# Patient Record
Sex: MALE | Race: WHITE | NOT HISPANIC OR LATINO | Employment: OTHER | URBAN - METROPOLITAN AREA
[De-identification: names, ages, dates, MRNs, and addresses within clinical notes are randomized per-mention and may not be internally consistent; named-entity substitution may affect disease eponyms.]

---

## 2019-01-29 ENCOUNTER — TELEPHONE (OUTPATIENT)
Dept: UROLOGY | Facility: CLINIC | Age: 64
End: 2019-01-29

## 2019-01-29 NOTE — TELEPHONE ENCOUNTER
Spoke w pt and informed him I was calling to inquire about any past urologist he has ever seen or any outside psa labs that may have been performed outside of Dr Hua office pt voiced ok yes and that he has previously seen Dr Pradhan records requested.

## 2019-02-03 ENCOUNTER — TELEPHONE (OUTPATIENT)
Dept: UROLOGY | Facility: CLINIC | Age: 64
End: 2019-02-03

## 2019-02-04 ENCOUNTER — LAB VISIT (OUTPATIENT)
Dept: LAB | Facility: HOSPITAL | Age: 64
End: 2019-02-04
Attending: UROLOGY
Payer: COMMERCIAL

## 2019-02-04 ENCOUNTER — OFFICE VISIT (OUTPATIENT)
Dept: UROLOGY | Facility: CLINIC | Age: 64
End: 2019-02-04
Payer: COMMERCIAL

## 2019-02-04 VITALS
SYSTOLIC BLOOD PRESSURE: 121 MMHG | DIASTOLIC BLOOD PRESSURE: 73 MMHG | RESPIRATION RATE: 18 BRPM | BODY MASS INDEX: 26.05 KG/M2 | WEIGHT: 182 LBS | HEART RATE: 71 BPM | HEIGHT: 70 IN

## 2019-02-04 DIAGNOSIS — N40.1 BPH WITH OBSTRUCTION/LOWER URINARY TRACT SYMPTOMS: Primary | ICD-10-CM

## 2019-02-04 DIAGNOSIS — Z12.5 PROSTATE CANCER SCREENING: ICD-10-CM

## 2019-02-04 DIAGNOSIS — N13.8 BPH WITH OBSTRUCTION/LOWER URINARY TRACT SYMPTOMS: Primary | ICD-10-CM

## 2019-02-04 LAB — COMPLEXED PSA SERPL-MCNC: 2.5 NG/ML

## 2019-02-04 PROCEDURE — 99244 PR OFFICE CONSULTATION,LEVEL IV: ICD-10-PCS | Mod: S$GLB,,, | Performed by: UROLOGY

## 2019-02-04 PROCEDURE — 99999 PR PBB SHADOW E&M-EST. PATIENT-LVL III: ICD-10-PCS | Mod: PBBFAC,,, | Performed by: UROLOGY

## 2019-02-04 PROCEDURE — 99244 OFF/OP CNSLTJ NEW/EST MOD 40: CPT | Mod: S$GLB,,, | Performed by: UROLOGY

## 2019-02-04 PROCEDURE — 99999 PR PBB SHADOW E&M-EST. PATIENT-LVL III: CPT | Mod: PBBFAC,,, | Performed by: UROLOGY

## 2019-02-04 PROCEDURE — 84153 ASSAY OF PSA TOTAL: CPT

## 2019-02-04 PROCEDURE — 36415 COLL VENOUS BLD VENIPUNCTURE: CPT

## 2019-02-04 RX ORDER — TAMSULOSIN HYDROCHLORIDE 0.4 MG/1
0.4 CAPSULE ORAL NIGHTLY
Qty: 30 CAPSULE | Refills: 11 | Status: SHIPPED | OUTPATIENT
Start: 2019-02-04 | End: 2019-08-07

## 2019-02-04 RX ORDER — GARLIC 200 MG
TABLET ORAL
COMMUNITY
End: 2019-08-07

## 2019-02-04 RX ORDER — PANTOPRAZOLE SODIUM 40 MG/1
40 TABLET, DELAYED RELEASE ORAL DAILY
COMMUNITY
End: 2019-08-07 | Stop reason: SDUPTHER

## 2019-02-04 RX ORDER — KETOCONAZOLE 20 MG/G
1 CREAM TOPICAL
COMMUNITY
End: 2019-08-07

## 2019-02-04 RX ORDER — AA/PROT/LYSINE/METHIO/VIT C/B6 50-12.5 MG
1 TABLET ORAL
COMMUNITY
End: 2019-08-07

## 2019-02-04 NOTE — TELEPHONE ENCOUNTER
Patient coming in Monday morning 2/4/19 at 0845 generated from referral sent 1/29/19 from Dr Hua office.     Only records received are 9/4/18 clinic note and one psa from 3/5.    Need:   1. most recent 1/2019 clinc note from visit from which referral was generated  2. ALL psas on record throughout his history with the practice (as he has been a patient since at least 2014)    Please CALL to request asap so can be available during his appt this morning

## 2019-02-04 NOTE — PROGRESS NOTES
[facility] Urology Consult:  Consult from: Dr Kemp  Consult for: BPH    Ashish Dave is a 63 y.o. male referred by Dr Kemp for evaluation of BPH/LUTS    He has been followed by Dr Kemp and noted to have BPH/LUTS since at least 2016.  Has been taking prostate supplement capsule Now prostate health. Has been taking them daily, sometimes BID sometimes TID  (Vitamin D-3, zinc, Selenium Saw Palmetto Phytosterols Quercetin Turmeric Root Pumpkin Seed Oil Lycopene Green Tea Extract Pomegranate flaxseed etc)  PSA: 2.8 on 3/1/18    Previously followed by Dr Ness, and then Dr Pradhan when Dr Ness when he retired  Recalls his PSAs were always 1.1-1.2 and was told he had an enlarged prostate     Usually no hesitancy, but does endorse weakening stream. 3/4 time is poor flow. Occ intermittency. Occ PV dribble  Father  at 87 and was diagnosed with prostate cancer in his 80s and it was removed  Stable on supplements, not worsening but not improving.  Generally averse to Rx meds.  NTF x1. Does drink a sleepy time herbal tea at bedtime  No hematuria, dysuria  Udip negative    AUA SS: 13/2 (3: urgency, weak stream; 2: emptying, straining; 1: frequency, intermittency, sleeping)      Past Medical History:   Diagnosis Date    GERD (gastroesophageal reflux disease)        Past Surgical History:   Procedure Laterality Date    KNEE SURGERY         No family history on file.    Social History     Socioeconomic History    Marital status:      Spouse name: Not on file    Number of children: Not on file    Years of education: Not on file    Highest education level: Not on file   Social Needs    Financial resource strain: Not on file    Food insecurity - worry: Not on file    Food insecurity - inability: Not on file    Transportation needs - medical: Not on file    Transportation needs - non-medical: Not on file   Occupational History    Not on file   Tobacco Use    Smoking status: Never Smoker   Substance and  "Sexual Activity    Alcohol use: Not on file    Drug use: Not on file    Sexual activity: Not on file   Other Topics Concern    Not on file   Social History Narrative    Not on file       Review of patient's allergies indicates:  Allergies no known allergies    Medications Reviewed: see MAR    ROS:    Constitutional: denies fevers, chills, night sweats, fatigue, malaise  Respiratory: negative for cough, shortness of breath, wheezing, dyspnea.  Cardiovascular: negative for chest pain, varicose veins, ankle swelling, palpitations, syncope.  GI: negative for abdominal pain, heartburn, indigestion, nausea, vomiting, constipation, diarrhea, blood in stool.   Urology: as noted above in HPI  Endocrinology: negative for cold intolerance, excessive thirst, not feeling tired/sluggish, no heat intolerance.   Hematology/Lymph: negative for easy bleeding, easy bruising, swollen glands.  Musculoskeletal: negative for back pain, joint pain, joint swelling, neck pain.  Allergy-Immunology: negative for seasonal allergies, negative for unusual infections.   Skin: negative for boils, breast lumps, hives, itching, rash.   Neurology: negative for, dizziness, headache, tingling/numbness, tremors.   Psych: satisfied with life; negative for, anxiety, depression, suicidal thoughts.     PHYSICAL EXAM:    Vitals:    02/04/19 0856   BP: 121/73   Pulse: 71   Resp: 18     Body mass index is 26.11 kg/m². Weight: 82.6 kg (181 lb 15.8 oz) Height: 5' 10" (177.8 cm)       General: Alert, cooperative, no distress, appears stated age  Head: Normocephalic, without obvious abnormality, atraumatic  Neck: no masses, no thyromegaly, no lymphadenopathy  Eyes: PERRL, conjunctiva/corneas clear  Lungs: Respirations unlabored, normal effort, no accessory muscle use  CV: Warm and well perfused extremities  Abdomen: Soft, non-tender, no CVA tenderness, no hepatosplenomegaly, no hernia  Penis: phallus normal, circumcised, well cared for, no plaques or lesions. "   Scrotum: no cysts, no lesions, no rash, no hydrocele.   Epididymes: normal, nontender, symmetrical, no masses or cysts.   Testes: normal, both descended, no masses.   Urethra: palpably normal with orthotopic meatus of normal size    HEIDY: normal sphincter tone, no masses, no hemmorrhoids   PROSTATE: 35g, no nodules, non-tender, symmetrical.   Extremities: Extremities normal, atraumatic, no cyanosis or edema  Skin: Normal color, texture, and turgor, no rashes or lesions  Psych: Appropriate, well oriented, normal affect, normal mood  Neuro: Non-focal      Assessment/Diagnosis:    1. BPH with obstruction/lower urinary tract symptoms  POCT URINE DIPSTICK WITHOUT MICROSCOPE   2. Prostate cancer screening  PSA, Screening       Plans:  He does have an enlarged prostate with moderate LUTS.   Has only ever been managed with OTC supplements without much clinical benefit.  Given longstanding symptoms recommended further intervention  Discussed medical therapy and minimally invasive BPH interventions  He is generally medication averse but we did discuss an alpha blocker trial.  Flomax 0.4mg daily Rxed. Discussed common side effects such as dizziness and retrograde ejaculation  Also discussed limiting fluid intake 2h before bed  Daily flomax for 3-4 months then RTC for uroflow/pvr as office based assessment of obstructive voiding.   If happy on meds with minimal side effects, will continue. Otherwise can discuss further lower tract evaluation with cysto/trus to evaluate for minimally invasive BPH interventions.    As well, will request further psa history from Mitzy Pradhan and Justo. Did discuss routine psa screening as well as psa velocity.   As psa last year was 2.8, which is a normal psa, would be of concern if psa truly was always 1.1-1.2 prior as he relayed.  Will get PSA today and chart check results, and trend with history and if any concern can proceed with further diagnostics which we briefly discussed    All questions  patient had were answered, and he is agreeable to treatment plan.

## 2019-02-04 NOTE — LETTER
February 4, 2019        Phil Kemp MD  [address]  [address]  [facility]  [place] [address]             [place] [facility]  [address] [place] [address] [address]  [place] [address]  Phone: [phone]  Fax: [phone]   Patient: Ashish Dave   MR Number: [id number]   YOB: 1955   Date of Visit: 2/4/2019       Dear Dr. Kemp:    Thank you for referring Ashish Dave to me for evaluation. Attached you will find relevant portions of my assessment and plan of care.    If you have questions, please do not hesitate to call me. I look forward to following Ashish Dave along with you.    Sincerely,      Phil Werner MD            CC  No Recipients    Enclosure

## 2019-02-19 ENCOUNTER — TELEPHONE (OUTPATIENT)
Dept: UROLOGY | Facility: CLINIC | Age: 64
End: 2019-02-19

## 2019-02-19 NOTE — TELEPHONE ENCOUNTER
----- Message from Sang Severino sent at 2/19/2019  9:38 AM CST -----  Type:  Patient Call Back    Who Called: pt   Who Left Message for Patient:nurse  Does the patient know what this is regarding?: psa results  Best Call Back Number:  [phone]  Additional Information:  Please call pt and leave a detailed message if there is no answer.

## 2019-08-07 ENCOUNTER — OFFICE VISIT (OUTPATIENT)
Dept: SURGERY | Facility: CLINIC | Age: 64
End: 2019-08-07
Payer: COMMERCIAL

## 2019-08-07 ENCOUNTER — HOSPITAL ENCOUNTER (EMERGENCY)
Facility: HOSPITAL | Age: 64
Discharge: HOME | End: 2019-08-07
Attending: EMERGENCY MEDICINE
Payer: COMMERCIAL

## 2019-08-07 VITALS
HEART RATE: 63 BPM | RESPIRATION RATE: 39 BRPM | HEIGHT: 70 IN | WEIGHT: 186 LBS | DIASTOLIC BLOOD PRESSURE: 83 MMHG | SYSTOLIC BLOOD PRESSURE: 141 MMHG | BODY MASS INDEX: 26.63 KG/M2 | TEMPERATURE: 98 F | OXYGEN SATURATION: 100 %

## 2019-08-07 VITALS
SYSTOLIC BLOOD PRESSURE: 132 MMHG | HEART RATE: 64 BPM | BODY MASS INDEX: 25.77 KG/M2 | WEIGHT: 180 LBS | DIASTOLIC BLOOD PRESSURE: 85 MMHG | TEMPERATURE: 98 F | HEIGHT: 70 IN

## 2019-08-07 DIAGNOSIS — R07.9 CHEST PAIN: ICD-10-CM

## 2019-08-07 DIAGNOSIS — K81.9 CHOLECYSTITIS: Primary | ICD-10-CM

## 2019-08-07 DIAGNOSIS — K80.10 CHRONIC CALCULOUS CHOLECYSTITIS: Primary | ICD-10-CM

## 2019-08-07 DIAGNOSIS — R10.13 EPIGASTRIC PAIN: ICD-10-CM

## 2019-08-07 LAB
ALBUMIN SERPL BCP-MCNC: 4.6 G/DL (ref 3.5–5.2)
ALP SERPL-CCNC: 48 U/L (ref 55–135)
ALT SERPL W/O P-5'-P-CCNC: 21 U/L (ref 10–44)
ANION GAP SERPL CALC-SCNC: 12 MMOL/L (ref 8–16)
AST SERPL-CCNC: 28 U/L (ref 10–40)
BASOPHILS # BLD AUTO: 0.09 K/UL (ref 0–0.2)
BASOPHILS NFR BLD: 1 % (ref 0–1.9)
BILIRUB SERPL-MCNC: 0.9 MG/DL (ref 0.1–1)
BILIRUB UR QL STRIP: NEGATIVE
BNP SERPL-MCNC: 42 PG/ML (ref 0–99)
BUN SERPL-MCNC: 13 MG/DL (ref 8–23)
CALCIUM SERPL-MCNC: 9.5 MG/DL (ref 8.7–10.5)
CHLORIDE SERPL-SCNC: 102 MMOL/L (ref 95–110)
CLARITY UR: CLEAR
CO2 SERPL-SCNC: 24 MMOL/L (ref 23–29)
COLOR UR: YELLOW
CREAT SERPL-MCNC: 0.7 MG/DL (ref 0.5–1.4)
DIFFERENTIAL METHOD: ABNORMAL
EOSINOPHIL # BLD AUTO: 0.1 K/UL (ref 0–0.5)
EOSINOPHIL NFR BLD: 0.7 % (ref 0–8)
ERYTHROCYTE [DISTWIDTH] IN BLOOD BY AUTOMATED COUNT: 14.5 % (ref 11.5–14.5)
EST. GFR  (AFRICAN AMERICAN): >60 ML/MIN/1.73 M^2
EST. GFR  (NON AFRICAN AMERICAN): >60 ML/MIN/1.73 M^2
GLUCOSE SERPL-MCNC: 132 MG/DL (ref 70–110)
GLUCOSE UR QL STRIP: NEGATIVE
HCT VFR BLD AUTO: 48.4 % (ref 40–54)
HGB BLD-MCNC: 16 G/DL (ref 14–18)
HGB UR QL STRIP: NEGATIVE
IMM GRANULOCYTES # BLD AUTO: 0.03 K/UL (ref 0–0.04)
IMM GRANULOCYTES NFR BLD AUTO: 0.3 % (ref 0–0.5)
INR PPP: 1
KETONES UR QL STRIP: ABNORMAL
LEUKOCYTE ESTERASE UR QL STRIP: NEGATIVE
LIPASE SERPL-CCNC: 29 U/L (ref 4–60)
LYMPHOCYTES # BLD AUTO: 1.2 K/UL (ref 1–4.8)
LYMPHOCYTES NFR BLD: 13 % (ref 18–48)
MAGNESIUM SERPL-MCNC: 1.9 MG/DL (ref 1.6–2.6)
MCH RBC QN AUTO: 29.9 PG (ref 27–31)
MCHC RBC AUTO-ENTMCNC: 33.1 G/DL (ref 32–36)
MCV RBC AUTO: 90 FL (ref 82–98)
MONOCYTES # BLD AUTO: 0.6 K/UL (ref 0.3–1)
MONOCYTES NFR BLD: 7 % (ref 4–15)
NEUTROPHILS # BLD AUTO: 7.2 K/UL (ref 1.8–7.7)
NEUTROPHILS NFR BLD: 78 % (ref 38–73)
NITRITE UR QL STRIP: NEGATIVE
NRBC BLD-RTO: 0 /100 WBC
PH UR STRIP: 6 [PH] (ref 5–8)
PLATELET # BLD AUTO: 197 K/UL (ref 150–350)
PMV BLD AUTO: 9.7 FL (ref 9.2–12.9)
POTASSIUM SERPL-SCNC: 3.3 MMOL/L (ref 3.5–5.1)
PROT SERPL-MCNC: 7.5 G/DL (ref 6–8.4)
PROT UR QL STRIP: NEGATIVE
PROTHROMBIN TIME: 12.5 SEC (ref 11.7–14)
RBC # BLD AUTO: 5.36 M/UL (ref 4.6–6.2)
SODIUM SERPL-SCNC: 138 MMOL/L (ref 136–145)
SP GR UR STRIP: 1 (ref 1–1.03)
TROPONIN I SERPL DL<=0.01 NG/ML-MCNC: <0.03 NG/ML (ref 0.02–0.04)
TROPONIN I SERPL DL<=0.01 NG/ML-MCNC: <0.03 NG/ML (ref 0.02–0.5)
TSH SERPL DL<=0.005 MIU/L-ACNC: 3.63 UIU/ML (ref 0.34–5.6)
URN SPEC COLLECT METH UR: ABNORMAL
UROBILINOGEN UR STRIP-ACNC: NEGATIVE EU/DL
WBC # BLD AUTO: 9.16 K/UL (ref 3.9–12.7)

## 2019-08-07 PROCEDURE — 3008F BODY MASS INDEX DOCD: CPT | Mod: S$GLB,,, | Performed by: SURGERY

## 2019-08-07 PROCEDURE — 99203 PR OFFICE/OUTPT VISIT, NEW, LEVL III, 30-44 MIN: ICD-10-PCS | Mod: S$GLB,,, | Performed by: SURGERY

## 2019-08-07 PROCEDURE — 84484 ASSAY OF TROPONIN QUANT: CPT

## 2019-08-07 PROCEDURE — 83880 ASSAY OF NATRIURETIC PEPTIDE: CPT

## 2019-08-07 PROCEDURE — 85610 PROTHROMBIN TIME: CPT

## 2019-08-07 PROCEDURE — 84484 ASSAY OF TROPONIN QUANT: CPT | Mod: 91

## 2019-08-07 PROCEDURE — 99999 PR PBB SHADOW E&M-EST. PATIENT-LVL III: CPT | Mod: PBBFAC,,, | Performed by: SURGERY

## 2019-08-07 PROCEDURE — 93005 ELECTROCARDIOGRAM TRACING: CPT

## 2019-08-07 PROCEDURE — 99203 OFFICE O/P NEW LOW 30 MIN: CPT | Mod: S$GLB,,, | Performed by: SURGERY

## 2019-08-07 PROCEDURE — 83690 ASSAY OF LIPASE: CPT

## 2019-08-07 PROCEDURE — 85025 COMPLETE CBC W/AUTO DIFF WBC: CPT

## 2019-08-07 PROCEDURE — 81003 URINALYSIS AUTO W/O SCOPE: CPT

## 2019-08-07 PROCEDURE — 99999 PR PBB SHADOW E&M-EST. PATIENT-LVL III: ICD-10-PCS | Mod: PBBFAC,,, | Performed by: SURGERY

## 2019-08-07 PROCEDURE — 36415 COLL VENOUS BLD VENIPUNCTURE: CPT

## 2019-08-07 PROCEDURE — 80053 COMPREHEN METABOLIC PANEL: CPT

## 2019-08-07 PROCEDURE — 99285 EMERGENCY DEPT VISIT HI MDM: CPT

## 2019-08-07 PROCEDURE — 83735 ASSAY OF MAGNESIUM: CPT

## 2019-08-07 PROCEDURE — 84443 ASSAY THYROID STIM HORMONE: CPT

## 2019-08-07 PROCEDURE — 3008F PR BODY MASS INDEX (BMI) DOCUMENTED: ICD-10-PCS | Mod: S$GLB,,, | Performed by: SURGERY

## 2019-08-07 RX ORDER — PANTOPRAZOLE SODIUM 40 MG/1
TABLET, DELAYED RELEASE ORAL
COMMUNITY
End: 2019-11-20 | Stop reason: SDUPTHER

## 2019-08-07 RX ORDER — NAFTIFINE HYDROCHLORIDE 20 MG/G
GEL TOPICAL
COMMUNITY
End: 2019-08-07

## 2019-08-07 RX ORDER — CLINDAMYCIN HYDROCHLORIDE 300 MG/1
CAPSULE ORAL
COMMUNITY
End: 2019-08-07 | Stop reason: ALTCHOICE

## 2019-08-07 RX ORDER — HYDROCORTISONE 25 MG/G
2.5 CREAM TOPICAL
COMMUNITY
Start: 2015-12-14 | End: 2019-08-07

## 2019-08-07 RX ORDER — CLINDAMYCIN HYDROCHLORIDE 300 MG/1
CAPSULE ORAL
Refills: 0 | COMMUNITY
Start: 2019-05-30 | End: 2019-08-07 | Stop reason: SDUPTHER

## 2019-08-07 RX ORDER — NAPROXEN 500 MG/1
500 TABLET ORAL
COMMUNITY
Start: 2016-11-22 | End: 2019-08-07

## 2019-08-07 RX ORDER — TERBINAFINE HYDROCHLORIDE 250 MG/1
250 TABLET ORAL DAILY
Refills: 0 | COMMUNITY
Start: 2019-06-27 | End: 2019-08-07

## 2019-08-07 RX ORDER — LIDOCAINE HYDROCHLORIDE 10 MG/ML
1 INJECTION, SOLUTION EPIDURAL; INFILTRATION; INTRACAUDAL; PERINEURAL ONCE
Status: DISCONTINUED | OUTPATIENT
Start: 2019-08-07 | End: 2019-08-12 | Stop reason: HOSPADM

## 2019-08-07 RX ORDER — NAFTIFINE HYDROCHLORIDE 2 G/100G
GEL TOPICAL
Refills: 5 | COMMUNITY
Start: 2019-06-04 | End: 2019-08-07

## 2019-08-07 RX ORDER — TRIAMCINOLONE ACETONIDE 1 MG/G
CREAM TOPICAL
Refills: 5 | COMMUNITY
Start: 2019-06-27 | End: 2019-08-07

## 2019-08-07 RX ORDER — PANTOPRAZOLE SODIUM 20 MG/1
40 TABLET, DELAYED RELEASE ORAL
COMMUNITY
Start: 2017-09-01 | End: 2019-08-07

## 2019-08-07 RX ORDER — SODIUM CHLORIDE 9 MG/ML
INJECTION, SOLUTION INTRAVENOUS CONTINUOUS
Status: CANCELLED | OUTPATIENT
Start: 2019-08-07

## 2019-08-07 RX ORDER — BENAZEPRIL HYDROCHLORIDE 10 MG/1
10 TABLET ORAL
COMMUNITY
Start: 2015-01-29 | End: 2019-08-07

## 2019-08-07 NOTE — ED PROVIDER NOTES
Encounter Date: 8/7/2019       History     Chief Complaint   Patient presents with    Chest Pain     radiates to back. started 3 hours ago.      63-year-old male presents complaining of chest and epigastric pain started today, patient localizes pain to the substernal region near the bottom of the sternal, patient also reports mild radiation of pain to the right.  Patient rates pain as 7/10 describes it as sharp in nature patient denies alleviating or exacerbating factors patient denies shortness of breath or fever patient does report mild associated nausea.        Review of patient's allergies indicates:  No Known Allergies  Past Medical History:   Diagnosis Date    GERD (gastroesophageal reflux disease)      Past Surgical History:   Procedure Laterality Date    KNEE SURGERY       No family history on file.  Social History     Tobacco Use    Smoking status: Never Smoker   Substance Use Topics    Alcohol use: Not on file    Drug use: Not on file     Review of Systems   Constitutional: Negative for fever.   HENT: Negative for congestion, rhinorrhea, sore throat and trouble swallowing.    Eyes: Negative for visual disturbance.   Respiratory: Negative for cough, chest tightness, shortness of breath and wheezing.    Cardiovascular: Positive for chest pain. Negative for palpitations and leg swelling.   Gastrointestinal: Positive for nausea. Negative for abdominal distention, abdominal pain, constipation, diarrhea and vomiting.   Genitourinary: Negative for difficulty urinating, dysuria, flank pain and frequency.   Musculoskeletal: Negative for arthralgias, back pain, joint swelling and neck pain.   Skin: Negative for color change and rash.   Neurological: Negative for dizziness, syncope, speech difficulty, weakness, numbness and headaches.   All other systems reviewed and are negative.      Physical Exam     Initial Vitals [08/07/19 0213]   BP Pulse Resp Temp SpO2   (!) 179/94 (!) 55 19 97.9 °F (36.6 °C) 99 %       MAP       --         Physical Exam    Nursing note and vitals reviewed.  Constitutional: He appears well-developed and well-nourished. He is not diaphoretic. No distress.   HENT:   Head: Normocephalic and atraumatic.   Right Ear: External ear normal.   Left Ear: External ear normal.   Nose: Nose normal.   Mouth/Throat: Oropharynx is clear and moist. No oropharyngeal exudate.   Eyes: Conjunctivae and EOM are normal. Pupils are equal, round, and reactive to light. Right eye exhibits no discharge. Left eye exhibits no discharge. No scleral icterus.   Neck: Normal range of motion. Neck supple. No thyromegaly present. No tracheal deviation present. No JVD present.   Cardiovascular: Normal rate, regular rhythm, normal heart sounds and intact distal pulses. Exam reveals no gallop and no friction rub.    No murmur heard.  Pulmonary/Chest: Breath sounds normal. No stridor. No respiratory distress. He has no wheezes. He has no rhonchi. He has no rales. He exhibits no tenderness.   Abdominal: Soft. Bowel sounds are normal. He exhibits no distension and no mass. There is tenderness. There is no rebound and no guarding.   Patient has mild tenderness to the substernal region as well as right upper quadrant   Musculoskeletal: Normal range of motion. He exhibits no edema or tenderness.   Lymphadenopathy:     He has no cervical adenopathy.   Neurological: He is alert and oriented to person, place, and time. He has normal strength and normal reflexes. He displays normal reflexes. No cranial nerve deficit or sensory deficit.   Skin: Skin is warm and dry. No rash noted. No erythema.         ED Course   Procedures  Labs Reviewed   LIPASE   MAGNESIUM   PROTIME-INR   TSH   CBC W/ AUTO DIFFERENTIAL   COMPREHENSIVE METABOLIC PANEL   TROPONIN I   B-TYPE NATRIURETIC PEPTIDE   URINALYSIS          Imaging Results    None          Medical Decision Making:   History:   Old Medical Records: I decided to obtain old medical records.  Initial  Assessment:   Emergent evaluation of a 63-year-old male presenting with chest pain and epigastric pain differential diagnosis includes ACS, electrolyte abnormality, endocrine dysfunction, infection        [place] of Care:  Assumed care from Dr. Holley pending right upper quadrant ultrasound and repeat troponin.  Agree with his assessment and plan unless otherwise noted. Reassessed.  Patient lying in bed in no acute distress.  States he has not had any further chest pain. States that the pain that he has had felt exactly like his previous episodes of GERD, it just lasted longer than before.  Endorses nausea but no sweating.  Patient has no family history of CAD.  Remote history of smoking.  The patient has few risk factors for CAD.  And this history is more concerning for a GI cause.  Right upper quadrant ultrasound reportedly shows gallstones with mild wall thickening and pericholecystic edema concerning for early cholecystitis.  Will discuss with surgery.  Obed Hernandez MD  Emergency Medicine  8/7/2019 8:11 AM    Discussed with Dr. Freeman.  States that the patient's gallbladder should come out sooner than later.  States he can either be admitted to have it done today or scheduled for this week.  Patient states that he would rather not have the surgery today.  Dr. Freeman states that he will see him in his office immediately after discharge for evaluation and scheduling.  Patient is amenable to this plan.  Repeat troponin is negative.  Suspect all of his symptoms are likely secondary to cholecystitis.  Patient given a copy of his ultrasound read and instructed to go upstairs to [address] where Dr. Freeman is expecting him.  Patient and wife understand the plan.  Obed Hernandez MD  Emergency Medicine  8/7/2019 9:12 AM                       Clinical Impression:       ICD-10-CM ICD-9-CM   1. Cholecystitis K81.9 575.10   2. Chest pain R07.9 786.50   3. Epigastric pain R10.13 789.06                                Obed  SUELLEN Hernandez MD  08/07/19 0928

## 2019-08-07 NOTE — H&P (VIEW-ONLY)
Subjective:       Patient ID: Ashish Dave is a 63 y.o. male.    Chief Complaint: Other (Referred by [facility] ER to eval gallbladder)      HPI:  Patient is 63-year-old male who was referred to the office from the emergency department.  He presented to the emergency department overnight with severe right upper quadrant and epigastric abdominal pain radiating to his right back.  Pain was associated with nausea.  Pain came on suddenly and remained persistent for about 4 hr.  Pain subsided while in the emergency department without pain medicine.  Ultrasound right upper quadrant showed gallbladder containing gallstones with wall thickening and mild pericholecystic fluid.  LFTs were within normal limits.  White blood cell count was normal at 9.  He states he has had pain in the right upper quadrant in the past lasting only 20-30 minutes at a time.  The mild symptoms go back about 1 year.  He has no history of abdominal surgeries.  He has past medical history of GERD.  Past Medical History:   Diagnosis Date    GERD (gastroesophageal reflux disease)      Past Surgical History:   Procedure Laterality Date    KNEE SURGERY Right     x's3     Review of patient's allergies indicates:  No Known Allergies  Medication List with Changes/Refills   Current Medications    PANTOPRAZOLE (PROTONIX) 40 MG TABLET    pantoprazole 40 mg tablet,delayed release   Discontinued Medications    BENAZEPRIL (LOTENSIN) 10 MG TABLET    Take 10 mg by mouth.    CLINDAMYCIN (CLEOCIN) 300 MG CAPSULE    clindamycin HCl 300 mg capsule   TAKE 1 CAPSULE BY MOUTH THREE TIMES DAILY FOR 10 DAYS    CLINDAMYCIN (CLEOCIN) 300 MG CAPSULE    TAKE 1 CAPSULE BY MOUTH THREE TIMES DAILY FOR 10 DAYS    HYDROCORTISONE (PROCTOSOL HC) 2.5 % RECTAL CREAM    Place 2.5 % rectally.    NAFTIFINE (NAFTIN) 2 % GEL    Naftin 2 % topical gel   APPLY 1 APPLICATION ON THE SKIN TWICE A DAY    NAFTIN 2 % GEL    APPLY 1 APPLICATION ON THE SKIN TWICE A DAY    NAPROXEN (NAPROSYN) 500 MG  TABLET    Take 500 mg by mouth.    PANTOPRAZOLE (PROTONIX) 20 MG TABLET    Take 40 mg by mouth.    PANTOPRAZOLE (PROTONIX) 40 MG TABLET    Take 40 mg by mouth once daily.    ROPINIROLE HCL (ROPINIROLE ORAL)    Take 1 mg by mouth.    TAMSULOSIN (FLOMAX) 0.4 MG CAP    Take 1 capsule (0.4 mg total) by mouth every evening.    TERBINAFINE HCL (LAMISIL) 250 MG TABLET    Take 250 mg by mouth once daily.    TRIAMCINOLONE ACETONIDE 0.1% (KENALOG) 0.1 % CREAM    APPLY ON THE SKIN TWICE DAILY AS NEEDED     Family History   Problem Relation Age of Onset    Stroke Mother     No Known Problems Father      Social History     Socioeconomic History    Marital status:      Spouse name: Not on file    Number of children: Not on file    Years of education: Not on file    Highest education level: Not on file   Occupational History    Not on file   Social Needs    Financial resource strain: Not on file    Food insecurity:     Worry: Not on file     Inability: Not on file    Transportation needs:     Medical: Not on file     Non-medical: Not on file   Tobacco Use    Smoking status: Never Smoker    Smokeless tobacco: Never Used   Substance and Sexual Activity    Alcohol use: Never     Frequency: Never    Drug use: Not on file    Sexual activity: Not on file   Lifestyle    Physical activity:     Days per week: Not on file     Minutes per session: Not on file    Stress: Not on file   Relationships    Social connections:     Talks on phone: Not on file     Gets together: Not on file     Attends Mosque service: Not on file     Active member of club or organization: Not on file     Attends meetings of clubs or organizations: Not on file     Relationship status: Not on file   Other Topics Concern    Not on file   Social History Narrative    Not on file         Review of Systems   Constitutional: Negative for appetite change, chills, fever and unexpected weight change.   HENT: Negative for hearing loss, rhinorrhea,  sore throat and voice change.    Eyes: Negative for photophobia and visual disturbance.   Respiratory: Negative for cough, choking and shortness of breath.    Cardiovascular: Negative for chest pain, palpitations and leg swelling.   Gastrointestinal: Positive for abdominal pain and nausea. Negative for blood in stool, constipation, diarrhea and vomiting.   Endocrine: Negative for cold intolerance, heat intolerance and polyphagia.   Genitourinary: Negative for dysuria.   Musculoskeletal: Positive for back pain. Negative for arthralgias.   Skin: Negative for color change.   Neurological: Negative for dizziness, seizures, syncope and headaches.   Hematological: Negative for adenopathy. Does not bruise/bleed easily.       Objective:      Physical Exam   Constitutional: He is oriented to person, place, and time. He appears well-developed and well-nourished.  Non-toxic appearance. No distress.   HENT:   Head: Normocephalic and atraumatic. Head is without abrasion and without laceration.   Right Ear: External ear normal.   Left Ear: External ear normal.   Nose: Nose normal.   Mouth/Throat: Oropharynx is clear and moist.   Eyes: Pupils are equal, round, and reactive to light. EOM are normal.   Neck: Trachea normal. Neck supple. No tracheal deviation and normal range of motion present.   Cardiovascular: Normal rate and regular rhythm.   Pulmonary/Chest: Effort normal. No accessory muscle usage. No tachypnea. No respiratory distress.   Abdominal: Soft. Normal appearance and bowel sounds are normal. He exhibits no distension and no mass. There is no hepatosplenomegaly. There is no tenderness. There is no rigidity, no rebound, no guarding and negative Briggs's sign.   Lymphadenopathy:        Right: No inguinal adenopathy present.        Left: No inguinal adenopathy present.   Neurological: He is alert and oriented to person, place, and time. Coordination and gait normal.   Skin: Skin is warm and intact. No rash noted.    Psychiatric: He has a normal mood and affect. His speech is normal and behavior is normal.       Assessment/Plan:   Ashish was seen today for other.    Diagnoses and all orders for this visit:    Chronic calculous cholecystitis    He was in the emergency department overnight with severe symptoms.  Symptoms completely resolved after about 4 hr.  He has imaging evidence of chronic cholecystitis or possibly acute or subacute on chronic calculous cholecystitis with wall thickening and mild pericholecystic fluid.  I have recommended cholecystectomy.  Patient would like to discuss this with his family and also with his primary care physician today before proceeding with surgery date.  He will have follow-up with me in the near future.    Patient would like to proceed with surgery first part of next week.  He will be scheduled for lap chapin Monday.      Planned procedure:  Cholecystectomy

## 2019-08-07 NOTE — PROGRESS NOTES
Subjective:       Patient ID: Ashish Dave is a 63 y.o. male.    Chief Complaint: Other (Referred by [facility] ER to eval gallbladder)      HPI:  Patient is 63-year-old male who was referred to the office from the emergency department.  He presented to the emergency department overnight with severe right upper quadrant and epigastric abdominal pain radiating to his right back.  Pain was associated with nausea.  Pain came on suddenly and remained persistent for about 4 hr.  Pain subsided while in the emergency department without pain medicine.  Ultrasound right upper quadrant showed gallbladder containing gallstones with wall thickening and mild pericholecystic fluid.  LFTs were within normal limits.  White blood cell count was normal at 9.  He states he has had pain in the right upper quadrant in the past lasting only 20-30 minutes at a time.  The mild symptoms go back about 1 year.  He has no history of abdominal surgeries.  He has past medical history of GERD.  Past Medical History:   Diagnosis Date    GERD (gastroesophageal reflux disease)      Past Surgical History:   Procedure Laterality Date    KNEE SURGERY Right     x's3     Review of patient's allergies indicates:  No Known Allergies  Medication List with Changes/Refills   Current Medications    PANTOPRAZOLE (PROTONIX) 40 MG TABLET    pantoprazole 40 mg tablet,delayed release   Discontinued Medications    BENAZEPRIL (LOTENSIN) 10 MG TABLET    Take 10 mg by mouth.    CLINDAMYCIN (CLEOCIN) 300 MG CAPSULE    clindamycin HCl 300 mg capsule   TAKE 1 CAPSULE BY MOUTH THREE TIMES DAILY FOR 10 DAYS    CLINDAMYCIN (CLEOCIN) 300 MG CAPSULE    TAKE 1 CAPSULE BY MOUTH THREE TIMES DAILY FOR 10 DAYS    HYDROCORTISONE (PROCTOSOL HC) 2.5 % RECTAL CREAM    Place 2.5 % rectally.    NAFTIFINE (NAFTIN) 2 % GEL    Naftin 2 % topical gel   APPLY 1 APPLICATION ON THE SKIN TWICE A DAY    NAFTIN 2 % GEL    APPLY 1 APPLICATION ON THE SKIN TWICE A DAY    NAPROXEN (NAPROSYN) 500 MG  TABLET    Take 500 mg by mouth.    PANTOPRAZOLE (PROTONIX) 20 MG TABLET    Take 40 mg by mouth.    PANTOPRAZOLE (PROTONIX) 40 MG TABLET    Take 40 mg by mouth once daily.    ROPINIROLE HCL (ROPINIROLE ORAL)    Take 1 mg by mouth.    TAMSULOSIN (FLOMAX) 0.4 MG CAP    Take 1 capsule (0.4 mg total) by mouth every evening.    TERBINAFINE HCL (LAMISIL) 250 MG TABLET    Take 250 mg by mouth once daily.    TRIAMCINOLONE ACETONIDE 0.1% (KENALOG) 0.1 % CREAM    APPLY ON THE SKIN TWICE DAILY AS NEEDED     Family History   Problem Relation Age of Onset    Stroke Mother     No Known Problems Father      Social History     Socioeconomic History    Marital status:      Spouse name: Not on file    Number of children: Not on file    Years of education: Not on file    Highest education level: Not on file   Occupational History    Not on file   Social Needs    Financial resource strain: Not on file    Food insecurity:     Worry: Not on file     Inability: Not on file    Transportation needs:     Medical: Not on file     Non-medical: Not on file   Tobacco Use    Smoking status: Never Smoker    Smokeless tobacco: Never Used   Substance and Sexual Activity    Alcohol use: Never     Frequency: Never    Drug use: Not on file    Sexual activity: Not on file   Lifestyle    Physical activity:     Days per week: Not on file     Minutes per session: Not on file    Stress: Not on file   Relationships    Social connections:     Talks on phone: Not on file     Gets together: Not on file     Attends [employer] service: Not on file     Active member of club or organization: Not on file     Attends meetings of clubs or organizations: Not on file     Relationship status: Not on file   Other Topics Concern    Not on file   Social History Narrative    Not on file         Review of Systems   Constitutional: Negative for appetite change, chills, fever and unexpected weight change.   HENT: Negative for hearing loss, rhinorrhea,  sore throat and voice change.    Eyes: Negative for photophobia and visual disturbance.   Respiratory: Negative for cough, choking and shortness of breath.    Cardiovascular: Negative for chest pain, palpitations and leg swelling.   Gastrointestinal: Positive for abdominal pain and nausea. Negative for blood in stool, constipation, diarrhea and vomiting.   Endocrine: Negative for cold intolerance, heat intolerance and polyphagia.   Genitourinary: Negative for dysuria.   Musculoskeletal: Positive for back pain. Negative for arthralgias.   Skin: Negative for color change.   Neurological: Negative for dizziness, seizures, syncope and headaches.   Hematological: Negative for adenopathy. Does not bruise/bleed easily.       Objective:      Physical Exam   Constitutional: He is oriented to person, place, and time. He appears well-developed and well-nourished.  Non-toxic appearance. No distress.   HENT:   Head: Normocephalic and atraumatic. Head is without abrasion and without laceration.   Right Ear: External ear normal.   Left Ear: External ear normal.   Nose: Nose normal.   Mouth/Throat: Oropharynx is clear and moist.   Eyes: Pupils are equal, round, and reactive to light. EOM are normal.   Neck: Trachea normal. Neck supple. No tracheal deviation and normal range of motion present.   Cardiovascular: Normal rate and regular rhythm.   Pulmonary/Chest: Effort normal. No accessory muscle usage. No tachypnea. No respiratory distress.   Abdominal: Soft. Normal appearance and bowel sounds are normal. He exhibits no distension and no mass. There is no hepatosplenomegaly. There is no tenderness. There is no rigidity, no rebound, no guarding and negative Briggs's sign.   Lymphadenopathy:        Right: No inguinal adenopathy present.        Left: No inguinal adenopathy present.   Neurological: He is alert and oriented to person, place, and time. Coordination and gait normal.   Skin: Skin is warm and intact. No rash noted.    Psychiatric: He has a normal mood and affect. His speech is normal and behavior is normal.       Assessment/Plan:   Ashish was seen today for other.    Diagnoses and all orders for this visit:    Chronic calculous cholecystitis    He was in the emergency department overnight with severe symptoms.  Symptoms completely resolved after about 4 hr.  He has imaging evidence of chronic cholecystitis or possibly acute or subacute on chronic calculous cholecystitis with wall thickening and mild pericholecystic fluid.  I have recommended cholecystectomy.  Patient would like to discuss this with his family and also with his primary care physician today before proceeding with surgery date.  He will have follow-up with me in the near future.    Patient would like to proceed with surgery first part of next week.  He will be scheduled for lap chapin Monday.      Planned procedure:  Cholecystectomy

## 2019-08-08 ENCOUNTER — HOSPITAL ENCOUNTER (OUTPATIENT)
Dept: PREADMISSION TESTING | Facility: HOSPITAL | Age: 64
Discharge: HOME | End: 2019-08-08
Attending: SURGERY
Payer: COMMERCIAL

## 2019-08-08 VITALS
HEART RATE: 56 BPM | SYSTOLIC BLOOD PRESSURE: 142 MMHG | WEIGHT: 180.75 LBS | BODY MASS INDEX: 25.88 KG/M2 | TEMPERATURE: 98 F | HEIGHT: 70 IN | DIASTOLIC BLOOD PRESSURE: 86 MMHG | RESPIRATION RATE: 20 BRPM | OXYGEN SATURATION: 97 %

## 2019-08-08 DIAGNOSIS — Z01.818 PREOP TESTING: Primary | ICD-10-CM

## 2019-08-08 NOTE — PRE-PROCEDURE INSTRUCTIONS
Review of Systems   Gastrointestinal:        To the ER yesterday had pain 10/10 under breastbone and radiated toward right posteriorly  No pain today

## 2019-08-08 NOTE — DISCHARGE INSTRUCTIONS
Cholecystectomy     Clips close off the duct connecting the gallbladder to the bile duct. The gallbladder is then removed.     Youve had painful attacks caused by gallstones. To treat the problem, your healthcare provider wants to remove your gallbladder. This surgery is called cholecystectomy. Removing the gallbladder can relieve pain. It will also prevent future attacks. You can live a healthy life without your gallbladder. You may also be able to go back to eating foods you enjoyed before your gallbladder problems started.  Before your surgery  Be prepared:  · Tell your provider what medicines you take. Include those bought over the counter. Also include herbs or supplements. Be sure to mention if you take prescription blood thinners. This includes warfarin, clopidogrel, and aspirin.  · Have any tests your provider asks for, such as blood tests.  · Dont eat or drink after midnight, the night before your surgery. This includes water, coffee, and mints. However, you may need to take some medicine with sips of water--talk with your healthcare provider.  The day of surgery  When you arrive, you will prepare for surgery:  · An IV line will be put into a vein in your arm or hand. This gives you fluids and medicine.  · An anesthesiologist will talk with you about anesthesia. This is medicine used to prevent pain. You will receive general anesthesia. This puts you into a state like deep sleep through the procedure.  During surgery  There are 2 methods for removing the gallbladder. Your healthcare provider will choose which method is best for you:  · Laparoscopic cholecystectomy. This is most common. During surgery, 2 to 4 small incisions are made. A thin tube with a camera is used. This is called a laparoscope. The scope is put through one of the incisions. It sends images to a video screen. Surgical tools are put through other incisions. The gallbladder is removed using the scope and these tools.  · Open  cholecystectomy. One larger incision is made. The surgeon sees and works through this incision. Open surgery is most often used when scarring or other factors make it a better choice for you.  In some cases, safety requires a change from laparoscopic to open surgery during the procedure.  After surgery  You will be sent to a room to wake up from the anesthesia. You will likely go home the same day. In some cases, an overnight stay is needed. If you had open cholecystectomy, you may need to stay in the hospital for a few days. When you are released to go home, have a family member or friend ready to drive you.  Risks and possible complications of gallbladder surgery  All surgeries have risks. The risks of gallbladder surgery include:  · Bleeding  · Infection  · Injury to the common bile duct or nearby organs  · Blood clots in the legs  · Bile leaks  · Hernia at incision site  · Pnemonia   Date Last Reviewed: 7/1/2016  © 9812-0755 [employer]. [address] [place] [address]. All rights reserved. This information is not intended as a substitute for professional medical care. Always follow your healthcare professional's instructions.      To confirm, Your doctor has instructed you that surgery is scheduled for: August 12    Please report to [facility] on [address] the morning of surgery.     Pre-Op will call the afternoon prior to surgery between 4:00 and 6:00 PM with the final arrival time.      PLEASE NOTE:  The surgery schedule has many variables which may affect the time of your surgery case.  Family members should be available if your surgery time changes.  Plan to be here the day of your procedure between 4-6 hours.    MEDICATIONS:  TAKE ONLY THESE MEDICATIONS WITH A SMALL SIP OF WATER THE MORNING OF YOUR PROCEDURE: NONE      DO NOT TAKE THESE MEDICATIONS 5-7 DAYS PRIOR to your procedure or per your surgeon's request: ASPIRIN, ALEVE, ADVIL, IBUPROFEN, FISH OIL VITAMIN E,  HERBALS  (May take Tylenol)    ONLY if you are prescribed any types of blood thinners such as:  Aspirin, Coumadin, Plavix, Pradaxa, Xarelto, Aggrenox, Effient, Eliquis, Savasya, Brilinta, or any other, ask your surgeon whether you should stop taking them and how long before surgery you should stop.  You may also need to verify with the prescribing physician if it is ok to stop your medication.      INSTRUCTIONS IMPORTANT!!  · Do not eat or drink anything between midnight and the time of your procedure- this includes gum, mints, and candy.  · ONLY if you are diabetic, check your sugar in the morning before your procedure.  · Do not smoke, vape or drink alcoholic beverages 24 hours prior to your procedure.  · Shower the night before AND the morning of your procedure with a Chlorhexidine wash such as Hibiclens or Dial antibacterial soap from the neck down.  Do not get it on your face or in your eyes.  You may use your own shampoo and face wash. This helps your skin to be as bacteria free as possible.    · If you wear contact lenses, dentures, hearing aids or glasses, bring a container to put them in during surgery and give to a family member for safe keeping.  Please leave all jewelry, piercing's and valuables at home.   · DO NOT remove hair from the surgery site.  Do not shave the incision site unless you are given specific instructions to do so.    · ONLY if you have been diagnosed with sleep apnea please bring your C-PAP machine.  · ONLY if you wear home oxygen please bring your portable oxygen tank the day of your procedure.   · ONLY for patients requiring bowel prep, written instructions will be given by your doctor's office.  · ONLY if you have a neuro stimulator, please bring the controller with you the morning of surgery  · ONLY if a type and screen test is needed before surgery, please return:  · If your doctor has scheduled you for an overnight stay, bring a small overnight bag with any personal items you  need.  · Make arrangements in advance for transportation home by a responsible adult.  · You must make arrangements for transportation, TAXI'S, UBER'S OR LYFTS ARE NOT ALLOWED.          If you have any questions about these instructions, call Pre-Op Admit  Nursing at [phone] or the Pre-Op Day Surgery Unit at [phone].

## 2019-08-10 ENCOUNTER — ANESTHESIA EVENT (OUTPATIENT)
Dept: SURGERY | Facility: HOSPITAL | Age: 64
End: 2019-08-10
Payer: COMMERCIAL

## 2019-08-12 ENCOUNTER — HOSPITAL ENCOUNTER (OUTPATIENT)
Facility: HOSPITAL | Age: 64
Discharge: HOME | End: 2019-08-12
Attending: SURGERY | Admitting: SURGERY
Payer: COMMERCIAL

## 2019-08-12 ENCOUNTER — ANESTHESIA (OUTPATIENT)
Dept: SURGERY | Facility: HOSPITAL | Age: 64
End: 2019-08-12
Payer: COMMERCIAL

## 2019-08-12 VITALS
OXYGEN SATURATION: 97 % | SYSTOLIC BLOOD PRESSURE: 141 MMHG | TEMPERATURE: 98 F | DIASTOLIC BLOOD PRESSURE: 81 MMHG | RESPIRATION RATE: 16 BRPM | HEART RATE: 75 BPM

## 2019-08-12 DIAGNOSIS — K80.10 CHRONIC CALCULOUS CHOLECYSTITIS: Primary | ICD-10-CM

## 2019-08-12 DIAGNOSIS — Z01.818 PREOP TESTING: ICD-10-CM

## 2019-08-12 LAB — POTASSIUM SERPL-SCNC: 3.7 MMOL/L (ref 3.5–5.1)

## 2019-08-12 PROCEDURE — 71000039 HC RECOVERY, EACH ADD'L HOUR: Performed by: SURGERY

## 2019-08-12 PROCEDURE — 25000003 PHARM REV CODE 250: Performed by: SURGERY

## 2019-08-12 PROCEDURE — 63600175 PHARM REV CODE 636 W HCPCS: Performed by: NURSE ANESTHETIST, CERTIFIED REGISTERED

## 2019-08-12 PROCEDURE — 27202107 HC XP QUATRO SENSOR: Performed by: STUDENT IN AN ORGANIZED HEALTH CARE EDUCATION/TRAINING PROGRAM

## 2019-08-12 PROCEDURE — 36000709 HC OR TIME LEV III EA ADD 15 MIN: Performed by: SURGERY

## 2019-08-12 PROCEDURE — 27000673 HC TUBING BLOOD Y: Performed by: STUDENT IN AN ORGANIZED HEALTH CARE EDUCATION/TRAINING PROGRAM

## 2019-08-12 PROCEDURE — 37000009 HC ANESTHESIA EA ADD 15 MINS: Performed by: SURGERY

## 2019-08-12 PROCEDURE — 71000033 HC RECOVERY, INTIAL HOUR: Performed by: SURGERY

## 2019-08-12 PROCEDURE — 47562 LAPAROSCOPIC CHOLECYSTECTOMY: CPT | Mod: ,,, | Performed by: SURGERY

## 2019-08-12 PROCEDURE — 71000016 HC POSTOP RECOV ADDL HR: Performed by: SURGERY

## 2019-08-12 PROCEDURE — 27201423 OPTIME MED/SURG SUP & DEVICES STERILE SUPPLY: Performed by: SURGERY

## 2019-08-12 PROCEDURE — 84132 ASSAY OF SERUM POTASSIUM: CPT

## 2019-08-12 PROCEDURE — 36000708 HC OR TIME LEV III 1ST 15 MIN: Performed by: SURGERY

## 2019-08-12 PROCEDURE — 96374 THER/PROPH/DIAG INJ IV PUSH: CPT | Performed by: SURGERY

## 2019-08-12 PROCEDURE — 25000003 PHARM REV CODE 250: Performed by: ANESTHESIOLOGY

## 2019-08-12 PROCEDURE — 25000003 PHARM REV CODE 250: Performed by: NURSE ANESTHETIST, CERTIFIED REGISTERED

## 2019-08-12 PROCEDURE — 63600175 PHARM REV CODE 636 W HCPCS: Performed by: STUDENT IN AN ORGANIZED HEALTH CARE EDUCATION/TRAINING PROGRAM

## 2019-08-12 PROCEDURE — 37000008 HC ANESTHESIA 1ST 15 MINUTES: Performed by: SURGERY

## 2019-08-12 PROCEDURE — 71000015 HC POSTOP RECOV 1ST HR: Performed by: SURGERY

## 2019-08-12 PROCEDURE — 27000671 HC TUBING MICROBORE EXT: Performed by: STUDENT IN AN ORGANIZED HEALTH CARE EDUCATION/TRAINING PROGRAM

## 2019-08-12 PROCEDURE — 47562 PR LAP,CHOLECYSTECTOMY: ICD-10-PCS | Mod: ,,, | Performed by: SURGERY

## 2019-08-12 PROCEDURE — 25000003 PHARM REV CODE 250: Performed by: STUDENT IN AN ORGANIZED HEALTH CARE EDUCATION/TRAINING PROGRAM

## 2019-08-12 PROCEDURE — 27000080 OPTIME MED/SURG SUP & DEVICES GENERAL CLASSIFICATION: Performed by: SURGERY

## 2019-08-12 RX ORDER — ONDANSETRON 2 MG/ML
4 INJECTION INTRAMUSCULAR; INTRAVENOUS DAILY PRN
Status: DISCONTINUED | OUTPATIENT
Start: 2019-08-12 | End: 2019-08-12 | Stop reason: HOSPADM

## 2019-08-12 RX ORDER — PROPOFOL 10 MG/ML
VIAL (ML) INTRAVENOUS
Status: DISCONTINUED | OUTPATIENT
Start: 2019-08-12 | End: 2019-08-12

## 2019-08-12 RX ORDER — ACETAMINOPHEN 325 MG/1
TABLET ORAL
Status: DISCONTINUED | OUTPATIENT
Start: 2019-08-12 | End: 2019-08-12

## 2019-08-12 RX ORDER — LIDOCAINE HCL/PF 100 MG/5ML
SYRINGE (ML) INTRAVENOUS
Status: DISCONTINUED | OUTPATIENT
Start: 2019-08-12 | End: 2019-08-12

## 2019-08-12 RX ORDER — CELECOXIB 100 MG/1
CAPSULE ORAL
Status: DISCONTINUED | OUTPATIENT
Start: 2019-08-12 | End: 2019-08-12

## 2019-08-12 RX ORDER — IBUPROFEN 400 MG/1
400 TABLET ORAL EVERY 4 HOURS PRN
Status: ON HOLD | COMMUNITY
End: 2020-07-21 | Stop reason: HOSPADM

## 2019-08-12 RX ORDER — OXYCODONE HYDROCHLORIDE 5 MG/1
5 TABLET ORAL
Status: DISCONTINUED | OUTPATIENT
Start: 2019-08-12 | End: 2019-08-12 | Stop reason: HOSPADM

## 2019-08-12 RX ORDER — MIDAZOLAM HYDROCHLORIDE 1 MG/ML
INJECTION, SOLUTION INTRAMUSCULAR; INTRAVENOUS
Status: DISCONTINUED | OUTPATIENT
Start: 2019-08-12 | End: 2019-08-12

## 2019-08-12 RX ORDER — HYDROMORPHONE HYDROCHLORIDE 1 MG/ML
0.2 INJECTION, SOLUTION INTRAMUSCULAR; INTRAVENOUS; SUBCUTANEOUS EVERY 5 MIN PRN
Status: DISCONTINUED | OUTPATIENT
Start: 2019-08-12 | End: 2019-08-12 | Stop reason: HOSPADM

## 2019-08-12 RX ORDER — GABAPENTIN 100 MG/1
CAPSULE ORAL
Status: DISCONTINUED | OUTPATIENT
Start: 2019-08-12 | End: 2019-08-12

## 2019-08-12 RX ORDER — EPHEDRINE SULFATE 50 MG/ML
INJECTION, SOLUTION INTRAVENOUS
Status: DISCONTINUED | OUTPATIENT
Start: 2019-08-12 | End: 2019-08-12

## 2019-08-12 RX ORDER — ROCURONIUM BROMIDE 10 MG/ML
INJECTION, SOLUTION INTRAVENOUS
Status: DISCONTINUED | OUTPATIENT
Start: 2019-08-12 | End: 2019-08-12

## 2019-08-12 RX ORDER — CEFOXITIN SODIUM 2 G/50ML
INJECTION, SOLUTION INTRAVENOUS
Status: DISCONTINUED | OUTPATIENT
Start: 2019-08-12 | End: 2019-08-12

## 2019-08-12 RX ORDER — OXYCODONE AND ACETAMINOPHEN 5; 325 MG/1; MG/1
1 TABLET ORAL EVERY 4 HOURS PRN
COMMUNITY
End: 2019-08-28

## 2019-08-12 RX ORDER — ONDANSETRON 2 MG/ML
INJECTION INTRAMUSCULAR; INTRAVENOUS
Status: DISCONTINUED | OUTPATIENT
Start: 2019-08-12 | End: 2019-08-12

## 2019-08-12 RX ORDER — FENTANYL CITRATE 50 UG/ML
INJECTION, SOLUTION INTRAMUSCULAR; INTRAVENOUS
Status: DISCONTINUED | OUTPATIENT
Start: 2019-08-12 | End: 2019-08-12

## 2019-08-12 RX ORDER — SUCCINYLCHOLINE CHLORIDE 20 MG/ML
INJECTION INTRAMUSCULAR; INTRAVENOUS
Status: DISCONTINUED | OUTPATIENT
Start: 2019-08-12 | End: 2019-08-12

## 2019-08-12 RX ORDER — SODIUM CHLORIDE 9 MG/ML
INJECTION, SOLUTION INTRAVENOUS CONTINUOUS
Status: DISCONTINUED | OUTPATIENT
Start: 2019-08-12 | End: 2019-08-12 | Stop reason: HOSPADM

## 2019-08-12 RX ORDER — BUPIVACAINE HYDROCHLORIDE AND EPINEPHRINE 2.5; 5 MG/ML; UG/ML
INJECTION, SOLUTION EPIDURAL; INFILTRATION; INTRACAUDAL; PERINEURAL
Status: DISCONTINUED | OUTPATIENT
Start: 2019-08-12 | End: 2019-08-12 | Stop reason: HOSPADM

## 2019-08-12 RX ORDER — ONDANSETRON 4 MG/1
4 TABLET, ORALLY DISINTEGRATING ORAL ONCE
Status: COMPLETED | OUTPATIENT
Start: 2019-08-12 | End: 2019-08-12

## 2019-08-12 RX ORDER — SODIUM CHLORIDE, SODIUM LACTATE, POTASSIUM CHLORIDE, CALCIUM CHLORIDE 600; 310; 30; 20 MG/100ML; MG/100ML; MG/100ML; MG/100ML
INJECTION, SOLUTION INTRAVENOUS CONTINUOUS PRN
Status: DISCONTINUED | OUTPATIENT
Start: 2019-08-12 | End: 2019-08-12

## 2019-08-12 RX ORDER — ESMOLOL HYDROCHLORIDE 10 MG/ML
INJECTION INTRAVENOUS
Status: DISCONTINUED | OUTPATIENT
Start: 2019-08-12 | End: 2019-08-12

## 2019-08-12 RX ORDER — ONDANSETRON 4 MG/1
4 TABLET, ORALLY DISINTEGRATING ORAL EVERY 6 HOURS PRN
Qty: 20 TABLET | Refills: 0 | Status: SHIPPED | OUTPATIENT
Start: 2019-08-12 | End: 2020-02-12

## 2019-08-12 RX ORDER — DIPHENHYDRAMINE HYDROCHLORIDE 50 MG/ML
25 INJECTION INTRAMUSCULAR; INTRAVENOUS EVERY 6 HOURS PRN
Status: DISCONTINUED | OUTPATIENT
Start: 2019-08-12 | End: 2019-08-12 | Stop reason: HOSPADM

## 2019-08-12 RX ORDER — GLYCOPYRROLATE 0.2 MG/ML
INJECTION INTRAMUSCULAR; INTRAVENOUS
Status: DISCONTINUED | OUTPATIENT
Start: 2019-08-12 | End: 2019-08-12

## 2019-08-12 RX ORDER — NEOSTIGMINE METHYLSULFATE 1 MG/ML
INJECTION, SOLUTION INTRAVENOUS
Status: DISCONTINUED | OUTPATIENT
Start: 2019-08-12 | End: 2019-08-12

## 2019-08-12 RX ADMIN — ESMOLOL HYDROCHLORIDE 20 MG: 10 INJECTION, SOLUTION INTRAVENOUS at 08:08

## 2019-08-12 RX ADMIN — HYDROMORPHONE HYDROCHLORIDE 0.2 MG: 1 INJECTION, SOLUTION INTRAMUSCULAR; INTRAVENOUS; SUBCUTANEOUS at 09:08

## 2019-08-12 RX ADMIN — PROPOFOL 60 MG: 10 INJECTION, EMULSION INTRAVENOUS at 07:08

## 2019-08-12 RX ADMIN — SODIUM CHLORIDE, SODIUM LACTATE, POTASSIUM CHLORIDE, AND CALCIUM CHLORIDE: .6; .31; .03; .02 INJECTION, SOLUTION INTRAVENOUS at 08:08

## 2019-08-12 RX ADMIN — GLYCOPYRROLATE 0.5 MG: 0.2 INJECTION INTRAMUSCULAR; INTRAVENOUS at 08:08

## 2019-08-12 RX ADMIN — OXYCODONE HYDROCHLORIDE 5 MG: 5 TABLET ORAL at 09:08

## 2019-08-12 RX ADMIN — PROPOFOL 140 MG: 10 INJECTION, EMULSION INTRAVENOUS at 07:08

## 2019-08-12 RX ADMIN — SODIUM CHLORIDE, SODIUM LACTATE, POTASSIUM CHLORIDE, AND CALCIUM CHLORIDE: .6; .31; .03; .02 INJECTION, SOLUTION INTRAVENOUS at 07:08

## 2019-08-12 RX ADMIN — NEOSTIGMINE METHYLSULFATE 4 MG: 1 INJECTION INTRAVENOUS at 08:08

## 2019-08-12 RX ADMIN — ROCURONIUM BROMIDE 5 MG: 10 INJECTION, SOLUTION INTRAVENOUS at 07:08

## 2019-08-12 RX ADMIN — EPHEDRINE SULFATE 15 MG: 50 INJECTION, SOLUTION INTRAVENOUS at 07:08

## 2019-08-12 RX ADMIN — GABAPENTIN 300 MG: 100 CAPSULE ORAL at 07:08

## 2019-08-12 RX ADMIN — ACETAMINOPHEN 1000 MG: 325 TABLET ORAL at 07:08

## 2019-08-12 RX ADMIN — FENTANYL CITRATE 100 MCG: 50 INJECTION INTRAMUSCULAR; INTRAVENOUS at 07:08

## 2019-08-12 RX ADMIN — CEFOXITIN SODIUM 2 G: 2 INJECTION, SOLUTION INTRAVENOUS at 07:08

## 2019-08-12 RX ADMIN — SUCCINYLCHOLINE CHLORIDE 140 MG: 20 INJECTION, SOLUTION INTRAMUSCULAR; INTRAVENOUS at 07:08

## 2019-08-12 RX ADMIN — ONDANSETRON 4 MG: 4 TABLET, ORALLY DISINTEGRATING ORAL at 01:08

## 2019-08-12 RX ADMIN — CELECOXIB 200 MG: 100 CAPSULE ORAL at 07:08

## 2019-08-12 RX ADMIN — ONDANSETRON 4 MG: 2 INJECTION INTRAMUSCULAR; INTRAVENOUS at 07:08

## 2019-08-12 RX ADMIN — LIDOCAINE HYDROCHLORIDE 100 MG: 20 INJECTION, SOLUTION INTRAVENOUS at 07:08

## 2019-08-12 RX ADMIN — MIDAZOLAM 2 MG: 1 INJECTION INTRAMUSCULAR; INTRAVENOUS at 07:08

## 2019-08-12 RX ADMIN — ONDANSETRON 4 MG: 2 INJECTION INTRAMUSCULAR; INTRAVENOUS at 11:08

## 2019-08-12 RX ADMIN — ROCURONIUM BROMIDE 20 MG: 10 INJECTION, SOLUTION INTRAVENOUS at 07:08

## 2019-08-12 NOTE — BRIEF OP NOTE
[facility]  Brief Operative Note    SUMMARY     Surgery Date: 8/12/2019     Surgeon(s) and Role:     * Alphonso Freeman III, MD - Primary    Assisting Surgeon: None    Pre-op Diagnosis:  Chronic calculous cholecystitis [K80.10]    Post-op Diagnosis:  Post-Op Diagnosis Codes:     * Chronic calculous cholecystitis [K80.10]    Procedure(s) (LRB):  CHOLECYSTECTOMY, LAPAROSCOPIC (N/A)    Anesthesia: General    Complications none     Instrument counts correct     Description of Procedure: The patient was brought to the operating room and transferred to the operating room table in the supine position.  Patient was given general anesthesia and intubated.  The abdomen was prepped and draped.  A transverse infraumbilical incision was made.  Dissection was carried down through the skin and subcutaneous tissue.  The abdomen was entered using the open Herve technique.  The abdomen was insufflated.  Under direct visualization, three 5mm ports were placed.  One was placed in the subxiphoid position, one in the right anterior axillary line and the other in the mid clavicular line.  The gallbladder body was retracted superiorly and the infundibulum was retracted laterally.  The peritoneum overlying the cystic duct and artery was carefully taken down.  The cystic duct and cystic artery were individually isolated and dissected free 360 degrees.  They were individually clipped proximally and distally.  They were transected using endo mandy.  Electrocautery was used to take the gallbladder off the liver bed. The Endo-Catch bag was used to remove the gallbladder from the abdomen.  We irrigated out the right upper quadrant with irrigation.  We checked again and there was no evidence of any bile leak or bleeding from our clips or from the liver bed.  The patient tolerated the procedure well.  We removed all of our ports.  We repaired the fascia with interrupted Ethibond sutures.  We repaired the skin with 4-0 Monocryl.   After that was done, the patient was extubated and taken to the recovery room in stable condition.  All lap and instrument counts were correct.    Description of the findings of the procedure: multiple small stones     Estimated Blood Loss: 20mL         Specimens:   Specimen (12h ago, onward)    Start     Ordered    08/12/19 0847  Specimen to Pathology - Surgery  Once     Comments:  Pre-op Diagnosis: Chronic calculous cholecystitis [K80.10]Procedure(s):CHOLECYSTECTOMY, LAPAROSCOPIC Number of specimens: 1Name of specimens: Gallbladder     Start Status     08/12/19 0847 Needs to be Collected Order ID: [id number]       08/12/19 0847    08/12/19 0844  Specimen to Pathology - Surgery  Once,   Status:  Canceled     Comments:  Pre-op Diagnosis: Chronic calculous cholecystitis [K80.10]Procedure(s):CHOLECYSTECTOMY, LAPAROSCOPIC Number of specimens: 1Name of specimens: Gallbladder     Start Status     08/12/19 0844 Canceled Order ID: [id number]       08/12/19 0843    08/12/19 0844  Specimen to Pathology - Surgery  Once,   Status:  Canceled     Comments:  Pre-op Diagnosis: Chronic calculous cholecystitis [K80.10]Procedure(s):CHOLECYSTECTOMY, LAPAROSCOPIC Number of specimens: 1Name of specimens: Gallbladder     Start Status     08/12/19 0844 Canceled Order ID: [id number]       08/12/19 0843    08/12/19 0800  Specimen to Pathology - Surgery  Once,   Status:  Canceled     Comments:  Pre-op Diagnosis: Chronic calculous cholecystitis [K80.10]Procedure(s):CHOLECYSTECTOMY, LAPAROSCOPIC Number of specimens: 1Name of specimens: Gallbladder     Start Status     08/12/19 0800 Canceled Order ID: [id number]       08/12/19 0805

## 2019-08-12 NOTE — OP NOTE
Surgery Date: 8/12/2019      Surgeon(s) and Role:     * Alphonso Freeman III, MD - Primary     Assisting Surgeon: None     Pre-op Diagnosis:  Chronic calculous cholecystitis [K80.10]     Post-op Diagnosis:  Post-Op Diagnosis Codes:     * Chronic calculous cholecystitis [K80.10]     Procedure(s) (LRB):  CHOLECYSTECTOMY, LAPAROSCOPIC (N/A)     Anesthesia: General     Complications none      Instrument counts correct      Description of Procedure: The patient was brought to the operating room and transferred to the operating room table in the supine position.  Patient was given general anesthesia and intubated.  The abdomen was prepped and draped.  A transverse infraumbilical incision was made.  Dissection was carried down through the skin and subcutaneous tissue.  The abdomen was entered using the open Herve technique.  The abdomen was insufflated.  Under direct visualization, three 5mm ports were placed.  One was placed in the subxiphoid position, one in the right anterior axillary line and the other in the mid clavicular line.  The gallbladder body was retracted superiorly and the infundibulum was retracted laterally.  The peritoneum overlying the cystic duct and artery was carefully taken down.  The cystic duct and cystic artery were individually isolated and dissected free 360 degrees.  They were individually clipped proximally and distally.  They were transected using endo mandy.  Electrocautery was used to take the gallbladder off the liver bed. The Endo-Catch bag was used to remove the gallbladder from the abdomen.  We irrigated out the right upper quadrant with irrigation.  We checked again and there was no evidence of any bile leak or bleeding from our clips or from the liver bed.  The patient tolerated the procedure well.  We removed all of our ports.  We repaired the fascia with interrupted Ethibond sutures.  We repaired the skin with 4-0 Monocryl.  After that was done, the patient was extubated and  taken to the recovery room in stable condition.  All lap and instrument counts were correct.     Description of the findings of the procedure: multiple small stones      Estimated Blood Loss: 20mL         Specimens:           Specimen (12h ago, onward)     Start     Ordered     08/12/19 0847   Specimen to Pathology - Surgery  Once     Comments:  Pre-op Diagnosis: Chronic calculous cholecystitis [K80.10]Procedure(s):CHOLECYSTECTOMY, LAPAROSCOPIC Number of specimens: 1Name of specimens: Gallbladder

## 2019-08-12 NOTE — ANESTHESIA PREPROCEDURE EVALUATION
08/12/2019  Ashish Dave is a 63 y.o., male.  Patient Active Problem List   Diagnosis    Chronic calculous cholecystitis       Anesthesia Evaluation    I have reviewed the Patient Summary Reports.     I have reviewed the Medications.     Review of Systems  Anesthesia Hx:  No problems with previous Anesthesia Denies Hx of Anesthetic complications  Denies Family Hx of Anesthesia complications.   Denies Personal Hx of Anesthesia complications.   Hematology/Oncology:  Hematology Normal   Oncology Normal     EENT/Dental:EENT/Dental Normal   Cardiovascular:  Cardiovascular Normal     Pulmonary:  Pulmonary Normal    Renal/:  Renal/ Normal     Hepatic/GI:   GERD    Musculoskeletal:  Musculoskeletal Normal    Neurological:  Neurology Normal    Endocrine:  Endocrine Normal    Psych:  Psychiatric Normal           Physical Exam  General:  Well nourished    Airway/Jaw/Neck:  Airway Findings: Mouth Opening: Normal Tongue: Normal  General Airway Assessment: Adult  Mallampati: II  TM Distance: Normal, at least 6 cm  Jaw/Neck Findings:  Neck ROM: Normal ROM      Dental:  Dental Findings:   Chest/Lungs:  Chest/Lungs Findings: Clear to auscultation, Normal Respiratory Rate     Heart/Vascular:  Heart Findings: Rate: Normal  Rhythm: Regular Rhythm  Sounds: Normal        Mental Status:  Mental Status Findings:  Alert and Oriented         Anesthesia Plan  Type of Anesthesia, risks & benefits discussed:  Anesthesia Type:  general  Patient's Preference:   Intra-op Monitoring Plan: standard ASA monitors  Intra-op Monitoring Plan Comments:   Post Op Pain Control Plan: multimodal analgesia  Post Op Pain Control Plan Comments:   Induction:    Beta Blocker:  Patient is not currently on a Beta-Blocker (No further documentation required).       Informed Consent: Patient understands risks and agrees with Anesthesia plan.  Questions  answered. Anesthesia consent signed with patient.  ASA Score: 2     Day of Surgery Review of History & Physical:        Anesthesia Plan Notes: GETA  Gabapentin 300, Acetaminophen 1g, Celecoxib 200mg             Ready For Surgery From Anesthesia Perspective.

## 2019-08-12 NOTE — ANESTHESIA POSTPROCEDURE EVALUATION
Anesthesia Post Evaluation    Patient: Ashish Dave    Procedure(s) Performed: Procedure(s) (LRB):  CHOLECYSTECTOMY, LAPAROSCOPIC (N/A)    Final Anesthesia Type: general  Patient location during evaluation: PACU  Patient participation: Yes- Able to Participate  Level of consciousness: awake and alert  Post-procedure vital signs: reviewed and stable  Pain management: adequate  Airway patency: patent  PONV status at discharge: No PONV  Anesthetic complications: no      Cardiovascular status: hemodynamically stable  Respiratory status: unassisted, spontaneous ventilation and room air  Hydration status: euvolemic  Follow-up not needed.          Vitals Value Taken Time   /79 8/12/2019  9:30 AM   Temp 36.2 °C (97.2 °F) 8/12/2019  9:30 AM   Pulse 68 8/12/2019  9:43 AM   Resp 22 8/12/2019  9:43 AM   SpO2 94 % 8/12/2019  9:43 AM   Vitals shown include unvalidated device data.      No case tracking events are documented in the log.      Pain/Misbah Score: Pain Rating Prior to Med Admin: 4 (8/12/2019  9:25 AM)  Misbah Score: 10 (8/12/2019  9:30 AM)

## 2019-08-12 NOTE — PLAN OF CARE
Arrived to PACU s/p cholecystectomy.  Has 4 lap sites closed with mepore that is D&I.  States pain 3/10.  Answers questions appropriately.

## 2019-08-12 NOTE — INTERVAL H&P NOTE
The patient has been examined and the H&P has been reviewed:    I concur with the findings and no changes have occurred since H&P was written.    Anesthesia/Surgery risks, benefits and alternative options discussed and understood by patient/family.          Active Hospital Problems    Diagnosis  POA    Chronic calculous cholecystitis [K80.10]  Yes      Resolved Hospital Problems   No resolved problems to display.

## 2019-08-12 NOTE — DISCHARGE INSTRUCTIONS
Remove dressings to abdomen in 48 hours.  Ok to shower in 48 hours.  Do not submerge incisions in water. No tub baths.  No strenuous activity.  Low fat diet for next 2 weeks.

## 2019-08-12 NOTE — PLAN OF CARE
Assisted up to bathroom and voided. C/o being nauseated after getting up to bathroom. Dr. Jacobs notified. Orders rec'd. zofran odt given. Dr. poole updated on pt's status. Ok to call zofran prescription to pt's pharmacy.

## 2019-08-12 NOTE — PLAN OF CARE
Transported to [id number] in stable condition.  Pain rated 3/10.  Dressing with scant amount of drainage noted.  Report given to JACQUELYN Peck

## 2019-08-12 NOTE — TRANSFER OF CARE
Anesthesia Transfer of Care Note    Patient: Ashish Dave    Procedure(s) Performed: Procedure(s) (LRB):  CHOLECYSTECTOMY, LAPAROSCOPIC (N/A)    Patient location: PACU    Anesthesia Type: general    Transport from OR: Transported from OR on room air with adequate spontaneous ventilation    Post pain: adequate analgesia    Post assessment: no apparent anesthetic complications    Post vital signs: stable    Level of consciousness: awake    Nausea/Vomiting: no nausea/vomiting    Complications: none    Transfer of care protocol was followed      Last vitals:   Visit Vitals  BP (!) 140/90   Pulse 70   Temp 37.1 °C (98.8 °F) (Oral)   Resp 16   SpO2 97%

## 2019-08-28 ENCOUNTER — OFFICE VISIT (OUTPATIENT)
Dept: SURGERY | Facility: CLINIC | Age: 64
End: 2019-08-28
Payer: COMMERCIAL

## 2019-08-28 VITALS
HEIGHT: 70 IN | DIASTOLIC BLOOD PRESSURE: 82 MMHG | TEMPERATURE: 98 F | SYSTOLIC BLOOD PRESSURE: 130 MMHG | WEIGHT: 180 LBS | HEART RATE: 59 BPM | BODY MASS INDEX: 25.77 KG/M2

## 2019-08-28 DIAGNOSIS — K80.10 CHRONIC CALCULOUS CHOLECYSTITIS: Primary | ICD-10-CM

## 2019-08-28 PROCEDURE — 99024 PR POST-OP FOLLOW-UP VISIT: ICD-10-PCS | Mod: ,,, | Performed by: SURGERY

## 2019-08-28 PROCEDURE — 99999 PR PBB SHADOW E&M-EST. PATIENT-LVL III: ICD-10-PCS | Mod: PBBFAC,,, | Performed by: SURGERY

## 2019-08-28 PROCEDURE — 99999 PR PBB SHADOW E&M-EST. PATIENT-LVL III: CPT | Mod: PBBFAC,,, | Performed by: SURGERY

## 2019-08-28 PROCEDURE — 99024 POSTOP FOLLOW-UP VISIT: CPT | Mod: ,,, | Performed by: SURGERY

## 2019-08-28 NOTE — PROGRESS NOTES
Subjective:       Patient ID: Ashish Dave is a 63 y.o. male.    Chief Complaint: Post-op Evaluation (FU DOS 8/12/19 Lap Tracey )      HPI:  Patient is status post cholecystectomy.  He is feeling well.  No complaints.  Pathology returned chronic calculous cholecystitis.    Review of Systems    Objective:      Physical Exam   Constitutional: He is oriented to person, place, and time. He is cooperative. No distress.   Pulmonary/Chest: Effort normal. No respiratory distress.   Abdominal: Soft. He exhibits no distension. There is no tenderness. There is no rebound and no guarding. No hernia.   Neurological: He is alert and oriented to person, place, and time.   Skin:   Incisions are clean, dry and intact  There is no evidence of infection, hematoma or seroma        Assessment/Plan:   Chronic calculous cholecystitis      Status post cholecystectomy.  Doing well.  Follow up if symptoms worsen or fail to improve.

## 2019-11-20 DIAGNOSIS — K21.9 GASTROESOPHAGEAL REFLUX DISEASE, ESOPHAGITIS PRESENCE NOT SPECIFIED: Primary | ICD-10-CM

## 2019-11-20 RX ORDER — PANTOPRAZOLE SODIUM 40 MG/1
40 TABLET, DELAYED RELEASE ORAL DAILY
Qty: 90 TABLET | Refills: 1 | Status: SHIPPED | OUTPATIENT
Start: 2019-11-20 | End: 2020-02-12 | Stop reason: SDUPTHER

## 2019-11-20 NOTE — TELEPHONE ENCOUNTER
----- Message from Jaycee Gunn sent at 11/20/2019  2:42 PM CST -----  Contact: sola rosenthal wife  Refill on pantoprazole Redd Tarango'is # [phone]

## 2020-01-29 ENCOUNTER — TELEPHONE (OUTPATIENT)
Dept: FAMILY MEDICINE | Facility: CLINIC | Age: 65
End: 2020-01-29

## 2020-01-29 DIAGNOSIS — K80.10 CHRONIC CALCULOUS CHOLECYSTITIS: ICD-10-CM

## 2020-01-29 DIAGNOSIS — Z79.899 ENCOUNTER FOR LONG-TERM (CURRENT) USE OF OTHER MEDICATIONS: ICD-10-CM

## 2020-01-29 DIAGNOSIS — Z00.00 ROUTINE GENERAL MEDICAL EXAMINATION AT A HEALTH CARE FACILITY: Primary | ICD-10-CM

## 2020-01-29 DIAGNOSIS — Z12.5 SPECIAL SCREENING FOR MALIGNANT NEOPLASM OF PROSTATE: ICD-10-CM

## 2020-02-01 LAB
ALBUMIN SERPL-MCNC: 4.7 G/DL (ref 3.6–5.1)
ALBUMIN/GLOB SERPL: 1.7 (CALC) (ref 1–2.5)
ALP SERPL-CCNC: 40 U/L (ref 40–115)
ALT SERPL-CCNC: 21 U/L (ref 9–46)
APPEARANCE UR: CLEAR
AST SERPL-CCNC: 28 U/L (ref 10–35)
BACTERIA #/AREA URNS HPF: NORMAL /HPF
BACTERIA UR CULT: NORMAL
BASOPHILS # BLD AUTO: 103 CELLS/UL (ref 0–200)
BASOPHILS NFR BLD AUTO: 2.1 %
BILIRUB SERPL-MCNC: 1.1 MG/DL (ref 0.2–1.2)
BILIRUB UR QL STRIP: NEGATIVE
BUN SERPL-MCNC: 17 MG/DL (ref 7–25)
BUN/CREAT SERPL: NORMAL (CALC) (ref 6–22)
CALCIUM SERPL-MCNC: 10.1 MG/DL (ref 8.6–10.3)
CHLORIDE SERPL-SCNC: 100 MMOL/L (ref 98–110)
CHOLEST SERPL-MCNC: 161 MG/DL
CHOLEST/HDLC SERPL: 2.1 (CALC)
CO2 SERPL-SCNC: 30 MMOL/L (ref 20–32)
COLOR UR: YELLOW
CREAT SERPL-MCNC: 0.91 MG/DL (ref 0.7–1.25)
EOSINOPHIL # BLD AUTO: 123 CELLS/UL (ref 15–500)
EOSINOPHIL NFR BLD AUTO: 2.5 %
ERYTHROCYTE [DISTWIDTH] IN BLOOD BY AUTOMATED COUNT: 13.2 % (ref 11–15)
GFRSERPLBLD MDRD-ARVRAT: 88 ML/MIN/1.73M2
GLOBULIN SER CALC-MCNC: 2.7 G/DL (CALC) (ref 1.9–3.7)
GLUCOSE SERPL-MCNC: 89 MG/DL (ref 65–99)
GLUCOSE UR QL STRIP: NEGATIVE
HCT VFR BLD AUTO: 50.3 % (ref 38.5–50)
HDLC SERPL-MCNC: 76 MG/DL
HGB BLD-MCNC: 17 G/DL (ref 13.2–17.1)
HGB UR QL STRIP: NEGATIVE
HYALINE CASTS #/AREA URNS LPF: NORMAL /LPF
KETONES UR QL STRIP: NEGATIVE
LDLC SERPL CALC-MCNC: 72 MG/DL (CALC)
LEUKOCYTE ESTERASE UR QL STRIP: NEGATIVE
LYMPHOCYTES # BLD AUTO: 1838 CELLS/UL (ref 850–3900)
LYMPHOCYTES NFR BLD AUTO: 37.5 %
MCH RBC QN AUTO: 30 PG (ref 27–33)
MCHC RBC AUTO-ENTMCNC: 33.8 G/DL (ref 32–36)
MCV RBC AUTO: 88.9 FL (ref 80–100)
MONOCYTES # BLD AUTO: 554 CELLS/UL (ref 200–950)
MONOCYTES NFR BLD AUTO: 11.3 %
NEUTROPHILS # BLD AUTO: 2283 CELLS/UL (ref 1500–7800)
NEUTROPHILS NFR BLD AUTO: 46.6 %
NITRITE UR QL STRIP: NEGATIVE
NONHDLC SERPL-MCNC: 85 MG/DL (CALC)
PH UR STRIP: 6.5 [PH] (ref 5–8)
PLATELET # BLD AUTO: 210 THOUSAND/UL (ref 140–400)
PMV BLD REES-ECKER: 10.2 FL (ref 7.5–12.5)
POTASSIUM SERPL-SCNC: 4.5 MMOL/L (ref 3.5–5.3)
PROT SERPL-MCNC: 7.4 G/DL (ref 6.1–8.1)
PROT UR QL STRIP: NEGATIVE
PSA SERPL-MCNC: 2.4 NG/ML
RBC # BLD AUTO: 5.66 MILLION/UL (ref 4.2–5.8)
RBC #/AREA URNS HPF: NORMAL /HPF
SODIUM SERPL-SCNC: 139 MMOL/L (ref 135–146)
SP GR UR STRIP: 1.02 (ref 1–1.03)
SQUAMOUS #/AREA URNS HPF: NORMAL /HPF
TRIGL SERPL-MCNC: 47 MG/DL
TSH SERPL-ACNC: 2.48 MIU/L (ref 0.4–4.5)
WBC # BLD AUTO: 4.9 THOUSAND/UL (ref 3.8–10.8)
WBC #/AREA URNS HPF: NORMAL /HPF

## 2020-02-12 ENCOUNTER — OFFICE VISIT (OUTPATIENT)
Dept: FAMILY MEDICINE | Facility: CLINIC | Age: 65
End: 2020-02-12
Payer: MEDICARE

## 2020-02-12 VITALS
HEART RATE: 70 BPM | HEIGHT: 70 IN | BODY MASS INDEX: 27.2 KG/M2 | DIASTOLIC BLOOD PRESSURE: 82 MMHG | WEIGHT: 190 LBS | SYSTOLIC BLOOD PRESSURE: 120 MMHG

## 2020-02-12 DIAGNOSIS — N40.0 ENLARGED PROSTATE WITHOUT LOWER URINARY TRACT SYMPTOMS (LUTS): ICD-10-CM

## 2020-02-12 DIAGNOSIS — I10 ESSENTIAL (PRIMARY) HYPERTENSION: ICD-10-CM

## 2020-02-12 DIAGNOSIS — M50.90 CERVICAL DISC DISEASE: ICD-10-CM

## 2020-02-12 DIAGNOSIS — M17.0 PRIMARY OSTEOARTHRITIS OF BOTH KNEES: ICD-10-CM

## 2020-02-12 DIAGNOSIS — K20.90 ESOPHAGITIS: ICD-10-CM

## 2020-02-12 DIAGNOSIS — K21.9 GASTROESOPHAGEAL REFLUX DISEASE, ESOPHAGITIS PRESENCE NOT SPECIFIED: ICD-10-CM

## 2020-02-12 DIAGNOSIS — M17.11 PRIMARY OSTEOARTHRITIS OF RIGHT KNEE: Primary | ICD-10-CM

## 2020-02-12 DIAGNOSIS — Z77.090 ASBESTOS EXPOSURE: ICD-10-CM

## 2020-02-12 PROBLEM — K80.10 CHRONIC CALCULOUS CHOLECYSTITIS: Status: RESOLVED | Noted: 2019-08-12 | Resolved: 2020-02-12

## 2020-02-12 PROCEDURE — 99214 OFFICE O/P EST MOD 30 MIN: CPT | Mod: S$GLB,,, | Performed by: FAMILY MEDICINE

## 2020-02-12 PROCEDURE — 99214 PR OFFICE/OUTPT VISIT, EST, LEVL IV, 30-39 MIN: ICD-10-PCS | Mod: S$GLB,,, | Performed by: FAMILY MEDICINE

## 2020-02-12 RX ORDER — PANTOPRAZOLE SODIUM 40 MG/1
40 TABLET, DELAYED RELEASE ORAL DAILY
Qty: 90 TABLET | Refills: 1 | Status: SHIPPED | OUTPATIENT
Start: 2020-02-12 | End: 2020-08-14 | Stop reason: SDUPTHER

## 2020-02-12 NOTE — PROGRESS NOTES
SUBJECTIVE:    Patient ID: Ashish Dave is a 64 y.o. male.    Chief Complaint: Follow-up (6 month//no bottles tb//refused flu/pna shot//refill)    This 64-year-old male comes in for his six-month checkup.  He has had some increased sinus congestion scratchy throat but nasal discharge has been clear.  No significant cough..  Is now using over-the-counter and antihistamines to help dry this up.  He had a colonoscopy in 2016 and is due for repeat in 2021      Telephone on 01/29/2020   Component Date Value Ref Range Status    WBC 01/31/2020 4.9  3.8 - 10.8 Thousand/uL Final    RBC 01/31/2020 5.66  4.20 - 5.80 Million/uL Final    Hemoglobin 01/31/2020 17.0  13.2 - 17.1 g/dL Final    Hematocrit 01/31/2020 50.3* 38.5 - 50.0 % Final    Mean Corpuscular Volume 01/31/2020 88.9  80.0 - 100.0 fL Final    Mean Corpuscular Hemoglobin 01/31/2020 30.0  27.0 - 33.0 pg Final    Mean Corpuscular Hemoglobin Conc 01/31/2020 33.8  32.0 - 36.0 g/dL Final    RDW 01/31/2020 13.2  11.0 - 15.0 % Final    Platelets 01/31/2020 210  140 - 400 Thousand/uL Final    MPV 01/31/2020 10.2  7.5 - 12.5 fL Final    Neutrophils Absolute 01/31/2020 2,283  1,500 - 7,800 cells/uL Final    Lymph # 01/31/2020 1,838  850 - 3,900 cells/uL Final    Mono # 01/31/2020 554  200 - 950 cells/uL Final    Eos # 01/31/2020 123  15 - 500 cells/uL Final    Baso # 01/31/2020 103  0 - 200 cells/uL Final    Neutrophils Relative 01/31/2020 46.6  % Final    Lymph% 01/31/2020 37.5  % Final    Mono% 01/31/2020 11.3  % Final    Eosinophil% 01/31/2020 2.5  % Final    Basophil% 01/31/2020 2.1  % Final    Glucose 01/31/2020 89  65 - 99 mg/dL Final    BUN, Bld 01/31/2020 17  7 - 25 mg/dL Final    Creatinine 01/31/2020 0.91  0.70 - 1.25 mg/dL Final    eGFR if non African American 01/31/2020 88  > OR = 60 mL/min/1.73m2 Final    eGFR if African American 01/31/2020 102  > OR = 60 mL/min/1.73m2 Final    BUN/Creatinine Ratio 01/31/2020 NOT APPLICABLE  6 - 22  (calc) Final    Sodium 01/31/2020 139  135 - 146 mmol/L Final    Potassium 01/31/2020 4.5  3.5 - 5.3 mmol/L Final    Chloride 01/31/2020 100  98 - 110 mmol/L Final    CO2 01/31/2020 30  20 - 32 mmol/L Final    Calcium 01/31/2020 10.1  8.6 - 10.3 mg/dL Final    Total Protein 01/31/2020 7.4  6.1 - 8.1 g/dL Final    Albumin 01/31/2020 4.7  3.6 - 5.1 g/dL Final    Globulin, Total 01/31/2020 2.7  1.9 - 3.7 g/dL (calc) Final    Albumin/Globulin Ratio 01/31/2020 1.7  1.0 - 2.5 (calc) Final    Total Bilirubin 01/31/2020 1.1  0.2 - 1.2 mg/dL Final    Alkaline Phosphatase 01/31/2020 40  40 - 115 U/L Final    AST 01/31/2020 28  10 - 35 U/L Final    ALT 01/31/2020 21  9 - 46 U/L Final    Cholesterol 01/31/2020 161  <200 mg/dL Final    HDL 01/31/2020 76  >40 mg/dL Final    Triglycerides 01/31/2020 47  <150 mg/dL Final    LDL Cholesterol 01/31/2020 72  mg/dL (calc) Final    Hdl/Cholesterol Ratio 01/31/2020 2.1  <5.0 (calc) Final    Non HDL Chol. (LDL+VLDL) 01/31/2020 85  <130 mg/dL (calc) Final    TSH 01/31/2020 2.48  0.40 - 4.50 mIU/L Final    PROSTATE SPECIFIC ANTIGEN, SCR - Q* 01/31/2020 2.4  < OR = 4.0 ng/mL Final    Color, UA 01/31/2020 YELLOW  YELLOW Final    Appearance, UA 01/31/2020 CLEAR  CLEAR Final    Specific [place], UA 01/31/2020 1.016  1.001 - 1.035 Final    pH, UA 01/31/2020 6.5  5.0 - 8.0 Final    Glucose, UA 01/31/2020 NEGATIVE  NEGATIVE Final    Bilirubin, UA 01/31/2020 NEGATIVE  NEGATIVE Final    Ketones, UA 01/31/2020 NEGATIVE  NEGATIVE Final    Occult Blood UA 01/31/2020 NEGATIVE  NEGATIVE Final    Protein, UA 01/31/2020 NEGATIVE  NEGATIVE Final    Nitrite, UA 01/31/2020 NEGATIVE  NEGATIVE Final    Leukocytes, UA 01/31/2020 NEGATIVE  NEGATIVE Final    WBC Casts, UA 01/31/2020 NONE SEEN  < OR = 5 /HPF Final    RBC Casts, UA 01/31/2020 NONE SEEN  < OR = 2 /HPF Final    Squam Epithel, UA 01/31/2020 NONE SEEN  < OR = 5 /HPF Final    Bacteria, UA 01/31/2020 NONE SEEN  NONE SEEN  /HPF Final    Hyaline Casts, UA 01/31/2020 NONE SEEN  NONE SEEN /LPF Final    Reflexive Urine Culture 01/31/2020 NO CULTURE INDICATED   Final       Past Medical History:   Diagnosis Date    Arthritis     Chronic calculous cholecystitis 8/12/2019    Essential (primary) hypertension 3/8/2016    GERD (gastroesophageal reflux disease)      Past Surgical History:   Procedure Laterality Date    COLONOSCOPY  2016    Dr Saldaña- rtc 5 yr    GALLBLADDER SURGERY  08/2019    KNEE SURGERY Right     x's3    LAPAROSCOPIC CHOLECYSTECTOMY N/A 8/12/2019    Procedure: CHOLECYSTECTOMY, LAPAROSCOPIC;  Surgeon: Alphonso Freeman III, MD;  Location: [facility];  Service: General;  Laterality: N/A;     Family History   Problem Relation Age of Onset    Stroke Mother     No Known Problems Father        Marital Status:   Alcohol History:  reports that he drank alcohol.  Tobacco History:  reports that he has never smoked. He has never used smokeless tobacco.  Drug History:  reports that he does not use drugs.    Review of patient's allergies indicates:  No Known Allergies    Current Outpatient Medications:     ibuprofen (ADVIL,MOTRIN) 400 MG tablet, Take 400 mg by mouth every 4 (four) hours as needed for Other., Disp: , Rfl:     pantoprazole (PROTONIX) 40 MG tablet, Take 1 tablet (40 mg total) by mouth once daily., Disp: 90 tablet, Rfl: 1    Review of Systems   Constitutional: Positive for activity change. Negative for appetite change, chills, fatigue, fever and unexpected weight change.   HENT: Positive for congestion and sore throat. Negative for ear pain and trouble swallowing.    Eyes: Negative for pain, discharge and visual disturbance.   Respiratory: Negative for apnea, cough, shortness of breath (with exertion) and wheezing.    Cardiovascular: Negative for chest pain and leg swelling.   Gastrointestinal: Negative for abdominal pain, blood in stool, constipation (on metamucil powder), diarrhea, nausea and vomiting.     "    On pantoprazole daily   Endocrine: Negative for cold intolerance, heat intolerance and polydipsia.   Genitourinary: Negative for dysuria, hematuria, testicular pain and urgency.        Noctuia 1 x  nite   Musculoskeletal: Positive for arthralgias (rt knee cant bend well, walking is ok, rides a  bike,occas uses  ibuprofen 400 mg) and neck stiffness. Negative for back pain, gait problem, joint swelling and myalgias.        Can't sleep on his shoulders at night   Neurological: Negative for dizziness, seizures and numbness (cervical arthritis causes  neck paresthesias).   Psychiatric/Behavioral: Negative for agitation, behavioral problems and hallucinations. The patient is not nervous/anxious.           Objective:      Vitals:    02/12/20 1056   BP: 120/82   Pulse: 70   Weight: 86.2 kg (190 lb)   Height: 5' 10" (1.778 m)     Body mass index is 27.26 kg/m².  Physical Exam   Constitutional: He is oriented to person, place, and time. He appears well-developed and well-nourished.   Well-nourished middle-aged male in no apparent distress.   HENT:   Head: Normocephalic and atraumatic.   Right Ear: External ear normal.   Left Ear: External ear normal.   Nose: Nose normal.   Mouth/Throat: Oropharynx is clear and moist.   Eyes: Pupils are equal, round, and reactive to light. EOM are normal.   Neck: Normal range of motion. Neck supple. Carotid bruit is not present. No thyromegaly present.   Cardiovascular: Normal rate, regular rhythm, normal heart sounds and intact distal pulses.   No murmur heard.  Pulmonary/Chest: Effort normal and breath sounds normal. He has no wheezes. He has no rales.   Abdominal: Soft. Bowel sounds are normal. He exhibits no distension. There is no hepatosplenomegaly. There is no tenderness.   Musculoskeletal: Normal range of motion. He exhibits no tenderness or deformity.        Lumbar back: Normal. He exhibits no pain and no spasm.   Bends 90 degrees at  waist shoulders full range of motion with some " soreness.  Knees are quite crepitant right greater than the left.  He is unable to flex the right knee past 90°.  No peripheral edema noted.  He walks with a mild limp   Lymphadenopathy:     He has no cervical adenopathy.   Neurological: He is alert and oriented to person, place, and time. No cranial nerve deficit. Coordination normal.   Skin: Skin is warm and dry. No rash noted.   Psychiatric: He has a normal mood and affect. His behavior is normal. Judgment and thought content normal.   Nursing note and vitals reviewed.        Assessment:       1. Primary osteoarthritis of right knee    2. Gastroesophageal reflux disease, esophagitis presence not specified    3. Cervical disc disease    4. Essential (primary) hypertension    5. Enlarged prostate without lower urinary tract symptoms (luts)    6. Esophagitis    7. Primary osteoarthritis of both knees    8. Asbestos exposure         Plan:       Primary osteoarthritis of right knee  Patient manages his right knee arthritis well with ibuprofen p.r.n..  He is not ready to go  pursue knee injections or knee replacements at this time.  Gastroesophageal reflux disease, esophagitis presence not specified  -     pantoprazole (PROTONIX) 40 MG tablet; Take 1 tablet (40 mg total) by mouth once daily.  Dispense: 90 tablet; Refill: 1  Cervical disc disease  Stable at this time  Essential (primary) hypertension  Blood pressure normal at this time  Enlarged prostate without lower urinary tract symptoms (luts)  Minimal symptoms of nocturia  Esophagitis  Pantoprazole refill  Primary osteoarthritis of both knees  Continue conservative care at this time  Asbestos exposure  He had a chest x-ray last year that was clear    Follow up in about 6 months (around 8/12/2020) for Osteoarthritis.

## 2020-03-03 ENCOUNTER — TELEPHONE (OUTPATIENT)
Dept: FAMILY MEDICINE | Facility: CLINIC | Age: 65
End: 2020-03-03

## 2020-03-03 NOTE — TELEPHONE ENCOUNTER
----- Message from Nara Raymundo MA sent at 2/27/2020 11:27 AM CST -----      ----- Message -----  From: Noni Feliz  Sent: 2/27/2020  11:08 AM CST  To: Phil Kemp Staff    Med PA with forms

## 2020-06-24 ENCOUNTER — TELEPHONE (OUTPATIENT)
Dept: FAMILY MEDICINE | Facility: CLINIC | Age: 65
End: 2020-06-24

## 2020-06-24 ENCOUNTER — OFFICE VISIT (OUTPATIENT)
Dept: FAMILY MEDICINE | Facility: CLINIC | Age: 65
End: 2020-06-24
Payer: MEDICARE

## 2020-06-24 VITALS
WEIGHT: 192 LBS | SYSTOLIC BLOOD PRESSURE: 124 MMHG | DIASTOLIC BLOOD PRESSURE: 82 MMHG | TEMPERATURE: 98 F | BODY MASS INDEX: 27.49 KG/M2 | HEIGHT: 70 IN | HEART RATE: 74 BPM

## 2020-06-24 DIAGNOSIS — Q75.9 SKULL ANOMALY: Primary | ICD-10-CM

## 2020-06-24 PROCEDURE — 99213 OFFICE O/P EST LOW 20 MIN: CPT | Mod: S$GLB,,, | Performed by: NURSE PRACTITIONER

## 2020-06-24 PROCEDURE — 99213 PR OFFICE/OUTPT VISIT, EST, LEVL III, 20-29 MIN: ICD-10-PCS | Mod: S$GLB,,, | Performed by: NURSE PRACTITIONER

## 2020-06-24 NOTE — TELEPHONE ENCOUNTER
----- Message from Fidel Muro sent at 6/24/2020  9:38 AM CDT -----  Regarding: needing appt  Pt is needing an appt for a spot on top of his head the size of a quarter just noticed this a.m.   Pt [phone]

## 2020-06-24 NOTE — PROGRESS NOTES
Patient ID: Ashish Dave is a 64 y.o. male.    Chief Complaint: Spot (on top of pts head on right side//no bottles tb )    65 y/o male presents for sick visit- c/oing of scaling and Indentation to top of skull. Pt reports he's had occasional scaling/flaky skin to the top of his head however this AM when he was feeling area he felt a small indentation to the same area. Denies any recent or past trauma to head. No pruritus or tenderness to site. He sees Dr. Bergeron regularly for skin checks/removal of sun spots.           Past Medical History:   Diagnosis Date    Arthritis     Chronic calculous cholecystitis 8/12/2019    Essential (primary) hypertension 3/8/2016    GERD (gastroesophageal reflux disease)      Past Surgical History:   Procedure Laterality Date    COLONOSCOPY  2016    Dr Saldaña- rtc 5 yr    GALLBLADDER SURGERY  08/2019    KNEE SURGERY Right     x's3    LAPAROSCOPIC CHOLECYSTECTOMY N/A 8/12/2019    Procedure: CHOLECYSTECTOMY, LAPAROSCOPIC;  Surgeon: Alphonso Freeman III, MD;  Location: [facility];  Service: General;  Laterality: N/A;         Tobacco History:  reports that he has never smoked. He has never used smokeless tobacco.      Review of patient's allergies indicates:  No Known Allergies    Current Outpatient Medications:     ibuprofen (ADVIL,MOTRIN) 400 MG tablet, Take 400 mg by mouth every 4 (four) hours as needed for Other., Disp: , Rfl:     pantoprazole (PROTONIX) 40 MG tablet, Take 1 tablet (40 mg total) by mouth once daily., Disp: 90 tablet, Rfl: 1    Review of Systems   Constitutional: Negative for chills and fever.   Respiratory: Negative for cough, shortness of breath and wheezing.    Cardiovascular: Negative for chest pain and leg swelling.   Gastrointestinal: Negative for abdominal pain, blood in stool, constipation and diarrhea.   Genitourinary: Negative for dysuria and hematuria.   Skin: Positive for rash (intermittent scaling/flaky skin to scalp).        Scaling and  "indentation to top of head   Neurological: Negative for dizziness and headaches.          Objective:      Vitals:    06/24/20 1034   BP: 124/82   Pulse: 74   Temp: 98.3 °F (36.8 °C)   Weight: 87.1 kg (192 lb)   Height: 5' 10" (1.778 m)     Physical Exam  Vitals signs and nursing note reviewed.   Constitutional:       Appearance: He is well-developed.   HENT:      Head: Normocephalic and atraumatic.        Right Ear: External ear normal.      Left Ear: External ear normal.      Mouth/Throat:      Pharynx: No posterior oropharyngeal erythema.   Neck:      Musculoskeletal: Neck supple.      Vascular: No carotid bruit.   Cardiovascular:      Rate and Rhythm: Normal rate and regular rhythm.      Heart sounds: No murmur. No friction rub. No gallop.    Pulmonary:      Effort: Pulmonary effort is normal. No respiratory distress.      Breath sounds: Normal breath sounds. No wheezing or rales.   Abdominal:      General: There is no distension.      Palpations: Abdomen is soft.      Tenderness: There is no abdominal tenderness.   Lymphadenopathy:      Cervical: No cervical adenopathy.   Skin:     General: Skin is warm and dry.      Findings: No rash.          Neurological:      Mental Status: He is alert and oriented to person, place, and time.      Gait: Gait normal.           Assessment:       1. Skull anomaly           Plan:       Skull anomaly  Comments:  pt does have small area of indentation though normal appearing however pt concerned this is new- advised can send for scalp xray however no red flags to indicate need for intracranial imaging- advised to see derm for skin issues  Orders:  -     X-Ray Skull Complete Min 4 Views; Future; Expected date: 06/24/2020      Follow up if symptoms worsen or fail to improve, for as scheduled.        6/24/2020 Shanae Pringle NP      "

## 2020-07-09 ENCOUNTER — TELEPHONE (OUTPATIENT)
Dept: FAMILY MEDICINE | Facility: CLINIC | Age: 65
End: 2020-07-09

## 2020-07-09 DIAGNOSIS — R50.9 FEVER, UNSPECIFIED FEVER CAUSE: Primary | ICD-10-CM

## 2020-07-09 NOTE — TELEPHONE ENCOUNTER
"Spoke with pt, state his main concern is his scalp is very tender to touch (in your last note this was mentioned). Dry cough. No SOB. 99.2 temp Has been taking Tylenol. Just doesn't feel "all together"  "

## 2020-07-09 NOTE — TELEPHONE ENCOUNTER
Spoke to pt. Advised Shanae recommends to go get tested for COVID and that the order is in for [facility] but the patient isn't wanting to go out. He is just going to self quarantine for now, rest, and take tylenol. He will call if symptoms worsen.

## 2020-07-09 NOTE — TELEPHONE ENCOUNTER
----- Message from Idalia Blum sent at 7/9/2020  9:40 AM CDT -----  Pt left VM  saying he isnt feeling well, and has a low grade fever    [phone]

## 2020-07-09 NOTE — TELEPHONE ENCOUNTER
Please let him know I recommend he go be tested for COVID19 at [facility]- I have entered the order. Recommend fluids, rest and tylenol for now

## 2020-07-12 ENCOUNTER — HOSPITAL ENCOUNTER (EMERGENCY)
Facility: HOSPITAL | Age: 65
Discharge: HOME | End: 2020-07-12
Attending: EMERGENCY MEDICINE
Payer: MEDICARE

## 2020-07-12 VITALS
HEART RATE: 83 BPM | SYSTOLIC BLOOD PRESSURE: 160 MMHG | WEIGHT: 190 LBS | TEMPERATURE: 99 F | HEIGHT: 70 IN | BODY MASS INDEX: 27.2 KG/M2 | OXYGEN SATURATION: 96 % | RESPIRATION RATE: 16 BRPM | DIASTOLIC BLOOD PRESSURE: 81 MMHG

## 2020-07-12 DIAGNOSIS — Z20.822 SUSPECTED COVID-19 VIRUS INFECTION: Primary | ICD-10-CM

## 2020-07-12 DIAGNOSIS — U07.1 COVID-19 VIRUS DETECTED: ICD-10-CM

## 2020-07-12 DIAGNOSIS — R50.9 FEVER, UNSPECIFIED FEVER CAUSE: ICD-10-CM

## 2020-07-12 LAB — SARS-COV-2 RNA RESP QL NAA+PROBE: DETECTED

## 2020-07-12 PROCEDURE — U0003 INFECTIOUS AGENT DETECTION BY NUCLEIC ACID (DNA OR RNA); SEVERE ACUTE RESPIRATORY SYNDROME CORONAVIRUS 2 (SARS-COV-2) (CORONAVIRUS DISEASE [COVID-19]), AMPLIFIED PROBE TECHNIQUE, MAKING USE OF HIGH THROUGHPUT TECHNOLOGIES AS DESCRIBED BY CMS-2020-01-R: HCPCS

## 2020-07-12 PROCEDURE — 99282 EMERGENCY DEPT VISIT SF MDM: CPT

## 2020-07-13 ENCOUNTER — TELEPHONE (OUTPATIENT)
Dept: FAMILY MEDICINE | Facility: CLINIC | Age: 65
End: 2020-07-13

## 2020-07-13 ENCOUNTER — NURSE TRIAGE (OUTPATIENT)
Dept: ADMINISTRATIVE | Facility: CLINIC | Age: 65
End: 2020-07-13

## 2020-07-13 ENCOUNTER — TELEPHONE (OUTPATIENT)
Dept: EMERGENCY MEDICINE | Facility: HOSPITAL | Age: 65
End: 2020-07-13

## 2020-07-13 NOTE — TELEPHONE ENCOUNTER
Not familiar with this patient but typically given he is positive for COVID-19 there is not much we can do aside from supportive measures at home.  If he feels like he can not breathe we have refer them for further evaluation in the ER

## 2020-07-13 NOTE — TELEPHONE ENCOUNTER
Your test was POSITIVE for COVID-19 (coronavirus).       Prevention steps for patients with confirmed COVID-19       Stay home and stay away from family members and friends. The CDC says, you can leave home after these three things have happened: 1) You have had no fever for at least 72 hours (that is three full days of no fever without the use of medicine that reduces fevers) 2) AND other symptoms have improved (for example, when your cough or shortness of breath have improved) 3) AND at least 10 days have passed since your first positive test.   Separate yourself from other people and animals in your home.   Call ahead before visiting your doctor.   Wear a facemask.   Cover your coughs and sneezes.   Wash your hands often with soap and water; hand  can be used, too.   Avoid sharing personal household items.   Wipe down surfaces used daily.   Monitor your symptoms. Seek prompt medical attention if your illness is worsening (e.g., difficulty breathing).    Before seeking care, call your healthcare provider.   If you have a medical emergency and need to call 911, notify the dispatch personnel that you have, or are being evaluated for COVID-19. If possible, put on a facemask before emergency medical services arrive.        Recommended precautions for household members, intimate partners, and caregivers in a home setting of a patient with symptomatic laboratory-confirmed COVID-19 or a patient under investigation.  Household members, intimate partners, and caregivers in the home setting awaiting tests results have close contact with a person with symptomatic, laboratory-confirmed COVID-19 or a person under investigation. Close contacts should monitor their health; they should call their provider right away if they develop symptoms suggestive of COVID-19 (e.g., fever, cough, shortness of breath).    Close contacts should also follow these recommendations:   Make sure that you understand and can help  the patient follow their provider's instructions for medication(s) and care. You should help the patient with basic needs in the home and provide support for getting groceries, prescriptions, and other personal needs.   Monitor the patient's symptoms. If the patient is getting sicker, call his or her healthcare provider and tell them that the patient has laboratory-confirmed COVID-19. If the patient has a medical emergency and you need to call 911, notify the dispatch personnel that the patient has, or is being evaluated for COVID-19.   Household members should stay in another room or be  from the patient. Household members should use a separate bedroom and bathroom, if available.   Prohibit visitors.   Household members should care for any pets in the home.   Make sure that shared spaces in the home have good air flow, such as by an air conditioner or an opened window, weather permitting.   Perform hand hygiene frequently. Wash your hands often with soap and water for at least 20 seconds or use an alcohol-based hand  (that contains > 60% alcohol) covering all surfaces of your hands and rubbing them together until they feel dry. Soap and water should be used preferentially.   Avoid touching your eyes, nose, and mouth.   The patient should wear a facemask. If the patient is not able to wear a facemask (for example, because it causes trouble breathing), caregivers should wear a mask when they are in the same room as the patient.   Wear a disposable facemask and gloves when you touch or have contact with the patient's blood, stool, or body fluids, such as saliva, sputum, nasal mucus, vomit, urine.  o Throw out disposable facemasks and gloves after using them. Do not reuse.  o When removing personal protective equipment, first remove and dispose of gloves. Then, immediately clean your hands with soap and water or alcohol-based hand . Next, remove and dispose of facemask, and  immediately clean your hands again with soap and water or alcohol-based hand .   You should not share dishes, drinking glasses, cups, eating utensils, towels, bedding, or other items with the patient. After the patient uses these items, you should wash them thoroughly (see below Wash laundry thoroughly).   Clean all high-touch surfaces, such as counters, tabletops, doorknobs, bathroom fixtures, toilets, phones, keyboards, tablets, and bedside tables, every day. Also, clean any surfaces that may have blood, stool, or body fluids on them.   Use a household cleaning spray or wipe, according to the label instructions. Labels contain instructions for safe and effective use of the cleaning product including precautions you should take when applying the product, such as wearing gloves and making sure you have good ventilation during use of the product.   Wash laundry thoroughly.  o Immediately remove and wash clothes or bedding that have blood, stool, or body fluids on them.  o Wear disposable gloves while handling soiled items and keep soiled items away from your body. Clean your hands (with soap and water or an alcohol-based hand ) immediately after removing your gloves.  o Read and follow directions on labels of laundry or clothing items and detergent. In general, using a normal laundry detergent according to washing machine instructions and dry thoroughly using the warmest temperatures recommended on the clothing label.   Place all used disposable gloves, facemasks, and other contaminated items in a lined container before disposing of them with other household waste. Clean your hands (with soap and water or an alcohol-based hand ) immediately after handling these items. Soap and water should be used preferentially if hands are visibly dirty.   Discuss any additional questions with your state or local health department or healthcare provider. Check available hours when contacting  your local health department.    For more information see CDC link below.      https://www.cdc.gov/coronavirus/2019-ncov/hcp/guidance-prevent-spread.html#precautions        Sources:  [facility], [place] [facility] and [facility]     Sincerely,     Suzanne Guerrero RN

## 2020-07-13 NOTE — ED PROVIDER NOTES
Encounter Date: 7/12/2020       History     Chief Complaint   Patient presents with    COVID-19 Concerns     EXPOSURE     HPI     Seen and evaluated.  Presents with chief complaint of requesting screening for COVID-19.  He has nonspecific complaints of viral syndrome, denies any associated shortness of breath or chest pain.  Symptoms have been ongoing for several days and he has been quarantined in with his significant other.  He denies any additional complaints.Symptom mild to moderate.       Review of patient's allergies indicates:  No Known Allergies  Past Medical History:   Diagnosis Date    Arthritis     Chronic calculous cholecystitis 8/12/2019    Essential (primary) hypertension 3/8/2016    GERD (gastroesophageal reflux disease)      Past Surgical History:   Procedure Laterality Date    COLONOSCOPY  2016    Dr Saldaña- rtc 5 yr    GALLBLADDER SURGERY  08/2019    KNEE SURGERY Right     x's3    LAPAROSCOPIC CHOLECYSTECTOMY N/A 8/12/2019    Procedure: CHOLECYSTECTOMY, LAPAROSCOPIC;  Surgeon: Alphonso Freeman III, MD;  Location: [facility];  Service: General;  Laterality: N/A;     Family History   Problem Relation Age of Onset    Stroke Mother     No Known Problems Father      Social History     Tobacco Use    Smoking status: Never Smoker    Smokeless tobacco: Never Used   Substance Use Topics    Alcohol use: Not Currently     Frequency: Never    Drug use: Never     Review of Systems   Constitutional: Negative for fever.   HENT: Positive for sore throat.    Respiratory: Negative for shortness of breath.    Cardiovascular: Negative for chest pain.   Skin: Negative for rash.   Neurological: Negative for weakness.   All other systems reviewed and are negative.      Physical Exam     Initial Vitals [07/12/20 1201]   BP Pulse Resp Temp SpO2   (!) 160/81 83 16 99 °F (37.2 °C) 96 %      MAP       --         Physical Exam    Nursing note and vitals reviewed.  Constitutional: He appears well-developed and  well-nourished. No distress.   HENT:   Head: Normocephalic.   Neck: Neck supple.   Cardiovascular: Normal rate.   Pulmonary/Chest: No respiratory distress.   Skin: Skin is warm and dry.         ED Course   Procedures  Labs Reviewed   SARS-COV-2 (COVID-19) QUALITATIVE PCR - Abnormal; Notable for the following components:       Result Value    SARS-CoV2 (COVID-19) Qualitative PCR Detected (*)     All other components within normal limits          Imaging Results    None          Medical Decision Making:   Initial Assessment:   Presented requesting covid screening. Appears stable. Will discharge with quarantine instructionspendinng results.                                 Clinical Impression:       ICD-10-CM ICD-9-CM   1. Suspected Covid-19 Virus Infection  R68.89    2. Fever, unspecified fever cause  R50.9 780.60             ED Disposition Condition    Discharge Stable        ED Prescriptions     None        Follow-up Information     Follow up With Specialties Details Why Contact Info Additional Information    Phil Kemp MD Family Medicine Go to  As needed [address]  [address]  [place] [address]  [phone]       [facility] Emergency Medicine Go to  As needed [address] [id number]  [phone] 1st floor                                     Eddi Marinelli Jr., MD  07/13/20 7300

## 2020-07-13 NOTE — TELEPHONE ENCOUNTER
----- Message from Janell Magallon sent at 7/13/2020  8:18 AM CDT -----  Vm- pt went and got tested yesterday for covid. He is felling terrible and would like to be checked out.

## 2020-07-13 NOTE — TELEPHONE ENCOUNTER
Spoke with pt, states he has not been able to sleep at night. Not having any other issues, no breathing problems. Agrees to go to the ER if any SOB or breathing issues occur.

## 2020-07-13 NOTE — TELEPHONE ENCOUNTER
"Spoke with patient in reference to cough and fever. Sob ,   Denies all symptoms.  States " I have been testing myself by holding my breathe and see how long I can hold it."  I told him that that is not an indication of lung function.  He needed to sit down and take deep breathes.  He has anxiety over covid.  States he takes" tylenol around the clock.  Told him to take tylenol at fever of 101. As directed on the bottle.    Gave him [phone] [facility] RN for anything.  Patient making full sentence is in NAD.  Patient verbalized understanding.       Reason for Disposition   [1] COVID-19 diagnosed by positive lab test AND [2] mild symptoms (e.g., cough, fever, others) AND [3] no complications or SOB    Additional Information   Negative: SEVERE difficulty breathing (e.g., struggling for each breath, speaks in single words)   Negative: Difficult to awaken or acting confused (e.g., disoriented, slurred speech)   Negative: Bluish (or gray) lips or face now   Negative: Shock suspected (e.g., cold/pale/clammy skin, too weak to stand, low BP, rapid pulse)   Negative: Sounds like a life-threatening emergency to the triager   Negative: [1] COVID-19 exposure AND [2] NO symptoms   Negative: COVID-19 and Breastfeeding, questions about   Negative: [1] Adult with possible COVID-19 symptoms AND [2] triager concerned about severity of symptoms or other causes   Negative: SEVERE or constant chest pain or pressure (Exception: mild central chest pain, present only when coughing)   Negative: MODERATE difficulty breathing (e.g., speaks in phrases, SOB even at rest, pulse 100-120)   Negative: MILD difficulty breathing (e.g., minimal/no SOB at rest, SOB with walking, pulse <100)   Negative: Chest pain   Negative: Patient sounds very sick or weak to the triager   Negative: Fever > 103 F (39.4 C)   Negative: [1] Fever > 101 F (38.3 C) AND [2] age > 60   Negative: [1] Fever > 100.0 F (37.8 C) AND [2] bedridden (e.g., nursing home " patient, CVA, chronic illness, recovering from surgery)   Negative: HIGH RISK patient (e.g., age > 64 years, diabetes, heart or lung disease, weak immune system)   Negative: [1] COVID-19 infection suspected by caller or triager AND [2] mild symptoms (cough, fever, or others) AND [3] no complications or SOB   Negative: Fever present > 3 days (72 hours)   Negative: [1] Fever returns after gone for over 24 hours AND [2] symptoms worse or not improved   Negative: [1] Continuous (nonstop) coughing interferes with work or school AND [2] no improvement using cough treatment per protocol   Negative: Cough present > 3 weeks    Protocols used: CORONAVIRUS (COVID-19) DIAGNOSED OR [id number]

## 2020-07-13 NOTE — TELEPHONE ENCOUNTER
Pt is positive for covid. He needs to treat his sx. If he is having SOB or trouble breathing pt should present back to the ER.

## 2020-07-13 NOTE — TELEPHONE ENCOUNTER
----- Message from Madison Garg sent at 7/13/2020 11:11 AM CDT -----  Vm- patient is calling and wants to be seen he said he is feeling shana bad and wants to get his lungs checked @ 10:59

## 2020-07-15 ENCOUNTER — NURSE TRIAGE (OUTPATIENT)
Dept: ADMINISTRATIVE | Facility: CLINIC | Age: 65
End: 2020-07-15

## 2020-07-15 NOTE — TELEPHONE ENCOUNTER
"Patient had a weak spell but is doing "much  Better now". Per patient.  Advised as per protocol.  Reason for Disposition   COVID-19, questions about    Additional Information   Negative: Severe difficulty breathing (e.g., struggling for each breath, speaks in single words)   Negative: Difficult to awaken or acting confused (e.g., disoriented, slurred speech)   Negative: Bluish (or gray) lips or face now   Negative: Shock suspected (e.g., cold/pale/clammy skin, too weak to stand, low BP, rapid pulse)   Negative: Sounds like a life-threatening emergency to the triager   Negative: [1] COVID-19 suspected (e.g., cough, fever, shortness of breath) AND [2] mild symptoms AND [3] public health department recommends testing   Negative: [1] COVID-19 exposure AND [2] no symptoms   Negative: COVID-19 and Breastfeeding, questions about   Negative: SEVERE or constant chest pain (Exception: mild central chest pain, present only when coughing)   Negative: MODERATE difficulty breathing (e.g., speaks in phrases, SOB even at rest, pulse 100-120)   Negative: Patient sounds very sick or weak to the triager   Negative: MILD difficulty breathing (e.g., minimal/no SOB at rest, SOB with walking, pulse <100)   Negative: Chest pain   Negative: Fever > 103 F (39.4 C)   Negative: [1] Fever > 101 F (38.3 C) AND [2] age > 60   Negative: [1] Fever > 100.0 F (37.8 C) AND [2] bedridden (e.g., nursing home patient, CVA, chronic illness, recovering from surgery)   Negative: HIGH RISK patient (e.g., age > 64 years, diabetes, heart or lung disease, weak immune system)   Negative: Fever present > 3 days (72 hours)   Negative: [1] Fever returns after gone for over 24 hours AND [2] symptoms worse or not improved   Negative: [1] Continuous (nonstop) coughing interferes with work or school AND [2] no improvement using cough treatment per protocol   Negative: Cough present > 3 weeks   Negative: [1] COVID-19 infection diagnosed or " suspected AND [2] mild symptoms (fever, cough) AND [3] no trouble breathing or other complications   Negative: COVID-19 Home Isolation, questions about   Negative: COVID-19 Prevention and Healthy Living, questions about   Negative: COVID-19 Testing, questions about    Protocols used: CORONAVIRUS (COVID-19) - DIAGNOSED OR [id number]

## 2020-07-16 ENCOUNTER — NURSE TRIAGE (OUTPATIENT)
Dept: ADMINISTRATIVE | Facility: CLINIC | Age: 65
End: 2020-07-16

## 2020-07-16 RX ORDER — DOXYCYCLINE 100 MG/1
100 CAPSULE ORAL 2 TIMES DAILY
Qty: 14 CAPSULE | Refills: 0 | Status: SHIPPED | OUTPATIENT
Start: 2020-07-16 | End: 2020-07-17 | Stop reason: SDUPTHER

## 2020-07-16 NOTE — TELEPHONE ENCOUNTER
Received message from symptom monitor that pt requesting call back. Pt reports dealing with consistent cough and  F temp for last 12 days. States she hasn't taken tylenol because  Temp hasn't went over 100. Recommend cough medication, tylenol, fluids and humidifier. Pt also reports that he is starting to fell a little depressed since he dosent seem to be getting better or worse. Pt requests that I send message to PCP to contact him regarding mood and temp. Message sent to Dr Kemp to contact pt. OOC number provided to call back with worsening symptoms or concerns    Reason for Disposition   COVID-19 Testing, questions about    Additional Information   Negative: Severe difficulty breathing (e.g., struggling for each breath, speaks in single words)   Negative: Difficult to awaken or acting confused (e.g., disoriented, slurred speech)   Negative: Bluish (or gray) lips or face now   Negative: Shock suspected (e.g., cold/pale/clammy skin, too weak to stand, low BP, rapid pulse)   Negative: Sounds like a life-threatening emergency to the triager   Negative: [1] COVID-19 suspected (e.g., cough, fever, shortness of breath) AND [2] mild symptoms AND [3] public health department recommends testing   Negative: [1] COVID-19 exposure AND [2] no symptoms   Negative: COVID-19 and Breastfeeding, questions about    Protocols used: CORONAVIRUS (COVID-19) - DIAGNOSED OR [id number]

## 2020-07-16 NOTE — TELEPHONE ENCOUNTER
"Per Dr. Kemp "try adding Doxycyline 100mg 1 po BID x 7 days." Spoke with pt, he wants the rx to [facility] [place].   "

## 2020-07-17 ENCOUNTER — NURSE TRIAGE (OUTPATIENT)
Dept: ADMINISTRATIVE | Facility: CLINIC | Age: 65
End: 2020-07-17

## 2020-07-17 RX ORDER — DOXYCYCLINE 100 MG/1
100 CAPSULE ORAL 2 TIMES DAILY
Qty: 14 CAPSULE | Refills: 0 | Status: ON HOLD | OUTPATIENT
Start: 2020-07-17 | End: 2020-07-21 | Stop reason: HOSPADM

## 2020-07-17 NOTE — TELEPHONE ENCOUNTER
Pt contacted through Covid Symptom tracking for an escalation of symptoms. States he called in this morning but also messaged PCP. States he has already spoken with someone from PCP and the have addressed his concerns. No triage required. Advised pt to call back with any questions, concerns, or worsening condition. Verbalized understanding.

## 2020-07-17 NOTE — TELEPHONE ENCOUNTER
----- Message from Janell Magallon sent at 7/17/2020 11:06 AM CDT -----  - pt was supposed to get rx sent to [facility] and they did not have it yesterday evening.   [phone]

## 2020-07-18 ENCOUNTER — NURSE TRIAGE (OUTPATIENT)
Dept: ADMINISTRATIVE | Facility: CLINIC | Age: 65
End: 2020-07-18

## 2020-07-18 NOTE — TELEPHONE ENCOUNTER
Pt verified identity by spelling first and last name and verifying . Pt states that SaO2 is 95% after reassessment; Pulse 96 bpm; T 99.4 F. Pt denies fever symptoms. Pt c/o intermittent cough with scant production of clear phlegm. Pt denies SOB at rest or with activity and states that he is able to make 20 or more laps around his house without any distress. Pt negative for stridor, wheezing, chest tightness or pressure and denies acute distress. Pt states that he just doesn't seem to have the same lung capacity that he did before COVID-19 and that it seems a harder to get air in. Care advice provided at this time per protocol disposition and pt advised that symptoms are mild at this time and can continue to be properly managed and monitored from home; pt voiced verbal understanding and agrees with advice. Pt states that his wife tested positive for COVID-19 and doesn't believe that she was enrolled in the Symptom Tracker program and would like her to be. Spoke with pt's wife and she was successfully enrolled into the Symptom Tracker program. Instructed pt to consider self as infectious and to follow social distancing, vigorous handwashing, cover cough and sneezes, wipe down cell phone several times daily with an antibacterial wipe, and quarantine techniques; pt voiced verbal understanding and agrees with information. Instructed pt to call OOC back for further assistance if needed or if symptoms worsened and call 911 for all emergencies; pt voiced verbal understanding and agrees with goals.      Reason for Disposition   [1] COVID-19 diagnosed by positive lab test AND [2] mild symptoms (e.g., cough, fever, others) AND [3] no complications or SOB    Protocols used: CORONAVIRUS (COVID-19) DIAGNOSED OR [id number]

## 2020-07-20 ENCOUNTER — HOSPITAL ENCOUNTER (INPATIENT)
Facility: HOSPITAL | Age: 65
LOS: 1 days | Discharge: HOME | DRG: 177 | End: 2020-07-21
Attending: EMERGENCY MEDICINE | Admitting: INTERNAL MEDICINE
Payer: MEDICARE

## 2020-07-20 DIAGNOSIS — J12.82 PNEUMONIA DUE TO COVID-19 VIRUS: Primary | ICD-10-CM

## 2020-07-20 DIAGNOSIS — U07.1 PNEUMONIA DUE TO COVID-19 VIRUS: Primary | ICD-10-CM

## 2020-07-20 DIAGNOSIS — Z20.822 SUSPECTED COVID-19 VIRUS INFECTION: ICD-10-CM

## 2020-07-20 DIAGNOSIS — R06.02 SOB (SHORTNESS OF BREATH): ICD-10-CM

## 2020-07-20 PROBLEM — R93.89 ABNORMAL CHEST X-RAY: Status: ACTIVE | Noted: 2020-07-20

## 2020-07-20 LAB
ABO + RH BLD: NORMAL
ALBUMIN SERPL BCP-MCNC: 3.2 G/DL (ref 3.5–5.2)
ALP SERPL-CCNC: 56 U/L (ref 55–135)
ALT SERPL W/O P-5'-P-CCNC: 55 U/L (ref 10–44)
ANION GAP SERPL CALC-SCNC: 14 MMOL/L (ref 8–16)
AST SERPL-CCNC: 51 U/L (ref 10–40)
BASOPHILS # BLD AUTO: 0.02 K/UL (ref 0–0.2)
BASOPHILS NFR BLD: 0.6 % (ref 0–1.9)
BILIRUB SERPL-MCNC: 1.1 MG/DL (ref 0.1–1)
BLD GP AB SCN CELLS X3 SERPL QL: NORMAL
BNP SERPL-MCNC: 53 PG/ML (ref 0–99)
BUN SERPL-MCNC: 8 MG/DL (ref 8–23)
CALCIUM SERPL-MCNC: 8.5 MG/DL (ref 8.7–10.5)
CHLORIDE SERPL-SCNC: 99 MMOL/L (ref 95–110)
CK SERPL-CCNC: 158 U/L (ref 20–200)
CO2 SERPL-SCNC: 23 MMOL/L (ref 23–29)
CREAT SERPL-MCNC: 0.7 MG/DL (ref 0.5–1.4)
CRP SERPL-MCNC: 10.78 MG/DL
DIFFERENTIAL METHOD: ABNORMAL
EOSINOPHIL # BLD AUTO: 0.1 K/UL (ref 0–0.5)
EOSINOPHIL NFR BLD: 2 % (ref 0–8)
ERYTHROCYTE [DISTWIDTH] IN BLOOD BY AUTOMATED COUNT: 14.3 % (ref 11.5–14.5)
EST. GFR  (AFRICAN AMERICAN): >60 ML/MIN/1.73 M^2
EST. GFR  (NON AFRICAN AMERICAN): >60 ML/MIN/1.73 M^2
FERRITIN SERPL-MCNC: 634 NG/ML (ref 20–300)
GLUCOSE SERPL-MCNC: 101 MG/DL (ref 70–110)
HCT VFR BLD AUTO: 56.9 % (ref 40–54)
HGB BLD-MCNC: 19.3 G/DL (ref 14–18)
IMM GRANULOCYTES # BLD AUTO: 0.02 K/UL (ref 0–0.04)
IMM GRANULOCYTES NFR BLD AUTO: 0.6 % (ref 0–0.5)
LACTATE SERPL-SCNC: 1 MMOL/L (ref 0.5–1.9)
LDH SERPL L TO P-CCNC: 230 U/L (ref 110–260)
LYMPHOCYTES # BLD AUTO: 0.5 K/UL (ref 1–4.8)
LYMPHOCYTES NFR BLD: 15.2 % (ref 18–48)
MCH RBC QN AUTO: 29.1 PG (ref 27–31)
MCHC RBC AUTO-ENTMCNC: 33.9 G/DL (ref 32–36)
MCV RBC AUTO: 86 FL (ref 82–98)
MONOCYTES # BLD AUTO: 0.5 K/UL (ref 0.3–1)
MONOCYTES NFR BLD: 15.7 % (ref 4–15)
NEUTROPHILS # BLD AUTO: 2.3 K/UL (ref 1.8–7.7)
NEUTROPHILS NFR BLD: 65.9 % (ref 38–73)
NRBC BLD-RTO: 0 /100 WBC
PLATELET # BLD AUTO: 330 K/UL (ref 150–350)
PMV BLD AUTO: 8.7 FL (ref 9.2–12.9)
POTASSIUM SERPL-SCNC: 4 MMOL/L (ref 3.5–5.1)
PROCALCITONIN SERPL IA-MCNC: 0.08 NG/ML (ref 0–0.5)
PROT SERPL-MCNC: 7.2 G/DL (ref 6–8.4)
RBC # BLD AUTO: 6.63 M/UL (ref 4.6–6.2)
SARS-COV-2 RDRP RESP QL NAA+PROBE: NEGATIVE
SODIUM SERPL-SCNC: 136 MMOL/L (ref 136–145)
TROPONIN I SERPL DL<=0.01 NG/ML-MCNC: <0.03 NG/ML
WBC # BLD AUTO: 3.43 K/UL (ref 3.9–12.7)

## 2020-07-20 PROCEDURE — 84484 ASSAY OF TROPONIN QUANT: CPT

## 2020-07-20 PROCEDURE — 86901 BLOOD TYPING SEROLOGIC RH(D): CPT

## 2020-07-20 PROCEDURE — 82550 ASSAY OF CK (CPK): CPT

## 2020-07-20 PROCEDURE — 25000003 PHARM REV CODE 250: Performed by: INTERNAL MEDICINE

## 2020-07-20 PROCEDURE — 12000002 HC ACUTE/MED SURGE SEMI-PRIVATE ROOM

## 2020-07-20 PROCEDURE — 25000242 PHARM REV CODE 250 ALT 637 W/ HCPCS: Performed by: EMERGENCY MEDICINE

## 2020-07-20 PROCEDURE — U0002 COVID-19 LAB TEST NON-CDC: HCPCS

## 2020-07-20 PROCEDURE — 83605 ASSAY OF LACTIC ACID: CPT

## 2020-07-20 PROCEDURE — 99285 EMERGENCY DEPT VISIT HI MDM: CPT | Mod: 25

## 2020-07-20 PROCEDURE — 84145 PROCALCITONIN (PCT): CPT

## 2020-07-20 PROCEDURE — 80053 COMPREHEN METABOLIC PANEL: CPT

## 2020-07-20 PROCEDURE — 82728 ASSAY OF FERRITIN: CPT

## 2020-07-20 PROCEDURE — 93005 ELECTROCARDIOGRAM TRACING: CPT | Performed by: SPECIALIST

## 2020-07-20 PROCEDURE — 99223 1ST HOSP IP/OBS HIGH 75: CPT | Mod: ,,, | Performed by: INTERNAL MEDICINE

## 2020-07-20 PROCEDURE — 87040 BLOOD CULTURE FOR BACTERIA: CPT

## 2020-07-20 PROCEDURE — 83615 LACTATE (LD) (LDH) ENZYME: CPT

## 2020-07-20 PROCEDURE — 83880 ASSAY OF NATRIURETIC PEPTIDE: CPT

## 2020-07-20 PROCEDURE — 86140 C-REACTIVE PROTEIN: CPT

## 2020-07-20 PROCEDURE — 85025 COMPLETE CBC W/AUTO DIFF WBC: CPT

## 2020-07-20 PROCEDURE — 63600175 PHARM REV CODE 636 W HCPCS: Performed by: INTERNAL MEDICINE

## 2020-07-20 PROCEDURE — 99223 PR INITIAL HOSPITAL CARE,LEVL III: ICD-10-PCS | Mod: ,,, | Performed by: INTERNAL MEDICINE

## 2020-07-20 RX ORDER — GUAIFENESIN/DEXTROMETHORPHAN 100-10MG/5
5 SYRUP ORAL
Status: DISCONTINUED | OUTPATIENT
Start: 2020-07-20 | End: 2020-07-21 | Stop reason: HOSPADM

## 2020-07-20 RX ORDER — GUAIFENESIN/DEXTROMETHORPHAN 100-10MG/5
5 SYRUP ORAL
COMMUNITY
End: 2020-08-14

## 2020-07-20 RX ORDER — ENOXAPARIN SODIUM 100 MG/ML
40 INJECTION SUBCUTANEOUS
Status: DISCONTINUED | OUTPATIENT
Start: 2020-07-20 | End: 2020-07-21 | Stop reason: HOSPADM

## 2020-07-20 RX ORDER — ACETAMINOPHEN 500 MG
500 TABLET ORAL EVERY 6 HOURS PRN
Status: DISCONTINUED | OUTPATIENT
Start: 2020-07-20 | End: 2020-07-21 | Stop reason: HOSPADM

## 2020-07-20 RX ORDER — DEXAMETHASONE 2 MG/1
6 TABLET ORAL
Status: COMPLETED | OUTPATIENT
Start: 2020-07-20 | End: 2020-07-20

## 2020-07-20 RX ORDER — CHOLECALCIFEROL (VITAMIN D3) 25 MCG
4000 TABLET ORAL DAILY
COMMUNITY

## 2020-07-20 RX ORDER — POTASSIUM CHLORIDE 20 MEQ/15ML
40 SOLUTION ORAL
Status: DISCONTINUED | OUTPATIENT
Start: 2020-07-20 | End: 2020-07-21 | Stop reason: HOSPADM

## 2020-07-20 RX ORDER — PANTOPRAZOLE SODIUM 40 MG/1
40 TABLET, DELAYED RELEASE ORAL DAILY
Status: DISCONTINUED | OUTPATIENT
Start: 2020-07-20 | End: 2020-07-21 | Stop reason: HOSPADM

## 2020-07-20 RX ORDER — LANOLIN ALCOHOL/MO/W.PET/CERES
800 CREAM (GRAM) TOPICAL
Status: DISCONTINUED | OUTPATIENT
Start: 2020-07-20 | End: 2020-07-21 | Stop reason: HOSPADM

## 2020-07-20 RX ORDER — ONDANSETRON 2 MG/ML
4 INJECTION INTRAMUSCULAR; INTRAVENOUS EVERY 6 HOURS PRN
Status: DISCONTINUED | OUTPATIENT
Start: 2020-07-20 | End: 2020-07-21 | Stop reason: HOSPADM

## 2020-07-20 RX ORDER — ACETAMINOPHEN 500 MG
10000 TABLET ORAL 3 TIMES DAILY
Status: COMPLETED | OUTPATIENT
Start: 2020-07-20 | End: 2020-07-21

## 2020-07-20 RX ORDER — ASCORBIC ACID 500 MG
1000 TABLET ORAL 3 TIMES DAILY
Status: DISCONTINUED | OUTPATIENT
Start: 2020-07-20 | End: 2020-07-21 | Stop reason: HOSPADM

## 2020-07-20 RX ORDER — CHOLECALCIFEROL (VITAMIN D3) 50 MCG
12000 TABLET ORAL 3 TIMES DAILY
Status: DISCONTINUED | OUTPATIENT
Start: 2020-07-20 | End: 2020-07-20

## 2020-07-20 RX ORDER — ACETAMINOPHEN 500 MG
500 TABLET ORAL EVERY 6 HOURS PRN
COMMUNITY
End: 2021-02-18

## 2020-07-20 RX ORDER — CHOLECALCIFEROL (VITAMIN D3) 25 MCG
2000 TABLET ORAL 3 TIMES DAILY
Status: COMPLETED | OUTPATIENT
Start: 2020-07-20 | End: 2020-07-21

## 2020-07-20 RX ADMIN — CHOLECALCIFEROL TAB 125 MCG (5000 UNIT) 10000 UNITS: 125 TAB at 01:07

## 2020-07-20 RX ADMIN — OXYCODONE HYDROCHLORIDE AND ACETAMINOPHEN 1000 MG: 500 TABLET ORAL at 09:07

## 2020-07-20 RX ADMIN — Medication 2000 UNITS: at 09:07

## 2020-07-20 RX ADMIN — DEXAMETHASONE 6 MG: 4 TABLET ORAL at 09:07

## 2020-07-20 RX ADMIN — CHOLECALCIFEROL TAB 125 MCG (5000 UNIT) 10000 UNITS: 125 TAB at 09:07

## 2020-07-20 RX ADMIN — Medication 2000 UNITS: at 01:07

## 2020-07-20 RX ADMIN — OXYCODONE HYDROCHLORIDE AND ACETAMINOPHEN 1000 MG: 500 TABLET ORAL at 01:07

## 2020-07-20 RX ADMIN — GUAIFENESIN AND DEXTROMETHORPHAN 5 ML: 100; 10 SYRUP ORAL at 12:07

## 2020-07-20 RX ADMIN — ENOXAPARIN SODIUM 40 MG: 100 INJECTION SUBCUTANEOUS at 09:07

## 2020-07-20 RX ADMIN — DEXAMETHASONE 6 MG: 2 TABLET ORAL at 08:07

## 2020-07-20 RX ADMIN — GUAIFENESIN AND DEXTROMETHORPHAN 5 ML: 100; 10 SYRUP ORAL at 11:07

## 2020-07-20 RX ADMIN — PANTOPRAZOLE SODIUM 40 MG: 40 TABLET, DELAYED RELEASE ORAL at 12:07

## 2020-07-20 NOTE — CONSULTS
"Consult Note  Infectious Disease    Reason for Consult:  COVID.    HPI: Ashish Dave is a   64 y.o. male with no significant past medical history other than HTN, GERD, arthritis, tested positive for COVID about 8 days ago, on 07/12/2020.    Initially he was okay, then he developed some episodes of fever generalized weakness.  Three days ago he felt shortness of breath.  He describes it as " I did not have enough lung capacity"     Today morning that the above feeling was even more pronounced.  He got anxious and decided to come  Pulse ox was 95% on room air.  He was also hypertensive  Chest x-ray showed bilateral patchy infiltrates.  He was admitted for possible COVID convalescent plasma and are RDV    T-max 98.6 at this time.  He looks, double although he seems to be an anxious person.  He was on room air.  Pulse ox was 96-97%.  At 1 point it dropped to 94 but I feel that was an error reading.      Review of patient's allergies indicates:  No Known Allergies  Past Medical History:   Diagnosis Date    Arthritis     Chronic calculous cholecystitis 8/12/2019    COVID-19     Essential (primary) hypertension 3/8/2016    GERD (gastroesophageal reflux disease)      Past Surgical History:   Procedure Laterality Date    COLONOSCOPY  2016    Dr Saldaña- rtc 5 yr    GALLBLADDER SURGERY  08/2019    KNEE SURGERY Right     x's3    LAPAROSCOPIC CHOLECYSTECTOMY N/A 8/12/2019    Procedure: CHOLECYSTECTOMY, LAPAROSCOPIC;  Surgeon: Alphonso Freeman III, MD;  Location: [facility];  Service: General;  Laterality: N/A;     Social History     Socioeconomic History    Marital status:      Spouse name: Not on file    Number of children: Not on file    Years of education: Not on file    Highest education level: Not on file   Occupational History    Not on file   Social Needs    Financial resource strain: Not on file    Food insecurity     Worry: Not on file     Inability: Not on file    Transportation needs     " Medical: Not on file     Non-medical: Not on file   Tobacco Use    Smoking status: Never Smoker    Smokeless tobacco: Never Used   Substance and Sexual Activity    Alcohol use: Not Currently     Frequency: Never    Drug use: Never    Sexual activity: Not on file   Lifestyle    Physical activity     Days per week: Not on file     Minutes per session: Not on file    Stress: Not on file   Relationships    Social connections     Talks on phone: Not on file     Gets together: Not on file     Attends Mandaen service: Not on file     Active member of club or organization: Not on file     Attends meetings of clubs or organizations: Not on file     Relationship status: Not on file   Other Topics Concern    Not on file   Social History Narrative    Not on file     Family History   Problem Relation Age of Onset    Stroke Mother     No Known Problems Father        Pertinent medications noted:     Review of Systems:   Twelve point review of systems negative other than the above.    EXAM & DIAGNOSTICS REVIEWED:   Vitals:     Temp:  [98.3 °F (36.8 °C)-98.6 °F (37 °C)]   Temp: 98.3 °F (36.8 °C) (07/20/20 1059)  Pulse: 79 (07/20/20 1059)  Resp: 18 (07/20/20 1059)  BP: (!) 160/87 (07/20/20 1059)  SpO2: 98 % (07/20/20 1059)  No intake or output data in the 24 hours ending 07/20/20 1132    General:  In NAD. Alert and attentive, cooperative, comfortable  Eyes:  Anicteric, PERRL, EOMI  ENT:  No ulcers, exudates, thrush, nares patent, dentition is  Neck:  supple, no masses or adenopathy appreciated  Lungs: Faint crackles if any. no consolidation, rales, wheezes, rub  Heart:  RRR, no gallop/murmur/rub noted  Abd:  Soft, NT, ND, normal BS, no masses or organomegaly appreciated.  :  Voids/Coello, urine clear, no flank tenderness  Musc:  Joints without effusion, swelling, erythema, synovitis, muscle wasting.   Skin:  No rashes. No palmar or plantar lesions. No subungual petechiae  Wound:   Neuro:Alert, attentive, speech fluent,  face symmetric, moves all extremities, no focal weakness. Ambulatory  Psych:  Calm, cooperative  Lymphatic:     No cervical, supraclavicular, axillary, or inguinal nodes  Extrem: No edema, erythema, phlebitis, cellulitis, warm and well perfused  VAD:       Isolation:      Lines/Tubes/Drains:    General Labs reviewed:  Recent Labs   Lab 07/20/20  0746   WBC 3.43*   HGB 19.3*   HCT 56.9*          Recent Labs   Lab 07/20/20  0746      K 4.0   CL 99   CO2 23   BUN 8   CREATININE 0.7   CALCIUM 8.5*   PROT 7.2   BILITOT 1.1*   ALKPHOS 56   ALT 55*   AST 51*     Micro:  Microbiology Results (last 7 days)     Procedure Component Value Units Date/Time    Blood culture x two cultures. Draw prior to antibiotics. [[id number]] Collected: 07/20/20 0755    Order Status: Sent Specimen: Blood from Peripheral, Forearm, Right Updated: 07/20/20 1023    Blood culture x two cultures. Draw prior to antibiotics. [[id number]] Collected: 07/20/20 0746    Order Status: Sent Specimen: Blood from Peripheral, Hand, Left Updated: 07/20/20 0838        Imaging Reviewed:   CXR reviewed   CT  Cardiology:    IMPRESSION & PLAN   COVID 19 illness.    COVID-19 Pneumonia.  Not hypoxemic as per documentation.  Anxious  Transaminitis, probably due to COVID.  Follow.    Recommendations:    I discussed COVID convalescent plasma and are RDV with patient.  Given that his oxygenation is markedly improved and he feels  improved in general, will just observe for now.  If he gets, progressively short of breath and if he becomes hypoxemic, will consider the above treatment.    Prone positioning is beneficial for people who were severely hypoxemic, I feel that even other patient with COVID could benefitting from it.  I discussed with patient.  He will try to go prone, although he has some osteoarthritis of the lower back which may not allow it.    I also advised that he gets a pulse oximeter to monitor pulse ox at at home    Agree with vitamin-C,  vitamin-D, zinc.  Discussed with patient.    Steroids as per pulmonologist.

## 2020-07-20 NOTE — ED NOTES
States was covid positive one week ago was feeling better started feeling like he cant catch his breath states he feels like his lungs aren't working at full capacity

## 2020-07-20 NOTE — ED NOTES
Patient up to walk around side of bed for repeat pulse ox after per Dr Lazo, patient walked around room, denies shortness of breath

## 2020-07-20 NOTE — ED PROVIDER NOTES
Encounter Date: 7/20/2020       History     Chief Complaint   Patient presents with    POS COVID     MY LUNG CAPACITY IS HALF ITS NORMAL, ONSET THIS AM, CANT TAKE FULL BREATH     Patient presents complaining of difficulty breathing.  Patient states he feels like he cannot take a deep breath.  Patient with recent diagnosis of COVID-19.  Patient having fever for the past 1 week although lately fevers have improved.  Morning when he awoke he felt like he did not have his of lung capacity.  Patient has history of hypertension.  He otherwise has medical problems.        Review of patient's allergies indicates:  No Known Allergies  Past Medical History:   Diagnosis Date    Arthritis     Chronic calculous cholecystitis 8/12/2019    COVID-19     Essential (primary) hypertension 3/8/2016    GERD (gastroesophageal reflux disease)      Past Surgical History:   Procedure Laterality Date    COLONOSCOPY  2016    Dr Saldaña- rtc 5 yr    GALLBLADDER SURGERY  08/2019    KNEE SURGERY Right     x's3    LAPAROSCOPIC CHOLECYSTECTOMY N/A 8/12/2019    Procedure: CHOLECYSTECTOMY, LAPAROSCOPIC;  Surgeon: Alphonso Freeman III, MD;  Location: [facility];  Service: General;  Laterality: N/A;     Family History   Problem Relation Age of Onset    Stroke Mother     No Known Problems Father      Social History     Tobacco Use    Smoking status: Never Smoker    Smokeless tobacco: Never Used   Substance Use Topics    Alcohol use: Not Currently     Frequency: Never    Drug use: Never     Review of Systems   Constitutional: Positive for fever.   Respiratory: Positive for cough and shortness of breath.    All other systems reviewed and are negative.      Physical Exam     Initial Vitals [07/20/20 0659]   BP Pulse Resp Temp SpO2   (!) 148/90 88 16 98.6 °F (37 °C) 95 %      MAP       --         Physical Exam    Nursing note and vitals reviewed.  Constitutional: He appears well-developed and well-nourished. He is not diaphoretic. No  distress.   HENT:   Head: Normocephalic and atraumatic.   Mouth/Throat: Oropharynx is clear and moist.   Eyes: EOM are normal.   Neck: Normal range of motion. Neck supple.   Cardiovascular: Normal rate, regular rhythm, normal heart sounds and intact distal pulses.   Pulmonary/Chest: No respiratory distress.   Bilateral rhonchi   Musculoskeletal: Normal range of motion.   Neurological: He is alert and oriented to person, place, and time. He has normal strength.   Skin: Skin is warm and dry.   Psychiatric: He has a normal mood and affect. His behavior is normal. Judgment and thought content normal.         ED Course   Procedures  Labs Reviewed   CBC W/ AUTO DIFFERENTIAL - Abnormal; Notable for the following components:       Result Value    WBC 3.43 (*)     RBC 6.63 (*)     Hemoglobin 19.3 (*)     Hematocrit 56.9 (*)     MPV 8.7 (*)     Immature Granulocytes 0.6 (*)     Lymph # 0.5 (*)     Lymph% 15.2 (*)     Mono% 15.7 (*)     All other components within normal limits   CULTURE, BLOOD   CULTURE, BLOOD   LACTIC ACID, PLASMA   SARS-COV-2 RNA AMPLIFICATION, QUAL   COMPREHENSIVE METABOLIC PANEL   C-REACTIVE PROTEIN   FERRITIN   LACTATE DEHYDROGENASE   CK   TROPONIN I   PROCALCITONIN   B-TYPE NATRIURETIC PEPTIDE   TYPE & SCREEN     EKG Readings: (Independently Interpreted)   EKG is normal sinus rhythm, no ST changes, regular rate     ECG Results          EKG 12-lead (In process)  Result time 07/20/20 08:31:17    In process by Interface, Lab In [facility] (07/20/20 08:31:17)                 Narrative:    Test Reason : R68.89,    Vent. Rate : 073 BPM     Atrial Rate : 073 BPM     P-R Int : 168 ms          QRS Dur : 080 ms      QT Int : 410 ms       P-R-T Axes : 053 -04 018 degrees     QTc Int : 451 ms    Normal sinus rhythm  Normal ECG  When compared with ECG of 07-AUG-2019 06:34,  No significant change was found    Referred By: [email or web address]   SELF           Confirmed By:                             Imaging Results          X-Ray  Chest AP Portable (Final result)  Result time 07/20/20 07:35:51    Final result by Luís Mireles MD (07/20/20 07:35:51)                 Narrative:    REASON: Pt complains of not getting full breaths, pt recently tested  pos for covid    FINDINGS:    Portable chest radiograph with comparison chest x-ray August 7, 2019.  The cardiomediastinal silhouette is within normal limits in size.The  pulmonary vascular structures are within normal limits. Bilateral  peripheral patchy lung opacities noted. No acute osseous abnormality.    IMPRESSION:    Bilateral peripheral patchy lung opacities noted consistent with  infection.    Electronically Signed by Luís Mireels on 7/20/2020 7:44 AM                               Medical Decision Making:   ED Management:  Patient with bilateral patchy infiltrates on chest x-ray consistent with sequale from COVID-19.  Patient candidate for steroids and, some plasma.  Discussed this with Infectious Disease doctor show bear as well as pulmonology Dr. Ray who both agree with assessment and plan.  Patient remains clinically stable.                                 Clinical Impression:       ICD-10-CM ICD-9-CM   1. Pneumonia due to COVID-19 virus  U07.1     J12.89    2. SOB (shortness of breath)  R06.02 786.05   3. Suspected Covid-19 Virus Infection  R68.89              ED Disposition Condition    Admit                           Gab Lazo MD  07/20/20 0910       Gab Lazo MD  07/20/20 0912

## 2020-07-20 NOTE — HPI
Mr. Ashish Dave is a 64-year-old gentleman with past medical history of GERD who was diagnosed with COVID-19 couple weeks ago.  Since that time he is initial fevers have resolved.  However, this morning he woke short of breath and decided come to the emergency department.  The emergency department he is found to have some radiographic evidence consistent with a sickle a of COVID-19 pneumonia.  Additionally, he was found have some mildly elevated inflammatory markers.  Pulmonology was consulted by the emergency department.  I examined this morning he was walking around his room on room air and in no acute distress.  Reported feeling like he could not catch his breath but otherwise had no new complaints.

## 2020-07-20 NOTE — ASSESSMENT & PLAN NOTE
· No indication for either steroids nor convalescent plasma from my perspective.  · Stable to discharge home.

## 2020-07-20 NOTE — CONSULTS
[facility]  Pulmonology   Consult Note    Inpatient consult to Pulmonology  Consult performed by: Kane Ray MD  Consult ordered by: Zoltan Hammer MD  Reason for consult: COVID-19 Pneumonia:  Assessment/Recommendations: -  no object of evidence of an ongoing COVID-19 infection.  -  I see no compelling indication for steroids nor for convalescent plasma.  -  seems stable to transfer to the outpatient setting.         Subjective     Chief Complaint   Patient presents with    POS COVID     MY LUNG CAPACITY IS HALF ITS NORMAL, ONSET THIS AM, CANT TAKE FULL BREATH      History of Present Illness:  Mr. Ashish Dave is a 64-year-old gentleman with past medical history of GERD who was diagnosed with COVID-19 couple weeks ago.  Since that time he is initial fevers have resolved.  However, this morning he woke short of breath and decided come to the emergency department.  The emergency department he is found to have some radiographic evidence consistent with a sickle a of COVID-19 pneumonia.  Additionally, he was found have some mildly elevated inflammatory markers.  Pulmonology was consulted by the emergency department.  I examined this morning he was walking around his room on room air and in no acute distress.  Reported feeling like he could not catch his breath but otherwise had no new complaints.     Past Medical History:   Diagnosis Date    Arthritis     Chronic calculous cholecystitis 8/12/2019    COVID-19     Essential (primary) hypertension 3/8/2016    GERD (gastroesophageal reflux disease)      Past Surgical History:   Procedure Laterality Date    COLONOSCOPY  2016    Dr Saldaña- rtc 5 yr    GALLBLADDER SURGERY  08/2019    KNEE SURGERY Right     x's3    LAPAROSCOPIC CHOLECYSTECTOMY N/A 8/12/2019    Procedure: CHOLECYSTECTOMY, LAPAROSCOPIC;  Surgeon: Alphonso Freeman III, MD;  Location: [facility];  Service: General;  Laterality: N/A;     Family History   Problem Relation Age of Onset     Stroke Mother     No Known Problems Father       Social History     Tobacco Use    Smoking status: Never Smoker    Smokeless tobacco: Never Used   Substance and Sexual Activity    Alcohol use: Not Currently     Frequency: Never    Drug use: Never    Sexual activity: Not on file      Allergies:  Patient has no known allergies.     Facility-Administered Medications as of 7/20/2020   Medication Route Frequency    acetaminophen tablet 500 mg Oral Q6H PRN    ascorbic acid (vitamin C) tablet 1,000 mg Oral TID    cholecalciferol (vitamin D3) 125 mcg (5,000 unit) tablet 10,000 Units Oral TID    And    vitamin D 1000 units tablet 2,000 Units Oral TID    [COMPLETED] dexAMETHasone tablet 6 mg Oral ED 1 Time    dexAMETHasone tablet 6 mg Oral Q12H    dextromethorphan-guaifenesin  mg/5 ml liquid 5 mL Oral Q12H    enoxaparin injection 40 mg Subcutaneous Q24H    magnesium oxide tablet 800 mg Oral PRN    magnesium oxide tablet 800 mg Oral PRN    ondansetron injection 4 mg Intravenous Q6H PRN    pantoprazole EC tablet 40 mg Oral Daily    potassium chloride 10% oral solution 40 mEq Oral PRN    potassium chloride 10% oral solution 40 mEq Oral PRN     Outpatient Medications as of 7/20/2020   Medication    doxycycline (MONODOX) 100 MG capsule    pantoprazole (PROTONIX) 40 MG tablet    ibuprofen (ADVIL,MOTRIN) 400 MG tablet      Review of Systems   Constitutional: Positive for fever, fatigue and weakness.   HENT: Positive for sore throat. Negative for postnasal drip and sinus pressure.    Eyes: Positive for redness. Negative for itching.   Respiratory: Positive for cough, shortness of breath and dyspnea on extertion. Negative for hemoptysis, sputum production and wheezing.    Cardiovascular: Negative for chest pain, palpitations and leg swelling.   Genitourinary: Negative for difficulty urinating and hematuria.   Endocrine: Negative for polydipsia, polyphagia and polyuria.    Musculoskeletal: Positive for  "arthralgias and myalgias. Negative for back pain.   Skin: Negative for rash.   Gastrointestinal: Positive for nausea. Negative for vomiting and abdominal pain.   Neurological: Positive for weakness and headaches. Negative for syncope.   Hematological: Negative for adenopathy. Does not bruise/bleed easily and no excessive bruising.   Psychiatric/Behavioral: Negative for confusion and sleep disturbance. The patient is not nervous/anxious.    All other systems reviewed and are negative.       I have personally reviewed the following: active problem list, medication list, allergies, family history, social history, health maintenance, notes from last encounter, lab results, endoscopy procedure notes, imaging and nursing/provider documentation .    Objective     VS: Temp:  [98.3 °F (36.8 °C)-98.8 °F (37.1 °C)]   Pulse:  [71-88]   Resp:  [13-20]   BP: (126-165)/(70-94)   SpO2:  [93 %-99 %]    Estimated body mass index is 26.83 kg/m² as calculated from the following:    Height as of this encounter: 5' 10" (1.778 m).    Weight as of this encounter: 84.8 kg (187 lb).   Ideal body weight: 73 kg (160 lb 15 oz)  Adjusted ideal body weight: 77.7 kg (171 lb 5.8 oz)   I/O: No intake or output data in the 24 hours ending 07/20/20 1246     Vent: SpO2 98% on room air   PE Physical Exam   Constitutional: He is oriented to person, place, and time. He appears well-developed and well-nourished.  Non-toxic appearance. No distress.   HENT:   Head: Normocephalic and atraumatic.   Mouth/Throat: Uvula is midline, oropharynx is clear and moist and mucous membranes are normal. Mallampati Score: II.   Neck: Trachea normal and normal range of motion. Neck supple. No thyromegaly present.   Cardiovascular: Normal rate, regular rhythm, normal heart sounds and intact distal pulses.   Pulmonary/Chest: Normal expansion, symmetric chest wall expansion and effort normal. He has no wheezes. He has no rhonchi. He has no rales.   Abdominal: Soft. Bowel " sounds are normal. He exhibits no distension. There is no abdominal tenderness.   Musculoskeletal: Normal range of motion.         General: No tenderness, deformity or edema.   Lymphadenopathy: No supraclavicular adenopathy is present.     He has no cervical adenopathy.     He has no axillary adenopathy.   Neurological: He is alert and oriented to person, place, and time. He has normal strength. No cranial nerve deficit or sensory deficit. GCS eye subscore is 4. GCS verbal subscore is 5. GCS motor subscore is 6.   Skin: Skin is warm, dry and intact. No rash noted.   Psychiatric: He has a normal mood and affect.   Nursing note and vitals reviewed.       Recent Diagnostic Studies:  Labs I have personally reviewed and interpreted all recent labs / diagnostic studies, and noted the following relevant results:  All pertinent labs within the past 24 hours have been reviewed.  Recent Labs   Lab 07/20/20  0746   WBC 3.43*   RBC 6.63*   HGB 19.3*   HCT 56.9*      MCV 86   MCH 29.1   MCHC 33.9      K 4.0   CL 99   CO2 23   BUN 8   CREATININE 0.7   ALT 55*   AST 51*   ALKPHOS 56   BILITOT 1.1*   PROT 7.2   ALBUMIN 3.2*      TROPONINI <0.030     CRP:  10.7  BNP:  53  CPK:  158  LDH:  230  Troponin:  Less than 0.03  Lactate:  1.0  Procalcitonin:  0.08  COVID-19 RNA amplification:  Negative   Imaging I have personally reviewed and interpreted all available images and reviewed the associated Radiology reports.  My personal impression of the relevant studies is as follows:  I have reviewed and interpreted all pertinent imaging results/findings within the past 24 hours.  CXR:  Bilateral peripheral patchy lung opacities noted consistent with sequelae of recent COVID-19 pneumonia.    Micro I have personally reviewed and interpreted the available culture data.  Relevant results are as follows.  Blood Culture No results found for: LABBLOO, Sputum Culture No results found for: GSRESP, RESPIRATORYC and Urine Culture   No results found for: LABURIN     Previous Diagnostic Studies:  I have personally reviewed and interpreted the following labs/studies/images from previous encounters:  COVID-19 PCR (07/12/2020):  Positive    Assessment       Active Hospital Problems    Diagnosis    Abnormal chest x-ray     My Impression:  Resolved COVID-19 infection with residual radiographic abnormalities consistent with a sacral a of COVID-19 pneumonia.  No compelling evidence of active infection or hypoxic respiratory failure.    Plan     Pulmonary  · No indication for either steroids nor convalescent plasma from my perspective.  · Stable to discharge home.     Thank you for your consult. I will sign off. Please contact us if you have any additional questions.    Kane Ray MD  Pulmonary / Critical Care Medicine  [facility]

## 2020-07-20 NOTE — PLAN OF CARE
07/20/20 1133   Discharge Assessment   Assessment Type Discharge Planning Assessment   Confirmed/corrected address and phone number on facesheet? Yes   Assessment information obtained from? Patient   Communicated expected length of stay with patient/caregiver no   Prior to hospitilization cognitive status: Alert/Oriented   Prior to hospitalization functional status: Independent   Current cognitive status: Alert/Oriented   Current Functional Status: Independent   Facility Arrived From: home   Lives With spouse   Able to Return to Prior Arrangements yes   Is patient able to care for self after discharge? Yes   Patient's perception of discharge disposition home or selfcare   Readmission Within the Last 30 Days no previous admission in last 30 days   Patient currently being followed by outpatient case management? No   Patient currently receives any other outside agency services? No   Equipment Currently Used at Home none   Do you have any problems affording any of your prescribed medications? No   Is the patient taking medications as prescribed? yes   Does the patient have transportation home? Yes   Transportation Anticipated family or friend will provide   Does the patient receive services at the [facility]? No   Discharge Plan A Home   Discharge Plan B Home Health   DME Needed Upon Discharge  none   Patient/Family in Agreement with Plan yes   Introduced self to patient asked if ok to take a few min of their time for short interview for D/C planning and ok with patient

## 2020-07-21 VITALS
DIASTOLIC BLOOD PRESSURE: 91 MMHG | WEIGHT: 187 LBS | RESPIRATION RATE: 16 BRPM | SYSTOLIC BLOOD PRESSURE: 142 MMHG | HEART RATE: 92 BPM | BODY MASS INDEX: 26.77 KG/M2 | OXYGEN SATURATION: 95 % | HEIGHT: 70 IN | TEMPERATURE: 98 F

## 2020-07-21 LAB
ALBUMIN SERPL BCP-MCNC: 3.3 G/DL (ref 3.5–5.2)
ALP SERPL-CCNC: 60 U/L (ref 55–135)
ALT SERPL W/O P-5'-P-CCNC: 73 U/L (ref 10–44)
ANION GAP SERPL CALC-SCNC: 12 MMOL/L (ref 8–16)
AST SERPL-CCNC: 53 U/L (ref 10–40)
BASOPHILS # BLD AUTO: 0.01 K/UL (ref 0–0.2)
BASOPHILS NFR BLD: 0.1 % (ref 0–1.9)
BILIRUB SERPL-MCNC: 0.9 MG/DL (ref 0.1–1)
BUN SERPL-MCNC: 12 MG/DL (ref 8–23)
CALCIUM SERPL-MCNC: 9.1 MG/DL (ref 8.7–10.5)
CHLORIDE SERPL-SCNC: 97 MMOL/L (ref 95–110)
CO2 SERPL-SCNC: 24 MMOL/L (ref 23–29)
CREAT SERPL-MCNC: 0.8 MG/DL (ref 0.5–1.4)
D DIMER PPP IA.FEU-MCNC: 0.91 UG/ML FEU
DIFFERENTIAL METHOD: ABNORMAL
EOSINOPHIL # BLD AUTO: 0 K/UL (ref 0–0.5)
EOSINOPHIL NFR BLD: 0 % (ref 0–8)
ERYTHROCYTE [DISTWIDTH] IN BLOOD BY AUTOMATED COUNT: 13.6 % (ref 11.5–14.5)
EST. GFR  (AFRICAN AMERICAN): >60 ML/MIN/1.73 M^2
EST. GFR  (NON AFRICAN AMERICAN): >60 ML/MIN/1.73 M^2
GLUCOSE SERPL-MCNC: 143 MG/DL (ref 70–110)
HCT VFR BLD AUTO: 45.8 % (ref 40–54)
HGB BLD-MCNC: 15.4 G/DL (ref 14–18)
IMM GRANULOCYTES # BLD AUTO: 0.08 K/UL (ref 0–0.04)
IMM GRANULOCYTES NFR BLD AUTO: 0.8 % (ref 0–0.5)
LYMPHOCYTES # BLD AUTO: 1 K/UL (ref 1–4.8)
LYMPHOCYTES NFR BLD: 10.2 % (ref 18–48)
MAGNESIUM SERPL-MCNC: 2.3 MG/DL (ref 1.6–2.6)
MCH RBC QN AUTO: 28.9 PG (ref 27–31)
MCHC RBC AUTO-ENTMCNC: 33.6 G/DL (ref 32–36)
MCV RBC AUTO: 86 FL (ref 82–98)
MONOCYTES # BLD AUTO: 0.4 K/UL (ref 0.3–1)
MONOCYTES NFR BLD: 4 % (ref 4–15)
NEUTROPHILS # BLD AUTO: 8.4 K/UL (ref 1.8–7.7)
NEUTROPHILS NFR BLD: 84.9 % (ref 38–73)
NRBC BLD-RTO: 0 /100 WBC
PLATELET # BLD AUTO: 667 K/UL (ref 150–350)
PLATELET BLD QL SMEAR: ABNORMAL
PMV BLD AUTO: 8.5 FL (ref 9.2–12.9)
POTASSIUM SERPL-SCNC: 4.5 MMOL/L (ref 3.5–5.1)
PROT SERPL-MCNC: 7.6 G/DL (ref 6–8.4)
RBC # BLD AUTO: 5.33 M/UL (ref 4.6–6.2)
SODIUM SERPL-SCNC: 133 MMOL/L (ref 136–145)
WBC # BLD AUTO: 9.92 K/UL (ref 3.9–12.7)

## 2020-07-21 PROCEDURE — 25000003 PHARM REV CODE 250: Performed by: INTERNAL MEDICINE

## 2020-07-21 PROCEDURE — 80053 COMPREHEN METABOLIC PANEL: CPT

## 2020-07-21 PROCEDURE — 85025 COMPLETE CBC W/AUTO DIFF WBC: CPT

## 2020-07-21 PROCEDURE — 36415 COLL VENOUS BLD VENIPUNCTURE: CPT

## 2020-07-21 PROCEDURE — 85379 FIBRIN DEGRADATION QUANT: CPT

## 2020-07-21 PROCEDURE — 83735 ASSAY OF MAGNESIUM: CPT

## 2020-07-21 PROCEDURE — 63600175 PHARM REV CODE 636 W HCPCS: Performed by: INTERNAL MEDICINE

## 2020-07-21 RX ADMIN — CHOLECALCIFEROL TAB 125 MCG (5000 UNIT) 10000 UNITS: 125 TAB at 09:07

## 2020-07-21 RX ADMIN — Medication 2000 UNITS: at 09:07

## 2020-07-21 RX ADMIN — OXYCODONE HYDROCHLORIDE AND ACETAMINOPHEN 1000 MG: 500 TABLET ORAL at 09:07

## 2020-07-21 RX ADMIN — OXYCODONE HYDROCHLORIDE AND ACETAMINOPHEN 1000 MG: 500 TABLET ORAL at 05:07

## 2020-07-21 RX ADMIN — Medication 2000 UNITS: at 05:07

## 2020-07-21 RX ADMIN — CHOLECALCIFEROL TAB 125 MCG (5000 UNIT) 10000 UNITS: 125 TAB at 05:07

## 2020-07-21 RX ADMIN — DEXAMETHASONE 6 MG: 4 TABLET ORAL at 09:07

## 2020-07-21 RX ADMIN — GUAIFENESIN AND DEXTROMETHORPHAN 5 ML: 100; 10 SYRUP ORAL at 11:07

## 2020-07-21 NOTE — PLAN OF CARE
Problem: Adult Inpatient Plan of Care  Goal: Absence of Hospital-Acquired Illness or Injury  Outcome: Ongoing, Progressing  Goal: Optimal Comfort and Wellbeing  Outcome: Ongoing, Progressing  Goal: Readiness for Transition of Care  Outcome: Ongoing, Progressing

## 2020-07-21 NOTE — ASSESSMENT & PLAN NOTE
Getting admitted with COVID19 Pneumonia , but not hypoxic  PCR done 1 week ago was positive and Today Pt had RAA COVID 19 test which is negative  CXR showed opacities  ID and Pulmonology team on board  If not meeting criteria for COVID treatment pt can go Home

## 2020-07-21 NOTE — SUBJECTIVE & OBJECTIVE
Past Medical History:   Diagnosis Date    Arthritis     Chronic calculous cholecystitis 8/12/2019    COVID-19     Essential (primary) hypertension 3/8/2016    GERD (gastroesophageal reflux disease)        Past Surgical History:   Procedure Laterality Date    COLONOSCOPY  2016    Dr Saldaña- rtc 5 yr    GALLBLADDER SURGERY  08/2019    KNEE SURGERY Right     x's3    LAPAROSCOPIC CHOLECYSTECTOMY N/A 8/12/2019    Procedure: CHOLECYSTECTOMY, LAPAROSCOPIC;  Surgeon: Alphonso Freeman III, MD;  Location: [facility];  Service: General;  Laterality: N/A;       Review of patient's allergies indicates:  No Known Allergies    No current facility-administered medications on file prior to encounter.      Current Outpatient Medications on File Prior to Encounter   Medication Sig    acetaminophen (TYLENOL) 500 MG tablet Take 500 mg by mouth every 6 (six) hours as needed for Pain.    ASCORBIC ACID, VITAMIN C, ORAL Take 4,000 mg by mouth 2 (two) times a day.    dextromethorphan-guaifenesin  mg/5 ml (TUSSIN DM COUGH)  mg/5 mL liquid Take 5 mLs by mouth every 12 (twelve) hours.    doxycycline (MONODOX) 100 MG capsule Take 1 capsule (100 mg total) by mouth 2 (two) times daily.    pantoprazole (PROTONIX) 40 MG tablet Take 1 tablet (40 mg total) by mouth once daily.    vitamin D (VITAMIN D3) 1000 units Tab Take 4,000 Units by mouth once daily.    ibuprofen (ADVIL,MOTRIN) 400 MG tablet Take 400 mg by mouth every 4 (four) hours as needed for Other.     Family History     Problem Relation (Age of Onset)    No Known Problems Father    Stroke Mother        Tobacco Use    Smoking status: Never Smoker    Smokeless tobacco: Never Used   Substance and Sexual Activity    Alcohol use: Not Currently     Frequency: Never    Drug use: Never    Sexual activity: Not on file     Review of Systems   Constitutional: Negative for activity change and appetite change.   HENT: Negative for congestion and dental problem.    Eyes:  Negative for discharge and itching.   Respiratory: Positive for shortness of breath.    Cardiovascular: Negative for chest pain.   Gastrointestinal: Negative for abdominal distention and abdominal pain.   Endocrine: Negative for cold intolerance.   Genitourinary: Negative for difficulty urinating and dysuria.   Musculoskeletal: Negative for arthralgias and back pain.   Skin: Negative for color change.   Neurological: Negative for dizziness and facial asymmetry.   Hematological: Negative for adenopathy.   Psychiatric/Behavioral: Negative for agitation and behavioral problems.     Objective:     Vital Signs (Most Recent):  Temp: 98.3 °F (36.8 °C) (07/20/20 1700)  Pulse: 70 (07/20/20 1700)  Resp: 17 (07/20/20 1700)  BP: (!) 150/80 (07/20/20 1700)  SpO2: 96 % (07/20/20 1700) Vital Signs (24h Range):  Temp:  [98.3 °F (36.8 °C)-98.8 °F (37.1 °C)] 98.3 °F (36.8 °C)  Pulse:  [70-88] 70  Resp:  [13-20] 17  SpO2:  [93 %-99 %] 96 %  BP: (126-165)/(70-94) 150/80     Weight: 84.8 kg (187 lb)  Body mass index is 26.83 kg/m².    Physical Exam  Vitals signs and nursing note reviewed.   Constitutional:       Appearance: He is well-developed.   HENT:      Head: Atraumatic.      Right Ear: External ear normal.      Left Ear: External ear normal.      Nose: Nose normal.      Mouth/Throat:      Mouth: Mucous membranes are moist.   Eyes:      Conjunctiva/sclera: Conjunctivae normal.   Neck:      Musculoskeletal: Full passive range of motion without pain and normal range of motion.   Cardiovascular:      Rate and Rhythm: Normal rate.   Pulmonary:      Effort: Pulmonary effort is normal.   Abdominal:      General: There is no distension.   Musculoskeletal: Normal range of motion.   Skin:     Coloration: Skin is not jaundiced.   Neurological:      Mental Status: He is alert and oriented to person, place, and time.   Psychiatric:         Behavior: Behavior normal.             Significant Labs:   CBC:   Recent Labs   Lab 07/20/20  0746   WBC  3.43*   HGB 19.3*   HCT 56.9*        CMP:   Recent Labs   Lab 07/20/20  0746      K 4.0   CL 99   CO2 23      BUN 8   CREATININE 0.7   CALCIUM 8.5*   PROT 7.2   ALBUMIN 3.2*   BILITOT 1.1*   ALKPHOS 56   AST 51*   ALT 55*   ANIONGAP 14   EGFRNONAA >60.0       Significant Imaging: I have reviewed all pertinent imaging results/findings within the past 24 hours.

## 2020-07-21 NOTE — H&P
[facility] Medicine  History & Physical    Patient Name: Ashish Dave  MRN: [id number]  Admission Date: 7/20/2020  Attending Physician: Zoltan Hammer MD   Primary Care Provider: Phil Kemp MD         Patient information was obtained from patient and ER records.     Subjective:     Principal Problem:Pneumonia due to COVID-19 virus    Chief Complaint:   Chief Complaint   Patient presents with    POS COVID     MY LUNG CAPACITY IS HALF ITS NORMAL, ONSET THIS AM, CANT TAKE FULL BREATH        HPI: 64 year old patient getting admitted with COVID19 pneumonia  Patient started having fever/malaise and weakness few days ago  COVID19 PCR was positive at that point  He went home and started having shortness of breath few days ago  He became anxious and came to ER and got admitted   He is  not hypoxic  His RAA COVID 19 done today was negative  CXR showed opacities    Past Medical History:   Diagnosis Date    Arthritis     Chronic calculous cholecystitis 8/12/2019    COVID-19     Essential (primary) hypertension 3/8/2016    GERD (gastroesophageal reflux disease)        Past Surgical History:   Procedure Laterality Date    COLONOSCOPY  2016    Dr Saldaña- rtc 5 yr    GALLBLADDER SURGERY  08/2019    KNEE SURGERY Right     x's3    LAPAROSCOPIC CHOLECYSTECTOMY N/A 8/12/2019    Procedure: CHOLECYSTECTOMY, LAPAROSCOPIC;  Surgeon: Alphonso Freeman III, MD;  Location: [facility];  Service: General;  Laterality: N/A;       Review of patient's allergies indicates:  No Known Allergies    No current facility-administered medications on file prior to encounter.      Current Outpatient Medications on File Prior to Encounter   Medication Sig    acetaminophen (TYLENOL) 500 MG tablet Take 500 mg by mouth every 6 (six) hours as needed for Pain.    ASCORBIC ACID, VITAMIN C, ORAL Take 4,000 mg by mouth 2 (two) times a day.    dextromethorphan-guaifenesin  mg/5 ml (TUSSIN DM COUGH)  mg/5 mL liquid  Take 5 mLs by mouth every 12 (twelve) hours.    doxycycline (MONODOX) 100 MG capsule Take 1 capsule (100 mg total) by mouth 2 (two) times daily.    pantoprazole (PROTONIX) 40 MG tablet Take 1 tablet (40 mg total) by mouth once daily.    vitamin D (VITAMIN D3) 1000 units Tab Take 4,000 Units by mouth once daily.    ibuprofen (ADVIL,MOTRIN) 400 MG tablet Take 400 mg by mouth every 4 (four) hours as needed for Other.     Family History     Problem Relation (Age of Onset)    No Known Problems Father    Stroke Mother        Tobacco Use    Smoking status: Never Smoker    Smokeless tobacco: Never Used   Substance and Sexual Activity    Alcohol use: Not Currently     Frequency: Never    Drug use: Never    Sexual activity: Not on file     Review of Systems   Constitutional: Negative for activity change and appetite change.   HENT: Negative for congestion and dental problem.    Eyes: Negative for discharge and itching.   Respiratory: Positive for shortness of breath.    Cardiovascular: Negative for chest pain.   Gastrointestinal: Negative for abdominal distention and abdominal pain.   Endocrine: Negative for cold intolerance.   Genitourinary: Negative for difficulty urinating and dysuria.   Musculoskeletal: Negative for arthralgias and back pain.   Skin: Negative for color change.   Neurological: Negative for dizziness and facial asymmetry.   Hematological: Negative for adenopathy.   Psychiatric/Behavioral: Negative for agitation and behavioral problems.     Objective:     Vital Signs (Most Recent):  Temp: 98.3 °F (36.8 °C) (07/20/20 1700)  Pulse: 70 (07/20/20 1700)  Resp: 17 (07/20/20 1700)  BP: (!) 150/80 (07/20/20 1700)  SpO2: 96 % (07/20/20 1700) Vital Signs (24h Range):  Temp:  [98.3 °F (36.8 °C)-98.8 °F (37.1 °C)] 98.3 °F (36.8 °C)  Pulse:  [70-88] 70  Resp:  [13-20] 17  SpO2:  [93 %-99 %] 96 %  BP: (126-165)/(70-94) 150/80     Weight: 84.8 kg (187 lb)  Body mass index is 26.83 kg/m².    Physical Exam  Vitals  signs and nursing note reviewed.   Constitutional:       Appearance: He is well-developed.   HENT:      Head: Atraumatic.      Right Ear: External ear normal.      Left Ear: External ear normal.      Nose: Nose normal.      Mouth/Throat:      Mouth: Mucous membranes are moist.   Eyes:      Conjunctiva/sclera: Conjunctivae normal.   Neck:      Musculoskeletal: Full passive range of motion without pain and normal range of motion.   Cardiovascular:      Rate and Rhythm: Normal rate.   Pulmonary:      Effort: Pulmonary effort is normal.   Abdominal:      General: There is no distension.   Musculoskeletal: Normal range of motion.   Skin:     Coloration: Skin is not jaundiced.   Neurological:      Mental Status: He is alert and oriented to person, place, and time.   Psychiatric:         Behavior: Behavior normal.             Significant Labs:   CBC:   Recent Labs   Lab 07/20/20  0746   WBC 3.43*   HGB 19.3*   HCT 56.9*        CMP:   Recent Labs   Lab 07/20/20  0746      K 4.0   CL 99   CO2 23      BUN 8   CREATININE 0.7   CALCIUM 8.5*   PROT 7.2   ALBUMIN 3.2*   BILITOT 1.1*   ALKPHOS 56   AST 51*   ALT 55*   ANIONGAP 14   EGFRNONAA >60.0       Significant Imaging: I have reviewed all pertinent imaging results/findings within the past 24 hours.    Assessment/Plan:     * Pneumonia due to COVID-19 virus  Getting admitted with COVID19 Pneumonia , but not hypoxic  PCR done 1 week ago was positive and Today Pt had RAA COVID 19 test which is negative  CXR showed opacities  ID and Pulmonology team on board  If not meeting criteria for COVID treatment pt can go Home        VTE Risk Mitigation (From admission, onward)         Ordered     enoxaparin injection 40 mg  Every 24 hours (non-standard times)      07/20/20 0942     IP VTE HIGH RISK PATIENT  Once      07/20/20 0942     Place sequential compression device  Until discontinued      07/20/20 0942                   Zoltan Hammer MD  Department of Hospital  Medicine   Formerly [facility]

## 2020-07-21 NOTE — DISCHARGE SUMMARY
[facility] Medicine  Discharge Summary      Patient Name: Ashish Dave  MRN: [id number]  Admission Date: 7/20/2020  Hospital Length of Stay: 1 days  Discharge Date and Time:  07/21/2020 4:50 PM  Attending Physician: Brandon Garsia MD   Discharging Provider: Brandon Garsia MD  Primary Care Provider: Phil Kemp MD      HPI:   64 year old patient getting admitted with COVID19 pneumonia  Patient started having fever/malaise and weakness few days ago  COVID19 PCR was positive at that point  He went home and started having shortness of breath few days ago  He became anxious and came to ER and got admitted   He is  not hypoxic  His RAA COVID 19 done today was negative  CXR showed opacities    * No surgery found *      Hospital Course:   7/21/2020  Case discussed with Dr Ray/pulmonology. The patient is currently covid 19 negative but still has the pulmonary effects of covid 19 which can resolve slowly. Pt is comfortable on room air. His wife has covid and is under self quarantine. Prt will receive the post covid care sheet. Per Dr Ray pt may be discharged and see him in 6 months. Pt,s O 2 saturation was above 94% at all times with or without exertion . ROS mild BARRETT, PE occasional bibasilar crackles, no wheezing.    Consults:   Consults (From admission, onward)        Status Ordering Provider     Inpatient consult to Hospitalist  Once     Provider:  Zoltan Hammer MD    Acknowledged ZOLTAN HAMMER     Inpatient consult to Infectious Diseases  Once     Provider:  Autumn Castañeda MD    Acknowledged ZOLTAN HAMMER     Inpatient consult to Pulmonology  Once     Provider:  Kane Ray MD    Completed ZOLTAN HAMMER          No new Assessment & Plan notes have been filed under this hospital service since the last note was generated.  Service: Hospital Medicine    Final Active Diagnoses:    Diagnosis Date Noted POA    PRINCIPAL PROBLEM:  Pneumonia due to COVID-19 virus [U07.1, J12.89] 07/20/2020 Yes       Problems Resolved During this Admission:    Diagnosis Date Noted Date Resolved POA    Pneumonia due to COVID-19 virus [U07.1, J12.89] 07/20/2020 07/20/2020 Unknown       Discharged Condition: good    Disposition: Home or Self Care    Follow Up:  Follow-up Information     Phil Kemp MD In 2 weeks.    Specialty: Family Medicine  Contact information:  [address]  [address]  [facility]  [place] [address]  [phone]             Kane Ray MD In 6 months.    Specialties: Hospitalist, Pulmonary Disease  Contact information:  [address]  [address]  [place] [address]  [phone]                 Patient Instructions:      Diet Cardiac     Notify your health care provider if you experience any of the following:  temperature >100.4     Notify your health care provider if you experience any of the following:  persistent nausea and vomiting or diarrhea     Notify your health care provider if you experience any of the following:  severe uncontrolled pain     Notify your health care provider if you experience any of the following:  redness, tenderness, or signs of infection (pain, swelling, redness, odor or green/yellow discharge around incision site)     Notify your health care provider if you experience any of the following:  difficulty breathing or increased cough     Notify your health care provider if you experience any of the following:  severe persistent headache     Notify your health care provider if you experience any of the following:  worsening rash     Notify your health care provider if you experience any of the following:  persistent dizziness, light-headedness, or visual disturbances     Notify your health care provider if you experience any of the following:  increased confusion or weakness     Activity as tolerated       Significant Diagnostic Studies: Labs:   BMP:   Recent Labs   Lab 07/20/20  0746 07/21/20  0604    143*    133*   K 4.0 4.5   CL 99 97   CO2 23  24   BUN 8 12   CREATININE 0.7 0.8   CALCIUM 8.5* 9.1   MG  --  2.3   , CMP   Recent Labs   Lab 07/20/20  0746 07/21/20  0604    133*   K 4.0 4.5   CL 99 97   CO2 23 24    143*   BUN 8 12   CREATININE 0.7 0.8   CALCIUM 8.5* 9.1   PROT 7.2 7.6   ALBUMIN 3.2* 3.3*   BILITOT 1.1* 0.9   ALKPHOS 56 60   AST 51* 53*   ALT 55* 73*   ANIONGAP 14 12   ESTGFRAFRICA >60.0 >60.0   EGFRNONAA >60.0 >60.0   , CBC   Recent Labs   Lab 07/20/20  0746 07/21/20  0604   WBC 3.43* 9.92   HGB 19.3* 15.4   HCT 56.9* 45.8    667*   , INR   Lab Results   Component Value Date    INR 1.0 08/07/2019   , Troponin   Recent Labs   Lab 07/20/20  0746   TROPONINI <0.030    and All labs within the past 24 hours have been reviewed    Pending Diagnostic Studies:     None         Medications:  Reconciled Home Medications:      Medication List      CONTINUE taking these medications    acetaminophen 500 MG tablet  Commonly known as: TYLENOL  Take 500 mg by mouth every 6 (six) hours as needed for Pain.     ASCORBIC ACID (VITAMIN C) ORAL  Take 4,000 mg by mouth 2 (two) times a day.     pantoprazole 40 MG tablet  Commonly known as: PROTONIX  Take 1 tablet (40 mg total) by mouth once daily.     TUSSIN DM COUGH  mg/5 mL liquid  Generic drug: dextromethorphan-guaifenesin  mg/5 ml  Take 5 mLs by mouth every 12 (twelve) hours.     vitamin D 1000 units Tab  Commonly known as: VITAMIN D3  Take 4,000 Units by mouth once daily.        STOP taking these medications    doxycycline 100 MG capsule  Commonly known as: MONODOX     ibuprofen 400 MG tablet  Commonly known as: ADVIL,MOTRIN            Indwelling Lines/Drains at time of discharge:   Lines/Drains/Airways     None                 Time spent on the discharge of patient: 19 minutes  Patient was seen and examined on the date of discharge and determined to be suitable for discharge.         Brandon Garsia MD  Department of Hospital Medicine  [facility]

## 2020-07-21 NOTE — HOSPITAL COURSE
7/21/2020  Case discussed with Dr Ray/pulmonology. The patient is currently covid 19 negative but still has the pulmonary effects of covid 19 which can resolve slowly. Pt is comfortable on room air. His wife has covid and is under self quarantine. Prt will receive the post covid care sheet. Per Dr Ray pt may be discharged and see him in 6 months.

## 2020-07-21 NOTE — HPI
64 year old patient getting admitted with COVID19 pneumonia  Patient started having fever/malaise and weakness few days ago  COVID19 PCR was positive at that point  He went home and started having shortness of breath few days ago  He became anxious and came to ER and got admitted   He is  not hypoxic  His RAA COVID 19 done today was negative  CXR showed opacities

## 2020-07-25 ENCOUNTER — NURSE TRIAGE (OUTPATIENT)
Dept: ADMINISTRATIVE | Facility: CLINIC | Age: 65
End: 2020-07-25

## 2020-07-25 ENCOUNTER — PATIENT MESSAGE (OUTPATIENT)
Dept: FAMILY MEDICINE | Facility: CLINIC | Age: 65
End: 2020-07-25

## 2020-07-25 LAB
BACTERIA BLD CULT: NORMAL
BACTERIA BLD CULT: NORMAL

## 2020-07-25 NOTE — TELEPHONE ENCOUNTER
Pt contacted through Covid Symptom tracking for an escalation of symptoms. Pt states he is mostly calling to ask questions. Pt states he thinks he is pretty much over Covid symptoms However,  states this morning had a brief episode of dizziness and labored breathing/ SOB while standing up cooking. States he rested and symptoms quickly resolved. States he has since walked for a 15 minute increment with no SOB/BARRETT. Denies CP and fever. States he was recently admitted with pneumonia secondary to covid-19. States since he has been home he has been monitoring sPo2- which has been 95-97%. States he did not check pulse ox with breathing episode earlier today. Advised pt to try to check pulse ox if another episode occurs. Also advised pt to call back or seek care if he has another episode of SOB, BARRETT, CP. Verbalized understanding.     [facility].    Encouraged pt to continue monitoring vital signs at home and call if they worsen in any way. Advised pt to call or seek care  if sat<95%, HR >100, or fever. Verbalized understanding.         Reason for Disposition   [1] COVID-19 diagnosed by positive lab test AND [2] mild symptoms (e.g., cough, fever, others) AND [3] no complications or SOB    Additional Information   Negative: SEVERE difficulty breathing (e.g., struggling for each breath, speaks in single words)   Negative: Difficult to awaken or acting confused (e.g., disoriented, slurred speech)   Negative: Bluish (or gray) lips or face now   Negative: Shock suspected (e.g., cold/pale/clammy skin, too weak to stand, low BP, rapid pulse)   Negative: Sounds like a life-threatening emergency to the triager   Negative: SEVERE or constant chest pain or pressure (Exception: mild central chest pain, present only when coughing)   Negative: MODERATE difficulty breathing (e.g., speaks in phrases, SOB even at rest, pulse 100-120)     Brief episode earlly this morning while standing- resolved immediately upon rest. Has since  walked for 15 min with no SOB/BARRETT. Advised pt to call back if he has another episode of SOB. Sat currently 96-97% .   Negative: Patient sounds very sick or weak to the triager   Negative: MILD difficulty breathing (e.g., minimal/no SOB at rest, SOB with walking, pulse <100)   Negative: Chest pain or pressure   Negative: Fever > 103 F (39.4 C)   Negative: [1] Fever > 101 F (38.3 C) AND [2] age > 60   Negative: [1] Fever > 100.0 F (37.8 C) AND [2] bedridden (e.g., nursing home patient, CVA, chronic illness, recovering from surgery)   Negative: HIGH RISK patient (e.g., age > 64 years, diabetes, heart or lung disease, weak immune system)   Negative: Fever present > 3 days (72 hours)   Negative: [1] Fever returns after gone for over 24 hours AND [2] symptoms worse or not improved   Negative: [1] Continuous (nonstop) coughing interferes with work or school AND [2] no improvement using cough treatment per protocol   Negative: [1] COVID-19 infection suspected by caller or triager AND [2] mild symptoms (cough, fever, or others) AND [3] no complications or SOB   Negative: Cough present > 3 weeks    Protocols used: CORONAVIRUS (COVID-19) DIAGNOSED OR [id number]

## 2020-07-26 ENCOUNTER — NURSE TRIAGE (OUTPATIENT)
Dept: ADMINISTRATIVE | Facility: CLINIC | Age: 65
End: 2020-07-26

## 2020-07-26 NOTE — TELEPHONE ENCOUNTER
Pt also voiced concern for not having ABX or steroids ordered on dc from hospital. Message routed to PCP/staff. Cough protocol also followed for c/o cough.    Reason for Disposition   [1] COVID-19 diagnosed by positive lab test AND [2] mild symptoms (e.g., cough, fever, others) AND [3] no complications or SOB    Additional Information   Negative: SEVERE difficulty breathing (e.g., struggling for each breath, speaks in single words)   Negative: Difficult to awaken or acting confused (e.g., disoriented, slurred speech)   Negative: Bluish (or gray) lips or face now   Negative: Shock suspected (e.g., cold/pale/clammy skin, too weak to stand, low BP, rapid pulse)   Negative: Sounds like a life-threatening emergency to the triager   Negative: [1] COVID-19 exposure AND [2] NO symptoms   Negative: COVID-19 and Breastfeeding, questions about   Negative: [1] Adult with possible COVID-19 symptoms AND [2] triager concerned about severity of symptoms or other causes   Negative: SEVERE or constant chest pain or pressure (Exception: mild central chest pain, present only when coughing)   Negative: MODERATE difficulty breathing (e.g., speaks in phrases, SOB even at rest, pulse 100-120)   Negative: Patient sounds very sick or weak to the triager   Negative: MILD difficulty breathing (e.g., minimal/no SOB at rest, SOB with walking, pulse <100)   Negative: Chest pain or pressure   Negative: Fever > 103 F (39.4 C)   Negative: [1] Fever > 101 F (38.3 C) AND [2] age > 60   Negative: [1] Fever > 100.0 F (37.8 C) AND [2] bedridden (e.g., nursing home patient, CVA, chronic illness, recovering from surgery)   Negative: HIGH RISK patient (e.g., age > 64 years, diabetes, heart or lung disease, weak immune system)   Negative: Fever present > 3 days (72 hours)   Negative: [1] Fever returns after gone for over 24 hours AND [2] symptoms worse or not improved   Negative: [1] Continuous (nonstop) coughing interferes with work or  school AND [2] no improvement using cough treatment per protocol   Negative: [1] COVID-19 infection suspected by caller or triager AND [2] mild symptoms (cough, fever, or others) AND [3] no complications or SOB   Negative: Cough present > 3 weeks    Protocols used: CORONAVIRUS (COVID-19) DIAGNOSED OR [id number]

## 2020-07-27 NOTE — TELEPHONE ENCOUNTER
Kartik Hernandez   
Let him know we can put in an order for CXR but many patients with COVID are reporting it can take some time for lung symptoms to resolve. As long as he's not having worsening SOB or cough or recurrent fever there really is no treatment indicated.  
yes

## 2020-07-28 ENCOUNTER — HOSPITAL ENCOUNTER (OUTPATIENT)
Dept: RADIOLOGY | Facility: HOSPITAL | Age: 65
Discharge: HOME | End: 2020-07-28
Attending: FAMILY MEDICINE
Payer: MEDICARE

## 2020-07-28 ENCOUNTER — TELEPHONE (OUTPATIENT)
Dept: FAMILY MEDICINE | Facility: CLINIC | Age: 65
End: 2020-07-28

## 2020-07-28 DIAGNOSIS — R06.02 SOB (SHORTNESS OF BREATH): Primary | ICD-10-CM

## 2020-07-28 DIAGNOSIS — R06.02 SOB (SHORTNESS OF BREATH): ICD-10-CM

## 2020-07-28 PROCEDURE — 71046 X-RAY EXAM CHEST 2 VIEWS: CPT | Mod: TC,PO

## 2020-07-28 NOTE — TELEPHONE ENCOUNTER
----- Message from Fidel Muro sent at 7/28/2020  8:33 AM CDT -----  Regarding: needing appt today  Pt is stating he is needing to come in . He feels his lung compactly  is low. He had covid but has recovered. Pt o2 level is 92 his bp was 117/73 which is low for him   Pt [phone]

## 2020-07-30 ENCOUNTER — TELEPHONE (OUTPATIENT)
Dept: FAMILY MEDICINE | Facility: CLINIC | Age: 65
End: 2020-07-30

## 2020-07-30 DIAGNOSIS — J18.9 PNEUMONIA OF BOTH LUNGS DUE TO INFECTIOUS ORGANISM, UNSPECIFIED PART OF LUNG: Primary | ICD-10-CM

## 2020-07-30 RX ORDER — METHYLPREDNISOLONE 4 MG/1
TABLET ORAL
Qty: 1 PACKAGE | Refills: 0 | Status: SHIPPED | OUTPATIENT
Start: 2020-07-30 | End: 2020-08-14

## 2020-07-30 RX ORDER — DOXYCYCLINE 100 MG/1
100 CAPSULE ORAL 2 TIMES DAILY
Qty: 20 CAPSULE | Refills: 0 | Status: SHIPPED | OUTPATIENT
Start: 2020-07-30 | End: 2020-08-14

## 2020-07-30 NOTE — TELEPHONE ENCOUNTER
----- Message from Janell Magallon sent at 7/30/2020 11:56 AM CDT -----  - pt would like to mention something else to dr. Kemp  [phone]

## 2020-07-30 NOTE — TELEPHONE ENCOUNTER
Spoke with pt, states she wanted to let Dr. Kemp know in the morning when he gets up his BP is lower than normal. It is 98/66 with a pulse of 63 which is extremely low for him. He just took it and it is 121/72. His oxygen level is also low when he first gets up. This morning it was between 92-95. During the day it is always 98. I let him know the parameters for BP, pulse, and oxygen and when to be concerned and that his readings per Dr. Kemp are wnl.

## 2020-07-30 NOTE — TELEPHONE ENCOUNTER
Patient has chest x-ray 07/28/2020 that shows patchy ground-glass opacities compatible with COVID pneumonia slightly worsened in the right perihilar region compared to prior.  Prescribing doxycycline and Medrol.  Recheck chest x-ray next week

## 2020-07-30 NOTE — TELEPHONE ENCOUNTER
----- Message from Janell Magallon sent at 7/30/2020 10:16 AM CDT -----  Vm- pt had xray done Monday and would like to know results  [phone]

## 2020-07-30 NOTE — TELEPHONE ENCOUNTER
----- Message from Janell Magallon sent at 7/30/2020  3:44 PM CDT -----  Vm- pt just picked up steroid pack from [facility] and he is not sure how to take it or start today.   [phone]

## 2020-07-31 ENCOUNTER — TELEPHONE (OUTPATIENT)
Dept: FAMILY MEDICINE | Facility: CLINIC | Age: 65
End: 2020-07-31

## 2020-07-31 NOTE — TELEPHONE ENCOUNTER
----- Message from Janell Magallon sent at 7/31/2020  9:24 AM CDT -----  - pt has a few questions about his pneumonia  [phone]

## 2020-07-31 NOTE — TELEPHONE ENCOUNTER
Spoke with pt, states next week he is going to be watching his grandchildren and he has heard that pna is contagious so he wanted to get Dr. Hua stance on that. Also states he wants to know if he should wait to do the repeat CXR until after he finishes all of his medication. Printed.

## 2020-07-31 NOTE — TELEPHONE ENCOUNTER
Spoke with pt and let him know per Dr. Kemp to have the CXR done on Tuesday. 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

## 2020-08-03 ENCOUNTER — TELEPHONE (OUTPATIENT)
Dept: FAMILY MEDICINE | Facility: CLINIC | Age: 65
End: 2020-08-03

## 2020-08-03 NOTE — TELEPHONE ENCOUNTER
----- Message from Phil Kemp MD sent at 8/2/2020 10:07 AM CDT -----  Patient was called with his chest x-ray result.  Slightly worsening right perihilar infiltrate.  Refill doxycycline and recheck chest x-ray in 1 to

## 2020-08-04 ENCOUNTER — HOSPITAL ENCOUNTER (OUTPATIENT)
Dept: RADIOLOGY | Facility: HOSPITAL | Age: 65
Discharge: HOME | End: 2020-08-04
Attending: FAMILY MEDICINE
Payer: MEDICARE

## 2020-08-04 DIAGNOSIS — J18.9 PNEUMONIA OF BOTH LUNGS DUE TO INFECTIOUS ORGANISM, UNSPECIFIED PART OF LUNG: ICD-10-CM

## 2020-08-04 PROCEDURE — 71046 X-RAY EXAM CHEST 2 VIEWS: CPT | Mod: TC,PO

## 2020-08-05 ENCOUNTER — TELEPHONE (OUTPATIENT)
Dept: FAMILY MEDICINE | Facility: CLINIC | Age: 65
End: 2020-08-05

## 2020-08-05 DIAGNOSIS — U07.1 PNEUMONIA DUE TO COVID-19 VIRUS: Primary | ICD-10-CM

## 2020-08-05 DIAGNOSIS — J12.82 PNEUMONIA DUE TO COVID-19 VIRUS: Primary | ICD-10-CM

## 2020-08-05 NOTE — TELEPHONE ENCOUNTER
Spoke to patient with result verbatim per Dr Kemp. Patient states that he is improving, but at a very slow pace. He still has 4 days of abx left. Wants to be sure he is to continue this. Also wants to know if he is still contagious. CXR order pended. Remind me to be sure he goes.

## 2020-08-05 NOTE — TELEPHONE ENCOUNTER
----- Message from Phil Kemp MD sent at 8/4/2020  9:18 PM CDT -----  Call patient.  Chest x-ray shows slight improvement in the pneumonia he has in both lungs.  If his symptoms are improving no additional medications are needed.  Repeat chest x-ray in 1 week to document clearing of the infiltrate

## 2020-08-06 ENCOUNTER — PATIENT MESSAGE (OUTPATIENT)
Dept: FAMILY MEDICINE | Facility: CLINIC | Age: 65
End: 2020-08-06

## 2020-08-07 ENCOUNTER — TELEPHONE (OUTPATIENT)
Dept: FAMILY MEDICINE | Facility: CLINIC | Age: 65
End: 2020-08-07

## 2020-08-07 NOTE — TELEPHONE ENCOUNTER
----- Message from Fidel Muro sent at 8/7/2020 10:46 AM CDT -----  Regarding: needing call back  Pt has a mild cause of pneumonia and his son is saying his dad is still the sample even with medication and he wants to know why he is not on an inhaler   Pt son gignx-[phone]

## 2020-08-07 NOTE — TELEPHONE ENCOUNTER
Son is not on the consent form - called the . They just thought maybe an inhaler could help cure the pna. Pt is not SOB or wheezing. Aware that we would give an inhaler to help the sx but pt is not having any and is okay to continue as is per noreen

## 2020-08-10 ENCOUNTER — TELEPHONE (OUTPATIENT)
Dept: FAMILY MEDICINE | Facility: CLINIC | Age: 65
End: 2020-08-10

## 2020-08-10 DIAGNOSIS — U07.1 PNEUMONIA DUE TO COVID-19 VIRUS: Primary | ICD-10-CM

## 2020-08-10 DIAGNOSIS — J12.82 PNEUMONIA DUE TO COVID-19 VIRUS: Primary | ICD-10-CM

## 2020-08-10 NOTE — TELEPHONE ENCOUNTER
----- Message from Jaycee Gunn sent at 8/10/2020  8:51 AM CDT -----   8:47    He wants to be referred to Dr. Ray or Dr. Cunningham  A pulmonologist  pts # [phone]

## 2020-08-11 ENCOUNTER — TELEPHONE (OUTPATIENT)
Dept: FAMILY MEDICINE | Facility: CLINIC | Age: 65
End: 2020-08-11

## 2020-08-11 ENCOUNTER — OFFICE VISIT (OUTPATIENT)
Dept: PULMONOLOGY | Facility: CLINIC | Age: 65
End: 2020-08-11
Payer: MEDICARE

## 2020-08-11 DIAGNOSIS — Z77.090 ASBESTOS EXPOSURE: ICD-10-CM

## 2020-08-11 DIAGNOSIS — U07.1 PNEUMONIA DUE TO COVID-19 VIRUS: ICD-10-CM

## 2020-08-11 DIAGNOSIS — R06.02 SHORTNESS OF BREATH: Primary | ICD-10-CM

## 2020-08-11 DIAGNOSIS — J12.82 PNEUMONIA DUE TO COVID-19 VIRUS: ICD-10-CM

## 2020-08-11 PROCEDURE — 99213 OFFICE O/P EST LOW 20 MIN: CPT | Mod: S$GLB,,, | Performed by: INTERNAL MEDICINE

## 2020-08-11 PROCEDURE — 99213 PR OFFICE/OUTPT VISIT, EST, LEVL III, 20-29 MIN: ICD-10-PCS | Mod: S$GLB,,, | Performed by: INTERNAL MEDICINE

## 2020-08-11 NOTE — PROGRESS NOTES
[facility]  Pulmonology  Hospital Follow-Up Visit    Subjective:     Reason for Visit:    Ashish Dave is a 64 y.o. male here for follow up after a recent hospitalization for COVID..    Chief Complaint   Patient presents with    Pneumonia     HOSPITAL FU     Shortness of Breath      8/11/2020:  Has had some residual but gradually improving exertional dyspnea since leaving the hospital.  Had a CXR that prompted a course of ABx and steroids which provided no significant improvement.  Denies fevers, dizziness, pleurisy or sputum production.  Feeling well today.  Has good days and bad days but overall, his trajectory is improving.     Review of Systems   Constitutional: Negative for fever, fatigue and weakness.   HENT: Negative for postnasal drip, sinus pressure and sore throat.    Eyes: Negative for redness and itching.   Respiratory: Positive for shortness of breath and dyspnea on extertion. Negative for cough, hemoptysis, sputum production, wheezing and pleurisy.    Cardiovascular: Negative for chest pain, palpitations and leg swelling.   Genitourinary: Negative for difficulty urinating and hematuria.   Endocrine: Negative for polydipsia, polyphagia and polyuria.    Musculoskeletal: Positive for arthralgias and myalgias. Negative for back pain.   Skin: Negative for rash.   Gastrointestinal: Positive for nausea. Negative for vomiting and abdominal pain.   Neurological: Positive for weakness and headaches. Negative for syncope.   Hematological: Negative for adenopathy. Does not bruise/bleed easily and no excessive bruising.   Psychiatric/Behavioral: Negative for confusion and sleep disturbance. The patient is not nervous/anxious.    All other systems reviewed and are negative.     Outpatient Medications as of 8/11/2020   Medication    acetaminophen (TYLENOL) 500 MG tablet    ASCORBIC ACID, VITAMIN C, ORAL    dextromethorphan-guaifenesin  mg/5 ml (TUSSIN DM COUGH)  mg/5 mL liquid     doxycycline (MONODOX) 100 MG capsule    methylPREDNISolone (MEDROL DOSEPACK) 4 mg tablet    pantoprazole (PROTONIX) 40 MG tablet    vitamin D (VITAMIN D3) 1000 units Tab     No current facility-administered medications on file as of 8/11/2020.       Previous Reports Reviewed:   ER records, historical medical records, lab reports, nursing home notes, office notes, operative reports, radiology reports, referral letter/letters and x-ray reports   The following portions of the patient's history were reviewed and updated as appropriate: allergies, current medications, past family history, past medical history, past social history, past surgical history and problem list.      Objective:     BP (!) (P) 146/78   Pulse (P) 88   Temp (P) 98.2 °F (36.8 °C)   Wt (P) 82.1 kg (181 lb)   SpO2 (P) 97%   BMI (P) 25.97 kg/m²   Body mass index is 25.97 kg/m² (pended).     Physical Exam   Constitutional: He is oriented to person, place, and time. He appears well-developed and well-nourished.  Non-toxic appearance. No distress.   HENT:   Head: Normocephalic and atraumatic.   Mouth/Throat: Uvula is midline, oropharynx is clear and moist and mucous membranes are normal. Mallampati Score: II.   Neck: Trachea normal and normal range of motion. Neck supple. No thyromegaly present.   Cardiovascular: Normal rate, regular rhythm, normal heart sounds and intact distal pulses.   Pulmonary/Chest: Normal expansion, symmetric chest wall expansion and effort normal. He has no wheezes. He has no rhonchi. He has no rales.   Abdominal: Soft. Bowel sounds are normal. He exhibits no distension. There is no abdominal tenderness.   Musculoskeletal: Normal range of motion.         General: No tenderness, deformity or edema.   Lymphadenopathy: No supraclavicular adenopathy is present.     He has no cervical adenopathy.     He has no axillary adenopathy.   Neurological: He is alert and oriented to person, place, and time. He has normal strength. No  cranial nerve deficit or sensory deficit. GCS eye subscore is 4. GCS verbal subscore is 5. GCS motor subscore is 6.   Skin: Skin is warm, dry and intact. No rash noted.   Psychiatric: He has a normal mood and affect.   Nursing note and vitals reviewed.     Personal Review of Relevant Diagnostic Studies:  I have personally reviewed and interpreted the following labs/studies/images.     CXR:  o Multiple CXRs obtained since discharge demonstrate improving infiltrates.  Most recent CXR is normal despite the radiologist's interpretation.      Assessment:     1. Shortness of breath    2. Pneumonia due to COVID-19 virus    3. Asbestos exposure      Impression:  Overall, he is recovering as expected form COVID-19.  His radiograph is steadily improving and I see no indication for further work up or intervention.  Radiographic abnormalities are not unexpected even up to 6 weeks after clinical improvement.      Plan:     · Reassurance.  · Obtain PFTs for baseline.     I informed the patient of my working diagnosis, it's etiology, risk factors, expected symptoms, diagnostic work up, treatment options and prognosis., I personally reviewed the results of relevant imaging/labs/studies with the patient, and discussed their clinical significance., I spent >10 minutes counseling the patient about their condition., Plan discussed with the patient, who is in agreement., Opportunity provided for the the patient to voice any additional questions or concerns., All questions were answered to the patient's satisfaction., Educational material provided and Patient given date for next visit.    Follow up in about 2 months (around 10/11/2020).    Orders Placed This Visit:  Orders Placed This Encounter   Procedures    Complete PFT with bronchodilator     Standing Status:   Future     Standing Expiration Date:   8/11/2021    Stress test, pulmonary     Standing Status:   Future     Standing Expiration Date:   8/11/2021     Scheduling  Instructions:      Six-Minute Walk     Order Specific Question:   Reason for study     Answer:   Functional status       Kane Ray MD  Pulmonary / Critical Care Medicine  [facility]

## 2020-08-11 NOTE — PATIENT INSTRUCTIONS

## 2020-08-11 NOTE — TELEPHONE ENCOUNTER
----- Message from [facility],  sent at 8/3/2020 10:54 AM CDT -----  Regarding: Repeat CXR due  Phil Kemp MD sent at 8/2/2020 10:07 AM CDT -----  Patient was called with his chest x-ray result.  Slightly worsening right perihilar infiltrate.  Refill doxycycline and recheck chest x-ray in 1 to

## 2020-08-11 NOTE — LETTER
August 11, 2020      Phil Kemp MD  [address]  [address]  [place] [address]           [facility] - Pulmonology  [address]  [address]  [place] [address]  Phone: [phone]          Patient: Ashish Dave   MR Number: [id number]   YOB: 1955   Date of Visit: 8/11/2020       Dear Dr. Phil Kemp:    Thank you for referring Ashish Dave to me for evaluation. Attached you will find relevant portions of my assessment and plan of care.    If you have questions, please do not hesitate to call me. I look forward to following Ashish Dave along with you.    Sincerely,    Kane Ray MD    Enclosure  CC:  No Recipients    If you would like to receive this communication electronically, please contact [email or web address] or [phone] to request more information on [employer] access.    For providers and/or their staff who would like to refer a patient to [facility], please contact us through our one-stop-shop provider referral line, [facility], at [phone].    If you feel you have received this communication in error or would no longer like to receive these types of communications, please e-mail [email or web address]

## 2020-08-12 ENCOUNTER — HOSPITAL ENCOUNTER (OUTPATIENT)
Dept: RADIOLOGY | Facility: HOSPITAL | Age: 65
Discharge: HOME | End: 2020-08-12
Attending: FAMILY MEDICINE
Payer: MEDICARE

## 2020-08-12 DIAGNOSIS — U07.1 PNEUMONIA DUE TO COVID-19 VIRUS: ICD-10-CM

## 2020-08-12 DIAGNOSIS — J12.82 PNEUMONIA DUE TO COVID-19 VIRUS: ICD-10-CM

## 2020-08-12 PROCEDURE — 71046 X-RAY EXAM CHEST 2 VIEWS: CPT | Mod: TC,PO

## 2020-08-13 ENCOUNTER — TELEPHONE (OUTPATIENT)
Dept: FAMILY MEDICINE | Facility: CLINIC | Age: 65
End: 2020-08-13

## 2020-08-14 ENCOUNTER — OFFICE VISIT (OUTPATIENT)
Dept: FAMILY MEDICINE | Facility: CLINIC | Age: 65
End: 2020-08-14
Payer: MEDICARE

## 2020-08-14 VITALS
HEART RATE: 76 BPM | WEIGHT: 181 LBS | SYSTOLIC BLOOD PRESSURE: 132 MMHG | TEMPERATURE: 99 F | DIASTOLIC BLOOD PRESSURE: 80 MMHG | HEIGHT: 70 IN | BODY MASS INDEX: 25.91 KG/M2

## 2020-08-14 DIAGNOSIS — Z79.899 LONG-TERM USE OF HIGH-RISK MEDICATION: ICD-10-CM

## 2020-08-14 DIAGNOSIS — M17.0 PRIMARY OSTEOARTHRITIS OF BOTH KNEES: ICD-10-CM

## 2020-08-14 DIAGNOSIS — Z12.5 SPECIAL SCREENING FOR MALIGNANT NEOPLASM OF PROSTATE: ICD-10-CM

## 2020-08-14 DIAGNOSIS — M17.11 PRIMARY OSTEOARTHRITIS OF RIGHT KNEE: ICD-10-CM

## 2020-08-14 DIAGNOSIS — N40.0 ENLARGED PROSTATE WITHOUT LOWER URINARY TRACT SYMPTOMS (LUTS): ICD-10-CM

## 2020-08-14 DIAGNOSIS — U07.1 PNEUMONIA DUE TO COVID-19 VIRUS: Primary | ICD-10-CM

## 2020-08-14 DIAGNOSIS — K21.9 GASTROESOPHAGEAL REFLUX DISEASE, ESOPHAGITIS PRESENCE NOT SPECIFIED: ICD-10-CM

## 2020-08-14 DIAGNOSIS — G47.00 INSOMNIA, UNSPECIFIED TYPE: ICD-10-CM

## 2020-08-14 DIAGNOSIS — M50.90 CERVICAL DISC DISEASE: ICD-10-CM

## 2020-08-14 DIAGNOSIS — K20.90 ESOPHAGITIS: ICD-10-CM

## 2020-08-14 DIAGNOSIS — J12.82 PNEUMONIA DUE TO COVID-19 VIRUS: Primary | ICD-10-CM

## 2020-08-14 PROCEDURE — 99214 PR OFFICE/OUTPT VISIT, EST, LEVL IV, 30-39 MIN: ICD-10-PCS | Mod: S$GLB,,, | Performed by: FAMILY MEDICINE

## 2020-08-14 PROCEDURE — 99214 OFFICE O/P EST MOD 30 MIN: CPT | Mod: S$GLB,,, | Performed by: FAMILY MEDICINE

## 2020-08-14 RX ORDER — PANTOPRAZOLE SODIUM 40 MG/1
40 TABLET, DELAYED RELEASE ORAL DAILY
Qty: 90 TABLET | Refills: 1 | Status: SHIPPED | OUTPATIENT
Start: 2020-08-14 | End: 2021-02-18 | Stop reason: SDUPTHER

## 2020-08-14 NOTE — PROGRESS NOTES
Subjective:       Patient ID: Ashish Dave is a 64 y.o. male.    Chief Complaint: Follow-up (from coronavirus and pna ac // not taking any medications ac)    This 64-year-old male had a positive COVID-19 test 17 days ago.  It was associated with fever 100.5 and her to take a deep breath.  He had a cough but was not productive.  Due to dyspnea and chest pain is hospitalized 1 night at [facility] but then released as he improved.  He did not require IV antivirals.  He has lost 10 lb throughout this illness.  He fine he feels like he is breathing better.  His chest x-ray is finally clear this week.    He has chronic arthritis in the right knee but can still walk 1-2 miles.  He does not wish a knee replacement at this time.  He does not take much NSAIDs or pain medicine for this.    Review of Systems   Constitutional: Negative for appetite change, chills, fatigue, fever and unexpected weight change.   HENT: Negative for congestion, ear pain and trouble swallowing.         Lost  Taste and  Sense  Of smell   Eyes: Negative for pain, discharge and visual disturbance.   Respiratory: Negative for apnea, cough, shortness of breath and wheezing.         Covid 19 pneumonia , took doxycycline 100 mg  Bid  X  14  Days, had pleuritic pains,   Cardiovascular: Negative for chest pain and leg swelling.   Gastrointestinal: Negative for abdominal pain, blood in stool, constipation, diarrhea, nausea and vomiting.   Endocrine: Negative for cold intolerance, heat intolerance and polydipsia.   Genitourinary: Negative for dysuria, hematuria, testicular pain and urgency.   Musculoskeletal: Positive for arthralgias (rt knee chronic pains ). Negative for gait problem, joint swelling and myalgias.   Neurological: Negative for dizziness, seizures and numbness.   Psychiatric/Behavioral: Negative for agitation, behavioral problems and hallucinations. The patient is not nervous/anxious.        Objective:      Physical Exam   Neck:  Normal range of motion. Neck supple.   Cardiovascular: Normal rate, regular rhythm and normal pulses.   Pulmonary/Chest: Effort normal and breath sounds normal.   Abdominal: Normal appearance and bowel sounds are normal.   Musculoskeletal:      Comments: Rt knee crepitant but has good range of motion, spine has full flexibility at 80-90 degrees.   Neurological: He is alert.   Psychiatric: Mood normal.       Assessment:       1. Pneumonia due to COVID-19 virus    2. Cervical disc disease    3. Enlarged prostate without lower urinary tract symptoms (luts)    4. Primary osteoarthritis of right knee    5. Primary osteoarthritis of both knees    6. Insomnia, unspecified type    7. Gastroesophageal reflux disease, esophagitis presence not specified    8. Esophagitis    9. Special screening for malignant neoplasm of prostate    10. Long-term use of high-risk medication        Plan:       1.  Pneumonia due to COVID-19 virus-chest x-ray is now clear, pulse oxygen are greater than 90%.  He has been approved now for walking or light exercise  2.  Cervical disc disease-intermittent neck pains      3.  BPH-not on medication for this  4.  OA of right knee-he is managing this conservatively and does not 1 knee replacement yet

## 2020-08-15 ENCOUNTER — NURSE TRIAGE (OUTPATIENT)
Dept: ADMINISTRATIVE | Facility: CLINIC | Age: 65
End: 2020-08-15

## 2020-08-15 NOTE — TELEPHONE ENCOUNTER
"States: shortness of breath while riding bicycle, feels ok now.    Additional Information   Negative: [1] Breathing stopped AND [2] hasn't returned   Negative: Choking on something   Negative: Severe difficulty breathing (e.g., struggling for each breath, speaks in single words)   Negative: Bluish lips, tongue, or face now   Negative: Difficult to awaken or acting confused  (e.g., disoriented, slurred speech)   Negative: Passed out (i.e., lost consciousness, collapsed and was not responding)   Negative: Wheezing started suddenly after medicine, an allergic food or bee sting   Negative: Stridor   Negative: Slow, shallow and weak breathing   Negative: Sounds like a life-threatening emergency to the triager   Negative: Chest pain   Negative: [1] Wheezing (high pitched whistling sound) AND [2] previous asthma attacks or use of asthma medicines   Negative: [1] Difficulty breathing AND [2] only present when coughing   Negative: [1] Difficulty breathing AND [2] only from stuffy or runny nose   Negative: [1] MODERATE difficulty breathing (e.g., speaks in phrases, SOB even at rest, pulse 100-120) AND [2] NEW-onset or WORSE than normal   Negative: Wheezing can be heard across the room   Negative: Drooling or spitting out saliva (because can't swallow)   Negative: History of prior "blood clot" in leg or lungs (i.e., deep vein thrombosis, pulmonary embolism)   Negative: History of inherited increased risk of blood clots (e.g., Factor 5 Leiden, Anti-thrombin 3, Protein C or Protein S deficiency, Prothrombin mutation)   Negative: Recent illness requiring prolonged bedrest (i.e., immobilization)   Negative: Hip or leg fracture in past 2 months (e.g., had cast on leg or ankle)   Negative: Major surgery in the past month   Negative: Recent long-distance travel with prolonged time in car, bus, plane, or train (i.e., within past 2 weeks; 6 or  more hours duration)   Negative: Extra heart beats OR irregular " "heart beating   (i.e., "palpitations")   Negative: Fever > 103 F (39.4 C)   Negative: [1] Fever > 101 F (38.3 C) AND [2] age > 60   Negative: [1] Fever > 101 F (38.3 C) AND [2] bedridden (e.g., nursing home patient, CVA, chronic illness, recovering from surgery)   Negative: [1] Fever > 100.5 F (38.1 C) AND [2] diabetes mellitus or weak immune system (e.g., HIV positive, cancer chemo, splenectomy, organ transplant, chronic steroids)   Negative: [1] Periods where breathing stops and then resumes normally AND [2] bedridden (e.g., nursing home patient, CVA)   Negative: Pregnant or postpartum (< 1 month since delivery)   Negative: Patient sounds very sick or weak to the triager   Negative: [1] MILD difficulty breathing (e.g., minimal/no SOB at rest, SOB with walking, pulse <100) AND [2] NEW-onset or WORSE than normal   Negative: [1] Longstanding difficulty breathing (e.g., CHF, COPD, emphysema) AND [2] WORSE than normal   Negative: [1] Longstanding difficulty breathing AND [2] not responding to usual therapy   Negative: [1] Coughing continuously (nonstop) AND [2] keeps from working or sleeping   Negative: [1] MODERATE longstanding difficulty breathing (e.g., speaks in phrases, SOB even at rest, pulse 100-120) AND [2] SAME as normal   Negative: [1] MILD longstanding difficulty breathing AND [2]  SAME as normal    Protocols used: ST BREATHING DIFFICULTY-A-AH      "

## 2020-08-16 ENCOUNTER — TELEPHONE (OUTPATIENT)
Dept: FAMILY MEDICINE | Facility: CLINIC | Age: 65
End: 2020-08-16

## 2020-08-16 NOTE — TELEPHONE ENCOUNTER
Phone call to patient-he had an episode of heart racing and dyspnea while riding a bicycle.  He felt short of breath while walking to the mailbox.  History of heart arrhythmia in the past diagnosed with a Holter monitor.  He would like to do a Holter monitor this week.  I instructed him to take aspirin 81 mg daily with food also.

## 2020-08-17 DIAGNOSIS — R00.2 HEART PALPITATIONS: Primary | ICD-10-CM

## 2020-08-21 ENCOUNTER — CLINICAL SUPPORT (OUTPATIENT)
Dept: CARDIOLOGY | Facility: HOSPITAL | Age: 65
End: 2020-08-21
Attending: FAMILY MEDICINE
Payer: MEDICARE

## 2020-08-21 DIAGNOSIS — R00.2 HEART PALPITATIONS: ICD-10-CM

## 2020-08-21 PROCEDURE — 93226 XTRNL ECG REC<48 HR SCAN A/R: CPT

## 2020-08-25 LAB
OHS CV EVENT MONITOR DAY: 0
OHS CV HOLTER LENGTH DECIMAL HOURS: 24
OHS CV HOLTER LENGTH HOURS: 24
OHS CV HOLTER LENGTH MINUTES: 0

## 2020-08-27 ENCOUNTER — TELEPHONE (OUTPATIENT)
Dept: FAMILY MEDICINE | Facility: CLINIC | Age: 65
End: 2020-08-27

## 2020-08-27 NOTE — TELEPHONE ENCOUNTER
----- Message from Jaycee Gunn sent at 8/27/2020  3:48 PM CDT -----  VM 3:01   Results on his heart monitor. Pt's # [phone]

## 2020-08-31 ENCOUNTER — TELEPHONE (OUTPATIENT)
Dept: FAMILY MEDICINE | Facility: CLINIC | Age: 65
End: 2020-08-31

## 2020-08-31 NOTE — TELEPHONE ENCOUNTER
----- Message from hPil Kemp MD sent at 8/30/2020 11:24 AM CDT -----  Call patient.  Holter monitor showed no serious heart conditions.  No abnormal rhythms showed up on that 24 hour strip

## 2020-09-02 ENCOUNTER — HOSPITAL ENCOUNTER (OUTPATIENT)
Dept: PULMONOLOGY | Facility: HOSPITAL | Age: 65
Discharge: HOME | End: 2020-09-02
Attending: INTERNAL MEDICINE
Payer: MEDICARE

## 2020-09-02 VITALS — BODY MASS INDEX: 25.91 KG/M2 | HEIGHT: 70 IN | WEIGHT: 181 LBS

## 2020-09-02 DIAGNOSIS — R06.02 SHORTNESS OF BREATH: ICD-10-CM

## 2020-09-02 PROCEDURE — 94727 GAS DIL/WSHOT DETER LNG VOL: CPT

## 2020-09-02 PROCEDURE — 94618 PULMONARY STRESS TESTING: CPT

## 2020-09-02 PROCEDURE — 94729 DIFFUSING CAPACITY: CPT

## 2020-09-02 PROCEDURE — 94010 BREATHING CAPACITY TEST: CPT

## 2020-09-02 NOTE — CARE UPDATE
"   09/02/20 0927   6MW Test   Ordering Provider Dr. Kane Ray   Diagnosis Completed Treatment;Shortness of Breath   Height 5' 10" (1.778 m)   Weight 82.1 kg (181 lb)   BMI (Calculated) 26   Predicted Distance 390.04   Patient Race    6MWT Status completed without stopping   Patient Reported No complaints   Was O2 used? No   6MW Distance walked (feet) 1600 feet   Distance walked (meters) 487.68 meters   Did patient stop? No   Type of assistive device(s) used? no assistive devices   Is extra documentation required for this patient? No   Pre-Exercise   Oxygen Saturation 93 %   Supplemental Oxygen Room Air   Heart Rate 82 bpm   Arik Dyspnea Rating  very, very light (just noticeable)   Post Exercise   Oxygen Saturation 96 %   Supplemental Oxygen Room Air   Heart Rate 105 bpm   Arik Dyspnea Rating  light   Recovery   Oxygen Saturation 96 %   Supplemental Oxygen Room Air   Heart Rate 80 bpm   Arik Dyspnea Rating  light   Interpretation   Is procedure ready for interpretation? Yes   Predicted Distance Meters (Calculated) 566.15 meters   Oxygen Qualification   Oxygen Qualification? No     "

## 2020-11-16 ENCOUNTER — PATIENT MESSAGE (OUTPATIENT)
Dept: FAMILY MEDICINE | Facility: CLINIC | Age: 65
End: 2020-11-16

## 2020-11-16 DIAGNOSIS — T30.0 BURN: Primary | ICD-10-CM

## 2020-11-17 RX ORDER — SILVER SULFADIAZINE 10 G/1000G
CREAM TOPICAL DAILY
Qty: 25 TUBE | Refills: 0 | Status: SHIPPED | OUTPATIENT
Start: 2020-11-17 | End: 2021-02-18

## 2020-11-20 ENCOUNTER — PATIENT MESSAGE (OUTPATIENT)
Dept: FAMILY MEDICINE | Facility: CLINIC | Age: 65
End: 2020-11-20

## 2021-02-04 ENCOUNTER — TELEPHONE (OUTPATIENT)
Dept: FAMILY MEDICINE | Facility: CLINIC | Age: 66
End: 2021-02-04

## 2021-02-18 ENCOUNTER — OFFICE VISIT (OUTPATIENT)
Dept: FAMILY MEDICINE | Facility: CLINIC | Age: 66
End: 2021-02-18
Payer: MEDICARE

## 2021-02-18 VITALS
HEIGHT: 70 IN | SYSTOLIC BLOOD PRESSURE: 132 MMHG | DIASTOLIC BLOOD PRESSURE: 76 MMHG | BODY MASS INDEX: 28.2 KG/M2 | WEIGHT: 197 LBS | HEART RATE: 60 BPM

## 2021-02-18 DIAGNOSIS — U07.1 PNEUMONIA DUE TO COVID-19 VIRUS: ICD-10-CM

## 2021-02-18 DIAGNOSIS — M12.811 ROTATOR CUFF ARTHROPATHY OF BOTH SHOULDERS: ICD-10-CM

## 2021-02-18 DIAGNOSIS — N40.0 ENLARGED PROSTATE WITHOUT LOWER URINARY TRACT SYMPTOMS (LUTS): ICD-10-CM

## 2021-02-18 DIAGNOSIS — M12.812 ROTATOR CUFF ARTHROPATHY OF BOTH SHOULDERS: ICD-10-CM

## 2021-02-18 DIAGNOSIS — J12.82 PNEUMONIA DUE TO COVID-19 VIRUS: ICD-10-CM

## 2021-02-18 DIAGNOSIS — K21.9 GASTROESOPHAGEAL REFLUX DISEASE WITHOUT ESOPHAGITIS: ICD-10-CM

## 2021-02-18 DIAGNOSIS — M17.0 PRIMARY OSTEOARTHRITIS OF BOTH KNEES: Primary | ICD-10-CM

## 2021-02-18 PROCEDURE — 99214 PR OFFICE/OUTPT VISIT, EST, LEVL IV, 30-39 MIN: ICD-10-PCS | Mod: CR,S$GLB,, | Performed by: FAMILY MEDICINE

## 2021-02-18 PROCEDURE — 99214 OFFICE O/P EST MOD 30 MIN: CPT | Mod: CR,S$GLB,, | Performed by: FAMILY MEDICINE

## 2021-02-18 RX ORDER — PANTOPRAZOLE SODIUM 40 MG/1
40 TABLET, DELAYED RELEASE ORAL DAILY
Qty: 90 TABLET | Refills: 1 | Status: SHIPPED | OUTPATIENT
Start: 2021-02-18 | End: 2021-08-19

## 2021-02-19 LAB
ALBUMIN SERPL-MCNC: 4.5 G/DL (ref 3.6–5.1)
ALBUMIN/GLOB SERPL: 1.7 (CALC) (ref 1–2.5)
ALP SERPL-CCNC: 44 U/L (ref 35–144)
ALT SERPL-CCNC: 29 U/L (ref 9–46)
APPEARANCE UR: CLEAR
AST SERPL-CCNC: 32 U/L (ref 10–35)
BACTERIA #/AREA URNS HPF: NORMAL /HPF
BACTERIA UR CULT: NORMAL
BASOPHILS # BLD AUTO: 93 CELLS/UL (ref 0–200)
BASOPHILS NFR BLD AUTO: 1.5 %
BILIRUB SERPL-MCNC: 0.8 MG/DL (ref 0.2–1.2)
BILIRUB UR QL STRIP: NEGATIVE
BUN SERPL-MCNC: 12 MG/DL (ref 7–25)
BUN/CREAT SERPL: NORMAL (CALC) (ref 6–22)
CALCIUM SERPL-MCNC: 9.5 MG/DL (ref 8.6–10.3)
CHLORIDE SERPL-SCNC: 103 MMOL/L (ref 98–110)
CHOLEST SERPL-MCNC: 158 MG/DL
CHOLEST/HDLC SERPL: 2.5 (CALC)
CO2 SERPL-SCNC: 28 MMOL/L (ref 20–32)
COLOR UR: NORMAL
CREAT SERPL-MCNC: 0.8 MG/DL (ref 0.7–1.25)
EOSINOPHIL # BLD AUTO: 180 CELLS/UL (ref 15–500)
EOSINOPHIL NFR BLD AUTO: 2.9 %
ERYTHROCYTE [DISTWIDTH] IN BLOOD BY AUTOMATED COUNT: 13.5 % (ref 11–15)
GFRSERPLBLD MDRD-ARVRAT: 93 ML/MIN/1.73M2
GLOBULIN SER CALC-MCNC: 2.7 G/DL (CALC) (ref 1.9–3.7)
GLUCOSE SERPL-MCNC: 92 MG/DL (ref 65–99)
GLUCOSE UR QL STRIP: NEGATIVE
HCT VFR BLD AUTO: 49.5 % (ref 38.5–50)
HDLC SERPL-MCNC: 63 MG/DL
HGB BLD-MCNC: 16.8 G/DL (ref 13.2–17.1)
HGB UR QL STRIP: NEGATIVE
HYALINE CASTS #/AREA URNS LPF: NORMAL /LPF
KETONES UR QL STRIP: NEGATIVE
LDLC SERPL CALC-MCNC: 79 MG/DL (CALC)
LEUKOCYTE ESTERASE UR QL STRIP: NEGATIVE
LYMPHOCYTES # BLD AUTO: 2195 CELLS/UL (ref 850–3900)
LYMPHOCYTES NFR BLD AUTO: 35.4 %
MCH RBC QN AUTO: 30.3 PG (ref 27–33)
MCHC RBC AUTO-ENTMCNC: 33.9 G/DL (ref 32–36)
MCV RBC AUTO: 89.2 FL (ref 80–100)
MONOCYTES # BLD AUTO: 577 CELLS/UL (ref 200–950)
MONOCYTES NFR BLD AUTO: 9.3 %
NEUTROPHILS # BLD AUTO: 3156 CELLS/UL (ref 1500–7800)
NEUTROPHILS NFR BLD AUTO: 50.9 %
NITRITE UR QL STRIP: NEGATIVE
NONHDLC SERPL-MCNC: 95 MG/DL (CALC)
PH UR STRIP: 5.5 [PH] (ref 5–8)
PLATELET # BLD AUTO: 239 THOUSAND/UL (ref 140–400)
PMV BLD REES-ECKER: 10.5 FL (ref 7.5–12.5)
POTASSIUM SERPL-SCNC: 4.1 MMOL/L (ref 3.5–5.3)
PROT SERPL-MCNC: 7.2 G/DL (ref 6.1–8.1)
PROT UR QL STRIP: NEGATIVE
PSA SERPL-MCNC: 3.1 NG/ML
RBC # BLD AUTO: 5.55 MILLION/UL (ref 4.2–5.8)
RBC #/AREA URNS HPF: NORMAL /HPF
SODIUM SERPL-SCNC: 138 MMOL/L (ref 135–146)
SP GR UR STRIP: 1.02 (ref 1–1.03)
SQUAMOUS #/AREA URNS HPF: NORMAL /HPF
TRIGL SERPL-MCNC: 80 MG/DL
TSH SERPL-ACNC: 2.54 MIU/L (ref 0.4–4.5)
WBC # BLD AUTO: 6.2 THOUSAND/UL (ref 3.8–10.8)
WBC #/AREA URNS HPF: NORMAL /HPF

## 2021-02-22 ENCOUNTER — TELEPHONE (OUTPATIENT)
Dept: FAMILY MEDICINE | Facility: CLINIC | Age: 66
End: 2021-02-22

## 2021-03-03 ENCOUNTER — TELEPHONE (OUTPATIENT)
Dept: FAMILY MEDICINE | Facility: CLINIC | Age: 66
End: 2021-03-03

## 2021-04-29 ENCOUNTER — PATIENT MESSAGE (OUTPATIENT)
Dept: RESEARCH | Facility: HOSPITAL | Age: 66
End: 2021-04-29

## 2021-06-14 ENCOUNTER — PATIENT MESSAGE (OUTPATIENT)
Dept: FAMILY MEDICINE | Facility: CLINIC | Age: 66
End: 2021-06-14

## 2021-06-15 ENCOUNTER — PATIENT MESSAGE (OUTPATIENT)
Dept: FAMILY MEDICINE | Facility: CLINIC | Age: 66
End: 2021-06-15

## 2021-07-05 NOTE — DISCHARGE SUMMARY
Discharge Summary  General Surgery      Admit Date: 8/12/2019    Discharge Date :8/12/2019    Attending Physician: Alphonso Freeman III     Discharge Physician: Alphonso Freeman III    Discharged Condition: good    Discharge Diagnosis: Chronic calculous cholecystitis [K80.10]    Treatments/Procedures: Procedure(s) (LRB):  CHOLECYSTECTOMY, LAPAROSCOPIC (N/A)    Hospital Course:  Patient admitted through day surgery for scheduled cholecystectomy.  Procedure performed without complication.  He had an uneventful hospital stay.  He was discharged home in stable condition    Significant Diagnostic Studies: none    Disposition: Home or Self Care    Diet: Regular    Follow up: Office 10-14 days    Activity: No heavy lifting till seen in office.    Patient Instructions:   Current Discharge Medication List      CONTINUE these medications which have NOT CHANGED    Details   ibuprofen (ADVIL,MOTRIN) 400 MG tablet Take 400 mg by mouth every 4 (four) hours as needed for Other.      pantoprazole (PROTONIX) 40 MG tablet pantoprazole 40 mg tablet,delayed release      ranitidine (ZANTAC) 75 MG tablet Take 75 mg by mouth nightly.             Discharge Procedure Orders   Diet Adult Regular     Lifting restrictions   Order Comments: No lifting over twenty pounds for six weeks     Notify your health care provider if you experience any of the following:  temperature >100.4     Notify your health care provider if you experience any of the following:  persistent nausea and vomiting or diarrhea     Notify your health care provider if you experience any of the following:  redness, tenderness, or signs of infection (pain, swelling, redness, odor or green/yellow discharge around incision site)     Notify your health care provider if you experience any of the following:  increased confusion or weakness     Remove dressing in 48 hours     Shower on day dressing removed (No bath)     
Ambulatory

## 2021-07-08 ENCOUNTER — PATIENT MESSAGE (OUTPATIENT)
Dept: FAMILY MEDICINE | Facility: CLINIC | Age: 66
End: 2021-07-08

## 2021-07-08 RX ORDER — DOXYCYCLINE 100 MG/1
100 CAPSULE ORAL 2 TIMES DAILY
Qty: 20 CAPSULE | Refills: 0 | Status: SHIPPED | OUTPATIENT
Start: 2021-07-08 | End: 2021-08-19

## 2021-07-22 ENCOUNTER — OFFICE VISIT (OUTPATIENT)
Dept: FAMILY MEDICINE | Facility: CLINIC | Age: 66
End: 2021-07-22
Payer: MEDICARE

## 2021-07-22 VITALS
SYSTOLIC BLOOD PRESSURE: 140 MMHG | HEIGHT: 70 IN | WEIGHT: 198 LBS | HEART RATE: 84 BPM | BODY MASS INDEX: 28.35 KG/M2 | DIASTOLIC BLOOD PRESSURE: 88 MMHG

## 2021-07-22 DIAGNOSIS — J01.40 SUBACUTE PANSINUSITIS: ICD-10-CM

## 2021-07-22 DIAGNOSIS — I10 BENIGN ESSENTIAL HTN: ICD-10-CM

## 2021-07-22 DIAGNOSIS — R07.89 CHEST TIGHTNESS: Primary | ICD-10-CM

## 2021-07-22 DIAGNOSIS — U07.1 COVID-19 VIRUS INFECTION: ICD-10-CM

## 2021-07-22 LAB
EKG 12-LEAD: NORMAL
PR INTERVAL: NORMAL
PRT AXES: NORMAL
QRS DURATION: NORMAL
QT/QTC: NORMAL
VENTRICULAR RATE: NORMAL

## 2021-07-22 PROCEDURE — 93000 ELECTROCARDIOGRAM COMPLETE: CPT | Mod: S$GLB,,, | Performed by: FAMILY MEDICINE

## 2021-07-22 PROCEDURE — 99214 PR OFFICE/OUTPT VISIT, EST, LEVL IV, 30-39 MIN: ICD-10-PCS | Mod: 25,CR,S$GLB, | Performed by: FAMILY MEDICINE

## 2021-07-22 PROCEDURE — 99214 OFFICE O/P EST MOD 30 MIN: CPT | Mod: 25,CR,S$GLB, | Performed by: FAMILY MEDICINE

## 2021-07-22 PROCEDURE — 93000 POCT EKG 12-LEAD: ICD-10-PCS | Mod: S$GLB,,, | Performed by: FAMILY MEDICINE

## 2021-07-22 RX ORDER — LISINOPRIL 10 MG/1
10 TABLET ORAL DAILY
Qty: 30 TABLET | Refills: 2 | Status: SHIPPED | OUTPATIENT
Start: 2021-07-22 | End: 2021-08-19 | Stop reason: SDUPTHER

## 2021-07-22 RX ORDER — AMOXICILLIN AND CLAVULANATE POTASSIUM 875; 125 MG/1; MG/1
1 TABLET, FILM COATED ORAL 2 TIMES DAILY
Qty: 14 TABLET | Refills: 0 | Status: SHIPPED | OUTPATIENT
Start: 2021-07-22 | End: 2021-08-19

## 2021-07-24 LAB — SARS-COV-2 IGG SERPL IA-ACNC: 4.19 INDEX

## 2021-08-09 ENCOUNTER — CLINICAL SUPPORT (OUTPATIENT)
Dept: FAMILY MEDICINE | Facility: CLINIC | Age: 66
End: 2021-08-09
Payer: MEDICARE

## 2021-08-09 VITALS — DIASTOLIC BLOOD PRESSURE: 78 MMHG | SYSTOLIC BLOOD PRESSURE: 122 MMHG

## 2021-08-09 DIAGNOSIS — R42 VERTIGO: Primary | ICD-10-CM

## 2021-08-09 RX ORDER — MECLIZINE HYDROCHLORIDE 25 MG/1
25 TABLET ORAL 2 TIMES DAILY PRN
Qty: 20 TABLET | Refills: 0 | Status: SHIPPED | OUTPATIENT
Start: 2021-08-09 | End: 2023-03-18

## 2021-08-19 ENCOUNTER — OFFICE VISIT (OUTPATIENT)
Dept: FAMILY MEDICINE | Facility: CLINIC | Age: 66
End: 2021-08-19
Payer: MEDICARE

## 2021-08-19 VITALS
DIASTOLIC BLOOD PRESSURE: 82 MMHG | SYSTOLIC BLOOD PRESSURE: 132 MMHG | HEIGHT: 70 IN | WEIGHT: 195 LBS | BODY MASS INDEX: 27.92 KG/M2 | HEART RATE: 76 BPM

## 2021-08-19 DIAGNOSIS — Z12.5 SPECIAL SCREENING FOR MALIGNANT NEOPLASM OF PROSTATE: ICD-10-CM

## 2021-08-19 DIAGNOSIS — I10 BENIGN ESSENTIAL HTN: Primary | ICD-10-CM

## 2021-08-19 DIAGNOSIS — M17.0 PRIMARY OSTEOARTHRITIS OF BOTH KNEES: ICD-10-CM

## 2021-08-19 DIAGNOSIS — M12.812 ROTATOR CUFF ARTHROPATHY OF BOTH SHOULDERS: ICD-10-CM

## 2021-08-19 DIAGNOSIS — F51.01 PRIMARY INSOMNIA: ICD-10-CM

## 2021-08-19 DIAGNOSIS — M50.90 CERVICAL DISC DISEASE: ICD-10-CM

## 2021-08-19 DIAGNOSIS — M12.811 ROTATOR CUFF ARTHROPATHY OF BOTH SHOULDERS: ICD-10-CM

## 2021-08-19 DIAGNOSIS — N40.0 ENLARGED PROSTATE WITHOUT LOWER URINARY TRACT SYMPTOMS (LUTS): ICD-10-CM

## 2021-08-19 PROCEDURE — 99214 PR OFFICE/OUTPT VISIT, EST, LEVL IV, 30-39 MIN: ICD-10-PCS | Mod: S$GLB,,, | Performed by: FAMILY MEDICINE

## 2021-08-19 PROCEDURE — 99214 OFFICE O/P EST MOD 30 MIN: CPT | Mod: S$GLB,,, | Performed by: FAMILY MEDICINE

## 2021-08-19 RX ORDER — LISINOPRIL 10 MG/1
10 TABLET ORAL DAILY
Qty: 90 TABLET | Refills: 1 | Status: SHIPPED | OUTPATIENT
Start: 2021-08-19 | End: 2021-09-30

## 2021-08-19 RX ORDER — PANTOPRAZOLE SODIUM 40 MG/1
40 TABLET, DELAYED RELEASE ORAL DAILY
COMMUNITY
End: 2021-08-19 | Stop reason: SDUPTHER

## 2021-08-19 RX ORDER — PANTOPRAZOLE SODIUM 40 MG/1
40 TABLET, DELAYED RELEASE ORAL DAILY
Qty: 90 TABLET | Refills: 1 | Status: SHIPPED | OUTPATIENT
Start: 2021-08-19 | End: 2022-03-02 | Stop reason: SDUPTHER

## 2021-09-30 ENCOUNTER — PATIENT MESSAGE (OUTPATIENT)
Dept: FAMILY MEDICINE | Facility: CLINIC | Age: 66
End: 2021-09-30

## 2021-09-30 RX ORDER — LISINOPRIL 10 MG/1
10 TABLET ORAL 2 TIMES DAILY
COMMUNITY
End: 2022-03-08 | Stop reason: SDUPTHER

## 2021-12-17 NOTE — TELEPHONE ENCOUNTER
"Spoke with pt, states he has been dealing with him and his wife that have COVID. He has been having symptoms for the last 12 days and his wife has had symptoms for the last 9-10 days.  His symptoms were getting a little better but he is still having to take Tylenol for his fever. His temp is usually 100 before he takes Tylenol. He states he is getting gradually better but it is not going aware but he is starting to feel like when he takes a breath he feels something in his lungs, states he just "feels like something is there". He has been taking [facility] brand Mucinex. Every once in a while he will cough up small bits of phlem but not a lot. He has not had a CXR. He is not having any SOB.   " 11

## 2021-12-21 ENCOUNTER — OFFICE VISIT (OUTPATIENT)
Dept: FAMILY MEDICINE | Facility: CLINIC | Age: 66
End: 2021-12-21
Payer: MEDICARE

## 2021-12-21 ENCOUNTER — PATIENT MESSAGE (OUTPATIENT)
Dept: FAMILY MEDICINE | Facility: CLINIC | Age: 66
End: 2021-12-21
Payer: MEDICARE

## 2021-12-21 ENCOUNTER — PATIENT MESSAGE (OUTPATIENT)
Dept: FAMILY MEDICINE | Facility: CLINIC | Age: 66
End: 2021-12-21

## 2021-12-21 VITALS
HEIGHT: 70 IN | SYSTOLIC BLOOD PRESSURE: 126 MMHG | OXYGEN SATURATION: 95 % | DIASTOLIC BLOOD PRESSURE: 82 MMHG | WEIGHT: 197.38 LBS | BODY MASS INDEX: 28.26 KG/M2 | HEART RATE: 92 BPM

## 2021-12-21 DIAGNOSIS — U07.1 COVID-19 VIRUS DETECTED: ICD-10-CM

## 2021-12-21 DIAGNOSIS — R05.9 COUGH: ICD-10-CM

## 2021-12-21 DIAGNOSIS — U07.1 COVID-19 VIRUS INFECTION: Primary | ICD-10-CM

## 2021-12-21 LAB
CTP QC/QA: YES
CTP QC/QA: YES
POC MOLECULAR INFLUENZA A AGN: NEGATIVE
POC MOLECULAR INFLUENZA B AGN: NEGATIVE
SARS-COV-2 RDRP RESP QL NAA+PROBE: POSITIVE

## 2021-12-21 PROCEDURE — 99213 PR OFFICE/OUTPT VISIT, EST, LEVL III, 20-29 MIN: ICD-10-PCS | Mod: CR,S$GLB,, | Performed by: NURSE PRACTITIONER

## 2021-12-21 PROCEDURE — 87502 INFLUENZA DNA AMP PROBE: CPT | Mod: QW,,, | Performed by: NURSE PRACTITIONER

## 2021-12-21 PROCEDURE — U0002: ICD-10-PCS | Mod: QW,CR,S$GLB, | Performed by: NURSE PRACTITIONER

## 2021-12-21 PROCEDURE — 87502 POCT INFLUENZA A/B MOLECULAR: ICD-10-PCS | Mod: QW,,, | Performed by: NURSE PRACTITIONER

## 2021-12-21 PROCEDURE — U0002 COVID-19 LAB TEST NON-CDC: HCPCS | Mod: QW,CR,S$GLB, | Performed by: NURSE PRACTITIONER

## 2021-12-21 PROCEDURE — 99213 OFFICE O/P EST LOW 20 MIN: CPT | Mod: CR,S$GLB,, | Performed by: NURSE PRACTITIONER

## 2021-12-21 RX ORDER — ASPIRIN 325 MG
325 TABLET, DELAYED RELEASE (ENTERIC COATED) ORAL DAILY
Refills: 0 | COMMUNITY
Start: 2021-12-21 | End: 2023-03-18

## 2021-12-22 ENCOUNTER — TELEPHONE (OUTPATIENT)
Dept: FAMILY MEDICINE | Facility: CLINIC | Age: 66
End: 2021-12-22
Payer: MEDICARE

## 2021-12-23 ENCOUNTER — INFUSION (OUTPATIENT)
Dept: INFECTIOUS DISEASES | Facility: HOSPITAL | Age: 66
End: 2021-12-23
Attending: NURSE PRACTITIONER
Payer: MEDICARE

## 2021-12-23 VITALS
TEMPERATURE: 98 F | OXYGEN SATURATION: 97 % | RESPIRATION RATE: 18 BRPM | DIASTOLIC BLOOD PRESSURE: 64 MMHG | SYSTOLIC BLOOD PRESSURE: 120 MMHG | HEART RATE: 65 BPM

## 2021-12-23 DIAGNOSIS — U07.1 COVID-19 VIRUS INFECTION: Primary | ICD-10-CM

## 2021-12-23 PROCEDURE — 25000003 PHARM REV CODE 250: Performed by: NURSE PRACTITIONER

## 2021-12-23 PROCEDURE — M0243 CASIRIVI AND IMDEVI INFUSION: HCPCS | Performed by: NURSE PRACTITIONER

## 2021-12-23 PROCEDURE — 63600175 PHARM REV CODE 636 W HCPCS: Performed by: NURSE PRACTITIONER

## 2021-12-23 RX ORDER — ALBUTEROL SULFATE 90 UG/1
2 AEROSOL, METERED RESPIRATORY (INHALATION)
Status: DISCONTINUED | OUTPATIENT
Start: 2021-12-23 | End: 2023-12-22

## 2021-12-23 RX ORDER — DIPHENHYDRAMINE HYDROCHLORIDE 50 MG/ML
25 INJECTION INTRAMUSCULAR; INTRAVENOUS ONCE AS NEEDED
Status: DISCONTINUED | OUTPATIENT
Start: 2021-12-23 | End: 2023-12-07

## 2021-12-23 RX ORDER — SODIUM CHLORIDE 0.9 % (FLUSH) 0.9 %
10 SYRINGE (ML) INJECTION
Status: DISCONTINUED | OUTPATIENT
Start: 2021-12-23 | End: 2023-12-07

## 2021-12-23 RX ORDER — ACETAMINOPHEN 325 MG/1
650 TABLET ORAL ONCE AS NEEDED
Status: DISCONTINUED | OUTPATIENT
Start: 2021-12-23 | End: 2023-12-07

## 2021-12-23 RX ORDER — EPINEPHRINE 0.3 MG/.3ML
0.3 INJECTION SUBCUTANEOUS
Status: DISCONTINUED | OUTPATIENT
Start: 2021-12-23 | End: 2023-12-07

## 2021-12-23 RX ORDER — ONDANSETRON 4 MG/1
4 TABLET, ORALLY DISINTEGRATING ORAL ONCE AS NEEDED
Status: DISCONTINUED | OUTPATIENT
Start: 2021-12-23 | End: 2023-12-22

## 2021-12-23 RX ADMIN — SODIUM CHLORIDE: 0.9 INJECTION, SOLUTION INTRAVENOUS at 12:12

## 2021-12-23 RX ADMIN — CASIRIVIMAB AND IMDEVIMAB 600 MG: 600; 600 INJECTION, SOLUTION, CONCENTRATE INTRAVENOUS at 12:12

## 2021-12-29 ENCOUNTER — PATIENT MESSAGE (OUTPATIENT)
Dept: FAMILY MEDICINE | Facility: CLINIC | Age: 66
End: 2021-12-29
Payer: MEDICARE

## 2022-02-23 ENCOUNTER — TELEPHONE (OUTPATIENT)
Dept: FAMILY MEDICINE | Facility: CLINIC | Age: 67
End: 2022-02-23
Payer: MEDICARE

## 2022-02-26 LAB
ALBUMIN SERPL-MCNC: 4.5 G/DL (ref 3.6–5.1)
ALBUMIN/CREAT UR: 3 MCG/MG CREAT
ALBUMIN/GLOB SERPL: 1.7 (CALC) (ref 1–2.5)
ALP SERPL-CCNC: 48 U/L (ref 35–144)
ALT SERPL-CCNC: 26 U/L (ref 9–46)
AST SERPL-CCNC: 25 U/L (ref 10–35)
BASOPHILS # BLD AUTO: 58 CELLS/UL (ref 0–200)
BASOPHILS NFR BLD AUTO: 1.1 %
BILIRUB SERPL-MCNC: 0.7 MG/DL (ref 0.2–1.2)
BUN SERPL-MCNC: 12 MG/DL (ref 7–25)
BUN/CREAT SERPL: NORMAL (CALC) (ref 6–22)
CALCIUM SERPL-MCNC: 9.7 MG/DL (ref 8.6–10.3)
CHLORIDE SERPL-SCNC: 103 MMOL/L (ref 98–110)
CHOLEST SERPL-MCNC: 143 MG/DL
CHOLEST/HDLC SERPL: 2.4 (CALC)
CO2 SERPL-SCNC: 31 MMOL/L (ref 20–32)
CREAT SERPL-MCNC: 0.91 MG/DL (ref 0.7–1.25)
CREAT UR-MCNC: 68 MG/DL (ref 20–320)
EOSINOPHIL # BLD AUTO: 122 CELLS/UL (ref 15–500)
EOSINOPHIL NFR BLD AUTO: 2.3 %
ERYTHROCYTE [DISTWIDTH] IN BLOOD BY AUTOMATED COUNT: 13.4 % (ref 11–15)
GLOBULIN SER CALC-MCNC: 2.6 G/DL (CALC) (ref 1.9–3.7)
GLUCOSE SERPL-MCNC: 93 MG/DL (ref 65–99)
HCT VFR BLD AUTO: 50.5 % (ref 38.5–50)
HDLC SERPL-MCNC: 59 MG/DL
HGB BLD-MCNC: 16.9 G/DL (ref 13.2–17.1)
LDLC SERPL CALC-MCNC: 71 MG/DL (CALC)
LYMPHOCYTES # BLD AUTO: 1622 CELLS/UL (ref 850–3900)
LYMPHOCYTES NFR BLD AUTO: 30.6 %
MCH RBC QN AUTO: 29.5 PG (ref 27–33)
MCHC RBC AUTO-ENTMCNC: 33.5 G/DL (ref 32–36)
MCV RBC AUTO: 88.3 FL (ref 80–100)
MICROALBUMIN UR-MCNC: 0.2 MG/DL
MONOCYTES # BLD AUTO: 716 CELLS/UL (ref 200–950)
MONOCYTES NFR BLD AUTO: 13.5 %
NEUTROPHILS # BLD AUTO: 2783 CELLS/UL (ref 1500–7800)
NEUTROPHILS NFR BLD AUTO: 52.5 %
NONHDLC SERPL-MCNC: 84 MG/DL (CALC)
PLATELET # BLD AUTO: 238 THOUSAND/UL (ref 140–400)
PMV BLD REES-ECKER: 9.9 FL (ref 7.5–12.5)
POTASSIUM SERPL-SCNC: 4.6 MMOL/L (ref 3.5–5.3)
PROT SERPL-MCNC: 7.1 G/DL (ref 6.1–8.1)
PSA SERPL-MCNC: 3.22 NG/ML
RBC # BLD AUTO: 5.72 MILLION/UL (ref 4.2–5.8)
SODIUM SERPL-SCNC: 140 MMOL/L (ref 135–146)
TRIGL SERPL-MCNC: 54 MG/DL
TSH SERPL-ACNC: 4.02 MIU/L (ref 0.4–4.5)
WBC # BLD AUTO: 5.3 THOUSAND/UL (ref 3.8–10.8)

## 2022-03-02 ENCOUNTER — PATIENT MESSAGE (OUTPATIENT)
Dept: FAMILY MEDICINE | Facility: CLINIC | Age: 67
End: 2022-03-02
Payer: MEDICARE

## 2022-03-02 RX ORDER — PANTOPRAZOLE SODIUM 40 MG/1
40 TABLET, DELAYED RELEASE ORAL DAILY
Qty: 90 TABLET | Refills: 1 | Status: SHIPPED | OUTPATIENT
Start: 2022-03-02 | End: 2023-03-18

## 2022-03-08 ENCOUNTER — TELEPHONE (OUTPATIENT)
Dept: FAMILY MEDICINE | Facility: CLINIC | Age: 67
End: 2022-03-08
Payer: MEDICARE

## 2022-03-08 RX ORDER — LISINOPRIL 10 MG/1
10 TABLET ORAL 2 TIMES DAILY
Qty: 60 TABLET | Refills: 2 | Status: SHIPPED | OUTPATIENT
Start: 2022-03-08 | End: 2022-04-07

## 2022-03-08 NOTE — TELEPHONE ENCOUNTER
----- Message from Deandra Wilkins sent at 3/8/2022  9:57 AM CST -----  [facility] on [address], calling for refill on Lisinopril

## 2022-03-10 ENCOUNTER — OFFICE VISIT (OUTPATIENT)
Dept: FAMILY MEDICINE | Facility: CLINIC | Age: 67
End: 2022-03-10
Payer: MEDICARE

## 2022-03-10 VITALS
WEIGHT: 193 LBS | BODY MASS INDEX: 27.63 KG/M2 | HEIGHT: 70 IN | HEART RATE: 66 BPM | DIASTOLIC BLOOD PRESSURE: 88 MMHG | SYSTOLIC BLOOD PRESSURE: 120 MMHG

## 2022-03-10 DIAGNOSIS — M12.812 ROTATOR CUFF ARTHROPATHY OF BOTH SHOULDERS: ICD-10-CM

## 2022-03-10 DIAGNOSIS — K20.90 ESOPHAGITIS: ICD-10-CM

## 2022-03-10 DIAGNOSIS — M50.90 CERVICAL DISC DISEASE: ICD-10-CM

## 2022-03-10 DIAGNOSIS — N40.0 ENLARGED PROSTATE WITHOUT LOWER URINARY TRACT SYMPTOMS (LUTS): ICD-10-CM

## 2022-03-10 DIAGNOSIS — M12.811 ROTATOR CUFF ARTHROPATHY OF BOTH SHOULDERS: ICD-10-CM

## 2022-03-10 DIAGNOSIS — R13.12 OROPHARYNGEAL DYSPHAGIA: Primary | ICD-10-CM

## 2022-03-10 DIAGNOSIS — M17.0 PRIMARY OSTEOARTHRITIS OF BOTH KNEES: ICD-10-CM

## 2022-03-10 PROCEDURE — 99214 OFFICE O/P EST MOD 30 MIN: CPT | Mod: S$GLB,,, | Performed by: FAMILY MEDICINE

## 2022-03-10 PROCEDURE — 99214 PR OFFICE/OUTPT VISIT, EST, LEVL IV, 30-39 MIN: ICD-10-PCS | Mod: S$GLB,,, | Performed by: FAMILY MEDICINE
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
## 2022-03-11 ENCOUNTER — TELEPHONE (OUTPATIENT)
Dept: FAMILY MEDICINE | Facility: CLINIC | Age: 67
End: 2022-03-11
Payer: MEDICARE

## 2022-03-24 ENCOUNTER — PES CALL (OUTPATIENT)
Dept: ADMINISTRATIVE | Facility: CLINIC | Age: 67
End: 2022-03-24
Payer: MEDICARE

## 2022-04-26 ENCOUNTER — PATIENT MESSAGE (OUTPATIENT)
Dept: FAMILY MEDICINE | Facility: CLINIC | Age: 67
End: 2022-04-26

## 2022-04-26 ENCOUNTER — TELEPHONE (OUTPATIENT)
Dept: GASTROENTEROLOGY | Facility: CLINIC | Age: 67
End: 2022-04-26
Payer: MEDICARE

## 2022-04-26 NOTE — TELEPHONE ENCOUNTER
----- Message from Emmie Hernandez sent at 4/26/2022 12:35 PM CDT -----  Contact: pt   [phone]  Type:  Sooner Appointment Request    Caller is requesting a sooner appointment.  Caller declined first available appointment listed below.  Caller will not accept being placed on the waitlist and is requesting a message be sent to doctor.    Name of Caller:  Pt  When is the first available appointment?  NA  Symptoms:  GERD  Best Call Back Number:  [phone]    Additional Information:  Pls cll back and advise

## 2022-04-28 ENCOUNTER — OFFICE VISIT (OUTPATIENT)
Dept: GASTROENTEROLOGY | Facility: CLINIC | Age: 67
End: 2022-04-28
Payer: MEDICARE

## 2022-04-28 VITALS
HEART RATE: 60 BPM | WEIGHT: 188.94 LBS | SYSTOLIC BLOOD PRESSURE: 152 MMHG | HEIGHT: 70 IN | BODY MASS INDEX: 27.05 KG/M2 | DIASTOLIC BLOOD PRESSURE: 75 MMHG

## 2022-04-28 DIAGNOSIS — R12 HEARTBURN: ICD-10-CM

## 2022-04-28 DIAGNOSIS — K21.9 GASTROESOPHAGEAL REFLUX DISEASE, UNSPECIFIED WHETHER ESOPHAGITIS PRESENT: ICD-10-CM

## 2022-04-28 DIAGNOSIS — Z12.11 SCREENING FOR COLON CANCER: ICD-10-CM

## 2022-04-28 DIAGNOSIS — R09.A2 GLOBUS SENSATION: ICD-10-CM

## 2022-04-28 DIAGNOSIS — Z01.818 PREOP TESTING: ICD-10-CM

## 2022-04-28 DIAGNOSIS — R13.10 DYSPHAGIA, UNSPECIFIED TYPE: Primary | ICD-10-CM

## 2022-04-28 DIAGNOSIS — R49.0 HOARSE VOICE QUALITY: ICD-10-CM

## 2022-04-28 PROCEDURE — 99999 PR PBB SHADOW E&M-EST. PATIENT-LVL IV: CPT | Mod: PBBFAC,,,

## 2022-04-28 PROCEDURE — 99203 OFFICE O/P NEW LOW 30 MIN: CPT | Mod: S$PBB,,,

## 2022-04-28 PROCEDURE — 99999 PR PBB SHADOW E&M-EST. PATIENT-LVL IV: ICD-10-PCS | Mod: PBBFAC,,,

## 2022-04-28 PROCEDURE — 99214 OFFICE O/P EST MOD 30 MIN: CPT | Mod: PBBFAC,PN

## 2022-04-28 PROCEDURE — 99203 PR OFFICE/OUTPT VISIT, NEW, LEVL III, 30-44 MIN: ICD-10-PCS | Mod: S$PBB,,,

## 2022-04-28 RX ORDER — ALUMINUM HYDROXIDE, MAGNESIUM HYDROXIDE, AND SIMETHICONE 2400; 240; 2400 MG/30ML; MG/30ML; MG/30ML
SUSPENSION ORAL EVERY 6 HOURS PRN
COMMUNITY
End: 2023-03-18

## 2022-04-28 RX ORDER — LISINOPRIL 10 MG/1
10 TABLET ORAL DAILY
COMMUNITY
End: 2022-09-15 | Stop reason: SDUPTHER

## 2022-04-28 NOTE — PROGRESS NOTES
Subjective:       Patient ID: Ashish Dave is a 66 y.o. male Body mass index is 27.11 kg/m².    Chief Complaint: Gastroesophageal Reflux (dysphagia)    This patient is new to me.  Referring Provider: Dr. Phil Kemp for oropharyngeal dysphagia.     Gastroesophageal Reflux  He complains of dysphagia (occurs with pills and dry foods like bread; feels like a lump in throat), globus sensation, heartburn and a hoarse voice (denies currently). He reports no abdominal pain, no belching, no chest pain, no choking, no coughing, no early satiety, no nausea, no sore throat or no water brash. The current episode started 1 to 4 weeks ago (worsened 2-3 weeks ago). The problem occurs frequently. The problem has been gradually improving. The heartburn duration is several minutes (resolve after taking mylanta). The heartburn is located in the substernum and back. The heartburn does not wake him from sleep. The heartburn does not limit his activity. The heartburn changes with position. The symptoms are aggravated by certain foods, lying down and caffeine (worsens after eating red gravy, meatloaf, onion & garlic; drinks 1 cup of coffee daily). Associated symptoms include weight loss (reports only doing smoothies during episodes of GERD). Pertinent negatives include no anemia, fatigue, melena or muscle weakness. Risk factors include NSAIDs (reports using ibuprofen OTC PRN). He has tried a PPI and an antacid (currently using Protonix 40 mg once daily) for the symptoms. The treatment provided moderate relief. Past procedures include an EGD (history of EGD with Dr. Iglesias years ago with dilation). Past procedures do not include an abdominal ultrasound, esophageal manometry, esophageal pH monitoring, H. pylori antibody titer or a UGI. Past invasive treatments do not include gastroplasty, gastroplication or reflux surgery.     Review of Systems   Constitutional: Positive for weight loss (reports only doing smoothies during episodes  of GERD). Negative for activity change, appetite change, chills, diaphoresis, fatigue, fever and unexpected weight change.   HENT: Positive for hoarse voice (denies currently). Negative for sore throat and trouble swallowing.    Respiratory: Negative for cough, choking and shortness of breath.    Cardiovascular: Negative for chest pain.   Gastrointestinal: Positive for dysphagia (occurs with pills and dry foods like bread; feels like a lump in throat) and heartburn. Negative for abdominal distention, abdominal pain, anal bleeding, blood in stool, constipation, diarrhea, melena, nausea, rectal pain and vomiting.   Musculoskeletal: Negative for muscle weakness.       No LMP for male patient.  Past Medical History:   Diagnosis Date    Arthritis     Chronic calculous cholecystitis 8/12/2019    COVID-19     Essential (primary) hypertension 3/8/2016    GERD (gastroesophageal reflux disease)      Past Surgical History:   Procedure Laterality Date    CHOLECYSTECTOMY      COLONOSCOPY  2016    Dr Saldaña- rtc 5 yr    GALLBLADDER SURGERY  08/2019    KNEE SURGERY Right     x's3    LAPAROSCOPIC CHOLECYSTECTOMY N/A 8/12/2019    Procedure: CHOLECYSTECTOMY, LAPAROSCOPIC;  Surgeon: Alphonso Freeman III, MD;  Location[facility];  Service: General;  Laterality: N/A;    UPPER GASTROINTESTINAL ENDOSCOPY       Family History   Problem Relation Age of Onset    Stroke Mother     No Known Problems Father     Ulcerative colitis Son     Colon cancer Neg Hx     Colon polyps Neg Hx     Crohn's disease Neg Hx     Esophageal cancer Neg Hx     Stomach cancer Neg Hx      Social History     Tobacco Use    Smoking status: Never Smoker    Smokeless tobacco: Never Used   Substance Use Topics    Alcohol use: Not Currently    Drug use: Never     Wt Readings from Last 10 Encounters:   04/28/22 85.7 kg (188 lb 15 oz)   03/10/22 87.5 kg (193 lb)   12/21/21 89.5 kg (197 lb 6.4 oz)   08/19/21 88.5 kg (195 lb)   07/22/21 89.8 kg (198  lb)   02/18/21 89.4 kg (197 lb)   09/02/20 82.1 kg (181 lb)   08/14/20 82.1 kg (181 lb)   08/11/20 (P) 82.1 kg (181 lb)   07/20/20 84.8 kg (187 lb)     Lab Results   Component Value Date    WBC 5.3 02/25/2022    HGB 16.9 02/25/2022    HCT 50.5 (H) 02/25/2022    MCV 88.3 02/25/2022     02/25/2022     CMP  Sodium   Date Value Ref Range Status   02/25/2022 140 135 - 146 mmol/L Final     Potassium   Date Value Ref Range Status   02/25/2022 4.6 3.5 - 5.3 mmol/L Final     Chloride   Date Value Ref Range Status   02/25/2022 103 98 - 110 mmol/L Final     CO2   Date Value Ref Range Status   02/25/2022 31 20 - 32 mmol/L Final     Glucose   Date Value Ref Range Status   02/25/2022 93 65 - 99 mg/dL Final     Comment:                   Fasting reference interval          BUN   Date Value Ref Range Status   02/25/2022 12 7 - 25 mg/dL Final     Creatinine   Date Value Ref Range Status   02/25/2022 0.91 0.70 - 1.25 mg/dL Final     Comment:     For patients >49 years of age, the reference limit  for Creatinine is approximately 13% higher for people  identified as -American.          Calcium   Date Value Ref Range Status   02/25/2022 9.7 8.6 - 10.3 mg/dL Final     Total Protein   Date Value Ref Range Status   02/25/2022 7.1 6.1 - 8.1 g/dL Final     Albumin   Date Value Ref Range Status   02/25/2022 4.5 3.6 - 5.1 g/dL Final     Total Bilirubin   Date Value Ref Range Status   02/25/2022 0.7 0.2 - 1.2 mg/dL Final     Alkaline Phosphatase   Date Value Ref Range Status   07/21/2020 60 55 - 135 U/L Final     AST   Date Value Ref Range Status   02/25/2022 25 10 - 35 U/L Final     ALT   Date Value Ref Range Status   02/25/2022 26 9 - 46 U/L Final     Anion Gap   Date Value Ref Range Status   07/21/2020 12 8 - 16 mmol/L Final     eGFR if    Date Value Ref Range Status   02/25/2022 101 > OR = 60 mL/min/1.73m2 Final     eGFR if non    Date Value Ref Range Status   02/25/2022 87 > OR = 60  mL/min/1.73m2 Final     Lab Results   Component Value Date    LIPASE 29 08/07/2019     Lab Results   Component Value Date    TSH 2.48 01/31/2020       Reviewed prior medical records including radiology report of chest x-ray 08/12/2020, abdominal US 08/07/2019 & endoscopy history (see surgical history).    Objective:      Physical Exam  Vitals and nursing note reviewed.   Constitutional:       General: He is not in acute distress.     Appearance: Normal appearance. He is not ill-appearing.   HENT:      Mouth/Throat:      Comments: Unable to assess due to COVID-19 concerns.  Eyes:      Extraocular Movements: Extraocular movements intact.      Pupils: Pupils are equal, round, and reactive to light.   Cardiovascular:      Rate and Rhythm: Normal rate and regular rhythm.      Heart sounds: Normal heart sounds.   Pulmonary:      Effort: Pulmonary effort is normal. No respiratory distress.      Breath sounds: Normal breath sounds.   Abdominal:      General: Abdomen is protuberant. Bowel sounds are normal. There is no distension or abdominal bruit. There are no signs of injury.      Palpations: Abdomen is soft. There is no shifting dullness, fluid wave, hepatomegaly, splenomegaly or mass.      Tenderness: There is no abdominal tenderness. There is no guarding or rebound. Negative signs include Briggs's sign, Rovsing's sign and McBurney's sign.      Hernia: No hernia is present.   Skin:     General: Skin is warm and dry.      Coloration: Skin is not jaundiced or pale.   Neurological:      Mental Status: He is alert and oriented to person, place, and time.   Psychiatric:         Attention and Perception: Attention normal.         Mood and Affect: Mood normal.         Speech: Speech normal.         Behavior: Behavior normal.         Assessment:       1. Dysphagia, unspecified type    2. Gastroesophageal reflux disease, unspecified whether esophagitis present    3. Heartburn    4. Globus sensation    5. Hoarse voice quality     6. Screening for colon cancer    7. Preop testing        Plan:       Dysphagia, unspecified type  - schedule EGD, discussed procedure with patient and possible esophageal dilation may be performed during procedure if indicated, patient verbalized understanding  - educated patient to eat smaller more frequent meals and to eat slowly and advised to eat a soft diet.  - possible UGI/esophagram/esophageal manometry if symptoms persist  -     Case Request Endoscopy: EGD (ESOPHAGOGASTRODUODENOSCOPY)    Gastroesophageal reflux disease, unspecified whether esophagitis present & Heartburn  - schedule EGD, discussed procedure with patient, including risks and benefits, patient verbalized understanding  -discussed about the different types of medications used to treat reflux and how to use them, antacids can be used PRN for breakthrough heartburn symptoms by reducing stomach acid that is already produced, H2 blockers work by limiting the amount acid production, & PPI's work to block acid production and are taken daily, patient verbalized understanding.  -Educated patient on lifestyle modifications to help control/reduce reflux/abdominal pain including: avoid large meals, avoid eating within 2-3 hours of bedtime (avoid late night eating & lying down soon after eating), elevate head of bed if nocturnal symptoms are present, smoking cessation (if current smoker), & weight loss (if overweight).   -Educated to avoid known foods which trigger reflux symptoms & to minimize/avoid high-fat foods, chocolate, caffeine, citrus, alcohol, & tomato products.  -Advised to avoid/limit use of NSAID's, since they can cause GI upset, bleeding, and/or ulcers. If needed, take with food.   -CONTINUE: Protonix 40 mg once daily 30-60 minutes before first meal of the day on empty stomach  -     Case Request Endoscopy: EGD (ESOPHAGOGASTRODUODENOSCOPY)    Globus sensation & Hoarse voice quality  - schedule EGD, discussed procedure with patient,  including risks and benefits, patient verbalized understanding  -     Case Request Endoscopy: EGD (ESOPHAGOGASTRODUODENOSCOPY)    Screening for colon cancer  -Follow-up for surveillance colonoscopy 06/2026    Preop testing  -     COVID-19 Routine Screening; Future; Expected date: 04/28/2022    Follow up in about 4 weeks (around 5/26/2022), or if symptoms worsen or fail to improve.      If no improvement in symptoms or symptoms worsen, call/follow-up at clinic or go to ER.        30 minutes of total time spent on the encounter, which includes face to face time and non-face to face time preparing to see the patient (eg, review of tests), Obtaining and/or reviewing separately obtained history, Documenting clinical information in the electronic or other health record, Independently interpreting results (not separately reported) and communicating results to the patient/family/caregiver, or Care coordination (not separately reported).

## 2022-05-09 ENCOUNTER — PATIENT MESSAGE (OUTPATIENT)
Dept: FAMILY MEDICINE | Facility: CLINIC | Age: 67
End: 2022-05-09

## 2022-06-03 ENCOUNTER — LAB VISIT (OUTPATIENT)
Dept: PRIMARY CARE CLINIC | Facility: CLINIC | Age: 67
End: 2022-06-03
Payer: MEDICARE

## 2022-06-03 DIAGNOSIS — Z01.818 PREOP TESTING: ICD-10-CM

## 2022-06-03 PROCEDURE — U0005 INFEC AGEN DETEC AMPLI PROBE: HCPCS

## 2022-06-03 PROCEDURE — U0003 INFECTIOUS AGENT DETECTION BY NUCLEIC ACID (DNA OR RNA); SEVERE ACUTE RESPIRATORY SYNDROME CORONAVIRUS 2 (SARS-COV-2) (CORONAVIRUS DISEASE [COVID-19]), AMPLIFIED PROBE TECHNIQUE, MAKING USE OF HIGH THROUGHPUT TECHNOLOGIES AS DESCRIBED BY CMS-2020-01-R: HCPCS

## 2022-06-04 LAB — SARS-COV-2 RNA RESP QL NAA+PROBE: NOT DETECTED

## 2022-06-06 ENCOUNTER — HOSPITAL ENCOUNTER (OUTPATIENT)
Facility: HOSPITAL | Age: 67
Discharge: HOME | End: 2022-06-06
Attending: INTERNAL MEDICINE | Admitting: INTERNAL MEDICINE
Payer: MEDICARE

## 2022-06-06 ENCOUNTER — ANESTHESIA (OUTPATIENT)
Dept: ENDOSCOPY | Facility: HOSPITAL | Age: 67
End: 2022-06-06
Payer: MEDICARE

## 2022-06-06 ENCOUNTER — ANESTHESIA EVENT (OUTPATIENT)
Dept: ENDOSCOPY | Facility: HOSPITAL | Age: 67
End: 2022-06-06
Payer: MEDICARE

## 2022-06-06 VITALS
BODY MASS INDEX: 26.34 KG/M2 | WEIGHT: 184 LBS | TEMPERATURE: 98 F | OXYGEN SATURATION: 98 % | RESPIRATION RATE: 17 BRPM | SYSTOLIC BLOOD PRESSURE: 102 MMHG | DIASTOLIC BLOOD PRESSURE: 65 MMHG | HEART RATE: 53 BPM | HEIGHT: 70 IN

## 2022-06-06 DIAGNOSIS — R13.10 DYSPHAGIA: ICD-10-CM

## 2022-06-06 DIAGNOSIS — K31.7 GASTRIC POLYPS: ICD-10-CM

## 2022-06-06 DIAGNOSIS — K22.2 SCHATZKI'S RING: Primary | ICD-10-CM

## 2022-06-06 PROCEDURE — 43251 PR EGD, FLEX, W/REMOVAL, TUMOR/POLYP/LESION(S), SNARE: ICD-10-PCS | Mod: ,,, | Performed by: INTERNAL MEDICINE

## 2022-06-06 PROCEDURE — 43251 EGD REMOVE LESION SNARE: CPT | Mod: ,,, | Performed by: INTERNAL MEDICINE

## 2022-06-06 PROCEDURE — 63600175 PHARM REV CODE 636 W HCPCS: Performed by: NURSE ANESTHETIST, CERTIFIED REGISTERED

## 2022-06-06 PROCEDURE — 88305 TISSUE EXAM BY PATHOLOGIST: CPT | Mod: 26,,, | Performed by: PATHOLOGY

## 2022-06-06 PROCEDURE — 25000003 PHARM REV CODE 250: Performed by: NURSE ANESTHETIST, CERTIFIED REGISTERED

## 2022-06-06 PROCEDURE — 37000009 HC ANESTHESIA EA ADD 15 MINS: Performed by: INTERNAL MEDICINE

## 2022-06-06 PROCEDURE — 43248 PR EGD, FLEX, W/DILATION OVER GUIDEWIRE: ICD-10-PCS | Mod: ,,, | Performed by: INTERNAL MEDICINE

## 2022-06-06 PROCEDURE — 43239 EGD BIOPSY SINGLE/MULTIPLE: CPT | Mod: 59,,, | Performed by: INTERNAL MEDICINE

## 2022-06-06 PROCEDURE — 43248 EGD GUIDE WIRE INSERTION: CPT | Mod: ,,, | Performed by: INTERNAL MEDICINE

## 2022-06-06 PROCEDURE — 88305 TISSUE EXAM BY PATHOLOGIST: ICD-10-PCS | Mod: 26,,, | Performed by: PATHOLOGY

## 2022-06-06 PROCEDURE — 37000008 HC ANESTHESIA 1ST 15 MINUTES: Performed by: INTERNAL MEDICINE

## 2022-06-06 PROCEDURE — 27201089 HC SNARE, DISP (ANY): Performed by: INTERNAL MEDICINE

## 2022-06-06 PROCEDURE — D9220A PRA ANESTHESIA: Mod: ANES,,, | Performed by: ANESTHESIOLOGY

## 2022-06-06 PROCEDURE — 25000003 PHARM REV CODE 250: Performed by: INTERNAL MEDICINE

## 2022-06-06 PROCEDURE — C1769 GUIDE WIRE: HCPCS | Performed by: INTERNAL MEDICINE

## 2022-06-06 PROCEDURE — 43251 EGD REMOVE LESION SNARE: CPT | Performed by: INTERNAL MEDICINE

## 2022-06-06 PROCEDURE — D9220A PRA ANESTHESIA: Mod: CRNA,,, | Performed by: NURSE ANESTHETIST, CERTIFIED REGISTERED

## 2022-06-06 PROCEDURE — D9220A PRA ANESTHESIA: ICD-10-PCS | Mod: CRNA,,, | Performed by: NURSE ANESTHETIST, CERTIFIED REGISTERED

## 2022-06-06 PROCEDURE — 27201012 HC FORCEPS, HOT/COLD, DISP: Performed by: INTERNAL MEDICINE

## 2022-06-06 PROCEDURE — 88305 TISSUE EXAM BY PATHOLOGIST: CPT | Mod: 59 | Performed by: PATHOLOGY

## 2022-06-06 PROCEDURE — 43248 EGD GUIDE WIRE INSERTION: CPT | Performed by: INTERNAL MEDICINE

## 2022-06-06 PROCEDURE — D9220A PRA ANESTHESIA: ICD-10-PCS | Mod: ANES,,, | Performed by: ANESTHESIOLOGY

## 2022-06-06 PROCEDURE — 43239 PR EGD, FLEX, W/BIOPSY, SGL/MULTI: ICD-10-PCS | Mod: 59,,, | Performed by: INTERNAL MEDICINE

## 2022-06-06 PROCEDURE — 43239 EGD BIOPSY SINGLE/MULTIPLE: CPT | Mod: 59 | Performed by: INTERNAL MEDICINE

## 2022-06-06 RX ORDER — PROPOFOL 10 MG/ML
VIAL (ML) INTRAVENOUS
Status: DISCONTINUED | OUTPATIENT
Start: 2022-06-06 | End: 2022-06-06

## 2022-06-06 RX ORDER — SODIUM CHLORIDE 9 MG/ML
INJECTION, SOLUTION INTRAVENOUS CONTINUOUS
Status: DISCONTINUED | OUTPATIENT
Start: 2022-06-06 | End: 2022-06-06 | Stop reason: HOSPADM

## 2022-06-06 RX ORDER — LIDOCAINE HYDROCHLORIDE 20 MG/ML
INJECTION INTRAVENOUS
Status: DISCONTINUED | OUTPATIENT
Start: 2022-06-06 | End: 2022-06-06

## 2022-06-06 RX ADMIN — PROPOFOL 50 MG: 10 INJECTION, EMULSION INTRAVENOUS at 11:06

## 2022-06-06 RX ADMIN — PROPOFOL 50 MG: 10 INJECTION, EMULSION INTRAVENOUS at 10:06

## 2022-06-06 RX ADMIN — SODIUM CHLORIDE: 0.9 INJECTION, SOLUTION INTRAVENOUS at 09:06

## 2022-06-06 RX ADMIN — LIDOCAINE HYDROCHLORIDE 100 MG: 20 INJECTION, SOLUTION INTRAVENOUS at 10:06

## 2022-06-06 NOTE — TRANSFER OF CARE
"Anesthesia Transfer of Care Note    Patient: Ashish Dave    Procedure(s) Performed: Procedure(s) (LRB):  EGD (ESOPHAGOGASTRODUODENOSCOPY) (N/A)    Patient location: GI    Anesthesia Type: general    Transport from OR: Transported from OR on room air with adequate spontaneous ventilation    Post pain: adequate analgesia    Post assessment: no apparent anesthetic complications    Post vital signs: stable    Level of consciousness: sedated    Nausea/Vomiting: no nausea/vomiting    Complications: none    Transfer of care protocol was followed      Last vitals:   Visit Vitals  /81   Pulse (!) 59   Temp 36.8 °C (98.2 °F) (Skin)   Resp 17   Ht 5' 10" (1.778 m)   Wt 83.5 kg (184 lb)   SpO2 99%   BMI 26.40 kg/m²     "

## 2022-06-06 NOTE — ANESTHESIA PREPROCEDURE EVALUATION
06/06/2022  Ashish Dave is a 66 y.o., male.  Patient Active Problem List   Diagnosis    Asbestos exposure    Cervical disc disease    Enlarged prostate without lower urinary tract symptoms (luts)    Esophagitis    Insomnia, unspecified    Osteoarthrosis    Primary osteoarthritis of right knee    Primary osteoarthritis of both knees    Pneumonia due to COVID-19 virus    Rotator cuff arthropathy of both shoulders       Pre-op Assessment    I have reviewed the Patient Summary Reports.    I have reviewed the Nursing Notes. I have reviewed the NPO Status.   I have reviewed the Medications.     Review of Systems  Anesthesia Hx:  No problems with previous Anesthesia Denies Hx of Anesthetic complications Asbestos exposure Denies Family Hx of Anesthesia complications.   Denies Personal Hx of Anesthesia complications.   Social:  Non-Smoker    Hematology/Oncology:  Hematology Normal   Oncology Normal     EENT/Dental:EENT/Dental Normal   Cardiovascular:   Hypertension    Pulmonary:  Pulmonary Normal Asbestos exposure   Renal/:  Renal/ Normal     Hepatic/GI:   GERD    Musculoskeletal:   Arthritis   Spine Disorders: cervical    Neurological:  Neurology Normal    Endocrine:  Endocrine Normal    Psych:  Psychiatric Normal           Physical Exam  General:  Well nourished, Cooperative, Alert and Oriented      Airway/Jaw/Neck:  Airway Findings: Mouth Opening: Normal   Tongue: Normal   General Airway Assessment: Adult Oropharynx Findings: Normal Mallampati: II  Improves to II with phonation.  TM Distance: Normal, at least 6 cm   Jaw/Neck Findings:  Neck ROM: Normal ROM   Neck Findings: Normal     Dental:  Dental Findings: Intact     Chest/Lungs:  Chest/Lungs Findings: Clear to auscultation, Normal Respiratory Rate      Heart/Vascular:  Heart Findings: Rate: Normal  Rhythm: Regular Rhythm  Sounds: Normal  Heart  murmur: negative    Abdomen:  Abdomen Findings: Normal    Musculoskeletal:  Musculoskeletal Findings: Normal   Skin:  Skin Findings: Normal    Mental Status:  Mental Status Findings:  Alert and Oriented         Anesthesia Plan  Type of Anesthesia, risks & benefits discussed:  Anesthesia Type:  Gen Natural Airway    Patient's Preference:   Plan Factors:          Intra-op Monitoring Plan: standard ASA monitors  Intra-op Monitoring Plan Comments:   Post Op Pain Control Plan: multimodal analgesia  Post Op Pain Control Plan Comments:     Induction:   IV  Beta Blocker:  Patient is not currently on a Beta-Blocker (No further documentation required).       Informed Consent: Informed consent signed with the Patient and all parties understand the risks and agree with anesthesia plan.  All questions answered.  Anesthesia consent signed with patient.  ASA Score: 2     Day of Surgery Review of History & Physical:        Anesthesia Plan Notes: GETA  Gabapentin 300, Acetaminophen 1g, Celecoxib 200mg             Ready For Surgery From Anesthesia Perspective.           Physical Exam  General: Well nourished, Cooperative, Alert and Oriented    Airway:  Mallampati: II / II  Mouth Opening: Normal  TM Distance: Normal, at least 6 cm  Tongue: Normal  Neck ROM: Normal ROM    Dental:  Intact    Chest/Lungs:  Clear to auscultation, Normal Respiratory Rate    Heart:  Rate: Normal  Rhythm: Regular Rhythm  Sounds: Normal          Anesthesia Plan  Type of Anesthesia, risks & benefits discussed:    Anesthesia Type: Gen Natural Airway  Intra-op Monitoring Plan: standard ASA monitors  Post Op Pain Control Plan: multimodal analgesia  Induction:  IV  Informed Consent: Informed consent signed with the Patient and all parties understand the risks and agree with anesthesia plan.  All questions answered.   ASA Score: 2  Anesthesia Plan Notes: GETA  Gabapentin 300, Acetaminophen 1g, Celecoxib 200mg         Ready For Surgery From Anesthesia Perspective.        .

## 2022-06-06 NOTE — ANESTHESIA POSTPROCEDURE EVALUATION
Anesthesia Post Evaluation    Patient: Ashish Dave    Procedure(s) Performed: Procedure(s) (LRB):  EGD (ESOPHAGOGASTRODUODENOSCOPY) (N/A)    Final Anesthesia Type: general      Patient location during evaluation: PACU  Patient participation: Yes- Able to Participate  Level of consciousness: awake and alert and oriented  Post-procedure vital signs: reviewed and stable  Pain management: adequate  Airway patency: patent    PONV status at discharge: No PONV  Anesthetic complications: no      Cardiovascular status: blood pressure returned to baseline  Respiratory status: unassisted, spontaneous ventilation and room air  Hydration status: euvolemic  Follow-up not needed.          Vitals Value Taken Time   BP 88/51 06/06/22 1110   Temp 36.4 °C (97.5 °F) 06/06/22 1110   Pulse 57 06/06/22 1110   Resp 16 06/06/22 1110   SpO2 94 % 06/06/22 1110         No case tracking events are documented in the log.      Pain/Misbah Score: Modified Misbah Score: 19 (6/6/2022 11:10 AM)

## 2022-06-06 NOTE — H&P
CC: Dysphagia    66 year old male with above. States that symptoms are stable, no alleviating/exacerbating factors. No bleeding or weight loss.     ROS:  No headache, no fever/chills, no chest pain/SOB, no nausea/vomiting/diarrhea/constipation/GI bleeding/abdominal pain, no dysuria/hematuria.    VSSAF   Exam:   Alert and oriented x 3; no apparent distress   PERRLA, sclera anicteric  CV: Regular rate/rhythm, normal PMI   Lungs: Clear bilaterally with no wheeze/rales   Abdomen: Soft, NT/ND, normal bowel sounds   Ext: No cyanosis, clubbing     Impression:   As above    Plan:   Proceed with endoscopy. Further recs to follow.

## 2022-06-06 NOTE — PLAN OF CARE
Discharge instructions given to pt/wife, verbalized understanding.  Tolerating PO fluids.  IV removed.  No c/o pain or nausea.  Up in wheelchair, waiting for MD to speak to pt.

## 2022-06-08 LAB
FINAL PATHOLOGIC DIAGNOSIS: NORMAL
GROSS: NORMAL
Lab: NORMAL

## 2022-06-08 NOTE — PROGRESS NOTES
Please notify patient that biopsies reviewed and showed no bacteria.  Stomach polyps were benign fundic gland polyps.  Continue current meds and follow up as previously planned.

## 2022-06-09 ENCOUNTER — PATIENT MESSAGE (OUTPATIENT)
Dept: GASTROENTEROLOGY | Facility: CLINIC | Age: 67
End: 2022-06-09
Payer: MEDICARE

## 2022-06-13 ENCOUNTER — TELEPHONE (OUTPATIENT)
Dept: GASTROENTEROLOGY | Facility: CLINIC | Age: 67
End: 2022-06-13
Payer: MEDICARE

## 2022-06-13 NOTE — TELEPHONE ENCOUNTER
----- Message from Amber Wu sent at 6/13/2022  1:50 PM CDT -----  Contact: wife Maureen  Type:  Sooner Appointment Request    Name of Caller: pt's gutierrez Ríos     When is the first available appointment?  7/20    Symptoms:  Post op appt     Best Call Back Number:  [phone] (home)      Additional Information:  Patient is asking for a call back would like to be seen sooner than 7/20

## 2022-07-18 ENCOUNTER — PATIENT MESSAGE (OUTPATIENT)
Dept: FAMILY MEDICINE | Facility: CLINIC | Age: 67
End: 2022-07-18

## 2022-07-20 ENCOUNTER — OFFICE VISIT (OUTPATIENT)
Dept: GASTROENTEROLOGY | Facility: CLINIC | Age: 67
End: 2022-07-20
Payer: MEDICARE

## 2022-07-20 VITALS
BODY MASS INDEX: 26.79 KG/M2 | HEART RATE: 68 BPM | SYSTOLIC BLOOD PRESSURE: 138 MMHG | DIASTOLIC BLOOD PRESSURE: 78 MMHG | WEIGHT: 186.75 LBS

## 2022-07-20 DIAGNOSIS — K21.9 GASTROESOPHAGEAL REFLUX DISEASE, UNSPECIFIED WHETHER ESOPHAGITIS PRESENT: Primary | ICD-10-CM

## 2022-07-20 DIAGNOSIS — Z86.010 HISTORY OF COLON POLYPS: ICD-10-CM

## 2022-07-20 DIAGNOSIS — K31.7 GASTRIC POLYPS: ICD-10-CM

## 2022-07-20 PROCEDURE — 99214 OFFICE O/P EST MOD 30 MIN: CPT | Mod: S$PBB,,, | Performed by: INTERNAL MEDICINE

## 2022-07-20 PROCEDURE — 99999 PR PBB SHADOW E&M-EST. PATIENT-LVL III: CPT | Mod: PBBFAC,,, | Performed by: INTERNAL MEDICINE

## 2022-07-20 PROCEDURE — 99999 PR PBB SHADOW E&M-EST. PATIENT-LVL III: ICD-10-PCS | Mod: PBBFAC,,, | Performed by: INTERNAL MEDICINE

## 2022-07-20 PROCEDURE — 99214 PR OFFICE/OUTPT VISIT, EST, LEVL IV, 30-39 MIN: ICD-10-PCS | Mod: S$PBB,,, | Performed by: INTERNAL MEDICINE

## 2022-07-20 PROCEDURE — 99213 OFFICE O/P EST LOW 20 MIN: CPT | Mod: PBBFAC,PN | Performed by: INTERNAL MEDICINE

## 2022-07-20 NOTE — PROGRESS NOTES
Subjective:       Patient ID: Ashihs Dave is a 66 y.o. male.    This is an established patient.      Chief Complaint: Gastritis    Patient seen for follow up from recent EGD with mild gastritis noted which was H.pylori negative.  He had multiple polyps removed which were benign fundic gland polyps.  He also had Schatzki ring dilated.  He feels well.  Symptoms have resolved.  He has been on PPI and is asking about weaning.  Last colonoscopy was a year ago and he had a polyp.  No complaints currently.    Review of Systems   Constitutional: Negative for chills, fatigue and fever.   HENT: Negative for trouble swallowing.    Respiratory: Negative for cough, shortness of breath and wheezing.    Cardiovascular: Negative for chest pain and palpitations.   Gastrointestinal: Negative for abdominal pain, constipation, diarrhea, nausea and vomiting.   Musculoskeletal: Negative for arthralgias and myalgias.   Integumentary:  Negative for color change and rash.   Neurological: Negative for dizziness, weakness and numbness.   Psychiatric/Behavioral: Negative for confusion. The patient is not nervous/anxious.    All other systems reviewed and are negative.        Objective:       Vitals:    07/20/22 1357   BP: 138/78   BP Location: Left arm   Patient Position: Sitting   Pulse: 68   Weight: 84.7 kg (186 lb 11.7 oz)       Physical Exam  Constitutional:       Appearance: He is well-developed.   HENT:      Head: Normocephalic and atraumatic.   Eyes:      General: No scleral icterus.     Pupils: Pupils are equal, round, and reactive to light.   Neck:      Thyroid: No thyromegaly.   Cardiovascular:      Rate and Rhythm: Normal rate and regular rhythm.      Heart sounds: No murmur heard.  Pulmonary:      Effort: Pulmonary effort is normal.      Breath sounds: Normal breath sounds. No wheezing.   Abdominal:      General: Bowel sounds are normal. There is no distension.      Palpations: Abdomen is soft.      Tenderness: There is no  abdominal tenderness.   Musculoskeletal:      Cervical back: Normal range of motion and neck supple.   Lymphadenopathy:      Cervical: No cervical adenopathy.   Skin:     General: Skin is warm and dry.      Findings: No erythema or rash.   Neurological:      Mental Status: He is alert and oriented to person, place, and time.   Psychiatric:         Behavior: Behavior normal.           Lab Results   Component Value Date    WBC 5.3 02/25/2022    HGB 16.9 02/25/2022    HCT 50.5 (H) 02/25/2022    MCV 88.3 02/25/2022     02/25/2022         CMP  Sodium   Date Value Ref Range Status   02/25/2022 140 135 - 146 mmol/L Final     Potassium   Date Value Ref Range Status   02/25/2022 4.6 3.5 - 5.3 mmol/L Final     Chloride   Date Value Ref Range Status   02/25/2022 103 98 - 110 mmol/L Final     CO2   Date Value Ref Range Status   02/25/2022 31 20 - 32 mmol/L Final     Glucose   Date Value Ref Range Status   02/25/2022 93 65 - 99 mg/dL Final     Comment:                   Fasting reference interval          BUN   Date Value Ref Range Status   02/25/2022 12 7 - 25 mg/dL Final     Creatinine   Date Value Ref Range Status   02/25/2022 0.91 0.70 - 1.25 mg/dL Final     Comment:     For patients >49 years of age, the reference limit  for Creatinine is approximately 13% higher for people  identified as -American.          Calcium   Date Value Ref Range Status   02/25/2022 9.7 8.6 - 10.3 mg/dL Final     Total Protein   Date Value Ref Range Status   02/25/2022 7.1 6.1 - 8.1 g/dL Final     Albumin   Date Value Ref Range Status   02/25/2022 4.5 3.6 - 5.1 g/dL Final     Total Bilirubin   Date Value Ref Range Status   02/25/2022 0.7 0.2 - 1.2 mg/dL Final     Alkaline Phosphatase   Date Value Ref Range Status   07/21/2020 60 55 - 135 U/L Final     AST   Date Value Ref Range Status   02/25/2022 25 10 - 35 U/L Final     ALT   Date Value Ref Range Status   02/25/2022 26 9 - 46 U/L Final     Anion Gap   Date Value Ref Range Status    07/21/2020 12 8 - 16 mmol/L Final     eGFR if    Date Value Ref Range Status   02/25/2022 101 > OR = 60 mL/min/1.73m2 Final     eGFR if non    Date Value Ref Range Status   02/25/2022 87 > OR = 60 mL/min/1.73m2 Final       Assessment:       Problem List Items Addressed This Visit    None     Visit Diagnoses     Gastroesophageal reflux disease, unspecified whether esophagitis present    -  Primary    Gastric polyps        History of colon polyps              Plan:       1.  Instructions given for weaning PPI  2.  Antireflux precautions including avoidance of late night eating and lying down soon after eating.     3.  Colonoscopy for surveillance due in 2026  4.  Follow up PRN in interim.

## 2022-09-15 ENCOUNTER — OFFICE VISIT (OUTPATIENT)
Dept: FAMILY MEDICINE | Facility: CLINIC | Age: 67
End: 2022-09-15
Payer: MEDICARE

## 2022-09-15 VITALS
HEART RATE: 82 BPM | DIASTOLIC BLOOD PRESSURE: 84 MMHG | BODY MASS INDEX: 27.35 KG/M2 | SYSTOLIC BLOOD PRESSURE: 128 MMHG | HEIGHT: 70 IN | WEIGHT: 191 LBS

## 2022-09-15 DIAGNOSIS — I10 BENIGN ESSENTIAL HTN: ICD-10-CM

## 2022-09-15 DIAGNOSIS — M12.811 ROTATOR CUFF ARTHROPATHY OF BOTH SHOULDERS: Primary | ICD-10-CM

## 2022-09-15 DIAGNOSIS — M50.90 CERVICAL DISC DISEASE: ICD-10-CM

## 2022-09-15 DIAGNOSIS — Z12.5 SPECIAL SCREENING FOR MALIGNANT NEOPLASM OF PROSTATE: ICD-10-CM

## 2022-09-15 DIAGNOSIS — K31.7 MULTIPLE GASTRIC POLYPS: ICD-10-CM

## 2022-09-15 DIAGNOSIS — M17.0 PRIMARY OSTEOARTHRITIS OF BOTH KNEES: ICD-10-CM

## 2022-09-15 DIAGNOSIS — N40.0 ENLARGED PROSTATE WITHOUT LOWER URINARY TRACT SYMPTOMS (LUTS): ICD-10-CM

## 2022-09-15 DIAGNOSIS — M12.812 ROTATOR CUFF ARTHROPATHY OF BOTH SHOULDERS: Primary | ICD-10-CM

## 2022-09-15 PROCEDURE — 99214 PR OFFICE/OUTPT VISIT, EST, LEVL IV, 30-39 MIN: ICD-10-PCS | Mod: S$GLB,,, | Performed by: FAMILY MEDICINE

## 2022-09-15 PROCEDURE — 99214 OFFICE O/P EST MOD 30 MIN: CPT | Mod: S$GLB,,, | Performed by: FAMILY MEDICINE

## 2022-09-15 RX ORDER — LISINOPRIL 10 MG/1
10 TABLET ORAL DAILY
Qty: 90 TABLET | Refills: 2 | Status: SHIPPED | OUTPATIENT
Start: 2022-09-15 | End: 2023-03-16 | Stop reason: SDUPTHER

## 2022-09-15 RX ORDER — PANTOPRAZOLE SODIUM 40 MG/1
40 TABLET, DELAYED RELEASE ORAL DAILY
Qty: 90 TABLET | Refills: 1 | Status: CANCELLED | OUTPATIENT
Start: 2022-09-15

## 2022-09-15 NOTE — PROGRESS NOTES
"  SUBJECTIVE:    Patient ID: Ashish Dave is a 66 y.o. male.    Chief Complaint: Shoulder Pain (Right side // no bottles //need refills // abc )    66-year-old male complains of bilateral shoulder pains.  Pop and ache that hurts to sleep on the midnight.  Ibuprofen 600 mg used p.r.n..  He can still mow the lawn and do active duties.    He sees orthopedics Dr. Thornton-knees are arthritic but seems to be doing okay.  He can bike ride but is unable to walk greater than 1 block.    2021-colonoscopy with Dr. deleon-RTC 5 years, EGD done with Dr. Syed revealed approximately 30 gastric polyps all of which were removed.  Currently on pantoprazole liver aide day or every other day.    Admits to having COVID infection 2-3 times.    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      Admission on 06/06/2022, Discharged on 06/06/2022   Component Date Value Ref Range Status    Final Pathologic Diagnosis 06/06/2022    Final                    Value:1. Gastric antrum and body, biopsy:      -  Gastric antral and oxyntic mucosa with findings of reactive  gastropathy      -  Routine H&E stain is negative for Helicobacter pylori  Comment:  The specimen shows foveolar hyperplasia with associated lamina  propria edema and vascular ectasia, consistent with reactive gastropathy.  This pattern of injury is often seen in the setting of bile reflux, alcohol  use, stress ulceration and NSAID/aspirin use. There is no evidence of atrophy  or intestinal metaplasia.  The specimen is negative for dysplasia or  malignancy.  2. Gastric polyps, biopsy:      -  Fragments of benign fundic gland polyp      Gross 06/06/2022    Final                    Value:Number of containers:  2  Part 1  Container Label: Clinic Number/AP Number:  [id number], and "antrum/body"  Received in formalin are 4 soft tan tissue fragments ranging from 2-3 mm in  greatest dimension.  Entirely submitted in RKJ--1-A.  Part " "2  Container Label: Clinic Number/AP Number:  [id number], and "gastric polyps"  Received in formalin in polyp trap 1 are multiple soft tan polypoid tissue  fragments ranging from 2-6 mm in greatest dimension.  Entirely submitted in  3492-2 as follows:  2A-2C:  Each cassette with 3 bisected polypoid fragments, 1 inked black 1  inked blue 1 inked green  2D:  The rest of the fragments.  SOBIA Pascual      Disclaimer 06/06/2022    Final                    Value:Unless the case is a 'gross only' or additional testing only, the final  diagnosis for each specimen is based on a microscopic examination of  appropriate tissue sections.     Lab Visit on 06/03/2022   Component Date Value Ref Range Status    SARS-CoV2 (COVID-19) Qualitative P* 06/03/2022 Not Detected  Not Detected Final       Past Medical History:   Diagnosis Date    Arthritis     Chronic calculous cholecystitis 8/12/2019    COVID-19     Essential (primary) hypertension 3/8/2016    GERD (gastroesophageal reflux disease)      Social History     Socioeconomic History    Marital status:    Tobacco Use    Smoking status: Never    Smokeless tobacco: Never   Substance and Sexual Activity    Alcohol use: Not Currently    Drug use: Never     Social Determinants of Health     Financial Resource Strain: Low Risk     Difficulty of Paying Living Expenses: Not hard at all   Food Insecurity: No Food Insecurity    Worried About Running Out of Food in the Last Year: Never true    Ran Out of Food in the Last Year: Never true   Transportation Needs: No Transportation Needs    Lack of Transportation (Medical): No    Lack of Transportation (Non-Medical): No   Physical Activity: Insufficiently Active    Days of Exercise per Week: 3 days    Minutes of Exercise per Session: 20 min   Stress: No Stress Concern Present    Feeling of Stress : Only a little   Social Connections: Unknown    Frequency of Communication with Friends and Family: More than three times a week    Frequency " of Social Gatherings with Friends and Family: Twice a week    Active Member of Clubs or Organizations: Yes    Attends Club or Organization Meetings: More than 4 times per year    Marital Status:    Housing Stability: Low Risk     Unable to Pay for Housing in the Last Year: No    Number of Places Lived in the Last Year: 1    Unstable Housing in the Last Year: No     Past Surgical History:   Procedure Laterality Date    CHOLECYSTECTOMY      COLONOSCOPY  2016    Dr Saldaña- rtc 5 yr    ESOPHAGOGASTRODUODENOSCOPY N/A 6/6/2022    Procedure: EGD (ESOPHAGOGASTRODUODENOSCOPY);  Surgeon: Pj Syed MD;  Location: [facility];  Service: Endoscopy;  Laterality: N/A;    GALLBLADDER SURGERY  08/2019    KNEE SURGERY Right     x's3    LAPAROSCOPIC CHOLECYSTECTOMY N/A 8/12/2019    Procedure: CHOLECYSTECTOMY, LAPAROSCOPIC;  Surgeon: Alphonso Freeman III, MD;  Location: [facility] OR;  Service: General;  Laterality: N/A;    UPPER GASTROINTESTINAL ENDOSCOPY       Family History   Problem Relation Age of Onset    Stroke Mother     No Known Problems Father     Ulcerative colitis Son     Colon cancer Neg Hx     Colon polyps Neg Hx     Crohn's disease Neg Hx     Esophageal cancer Neg Hx     Stomach cancer Neg Hx        Review of patient's allergies indicates:  No Known Allergies    Current Outpatient Medications:     ASCORBIC ACID, VITAMIN C, ORAL, Take 4,000 mg by mouth 2 (two) times a day., Disp: , Rfl:     pantoprazole (PROTONIX) 40 MG tablet, Take 1 tablet (40 mg total) by mouth once daily., Disp: 90 tablet, Rfl: 1    vitamin D (VITAMIN D3) 1000 units Tab, Take 4,000 Units by mouth once daily., Disp: , Rfl:     aluminum & magnesium hydroxide-simethicone (MYLANTA MAX STRENGTH) 400-400-40 mg/5 mL suspension, Take by mouth every 6 (six) hours as needed., Disp: , Rfl:     aspirin (ECOTRIN) 325 MG EC tablet, Take 1 tablet (325 mg total) by mouth once daily. for 14 days, Disp: , Rfl: 0    lisinopriL 10 MG tablet, Take 1 tablet (10  mg total) by mouth once daily., Disp: 90 tablet, Rfl: 2    meclizine (ANTIVERT) 25 mg tablet, Take 1 tablet (25 mg total) by mouth 2 (two) times daily as needed (vertigo). (Patient not taking: No sig reported), Disp: 20 tablet, Rfl: 0    Current Facility-Administered Medications:     acetaminophen tablet 650 mg, 650 mg, Oral, Once PRN, Shanae Pringle, NP    albuterol inhaler 2 puff, 2 puff, Inhalation, Q20 Min PRN, Shanae Pringle, NP    diphenhydrAMINE injection 25 mg, 25 mg, Intravenous, Once PRN, Shanae Pringle, NP    EPINEPHrine (EPIPEN) 0.3 mg/0.3 mL pen injection 0.3 mg, 0.3 mg, Intramuscular, PRN, Shanae Pringle, NP    methylPREDNISolone sodium succinate injection 40 mg, 40 mg, Intravenous, Once PRN, Shanae Pringle NP    ondansetron disintegrating tablet 4 mg, 4 mg, Oral, Once PRN, Shanae Pringle, NP    sodium chloride 0.9% 500 mL flush bag, , Intravenous, PRN, Shanae Pringle, NP, Stopped at 12/23/21 1415    sodium chloride 0.9% flush 10 mL, 10 mL, Intravenous, PRN, Shanae Pringle, NP    Review of Systems   Constitutional:  Negative for appetite change, chills, fatigue, fever and unexpected weight change.   HENT:  Negative for congestion, ear pain and trouble swallowing.    Eyes:  Negative for pain, discharge and visual disturbance.   Respiratory:  Positive for shortness of breath (occas). Negative for apnea, cough and wheezing.    Cardiovascular:  Negative for chest pain and leg swelling.   Gastrointestinal:  Negative for abdominal pain, blood in stool, constipation, diarrhea, nausea and vomiting.   Endocrine: Negative for cold intolerance, heat intolerance and polydipsia.   Genitourinary:  Negative for dysuria, hematuria, testicular pain and urgency.   Musculoskeletal:  Negative for gait problem, joint swelling and myalgias.   Neurological:  Negative for dizziness, seizures and numbness.   Psychiatric/Behavioral:  Negative for agitation, behavioral problems and hallucinations. The  "patient is not nervous/anxious.         Objective:      Vitals:    09/15/22 0845   BP: 128/84   Pulse: 82   Weight: 86.6 kg (191 lb)   Height: 5' 10" (1.778 m)     Physical Exam  Vitals and nursing note reviewed.   Constitutional:       General: He is not in acute distress.     Appearance: Normal appearance. He is well-developed. He is not toxic-appearing.   HENT:      Head: Normocephalic and atraumatic.      Right Ear: Tympanic membrane and external ear normal.      Left Ear: Tympanic membrane and external ear normal.      Nose: Nose normal.      Mouth/Throat:      Pharynx: Oropharynx is clear.   Eyes:      Pupils: Pupils are equal, round, and reactive to light.   Neck:      Thyroid: No thyromegaly.      Vascular: No carotid bruit.   Cardiovascular:      Rate and Rhythm: Normal rate and regular rhythm.      Heart sounds: Normal heart sounds. No murmur heard.  Pulmonary:      Effort: Pulmonary effort is normal.      Breath sounds: Normal breath sounds. No wheezing or rales.   Abdominal:      General: Bowel sounds are normal. There is no distension.      Palpations: Abdomen is soft.      Tenderness: There is no abdominal tenderness.   Musculoskeletal:         General: No tenderness or deformity. Normal range of motion.      Cervical back: Normal range of motion and neck supple.      Lumbar back: Normal. No spasms.      Comments: Bends 90 degrees at  waist shoulder type stiffness but good range of motion.  Knees are crepitant bilaterally. no pitting edema to lower extremities.   Lymphadenopathy:      Cervical: No cervical adenopathy.   Skin:     General: Skin is warm and dry.      Findings: No rash.   Neurological:      Mental Status: He is alert and oriented to person, place, and time.      Cranial Nerves: No cranial nerve deficit.      Coordination: Coordination normal.   Psychiatric:         Mood and Affect: Mood normal.         Behavior: Behavior normal.         Thought Content: Thought content normal.         " Judgment: Judgment normal.         Assessment:       1. Rotator cuff arthropathy of both shoulders    2. Primary osteoarthritis of both knees    3. Benign essential HTN    4. Cervical disc disease    5. Enlarged prostate without lower urinary tract symptoms (luts)    6. Multiple gastric polyps           Plan:       Rotator cuff arthropathy of both shoulders  Patient wish to continue conservative treatment.  Not want surgical intervention at this time.  Primary osteoarthritis of both knees  Continue ibuprofen 600 mg  Benign essential HTN  -     lisinopriL 10 MG tablet; Take 1 tablet (10 mg total) by mouth once daily.  Dispense: 90 tablet; Refill: 2  Refill lisinopril pressure well controlled  Cervical disc disease    Enlarged prostate without lower urinary tract symptoms (luts)    Multiple gastric polyps  Okay to reduce pantoprazole to every 3 days  Consider shingles vaccine this fall  Follow up in about 6 months (around 3/15/2023).        9/18/2022 Phil Kemp

## 2022-09-18 PROBLEM — K31.7 MULTIPLE GASTRIC POLYPS: Status: ACTIVE | Noted: 2022-09-18

## 2022-10-03 ENCOUNTER — PATIENT MESSAGE (OUTPATIENT)
Dept: FAMILY MEDICINE | Facility: CLINIC | Age: 67
End: 2022-10-03

## 2023-01-26 ENCOUNTER — PATIENT MESSAGE (OUTPATIENT)
Dept: FAMILY MEDICINE | Facility: CLINIC | Age: 68
End: 2023-01-26

## 2023-01-26 RX ORDER — BENZONATATE 200 MG/1
200 CAPSULE ORAL 3 TIMES DAILY PRN
Qty: 30 CAPSULE | Refills: 0 | Status: SHIPPED | OUTPATIENT
Start: 2023-01-26 | End: 2023-02-05

## 2023-03-01 ENCOUNTER — TELEPHONE (OUTPATIENT)
Dept: FAMILY MEDICINE | Facility: CLINIC | Age: 68
End: 2023-03-01

## 2023-03-04 LAB
ALBUMIN SERPL-MCNC: 4.3 G/DL (ref 3.6–5.1)
ALBUMIN/GLOB SERPL: 1.5 (CALC) (ref 1–2.5)
ALP SERPL-CCNC: 41 U/L (ref 35–144)
ALT SERPL-CCNC: 24 U/L (ref 9–46)
APPEARANCE UR: CLEAR
AST SERPL-CCNC: 21 U/L (ref 10–35)
BACTERIA #/AREA URNS HPF: NORMAL /HPF
BACTERIA UR CULT: NORMAL
BASOPHILS # BLD AUTO: 103 CELLS/UL (ref 0–200)
BASOPHILS NFR BLD AUTO: 1.8 %
BILIRUB SERPL-MCNC: 0.6 MG/DL (ref 0.2–1.2)
BILIRUB UR QL STRIP: NEGATIVE
BUN SERPL-MCNC: 13 MG/DL (ref 7–25)
BUN/CREAT SERPL: NORMAL (CALC) (ref 6–22)
CALCIUM SERPL-MCNC: 9.3 MG/DL (ref 8.6–10.3)
CHLORIDE SERPL-SCNC: 104 MMOL/L (ref 98–110)
CHOLEST SERPL-MCNC: 161 MG/DL
CHOLEST/HDLC SERPL: 2.3 (CALC)
CO2 SERPL-SCNC: 29 MMOL/L (ref 20–32)
COLOR UR: YELLOW
CREAT SERPL-MCNC: 0.88 MG/DL (ref 0.7–1.35)
EGFR: 94 ML/MIN/1.73M2
EOSINOPHIL # BLD AUTO: 143 CELLS/UL (ref 15–500)
EOSINOPHIL NFR BLD AUTO: 2.5 %
ERYTHROCYTE [DISTWIDTH] IN BLOOD BY AUTOMATED COUNT: 14.5 % (ref 11–15)
GLOBULIN SER CALC-MCNC: 2.9 G/DL (CALC) (ref 1.9–3.7)
GLUCOSE SERPL-MCNC: 97 MG/DL (ref 65–99)
GLUCOSE UR QL STRIP: NEGATIVE
HCT VFR BLD AUTO: 50.1 % (ref 38.5–50)
HDLC SERPL-MCNC: 71 MG/DL
HGB BLD-MCNC: 16.3 G/DL (ref 13.2–17.1)
HGB UR QL STRIP: NEGATIVE
HYALINE CASTS #/AREA URNS LPF: NORMAL /LPF
KETONES UR QL STRIP: NEGATIVE
LDLC SERPL CALC-MCNC: 76 MG/DL (CALC)
LEUKOCYTE ESTERASE UR QL STRIP: NEGATIVE
LYMPHOCYTES # BLD AUTO: 2012 CELLS/UL (ref 850–3900)
LYMPHOCYTES NFR BLD AUTO: 35.3 %
MCH RBC QN AUTO: 29.7 PG (ref 27–33)
MCHC RBC AUTO-ENTMCNC: 32.5 G/DL (ref 32–36)
MCV RBC AUTO: 91.3 FL (ref 80–100)
MONOCYTES # BLD AUTO: 502 CELLS/UL (ref 200–950)
MONOCYTES NFR BLD AUTO: 8.8 %
NEUTROPHILS # BLD AUTO: 2941 CELLS/UL (ref 1500–7800)
NEUTROPHILS NFR BLD AUTO: 51.6 %
NITRITE UR QL STRIP: NEGATIVE
NONHDLC SERPL-MCNC: 90 MG/DL (CALC)
PH UR STRIP: 5.5 [PH] (ref 5–8)
PLATELET # BLD AUTO: 201 THOUSAND/UL (ref 140–400)
PMV BLD REES-ECKER: 10 FL (ref 7.5–12.5)
POTASSIUM SERPL-SCNC: 4.6 MMOL/L (ref 3.5–5.3)
PROT SERPL-MCNC: 7.2 G/DL (ref 6.1–8.1)
PROT UR QL STRIP: NEGATIVE
PSA SERPL-MCNC: 4.22 NG/ML
RBC # BLD AUTO: 5.49 MILLION/UL (ref 4.2–5.8)
RBC #/AREA URNS HPF: NORMAL /HPF
SERVICE CMNT-IMP: NORMAL
SODIUM SERPL-SCNC: 138 MMOL/L (ref 135–146)
SP GR UR STRIP: 1.01 (ref 1–1.03)
SQUAMOUS #/AREA URNS HPF: NORMAL /HPF
TRIGL SERPL-MCNC: 64 MG/DL
TSH SERPL-ACNC: 3.81 MIU/L (ref 0.4–4.5)
WBC # BLD AUTO: 5.7 THOUSAND/UL (ref 3.8–10.8)
WBC #/AREA URNS HPF: NORMAL /HPF

## 2023-03-06 ENCOUNTER — TELEPHONE (OUTPATIENT)
Dept: FAMILY MEDICINE | Facility: CLINIC | Age: 68
End: 2023-03-06

## 2023-03-16 ENCOUNTER — OFFICE VISIT (OUTPATIENT)
Dept: FAMILY MEDICINE | Facility: CLINIC | Age: 68
End: 2023-03-16
Payer: MEDICARE

## 2023-03-16 VITALS
BODY MASS INDEX: 30.46 KG/M2 | SYSTOLIC BLOOD PRESSURE: 114 MMHG | DIASTOLIC BLOOD PRESSURE: 76 MMHG | WEIGHT: 201 LBS | HEART RATE: 78 BPM | HEIGHT: 68 IN

## 2023-03-16 DIAGNOSIS — K31.7 MULTIPLE GASTRIC POLYPS: ICD-10-CM

## 2023-03-16 DIAGNOSIS — R97.20 ELEVATED PSA: ICD-10-CM

## 2023-03-16 DIAGNOSIS — Z77.090 ASBESTOS EXPOSURE: ICD-10-CM

## 2023-03-16 DIAGNOSIS — I10 BENIGN ESSENTIAL HTN: ICD-10-CM

## 2023-03-16 DIAGNOSIS — M17.0 PRIMARY OSTEOARTHRITIS OF BOTH KNEES: ICD-10-CM

## 2023-03-16 DIAGNOSIS — F51.01 PRIMARY INSOMNIA: ICD-10-CM

## 2023-03-16 DIAGNOSIS — M50.90 CERVICAL DISC DISEASE: ICD-10-CM

## 2023-03-16 DIAGNOSIS — N40.0 ENLARGED PROSTATE WITHOUT LOWER URINARY TRACT SYMPTOMS (LUTS): ICD-10-CM

## 2023-03-16 DIAGNOSIS — M12.812 ROTATOR CUFF ARTHROPATHY OF BOTH SHOULDERS: Primary | ICD-10-CM

## 2023-03-16 DIAGNOSIS — M12.811 ROTATOR CUFF ARTHROPATHY OF BOTH SHOULDERS: Primary | ICD-10-CM

## 2023-03-16 PROCEDURE — 99214 OFFICE O/P EST MOD 30 MIN: CPT | Mod: S$GLB,,, | Performed by: FAMILY MEDICINE

## 2023-03-16 PROCEDURE — 99214 PR OFFICE/OUTPT VISIT, EST, LEVL IV, 30-39 MIN: ICD-10-PCS | Mod: S$GLB,,, | Performed by: FAMILY MEDICINE

## 2023-03-16 RX ORDER — LISINOPRIL 10 MG/1
10 TABLET ORAL DAILY
Qty: 90 TABLET | Refills: 3 | Status: SHIPPED | OUTPATIENT
Start: 2023-03-16 | End: 2023-12-07

## 2023-03-16 NOTE — PROGRESS NOTES
SUBJECTIVE:    Patient ID: Ashish Dave is a 67 y.o. male.    Chief Complaint: Joint Pain (Multiple joins pain, no bottle, declined vaccines (pna, flu), review Lab-results,  abc )    67-year-old male here for six-month checkup.  He states his shoulder pains are quite constant at this time.  Left hip hurts at night.  His knees ache and are stiff, he can walk 1 block but not much further.  His right hand goes numb and tingling at night.  He does have some neck pain and takes ibuprofen as needed.  Low back pain is present every morning but gets better with time.  He does have some limping when he walks more than 1 block.  He is short of breath with extra exertion.    2021-colonoscopy with Dr. Iglesias-RTC 5 years    EGD-Dr. Syed--gastric polyps removed recently.    Elevated PSA-follows up with Dr. Werner as needed PSA 4.2 to    Hypertension well controlled with lisinopril      No visits with results within 6 Month(s) from this visit.   Latest known visit with results is:   Office Visit on 09/15/2022   Component Date Value Ref Range Status    WBC 03/03/2023 5.7  3.8 - 10.8 Thousand/uL Final    RBC 03/03/2023 5.49  4.20 - 5.80 Million/uL Final    Hemoglobin 03/03/2023 16.3  13.2 - 17.1 g/dL Final    Hematocrit 03/03/2023 50.1 (H)  38.5 - 50.0 % Final    MCV 03/03/2023 91.3  80.0 - 100.0 fL Final    MCH 03/03/2023 29.7  27.0 - 33.0 pg Final    MCHC 03/03/2023 32.5  32.0 - 36.0 g/dL Final    RDW 03/03/2023 14.5  11.0 - 15.0 % Final    Platelets 03/03/2023 201  140 - 400 Thousand/uL Final    MPV 03/03/2023 10.0  7.5 - 12.5 fL Final    Neutrophils, Abs 03/03/2023 2,941  1,500 - 7,800 cells/uL Final    Lymph # 03/03/2023 2,012  850 - 3,900 cells/uL Final    Mono # 03/03/2023 502  200 - 950 cells/uL Final    Eos # 03/03/2023 143  15 - 500 cells/uL Final    Baso # 03/03/2023 103  0 - 200 cells/uL Final    Neutrophils Relative 03/03/2023 51.6  % Final    Lymph % 03/03/2023 35.3  % Final    Mono % 03/03/2023 8.8  % Final     Eosinophil % 03/03/2023 2.5  % Final    Basophil % 03/03/2023 1.8  % Final    Glucose 03/03/2023 97  65 - 99 mg/dL Final    BUN 03/03/2023 13  7 - 25 mg/dL Final    Creatinine 03/03/2023 0.88  0.70 - 1.35 mg/dL Final    eGFR 03/03/2023 94  > OR = 60 mL/min/1.73m2 Final    BUN/Creatinine Ratio 03/03/2023 NOT APPLICABLE  6 - 22 (calc) Final    Sodium 03/03/2023 138  135 - 146 mmol/L Final    Potassium 03/03/2023 4.6  3.5 - 5.3 mmol/L Final    Chloride 03/03/2023 104  98 - 110 mmol/L Final    CO2 03/03/2023 29  20 - 32 mmol/L Final    Calcium 03/03/2023 9.3  8.6 - 10.3 mg/dL Final    Total Protein 03/03/2023 7.2  6.1 - 8.1 g/dL Final    Albumin 03/03/2023 4.3  3.6 - 5.1 g/dL Final    Globulin, Total 03/03/2023 2.9  1.9 - 3.7 g/dL (calc) Final    Albumin/Globulin Ratio 03/03/2023 1.5  1.0 - 2.5 (calc) Final    Total Bilirubin 03/03/2023 0.6  0.2 - 1.2 mg/dL Final    Alkaline Phosphatase 03/03/2023 41  35 - 144 U/L Final    AST 03/03/2023 21  10 - 35 U/L Final    ALT 03/03/2023 24  9 - 46 U/L Final    Cholesterol 03/03/2023 161  <200 mg/dL Final    HDL 03/03/2023 71  > OR = 40 mg/dL Final    Triglycerides 03/03/2023 64  <150 mg/dL Final    LDL Cholesterol 03/03/2023 76  mg/dL (calc) Final    HDL/Cholesterol Ratio 03/03/2023 2.3  <5.0 (calc) Final    Non HDL Chol. (LDL+VLDL) 03/03/2023 90  <130 mg/dL (calc) Final    PROSTATE SPECIFIC ANTIGEN, SCR - Q* 03/03/2023 4.22 (H)  < OR = 4.00 ng/mL Final    TSH w/reflex to FT4 03/03/2023 3.81  0.40 - 4.50 mIU/L Final    Color, UA 03/03/2023 YELLOW  YELLOW Final    Appearance, UA 03/03/2023 CLEAR  CLEAR Final    Specific Gravity, UA 03/03/2023 1.008  1.001 - 1.035 Final    pH, UA 03/03/2023 5.5  5.0 - 8.0 Final    Glucose, UA 03/03/2023 NEGATIVE  NEGATIVE Final    Bilirubin, UA 03/03/2023 NEGATIVE  NEGATIVE Final    Ketones, UA 03/03/2023 NEGATIVE  NEGATIVE Final    Occult Blood UA 03/03/2023 NEGATIVE  NEGATIVE Final    Protein, UA 03/03/2023 NEGATIVE  NEGATIVE Final    Nitrite, UA  03/03/2023 NEGATIVE  NEGATIVE Final    Leukocytes, UA 03/03/2023 NEGATIVE  NEGATIVE Final    WBC Casts, UA 03/03/2023 NONE SEEN  < OR = 5 /HPF Final    RBC Casts, UA 03/03/2023 NONE SEEN  < OR = 2 /HPF Final    Squam Epithel, UA 03/03/2023 NONE SEEN  < OR = 5 /HPF Final    Bacteria, UA 03/03/2023 NONE SEEN  NONE SEEN /HPF Final    Hyaline Casts, UA 03/03/2023 NONE SEEN  NONE SEEN /LPF Final    Service Cmt: 03/03/2023    Final    Reflexive Urine Culture 03/03/2023    Final       Past Medical History:   Diagnosis Date    Arthritis     Chronic calculous cholecystitis 8/12/2019    COVID-19     Essential (primary) hypertension 3/8/2016    GERD (gastroesophageal reflux disease)      Social History     Socioeconomic History    Marital status:    Tobacco Use    Smoking status: Never    Smokeless tobacco: Never   Substance and Sexual Activity    Alcohol use: Not Currently    Drug use: Never     Social Determinants of Health     Financial Resource Strain: Low Risk     Difficulty of Paying Living Expenses: Not hard at all   Food Insecurity: No Food Insecurity    Worried About Running Out of Food in the Last Year: Never true    Ran Out of Food in the Last Year: Never true   Transportation Needs: No Transportation Needs    Lack of Transportation (Medical): No    Lack of Transportation (Non-Medical): No   Physical Activity: Insufficiently Active    Days of Exercise per Week: 3 days    Minutes of Exercise per Session: 20 min   Stress: No Stress Concern Present    Feeling of Stress : Only a little   Social Connections: Unknown    Frequency of Communication with Friends and Family: More than three times a week    Frequency of Social Gatherings with Friends and Family: Twice a week    Active Member of Clubs or Organizations: Yes    Attends Club or Organization Meetings: More than 4 times per year    Marital Status:    Housing Stability: Low Risk     Unable to Pay for Housing in the Last Year: No    Number of Places  Lived in the Last Year: 1    Unstable Housing in the Last Year: No     Past Surgical History:   Procedure Laterality Date    CHOLECYSTECTOMY      COLONOSCOPY  2016    Dr Saldaña- rtc 5 yr    ESOPHAGOGASTRODUODENOSCOPY N/A 6/6/2022    Procedure: EGD (ESOPHAGOGASTRODUODENOSCOPY);  Surgeon: Pj Syed MD;  Location: [facility];  Service: Endoscopy;  Laterality: N/A;    GALLBLADDER SURGERY  08/2019    KNEE SURGERY Right     x's3    LAPAROSCOPIC CHOLECYSTECTOMY N/A 8/12/2019    Procedure: CHOLECYSTECTOMY, LAPAROSCOPIC;  Surgeon: Alphonso Freeman III, MD;  Location: [facility];  Service: General;  Laterality: N/A;    UPPER GASTROINTESTINAL ENDOSCOPY       Family History   Problem Relation Age of Onset    Stroke Mother     No Known Problems Father     Ulcerative colitis Son     Colon cancer Neg Hx     Colon polyps Neg Hx     Crohn's disease Neg Hx     Esophageal cancer Neg Hx     Stomach cancer Neg Hx        Review of patient's allergies indicates:  No Known Allergies    Current Outpatient Medications:     ASCORBIC ACID, VITAMIN C, ORAL, Take 4,000 mg by mouth 2 (two) times a day., Disp: , Rfl:     vitamin D (VITAMIN D3) 1000 units Tab, Take 4,000 Units by mouth once daily., Disp: , Rfl:     aluminum & magnesium hydroxide-simethicone (MYLANTA MAX STRENGTH) 400-400-40 mg/5 mL suspension, Take by mouth every 6 (six) hours as needed., Disp: , Rfl:     aspirin (ECOTRIN) 325 MG EC tablet, Take 1 tablet (325 mg total) by mouth once daily. for 14 days, Disp: , Rfl: 0    lisinopriL 10 MG tablet, Take 1 tablet (10 mg total) by mouth once daily., Disp: 90 tablet, Rfl: 3    meclizine (ANTIVERT) 25 mg tablet, Take 1 tablet (25 mg total) by mouth 2 (two) times daily as needed (vertigo). (Patient not taking: No sig reported), Disp: 20 tablet, Rfl: 0    pantoprazole (PROTONIX) 40 MG tablet, Take 1 tablet (40 mg total) by mouth once daily. (Patient not taking: Reported on 3/16/2023), Disp: 90 tablet, Rfl: 1    Current  Facility-Administered Medications:     acetaminophen tablet 650 mg, 650 mg, Oral, Once PRN, Shanae Pringle NP    albuterol inhaler 2 puff, 2 puff, Inhalation, Q20 Min PRN, Shanae Pringle NP    diphenhydrAMINE injection 25 mg, 25 mg, Intravenous, Once PRN, Shanae Pringle NP    EPINEPHrine (EPIPEN) 0.3 mg/0.3 mL pen injection 0.3 mg, 0.3 mg, Intramuscular, PRN, Shanae Pringle NP    methylPREDNISolone sodium succinate injection 40 mg, 40 mg, Intravenous, Once PRN, Shanae Pringle NP    ondansetron disintegrating tablet 4 mg, 4 mg, Oral, Once PRN, Shanae Pringle NP    sodium chloride 0.9% 500 mL flush bag, , Intravenous, PRN, Shanae Pringle NP, Stopped at 12/23/21 1415    sodium chloride 0.9% flush 10 mL, 10 mL, Intravenous, PRN, Shanae Pringle NP    Review of Systems   Constitutional:  Negative for appetite change, chills, fatigue, fever and unexpected weight change.   HENT:  Negative for congestion, ear pain and trouble swallowing.    Eyes:  Negative for pain, discharge and visual disturbance.   Respiratory:  Negative for apnea, cough, shortness of breath and wheezing.    Cardiovascular:  Negative for chest pain and leg swelling.   Gastrointestinal:  Negative for abdominal pain, blood in stool, constipation, diarrhea, nausea and vomiting.   Endocrine: Negative for cold intolerance, heat intolerance and polydipsia.   Genitourinary:  Negative for dysuria, hematuria, testicular pain and urgency.        Nocturia  0-1 x night   Musculoskeletal:  Positive for arthralgias (knees ache, shoulders ache constantly), back pain and gait problem (every morning back hurts). Negative for joint swelling and myalgias.   Neurological:  Negative for dizziness, seizures and numbness.   Psychiatric/Behavioral:  Negative for agitation, behavioral problems and hallucinations. The patient is not nervous/anxious.         Objective:      Vitals:    03/16/23 0953   BP: 114/76   Pulse: 78   Weight: 91.2 kg (201 lb)  "  Height: 5' 8" (1.727 m)     Physical Exam  Vitals and nursing note reviewed.   Constitutional:       General: He is not in acute distress.     Appearance: Normal appearance. He is well-developed. He is not toxic-appearing.   HENT:      Head: Normocephalic and atraumatic.      Right Ear: Tympanic membrane and external ear normal.      Left Ear: Tympanic membrane and external ear normal.      Nose: Nose normal.      Mouth/Throat:      Pharynx: Oropharynx is clear.   Eyes:      Pupils: Pupils are equal, round, and reactive to light.   Neck:      Thyroid: No thyromegaly.      Vascular: No carotid bruit.   Cardiovascular:      Rate and Rhythm: Normal rate and regular rhythm.      Heart sounds: Normal heart sounds. No murmur heard.  Pulmonary:      Effort: Pulmonary effort is normal.      Breath sounds: Normal breath sounds. No wheezing or rales.   Abdominal:      General: Bowel sounds are normal. There is no distension.      Palpations: Abdomen is soft.      Tenderness: There is no abdominal tenderness.   Musculoskeletal:         General: No tenderness or deformity. Normal range of motion.      Cervical back: Normal range of motion and neck supple.      Lumbar back: Normal. No spasms.      Comments: Bends 45 degrees at  waist, tight lumbar muscles, shoulders have decreased flexion to 90°., knees are quite crepitant bilaterally.  No pitting edema to lower extremities   Lymphadenopathy:      Cervical: No cervical adenopathy.   Skin:     General: Skin is warm and dry.      Findings: No rash.   Neurological:      Mental Status: He is alert and oriented to person, place, and time.      Cranial Nerves: No cranial nerve deficit.      Coordination: Coordination normal.      Comments: Positive Tinel sign on the right wrist indicating carpal tunnel syndrome   Psychiatric:         Behavior: Behavior normal.         Thought Content: Thought content normal.         Judgment: Judgment normal.         Assessment:       1. Rotator " cuff arthropathy of both shoulders    2. Benign essential HTN    3. Elevated PSA    4. Cervical disc disease    5. Enlarged prostate without lower urinary tract symptoms (luts)    6. Multiple gastric polyps    7. Primary osteoarthritis of both knees    8. Asbestos exposure    9. Primary insomnia         Plan:       Rotator cuff arthropathy of both shoulders  Patient encouraged to go see Dr. Thornton for shoulder injections.  Can use NSAIDs as tolerated  Benign essential HTN  -     lisinopriL 10 MG tablet; Take 1 tablet (10 mg total) by mouth once daily.  Dispense: 90 tablet; Refill: 3  Blood pressure well controlled  Elevated PSA  -     PSA, Total and Free; Future; Expected date: 09/18/2023  PSA 4.22 will recheck PSA  6 months, may refer to Dr. Werner urology after the  Cervical disc disease    Enlarged prostate without lower urinary tract symptoms (luts)    Multiple gastric polyps  Removed during EGD recently  Primary osteoarthritis of both knees  Could get knee injections if desired  Asbestos exposure    Primary insomnia      Follow up in about 6 months (around 9/16/2023), or OA PSA.        3/18/2023 Phil Kemp

## 2023-03-18 PROBLEM — R97.20 ELEVATED PSA: Status: ACTIVE | Noted: 2023-03-18

## 2023-06-26 ENCOUNTER — PATIENT MESSAGE (OUTPATIENT)
Dept: FAMILY MEDICINE | Facility: CLINIC | Age: 68
End: 2023-06-26

## 2023-06-26 RX ORDER — CEFUROXIME AXETIL 500 MG/1
500 TABLET ORAL 2 TIMES DAILY
Qty: 14 TABLET | Refills: 1 | Status: SHIPPED | OUTPATIENT
Start: 2023-06-26 | End: 2023-09-19

## 2023-06-26 NOTE — TELEPHONE ENCOUNTER
Spoke with patient, states this has been going on the last 2 days. States he has had sinus issues with yellow mucus. Thinks it is dripping in his throat because he now has laryngitis. He has been taking Coricidin HBP over the counter and would like something prescribed.     Printed.

## 2023-06-26 NOTE — TELEPHONE ENCOUNTER
----- Message from Marlyn John sent at 6/26/2023  3:57 PM CDT -----  Patient wife Saba called and wanted to know if the nurse checked with the doctor about the patient sinus infection ? She stated that she has not heard anything as of yet. Please give her a call at [phone]

## 2023-06-26 NOTE — TELEPHONE ENCOUNTER
----- Message from Janell Magallon sent at 6/26/2023  9:31 AM CDT -----  - 8:25-pt is having some sinus problems, the mucus from her nose is yellow.   [phone]

## 2023-08-31 ENCOUNTER — TELEPHONE (OUTPATIENT)
Dept: FAMILY MEDICINE | Facility: CLINIC | Age: 68
End: 2023-08-31

## 2023-09-07 ENCOUNTER — TELEPHONE (OUTPATIENT)
Dept: FAMILY MEDICINE | Facility: CLINIC | Age: 68
End: 2023-09-07

## 2023-09-07 NOTE — TELEPHONE ENCOUNTER
----- Message from Janell Magallon sent at 9/7/2023 10:03 AM CDT -----  - 9:29-wife ray is calling to see if he needs blood work before his appt   [phone]

## 2023-09-15 ENCOUNTER — TELEPHONE (OUTPATIENT)
Dept: FAMILY MEDICINE | Facility: CLINIC | Age: 68
End: 2023-09-15

## 2023-09-15 LAB
PSA FREE MFR SERPL: 15 % (CALC)
PSA FREE SERPL-MCNC: 0.8 NG/ML
PSA SERPL-MCNC: 5.4 NG/ML

## 2023-09-19 ENCOUNTER — OFFICE VISIT (OUTPATIENT)
Dept: FAMILY MEDICINE | Facility: CLINIC | Age: 68
End: 2023-09-19
Attending: FAMILY MEDICINE
Payer: MEDICARE

## 2023-09-19 ENCOUNTER — TELEPHONE (OUTPATIENT)
Dept: FAMILY MEDICINE | Facility: CLINIC | Age: 68
End: 2023-09-19

## 2023-09-19 VITALS
SYSTOLIC BLOOD PRESSURE: 130 MMHG | WEIGHT: 198 LBS | BODY MASS INDEX: 30.01 KG/M2 | HEART RATE: 68 BPM | DIASTOLIC BLOOD PRESSURE: 80 MMHG | HEIGHT: 68 IN

## 2023-09-19 DIAGNOSIS — R97.20 ELEVATED PSA: ICD-10-CM

## 2023-09-19 DIAGNOSIS — I10 BENIGN ESSENTIAL HTN: ICD-10-CM

## 2023-09-19 DIAGNOSIS — M50.90 CERVICAL DISC DISEASE: ICD-10-CM

## 2023-09-19 DIAGNOSIS — M17.0 PRIMARY OSTEOARTHRITIS OF BOTH KNEES: ICD-10-CM

## 2023-09-19 DIAGNOSIS — R05.8 ACE-INHIBITOR COUGH: Primary | ICD-10-CM

## 2023-09-19 DIAGNOSIS — J30.9 NASAL SINUS INFLAMMATION DUE TO ALLERGY: ICD-10-CM

## 2023-09-19 DIAGNOSIS — N40.0 ENLARGED PROSTATE WITHOUT LOWER URINARY TRACT SYMPTOMS (LUTS): ICD-10-CM

## 2023-09-19 DIAGNOSIS — T46.4X5A ACE-INHIBITOR COUGH: Primary | ICD-10-CM

## 2023-09-19 PROCEDURE — 99214 PR OFFICE/OUTPT VISIT, EST, LEVL IV, 30-39 MIN: ICD-10-PCS | Mod: S$GLB,,, | Performed by: FAMILY MEDICINE

## 2023-09-19 PROCEDURE — 99214 OFFICE O/P EST MOD 30 MIN: CPT | Mod: S$GLB,,, | Performed by: FAMILY MEDICINE

## 2023-09-19 RX ORDER — BENZONATATE 100 MG/1
100 CAPSULE ORAL 3 TIMES DAILY PRN
Qty: 30 CAPSULE | Refills: 1 | Status: SHIPPED | OUTPATIENT
Start: 2023-09-19 | End: 2023-09-29

## 2023-09-19 RX ORDER — PREDNISONE 10 MG/1
10 TABLET ORAL 2 TIMES DAILY
Qty: 20 TABLET | Refills: 0 | Status: SHIPPED | OUTPATIENT
Start: 2023-09-19 | End: 2023-11-08

## 2023-09-19 RX ORDER — LOSARTAN POTASSIUM 25 MG/1
25 TABLET ORAL DAILY
Qty: 90 TABLET | Refills: 3 | Status: SHIPPED | OUTPATIENT
Start: 2023-09-19 | End: 2024-03-12

## 2023-09-19 NOTE — TELEPHONE ENCOUNTER
----- Message from Jaycee Gunn sent at 9/19/2023 11:38 AM CDT -----  The patient saw Dr. Kemp today. He has to be seen in 6 months. He wants to come in with his wife Maureen Dave on 03/12/2023 at 8:40. Can you work him in with her? Pt's # [phone] GH

## 2023-09-19 NOTE — TELEPHONE ENCOUNTER
----- Message from Jaycee Gunn sent at 9/19/2023 11:42 AM CDT -----  Please dis re guard the message about the appointment with his wife. His wife must of set his appointment up  when she was in last time. The patient is aware. GH

## 2023-09-20 ENCOUNTER — PATIENT MESSAGE (OUTPATIENT)
Dept: FAMILY MEDICINE | Facility: CLINIC | Age: 68
End: 2023-09-20

## 2023-09-20 NOTE — PROGRESS NOTES
SUBJECTIVE:    Patient ID: Ashish Dave is a 67 y.o. male.    Chief Complaint: Cough (Dry cough for 2 weeks, review Lab-results, discuss about medication,abc )    This 67-year-old male is here due to a cough for the last 2 weeks.  He is producing white mucus but no blood in the sputum.  He does have sinus allergies.  The cough is waking him up at night.  He has tried Coricidin HBP.  He has been taking lisinopril 10 mg for many years up to 4 times a week for hypertension.    He is a nonsmoker, no significant alcohol intake.    He had COVID twice once in 2020 once in 2021.    Nocturia 1 time per night        No visits with results within 6 Month(s) from this visit.   Latest known visit with results is:   Office Visit on 03/16/2023   Component Date Value Ref Range Status    Total PSA 09/13/2023 5.4 (H)  < OR = 4.0 ng/mL Final    Free PSA 09/13/2023 0.8  ng/mL Final    % Free PSA 09/13/2023 15 (L)  >25 % (calc) Final       Past Medical History:   Diagnosis Date    Arthritis     Chronic calculous cholecystitis 8/12/2019    COVID-19     Essential (primary) hypertension 3/8/2016    GERD (gastroesophageal reflux disease)      Social History     Socioeconomic History    Marital status:    Tobacco Use    Smoking status: Never    Smokeless tobacco: Never   Substance and Sexual Activity    Alcohol use: Not Currently    Drug use: Never     Social Determinants of Health     Financial Resource Strain: Low Risk  (9/15/2023)    Overall Financial Resource Strain (CARDIA)     Difficulty of Paying Living Expenses: Not very hard   Food Insecurity: No Food Insecurity (9/15/2023)    Hunger Vital Sign     Worried About Running Out of Food in the Last Year: Never true     Ran Out of Food in the Last Year: Never true   Transportation Needs: No Transportation Needs (9/15/2023)    PRAPARE - Transportation     Lack of Transportation (Medical): No     Lack of Transportation (Non-Medical): No   Physical Activity: Insufficiently Active  (9/15/2023)    Exercise Vital Sign     Days of Exercise per Week: 2 days     Minutes of Exercise per Session: 30 min   Stress: No Stress Concern Present (9/15/2023)    [employer] [place] [employer] - Occupational Stress Questionnaire     Feeling of Stress : Only a little   Social Connections: Unknown (9/15/2023)    Social Connection and Isolation Panel [NHANES]     Frequency of Communication with Friends and Family: More than three times a week     Frequency of Social Gatherings with Friends and Family: Twice a week     Active Member of Clubs or Organizations: Yes     Attends Club or Organization Meetings: More than 4 times per year     Marital Status:    Housing Stability: Low Risk  (9/15/2023)    Housing Stability Vital Sign     Unable to Pay for Housing in the Last Year: No     Number of Places Lived in the Last Year: 1     Unstable Housing in the Last Year: No     Past Surgical History:   Procedure Laterality Date    CHOLECYSTECTOMY      COLONOSCOPY  2016    Dr Saldaña- rtc 5 yr    ESOPHAGOGASTRODUODENOSCOPY N/A 6/6/2022    Procedure: EGD (ESOPHAGOGASTRODUODENOSCOPY);  Surgeon: Pj Syed MD;  Location: [facility];  Service: Endoscopy;  Laterality: N/A;    GALLBLADDER SURGERY  08/2019    KNEE SURGERY Right     x's3    LAPAROSCOPIC CHOLECYSTECTOMY N/A 8/12/2019    Procedure: CHOLECYSTECTOMY, LAPAROSCOPIC;  Surgeon: Alphonso Freeman III, MD;  Location: [facility];  Service: General;  Laterality: N/A;    UPPER GASTROINTESTINAL ENDOSCOPY       Family History   Problem Relation Age of Onset    Stroke Mother     No Known Problems Father     Ulcerative colitis Son     Colon cancer Neg Hx     Colon polyps Neg Hx     Crohn's disease Neg Hx     Esophageal cancer Neg Hx     Stomach cancer Neg Hx        The 10-year CVD risk score (D'Agostino et al., 2008) is: 16.1%    Values used to calculate the score:      Age: 67 years      Sex: Male      Diabetic: No      Tobacco smoker: No      Systolic Blood  Pressure: 130 mmHg      Is BP treated: Yes      HDL Cholesterol: 71 mg/dL      Total Cholesterol: 161 mg/dL    All of your core healthy metrics are met.      Review of patient's allergies indicates:  No Known Allergies    Current Outpatient Medications:     ASCORBIC ACID, VITAMIN C, ORAL, Take 4,000 mg by mouth 2 (two) times a day., Disp: , Rfl:     lisinopriL 10 MG tablet, Take 1 tablet (10 mg total) by mouth once daily., Disp: 90 tablet, Rfl: 3    vitamin D (VITAMIN D3) 1000 units Tab, Take 4,000 Units by mouth once daily., Disp: , Rfl:     benzonatate (TESSALON) 100 MG capsule, Take 1 capsule (100 mg total) by mouth 3 (three) times daily as needed for Cough., Disp: 30 capsule, Rfl: 1    cefUROXime (CEFTIN) 500 MG tablet, Take 1 tablet (500 mg total) by mouth 2 (two) times daily., Disp: 14 tablet, Rfl: 1    losartan (COZAAR) 25 MG tablet, Take 1 tablet (25 mg total) by mouth once daily., Disp: 90 tablet, Rfl: 3    predniSONE (DELTASONE) 10 MG tablet, Take 1 tablet (10 mg total) by mouth 2 (two) times daily., Disp: 20 tablet, Rfl: 0    Current Facility-Administered Medications:     acetaminophen tablet 650 mg, 650 mg, Oral, Once PRN, Shanae Pringle, NP    albuterol inhaler 2 puff, 2 puff, Inhalation, Q20 Min PRN, Shanae Pringle, NP    diphenhydrAMINE injection 25 mg, 25 mg, Intravenous, Once PRN, Shanae Pringle, NP    EPINEPHrine (EPIPEN) 0.3 mg/0.3 mL pen injection 0.3 mg, 0.3 mg, Intramuscular, PRN, Shanae Pringle, NP    methylPREDNISolone sodium succinate injection 40 mg, 40 mg, Intravenous, Once PRN, Shanae Pringle, NP    ondansetron disintegrating tablet 4 mg, 4 mg, Oral, Once PRN, Shanae Pringle, NP    sodium chloride 0.9% 500 mL flush bag, , Intravenous, PRN, Shanae Pringle, NP, Stopped at 12/23/21 1415    sodium chloride 0.9% flush 10 mL, 10 mL, Intravenous, PRN, Shanae Pringle, NP    Review of Systems   Constitutional:  Negative for appetite change, chills, fatigue, fever  "and unexpected weight change.   HENT:  Negative for ear pain and trouble swallowing.    Eyes:  Negative for pain, discharge and visual disturbance.   Respiratory:  Positive for cough. Negative for apnea, shortness of breath and wheezing.    Cardiovascular:  Negative for chest pain and leg swelling.   Gastrointestinal:  Negative for abdominal pain, blood in stool, constipation, diarrhea, nausea, vomiting and reflux.   Endocrine: Negative for cold intolerance, heat intolerance and polydipsia.   Genitourinary:  Negative for bladder incontinence, dysuria, erectile dysfunction, frequency, hematuria, testicular pain and urgency.   Musculoskeletal:  Negative for gait problem, joint swelling and myalgias.   Neurological:  Negative for dizziness, seizures and numbness.   Psychiatric/Behavioral:  Negative for agitation, behavioral problems and hallucinations. The patient is not nervous/anxious.            Objective:      Vitals:    09/19/23 1036   BP: 130/80   Pulse: 68   Weight: 89.8 kg (198 lb)   Height: 5' 8" (1.727 m)     Physical Exam  Vitals and nursing note reviewed.   Constitutional:       General: He is not in acute distress.     Appearance: Normal appearance. He is well-developed. He is not toxic-appearing.   HENT:      Head: Normocephalic and atraumatic.      Right Ear: Tympanic membrane and external ear normal.      Left Ear: Tympanic membrane and external ear normal.      Nose: Congestion (swollen nasal turbinates on the left side.) present.      Mouth/Throat:      Pharynx: Oropharynx is clear.   Eyes:      Pupils: Pupils are equal, round, and reactive to light.   Neck:      Thyroid: No thyromegaly.      Vascular: No carotid bruit.   Cardiovascular:      Rate and Rhythm: Normal rate and regular rhythm.      Heart sounds: Normal heart sounds. No murmur heard.  Pulmonary:      Effort: Pulmonary effort is normal.      Breath sounds: Normal breath sounds. No wheezing or rales.   Abdominal:      General: Bowel sounds " are normal. There is no distension.      Palpations: Abdomen is soft.      Tenderness: There is no abdominal tenderness.   Musculoskeletal:         General: No tenderness or deformity. Normal range of motion.      Cervical back: Normal range of motion and neck supple.      Lumbar back: Normal. No spasms.      Comments: Bends 90 degrees at  waist, crepitant knees bilaterally.  No pitting edema to lower extremities   Lymphadenopathy:      Cervical: No cervical adenopathy.   Skin:     General: Skin is warm and dry.      Findings: No rash.   Neurological:      Mental Status: He is alert and oriented to person, place, and time. Mental status is at baseline.      Cranial Nerves: No cranial nerve deficit.      Coordination: Coordination normal.   Psychiatric:         Mood and Affect: Mood normal.         Behavior: Behavior normal.         Thought Content: Thought content normal.         Judgment: Judgment normal.           Assessment:       1. ACE-inhibitor cough    2. Nasal sinus inflammation due to allergy    3. Benign essential HTN    4. Elevated PSA    5. Cervical disc disease    6. Enlarged prostate without lower urinary tract symptoms (luts)    7. Primary osteoarthritis of both knees         Plan:       ACE-inhibitor cough  Will discontinue lisinopril and add losartan for blood pressure control.  Nasal sinus inflammation due to allergy  -     predniSONE (DELTASONE) 10 MG tablet; Take 1 tablet (10 mg total) by mouth 2 (two) times daily.  Dispense: 20 tablet; Refill: 0  -     benzonatate (TESSALON) 100 MG capsule; Take 1 capsule (100 mg total) by mouth 3 (three) times daily as needed for Cough.  Dispense: 30 capsule; Refill: 1  Add prednisone and Tessalon for symptom control.  Benign essential HTN  -     losartan (COZAAR) 25 MG tablet; Take 1 tablet (25 mg total) by mouth once daily.  Dispense: 90 tablet; Refill: 3    Elevated PSA  -     Ambulatory referral/consult to Urology; Future; Expected date: 09/26/2023  PSA has  increased to 5.4, from 4.22.  His symptoms have not changed.  Will refer to Dr. Werner urology for further follow-up.  Cervical disc disease    Enlarged prostate without lower urinary tract symptoms (luts)    Primary osteoarthritis of both knees      Follow up in about 6 months (around 3/19/2024), or PSA, htn.        9/19/2023 Phil Kemp

## 2023-09-25 NOTE — PROGRESS NOTES
[facility] Urology New Patient/H&P:     Ashish Dave is a 67 y.o. male who presents for evaluation of psa elevation at referral of Dr sung    23 eval with PCP: PSA has increased to 5.4, from 4.22.  His symptoms have not changed.  Will refer to Dr. Werner urology for further follow-up.    Last seen in 2019 at referral from pcp for bph/luts eval noting  He has been followed by Dr Sung and noted to have BPH/LUTS since at least 2016. Has been taking prostate supplement capsule Now prostate health. Has been taking them daily, sometimes BID sometimes TID  (Vitamin D-3, zinc, Selenium Saw Palmetto Phytosterols Quercetin Turmeric Root Pumpkin Seed Oil Lycopene Green Tea Extract Pomegranate flaxseed etc). PSA: 2.8 on 3/1/18  Previously followed by Dr Ness, and then Dr rPadhan when Dr Ness when he retired  Recalls his PSAs were always 1.1-1.2 and was told he had an enlarged prostate   Usually no hesitancy, but does endorse weakening stream. 3/4 time is poor flow. Occ intermittency. Occ PV solomon  Father  at 87 and was diagnosed with prostate cancer in his 80s and it was removed, Stable on supplements, not worsening but not improving. Generally averse to Rx meds.  NTF x1. Does drink a sleepy time herbal tea at bedtime, No hematuria, dysuria, Udip negative  AUA SS: 13/2 (3: urgency, weak stream; 2: emptying, straining; 1: frequency, intermittency, sleeping)  - risk benefit discussion though preferred no meds wanted trial of meds to see response before further eval. Planned flomax rx and uroflow/pvr 3 mos later. Canceled  - as well noted As psa last year was 2.8, which is a normal psa, would be of concern if psa truly was always 1.1-1.2 prior as he relayed. Will get PSA today and chart check results, and trend. (Psa 19 was 2.5)    Lost to follow up and returns today after referred back as above noting   Reference Range 19 09:55 20 08:11 21 09:33 22 07:46 23 07:44 23 08:50    PSA, Screen 0.00 - 4.00  2.5        Total PSA < OR = 4.0       5.4 (H)   Free PSA ng/mL      0.8   % Free PSA >25 % (calc)      15 (L)   PSA, SCR - [facility] < OR = 4.00   2.4 3.1 3.22 4.22 (H)    AUA SS: 10/2 (2: intermittency, urgency, weak stream; 1: emptying, fgrequency, straining, sleeping)  Father had prostate cancer diagnosed in his late 80s  Quit most of his supplements as above - doing MVI with extra vitD and VitC and an OTC prostat formula, and garlic capsules, turmeric  In March 2023 when psa up at routine screening with PCP noted recheck in 6 mos and refer back if elevated  As well noted 2021-colonoscopy with Dr. Villagran 5 years, EGD-Dr. Syed--gastric polyps removed recently.  3/3/23: Cr 0.88/eGFR 94  Took flomax after visit 4 yrs ago but didn't continue  LUTS stable    Past Medical History:   Diagnosis Date    Arthritis     Chronic calculous cholecystitis 8/12/2019    COVID-19     Essential (primary) hypertension 3/8/2016    GERD (gastroesophageal reflux disease)        Past Surgical History:   Procedure Laterality Date    CHOLECYSTECTOMY      COLONOSCOPY  2016    Dr Sarbjit aquino 5 yr    ESOPHAGOGASTRODUODENOSCOPY N/A 6/6/2022    Procedure: EGD (ESOPHAGOGASTRODUODENOSCOPY);  Surgeon: Pj Syed MD;  Location: [facility];  Service: Endoscopy;  Laterality: N/A;    GALLBLADDER SURGERY  08/2019    KNEE SURGERY Right     x's3    LAPAROSCOPIC CHOLECYSTECTOMY N/A 8/12/2019    Procedure: CHOLECYSTECTOMY, LAPAROSCOPIC;  Surgeon: Alphonso Freeman III, MD;  Location: [facility];  Service: General;  Laterality: N/A;    UPPER GASTROINTESTINAL ENDOSCOPY         Family History   Problem Relation Age of Onset    Stroke Mother     No Known Problems Father     Ulcerative colitis Son     Colon cancer Neg Hx     Colon polyps Neg Hx     Crohn's disease Neg Hx     Esophageal cancer Neg Hx     Stomach cancer Neg Hx        Social History     Socioeconomic History    Marital status:    Tobacco Use    Smoking  "status: Never    Smokeless tobacco: Never   Substance and Sexual Activity    Alcohol use: Not Currently    Drug use: Never     Social Determinants of Health     Financial Resource Strain: Low Risk  (9/15/2023)    Overall Financial Resource Strain (CARDIA)     Difficulty of Paying Living Expenses: Not very hard   Food Insecurity: No Food Insecurity (9/15/2023)    Hunger Vital Sign     Worried About Running Out of Food in the Last Year: Never true     Ran Out of Food in the Last Year: Never true   Transportation Needs: No Transportation Needs (9/15/2023)    PRAPARE - Transportation     Lack of Transportation (Medical): No     Lack of Transportation (Non-Medical): No   Physical Activity: Insufficiently Active (9/15/2023)    Exercise Vital Sign     Days of Exercise per Week: 2 days     Minutes of Exercise per Session: 30 min   Stress: No Stress Concern Present (9/15/2023)    [employer] [place] [employer] - Occupational Stress Questionnaire     Feeling of Stress : Only a little   Social Connections: Unknown (9/15/2023)    Social Connection and Isolation Panel [NHANES]     Frequency of Communication with Friends and Family: More than three times a week     Frequency of Social Gatherings with Friends and Family: Twice a week     Active Member of Clubs or Organizations: Yes     Attends Club or Organization Meetings: More than 4 times per year     Marital Status:    Housing Stability: Low Risk  (9/15/2023)    Housing Stability Vital Sign     Unable to Pay for Housing in the Last Year: No     Number of Places Lived in the Last Year: 1     Unstable Housing in the Last Year: No       Review of patient's allergies indicates:  No Known Allergies    Medications Reviewed: see MAR    Focused Physical Exam    Vitals:    09/26/23 0910   BP: 135/81   Pulse: 74     Body mass index is 27.98 kg/m². Weight: 88.5 kg (195 lb) Height: 5' 10" (177.8 cm)       Abdomen: Soft, non-tender, nondistended, no CVA " tenderness    LABS:    Recent Results (from the past 336 hour(s))   PSA, Total and Free    Collection Time: 09/13/23  8:50 AM   Result Value Ref Range    Total PSA 5.4 (H) < OR = 4.0 ng/mL    Free PSA 0.8 ng/mL    % Free PSA 15 (L) >25 % (calc)   POCT Urinalysis(Instrument)    Collection Time: 09/26/23  9:20 AM   Result Value Ref Range    Color, POC UA Yellow Yellow, Straw, Colorless    Clarity, POC UA Clear Clear    Glucose, POC UA Negative Negative    Bilirubin, POC UA Negative Negative    Ketones, POC UA Negative Negative    Spec Grav POC UA 1.010 1.005 - 1.030    Blood, POC UA Negative Negative    pH, POC UA 6.0 5.0 - 8.0    Protein, POC UA Negative Negative    Urobilinogen, POC UA 0.2 <=1.0    Nitrite, POC UA Negative Negative    WBC, POC UA Negative Negative         Assessment/Diagnosis:    1. Elevated PSA  Ambulatory referral/consult to Urology    POCT Urinalysis(Instrument)    MRI Prostate W W/O Contrast      2. BPH with obstruction/lower urinary tract symptoms            Plans:  I had a long discussion with the patient regarding the natural history of cancer in men as well as when diagnostics are indicated. We also discussed differential for elevated psa which also includes benign enlargement and prostatitis.  We did discuss that an elevated PSA is considered a PSA greater than 4 because statistically 20% of people in this value range are found to have prostate cancer, however we also discussed a bit about PSA velocity and trends and age specific psa elevations. Given his true elevation in psa, sustained with further elevation 6 mos later with low free psa, I therefore recommended prostate biopsy to evaluate for underlying malignancy.  We discussed biopsy and in detail, including 1% risk of infectious complications including sepsis but that it is an otherwise safe diagnostic procedure with expected hematuria hematospermia after.  We discussed the significance of free PSA percentage in helping to risk  stratify the chances of underlying malignancy noting that a normal free PSA is greater than 30% with single digit risk, and concerning would be less than 10% with greater than 50 60% chance of underlying prostate cancer.  Certainly his PSA velocity rise slow over time with acute rise in PSA and low free PSA of 15% is concerning for underlying malignancy     I went over the details of a transrectal ultrasound-guided biopsy of the prostate, and described the technique in detail.   The patient will be given local injection anesthetic to block the prostate so as to minimize any pain. 12-14 biopsy specimens will be taken. These will be sent for histopathology analysis.   Complications include bleeding, fever and chills. He was also instructed to watch for any signs of fever. If he does have any fever or chills after, he was advised to come to the emergency room right away for intravenous antibiotics and possible admission to the hospital. He is to refrain from any strenuous activity including sexual activity for the next 72 hours after biopsy. He was also advised that he may have blood while urinating, during bowel movements as well as during ejaculations. He was given a prebiopsy/postbiopsy instruction sheet was reminding him to avoid aspirin and blood thinners for 7 days prior, take the Rxed antibiotics the day before, day of, day after biopsy, and perform a fleet enema at home morning of biopsy. All questions he had were answered in detail.     Prior to prostate biopsy, discussed MRI of the prostate.  Reviewed the utility of MRI of prostate in diagnostics noting that a negative MRI does not rule out prostate cancer nor take the place of biopsy.  However a positive MRI or index lesion on MRI can increase the sensitivity of biopsy as this lesions can be targeted at time of biopsy with extra cores.  We discussed cognitive fusion versus MRI fusion and should there be an index lesion can determine this based on location of  lesion.    As well, reviewed his long history of BPH/LUTS, mild-to-moderate.  Averse to medical therapy, but did initially trial it after our 1st consultation about 4 years ago, but discontinued it.  Remains on over-the-counter supplement.  LUTS remain moderate and stable.  Not a significant bothersome to him, but discuss the natural progression of BPH with lower tract symptoms over time.  Certainly whether biopsy is positive or negative, discuss the value of cystoscopic evaluation at the time of prostate biopsy to determine prostate anatomy and changes at the level of the bladder to help guide further recommendations for management of his lower urinary tract symptoms and prostate obstruction in the absence of medication.  Certainly if his biopsy was negative or he had very low-grade prostate cancer which can be followed, there are minimally invasive procedures to relieve prostate obstruction and get his voiding better, and this diagnostic evaluation can help guide recommendations whether prostate cancer is present or absent in his BPH only.     Extensive consultation, answering all patient questions and reviewing all of the above, especially the value of MRI, and how it does not replace prostate biopsy and the need for prostate biopsy to determine pathology and get an answer.  We did discuss that had he not already had low free PSA I would have advised him that if his PSA remains elevated but is of normal PSA density with normal free PSA greater than 30% and no index lesions on MRI would then be reasonable to follow his free and total PSA closely, however he is already had an acute PSA rise in the last 6 months, with a low free PSA.  Therefore biopsy is distinctly recommended in spite of MRI findings, and MRI can only add to the diagnostics.     Despite reviewing the above, he still declines scheduling any diagnostic procedures.  Notes he will have to think about biopsy.  Agreed to be scheduled for MRI.  When he  does proceed with prostate biopsy he is agreeable to cystoscopic evaluation, and cystoscopy with local anesthesia with xylocaine jelly and flexible cystoscope was reviewed with the patient.  I will provide prep instructions as above, and again noting that he should hold supplements like garlic oil fish oil turmeric 1 week prior.    MRI to be scheduled next available with creatinine on arrival.  He will let us know if he would like to proceed with further diagnostics.  Certainly without any further diagnostics discuss the blunt concern based on lab trends there may be underlying disease present that would be undiagnosed      Total time spent in/on encounter today, including face to face time with patient, counseling, medical record review, interpretation of tests/results, , and treatment plan coordination: 60 minutes

## 2023-09-26 ENCOUNTER — OFFICE VISIT (OUTPATIENT)
Dept: UROLOGY | Facility: CLINIC | Age: 68
End: 2023-09-26
Payer: MEDICARE

## 2023-09-26 VITALS
DIASTOLIC BLOOD PRESSURE: 81 MMHG | WEIGHT: 195 LBS | SYSTOLIC BLOOD PRESSURE: 135 MMHG | BODY MASS INDEX: 27.92 KG/M2 | HEIGHT: 70 IN | HEART RATE: 74 BPM

## 2023-09-26 DIAGNOSIS — R97.20 ELEVATED PSA: Primary | ICD-10-CM

## 2023-09-26 DIAGNOSIS — N40.1 BPH WITH OBSTRUCTION/LOWER URINARY TRACT SYMPTOMS: ICD-10-CM

## 2023-09-26 DIAGNOSIS — N13.8 BPH WITH OBSTRUCTION/LOWER URINARY TRACT SYMPTOMS: ICD-10-CM

## 2023-09-26 LAB
BILIRUBIN, UA POC OHS: NEGATIVE
BLOOD, UA POC OHS: NEGATIVE
CLARITY, UA POC OHS: CLEAR
COLOR, UA POC OHS: YELLOW
GLUCOSE, UA POC OHS: NEGATIVE
KETONES, UA POC OHS: NEGATIVE
LEUKOCYTES, UA POC OHS: NEGATIVE
NITRITE, UA POC OHS: NEGATIVE
PH, UA POC OHS: 6
PROTEIN, UA POC OHS: NEGATIVE
SPECIFIC GRAVITY, UA POC OHS: 1.01
UROBILINOGEN, UA POC OHS: 0.2

## 2023-09-26 PROCEDURE — 99999PBSHW POCT URINALYSIS(INSTRUMENT): ICD-10-PCS | Mod: PBBFAC,,,

## 2023-09-26 PROCEDURE — 99205 PR OFFICE/OUTPT VISIT, NEW, LEVL V, 60-74 MIN: ICD-10-PCS | Mod: S$PBB,,, | Performed by: UROLOGY

## 2023-09-26 PROCEDURE — 99205 OFFICE O/P NEW HI 60 MIN: CPT | Mod: S$PBB,,, | Performed by: UROLOGY

## 2023-09-26 PROCEDURE — 99999PBSHW POCT URINALYSIS(INSTRUMENT): Mod: PBBFAC,,,

## 2023-09-26 PROCEDURE — 81003 URINALYSIS AUTO W/O SCOPE: CPT | Mod: PBBFAC,PO | Performed by: UROLOGY

## 2023-09-26 PROCEDURE — 99999 PR PBB SHADOW E&M-EST. PATIENT-LVL IV: CPT | Mod: PBBFAC,,, | Performed by: UROLOGY

## 2023-09-26 PROCEDURE — 99214 OFFICE O/P EST MOD 30 MIN: CPT | Mod: PBBFAC,PO | Performed by: UROLOGY

## 2023-09-26 PROCEDURE — 99999 PR PBB SHADOW E&M-EST. PATIENT-LVL IV: ICD-10-PCS | Mod: PBBFAC,,, | Performed by: UROLOGY

## 2023-09-26 RX ORDER — CIPROFLOXACIN 500 MG/1
500 TABLET ORAL 2 TIMES DAILY
Qty: 6 TABLET | Refills: 0 | Status: SHIPPED | OUTPATIENT
Start: 2023-09-26 | End: 2023-12-07

## 2023-09-27 ENCOUNTER — PATIENT MESSAGE (OUTPATIENT)
Dept: FAMILY MEDICINE | Facility: CLINIC | Age: 68
End: 2023-09-27

## 2023-09-28 ENCOUNTER — PATIENT MESSAGE (OUTPATIENT)
Dept: UROLOGY | Facility: CLINIC | Age: 68
End: 2023-09-28
Payer: MEDICARE

## 2023-09-28 DIAGNOSIS — R97.20 ELEVATED PSA: Primary | ICD-10-CM

## 2023-09-29 DIAGNOSIS — R97.20 ELEVATED PSA: Primary | ICD-10-CM

## 2023-09-29 NOTE — PROGRESS NOTES
Procedure Order to Urology [[id number]]    Electronically signed by: Phil Werner MD on 09/28/23 2114 Status: Active   Ordering user: Phil Werner MD 09/28/23 2114 Authorized by: Phil Werner MD   Ordering mode: Standard   Frequency:  09/28/23 -     Diagnoses   Elevated PSA [R97.20]   Questionnaire    Question Answer   Procedure Prostate Biopsy Comment - 11/7   Cystoscopy   Facility Name: [facility], please order local sedation, UA poct   preop IM Gentamyacin 80mg,160mg if over 300 lbs (no urine Dr charles or [place]) /cystoscopy

## 2023-10-05 ENCOUNTER — HOSPITAL ENCOUNTER (OUTPATIENT)
Dept: RADIOLOGY | Facility: HOSPITAL | Age: 68
Discharge: HOME | End: 2023-10-05
Attending: UROLOGY
Payer: MEDICARE

## 2023-10-05 DIAGNOSIS — R97.20 ELEVATED PSA: ICD-10-CM

## 2023-10-05 PROCEDURE — 72197 MRI PROSTATE W W/O CONTRAST: ICD-10-PCS | Mod: 26,,, | Performed by: RADIOLOGY

## 2023-10-05 PROCEDURE — 72197 MRI PELVIS W/O & W/DYE: CPT | Mod: 26,,, | Performed by: RADIOLOGY

## 2023-10-05 PROCEDURE — 72197 MRI PELVIS W/O & W/DYE: CPT | Mod: TC

## 2023-10-05 PROCEDURE — 25500020 PHARM REV CODE 255

## 2023-10-05 PROCEDURE — A9585 GADOBUTROL INJECTION: HCPCS

## 2023-10-05 RX ORDER — GADOBUTROL 604.72 MG/ML
INJECTION INTRAVENOUS
Status: COMPLETED
Start: 2023-10-05 | End: 2023-10-05

## 2023-10-05 RX ADMIN — GADOBUTROL 8 ML: 604.72 INJECTION INTRAVENOUS at 09:10

## 2023-10-07 ENCOUNTER — PATIENT MESSAGE (OUTPATIENT)
Dept: SURGERY | Facility: HOSPITAL | Age: 68
End: 2023-10-07

## 2023-10-09 ENCOUNTER — TELEPHONE (OUTPATIENT)
Dept: UROLOGY | Facility: CLINIC | Age: 68
End: 2023-10-09
Payer: MEDICARE

## 2023-10-09 NOTE — TELEPHONE ENCOUNTER
----- Message from Paddy Herndon sent at 10/9/2023  8:38 AM CDT -----  Contact: self  Type: Sooner Appointment Request        Caller is requesting a sooner appointment. Caller declined first available appointment listed below. Caller will not accept being placed on the waitlist and is requesting a message be sent to doctor.        Name of Caller: Patient   Best Call Back Number: [phone]  Additional Information: Pt might have a UTI and he needs a consultation to discuss procedure options. Thanks

## 2023-10-09 NOTE — TELEPHONE ENCOUNTER
Spoke w pt thinks he may have a uti   With some abd pain x2 and slight burning with urination would like to have addressed.  Pt was offered appt with Np for f/u of possible uti   Pt accepted appt for 10/11 with ANNEL

## 2023-10-11 ENCOUNTER — OFFICE VISIT (OUTPATIENT)
Dept: UROLOGY | Facility: CLINIC | Age: 68
End: 2023-10-11
Payer: MEDICARE

## 2023-10-11 ENCOUNTER — PATIENT MESSAGE (OUTPATIENT)
Dept: SURGERY | Facility: HOSPITAL | Age: 68
End: 2023-10-11

## 2023-10-11 DIAGNOSIS — R86.8 DISCOLORED SEMEN: Primary | ICD-10-CM

## 2023-10-11 DIAGNOSIS — R39.9 UTI SYMPTOMS: ICD-10-CM

## 2023-10-11 LAB
BACTERIA #/AREA URNS AUTO: NORMAL /HPF
BILIRUBIN, UA POC OHS: NEGATIVE
BLOOD, UA POC OHS: NEGATIVE
CLARITY, UA POC OHS: CLEAR
COLOR, UA POC OHS: YELLOW
GLUCOSE, UA POC OHS: NEGATIVE
KETONES, UA POC OHS: 15
LEUKOCYTES, UA POC OHS: NEGATIVE
MICROSCOPIC COMMENT: NORMAL
NITRITE, UA POC OHS: NEGATIVE
PH, UA POC OHS: 5.5
PROTEIN, UA POC OHS: NEGATIVE
RBC #/AREA URNS AUTO: 0 /HPF (ref 0–4)
SPECIFIC GRAVITY, UA POC OHS: 1.01
UROBILINOGEN, UA POC OHS: 1
WBC #/AREA URNS AUTO: 0 /HPF (ref 0–5)

## 2023-10-11 PROCEDURE — 99999PBSHW POCT URINALYSIS(INSTRUMENT): Mod: PBBFAC,,,

## 2023-10-11 PROCEDURE — 87086 URINE CULTURE/COLONY COUNT: CPT | Performed by: NURSE PRACTITIONER

## 2023-10-11 PROCEDURE — 99214 OFFICE O/P EST MOD 30 MIN: CPT | Mod: S$PBB,,, | Performed by: NURSE PRACTITIONER

## 2023-10-11 PROCEDURE — 99999 PR PBB SHADOW E&M-EST. PATIENT-LVL III: ICD-10-PCS | Mod: PBBFAC,,, | Performed by: NURSE PRACTITIONER

## 2023-10-11 PROCEDURE — 81003 URINALYSIS AUTO W/O SCOPE: CPT | Mod: PBBFAC,PO | Performed by: NURSE PRACTITIONER

## 2023-10-11 PROCEDURE — 99213 OFFICE O/P EST LOW 20 MIN: CPT | Mod: PBBFAC,PO | Performed by: NURSE PRACTITIONER

## 2023-10-11 PROCEDURE — 99214 PR OFFICE/OUTPT VISIT, EST, LEVL IV, 30-39 MIN: ICD-10-PCS | Mod: S$PBB,,, | Performed by: NURSE PRACTITIONER

## 2023-10-11 PROCEDURE — 81001 URINALYSIS AUTO W/SCOPE: CPT | Performed by: NURSE PRACTITIONER

## 2023-10-11 PROCEDURE — 99999PBSHW POCT URINALYSIS(INSTRUMENT): ICD-10-PCS | Mod: PBBFAC,,,

## 2023-10-11 PROCEDURE — 99999 PR PBB SHADOW E&M-EST. PATIENT-LVL III: CPT | Mod: PBBFAC,,, | Performed by: NURSE PRACTITIONER

## 2023-10-11 RX ORDER — DOXYCYCLINE 100 MG/1
100 CAPSULE ORAL 2 TIMES DAILY
Qty: 42 CAPSULE | Refills: 0 | Status: SHIPPED | OUTPATIENT
Start: 2023-10-11 | End: 2023-11-01

## 2023-10-11 NOTE — PROGRESS NOTES
[facility] Note  Staff: GERALD HessC    PCP: BERENICE Sung.  Urologist:  VISHAL Werner    Chief Complaint: Discolored semen    Subjective:        HPI: Ashish Dave is a 67 y.o. male presents today with c/o new onset discolored semen symptoms at this time.  Pt states today his semen is a different/unusual color of yellow at this time.  No pain with ejaculation noted by pt today.     HX:  Last evaluated by Dr. Werner on 23:  Ashish Dave is a 67 y.o. male who presents for evaluation of psa elevation at referral of Dr sung     23 eval with PCP: PSA has increased to 5.4, from 4.22.  His symptoms have not changed.  Will refer to Dr. Werner urology for further follow-up.     Last seen in 2019 at referral from pcp for bph/luts eval noting  He has been followed by Dr Sung and noted to have BPH/LUTS since at least 2016. Has been taking prostate supplement capsule Now prostate health. Has been taking them daily, sometimes BID sometimes TID  (Vitamin D-3, zinc, Selenium Saw Palmetto Phytosterols Quercetin Turmeric Root Pumpkin Seed Oil Lycopene Green Tea Extract Pomegranate flaxseed etc). PSA: 2.8 on 3/1/18  Previously followed by Dr Ness, and then Dr Pradhan when Dr Ness when he retired  Recalls his PSAs were always 1.1-1.2 and was told he had an enlarged prostate   Usually no hesitancy, but does endorse weakening stream. 3/4 time is poor flow. Occ intermittency. Occ PV dribble  Father  at 87 and was diagnosed with prostate cancer in his 80s and it was removed, Stable on supplements, not worsening but not improving. Generally averse to Rx meds.  NTF x1. Does drink a sleepy time herbal tea at bedtime, No hematuria, dysuria, Udip negative  AUA SS: 13/2 (3: urgency, weak stream; 2: emptying, straining; 1: frequency, intermittency, sleeping)  - risk benefit discussion though preferred no meds wanted trial of meds to see response before further eval. Planned flomax rx and  uroflow/pvr 3 mos later. Canceled  - as well noted As psa last year was 2.8, which is a normal psa, would be of concern if psa truly was always 1.1-1.2 prior as he relayed. Will get PSA today and chart check results, and trend. (Psa 2/4/19 was 2.5)     Lost to follow up and returns today after referred back as above noting    Reference Range 02/04/19 09:55 01/31/20 08:11 02/18/21 09:33 02/25/22 07:46 03/03/23 07:44 09/13/23 08:50   PSA, Screen 0.00 - 4.00  2.5             Total PSA < OR = 4.0            5.4 (H)   Free PSA ng/mL           0.8   % Free PSA >25 % (calc)           15 (L)   PSA, SCR - QUEST < OR = 4.00    2.4 3.1 3.22 4.22 (H)     AUA SS: 10/2 (2: intermittency, urgency, weak stream; 1: emptying, fgrequency, straining, sleeping)  Father had prostate cancer diagnosed in his late 80s  Quit most of his supplements as above - doing MVI with extra vitD and VitC and an OTC prostat formula, and garlic capsules, turmeric  In March 2023 when psa up at routine screening with PCP noted recheck in 6 mos and refer back if elevated  As well noted 2021-colonoscopy with Dr. Villagran 5 years, EGD-Dr. Syed--gastric polyps removed recently.  3/3/23: Cr 0.88/eGFR 94  Took flomax after visit 4 yrs ago but didn't continue  LUTS stable    TODAY:  UA in office showed 15 ketones otherwise negative findings.  Pt denies gross hematuria or dysuria today.  Not having any problems with urination at this time.    REVIEW OF SYSTEMS:  A comprehensive 10 system review was performed and is negative except as noted above in HPI    PMHx:  Past Medical History:   Diagnosis Date    Arthritis     Chronic calculous cholecystitis 8/12/2019    COVID-19     Essential (primary) hypertension 3/8/2016    GERD (gastroesophageal reflux disease)      PSHx:  Past Surgical History:   Procedure Laterality Date    CHOLECYSTECTOMY      COLONOSCOPY  2016    Dr Sarbjit aquino 5 yr    ESOPHAGOGASTRODUODENOSCOPY N/A 6/6/2022    Procedure: EGD  (ESOPHAGOGASTRODUODENOSCOPY);  Surgeon: Pj Syed MD;  Location: [facility];  Service: Endoscopy;  Laterality: N/A;    GALLBLADDER SURGERY  08/2019    KNEE SURGERY Right     x's3    LAPAROSCOPIC CHOLECYSTECTOMY N/A 8/12/2019    Procedure: CHOLECYSTECTOMY, LAPAROSCOPIC;  Surgeon: Alphonso Freeman III, MD;  Location: [facility];  Service: General;  Laterality: N/A;    UPPER GASTROINTESTINAL ENDOSCOPY       Allergies:  Patient has no known allergies.    Medications: reviewed   Objective:   There were no vitals filed for this visit.    General:WDWN in NAD  Eyes: PERRLA, normal conjunctiva  Respiratory: no increased work on breathing, clear to auscultation  Cardiovascular: regular rate and rhythm. No obvious extremity edema.  GI: palpation of masses. No tenderness. No hepatosplenomegaly to palpation.  Musculoskeletal: normal range of motion of bilateral upper extremities. Normal muscle strength and tone.  Skin: no obvious rashes or lesions. No tightening of skin noted.  Neurologic: CN grossly normal. Normal sensation.   Psychiatric: awake, alert and oriented x 3. Mood and affect normal. Cooperative.      Assessment:       1. Discolored semen    2. UTI symptoms          Plan:     Even though his current LUTS are only regarding worrisome color of semen at this time, I will go ahead and prescribe Doxy 100 mg BID to rule out any possible prostate infection.  The med prescribed to pt today as trial to see if med improves pt's current LUTS.  Benefits, risks and side affects were thoroughly explained to pt today in office with all questions answered.    I had a long discussion with the patient today regarding any questions and concerns he has related to TRUS with biopsies vs. MRI Prostate fusion biopsy at this time.  I went over the details of a transrectal ultrasound-guided biopsy of the prostate and the MRI with prostate fusion biopsy today.  Detailed information from AUA and guidelines were printed out and hand given  and discussed with this patient including the type of different techniques in detail.     Pt verbalized understanding, but on conclusion also requested another PSA lab test to be done in the future.  Which I will forward message to MD to review and make determination on this at this time.    F/u with Dr. Werner as scheduled or possible telehealth visit to discuss with pt regarding pt's final decision that he would decide regarding future prostate procedure.    [facility]: Active    Coretta Linder, GUZMANP-C

## 2023-10-13 ENCOUNTER — PATIENT MESSAGE (OUTPATIENT)
Dept: SURGERY | Facility: HOSPITAL | Age: 68
End: 2023-10-13

## 2023-10-13 LAB — BACTERIA UR CULT: NO GROWTH

## 2023-10-16 ENCOUNTER — TELEPHONE (OUTPATIENT)
Dept: UROLOGY | Facility: CLINIC | Age: 68
End: 2023-10-16
Payer: MEDICARE

## 2023-10-16 NOTE — TELEPHONE ENCOUNTER
----- Message from Kenzie Estevez sent at 10/16/2023  1:32 PM CDT -----  Contact: patient  Type:  Needs Medical Advice    Who Called: patient     Would the patient rather a call back or a response via [facility]? Call     Best Call Back Number: [phone] (home)      Additional Information: Patient would like to speak with the nurse in regards to biopsy question.     Please call to advise

## 2023-10-16 NOTE — TELEPHONE ENCOUNTER
"Spoke w pt who called with several questions regarding prostate biopsy and how to proceed and why to procedures   Explained in details the below recs    Reviewed results by Dr chamorro that noted  "   Phil Chamorro MD   10/7/2023  2:27 PM CDT       Equivocal transition zone lesion on mri, portion of prostate commonly benign, though 5-10% of prostate cancer here.   1. Can proceed with cysto/prostate biopsy as planned taking extra cores from left anterior Transition zone (aka cognitive fusion) to evaluate psa elevation AND bph, or 2. can refer out for MRI fusion biopsy to target this area and return to review pathology, and plan cystoscopy later for bph/luts eval   Pt voiced ok and understanding  "

## 2023-10-18 ENCOUNTER — TELEPHONE (OUTPATIENT)
Dept: UROLOGY | Facility: CLINIC | Age: 68
End: 2023-10-18
Payer: MEDICARE

## 2023-10-18 ENCOUNTER — PATIENT MESSAGE (OUTPATIENT)
Dept: UROLOGY | Facility: CLINIC | Age: 68
End: 2023-10-18
Payer: MEDICARE

## 2023-10-18 DIAGNOSIS — R97.20 ELEVATED PSA: Primary | ICD-10-CM

## 2023-10-18 NOTE — TELEPHONE ENCOUNTER
Spoke to the patient, Uronav is scheduled for 11/7/2023 for 8:15 am.    Patient was given the instructions/directions via verbally while on the phone. I copy was mailed out to his home and a message was sent to him via UB.. All questions were answered.    MATT Gusman

## 2023-10-18 NOTE — TELEPHONE ENCOUNTER
----- Message from Phil Werner MD sent at 10/17/2023  6:42 AM CDT -----  Regarding: Uronav referral  Dr Roque,  Please review for uronav  Anxious gentleman whom after extensive risk benefit discussion would prefer uronav given his pirad3 lesion in anterior transition zone.   Has cipro RX  Looking 3-4 weeks out+  Thanks and please let us know

## 2023-10-18 NOTE — TELEPHONE ENCOUNTER
Please book patient for uronav.   He is currently on doxy.   Let patient know we should wait til nov 8 and we can do that early in the morning 8:15 or 8:30 per her request.     Make sure he does fleets that AM.

## 2023-11-08 ENCOUNTER — PROCEDURE VISIT (OUTPATIENT)
Dept: UROLOGY | Facility: CLINIC | Age: 68
End: 2023-11-08
Payer: MEDICARE

## 2023-11-08 VITALS
DIASTOLIC BLOOD PRESSURE: 78 MMHG | HEART RATE: 63 BPM | TEMPERATURE: 97 F | RESPIRATION RATE: 17 BRPM | SYSTOLIC BLOOD PRESSURE: 132 MMHG | WEIGHT: 177.38 LBS | BODY MASS INDEX: 25.45 KG/M2

## 2023-11-08 DIAGNOSIS — R97.20 ELEVATED PSA: ICD-10-CM

## 2023-11-08 PROCEDURE — 76872 US TRANSRECTAL: CPT | Mod: PBBFAC | Performed by: UROLOGY

## 2023-11-08 PROCEDURE — 55700 TRANSRECTAL ULTRASOUND W/ BIOPSY: ICD-10-PCS | Mod: S$PBB,,, | Performed by: UROLOGY

## 2023-11-08 PROCEDURE — 76872 TRANSRECTAL ULTRASOUND W/ BIOPSY: ICD-10-PCS | Mod: 26,S$PBB,, | Performed by: UROLOGY

## 2023-11-08 PROCEDURE — G0416 PROSTATE BIOPSY, ANY MTHD: HCPCS | Mod: 26,,, | Performed by: PATHOLOGY

## 2023-11-08 PROCEDURE — 96372 THER/PROPH/DIAG INJ SC/IM: CPT | Mod: PBBFAC,59

## 2023-11-08 PROCEDURE — 88305 TISSUE EXAM BY PATHOLOGIST: CPT | Performed by: PATHOLOGY

## 2023-11-08 PROCEDURE — 99999PBSHW PR PBB SHADOW TECHNICAL ONLY FILED TO HB: ICD-10-PCS | Mod: PBBFAC,,,

## 2023-11-08 PROCEDURE — 99999PBSHW PR PBB SHADOW TECHNICAL ONLY FILED TO HB: Mod: PBBFAC,,,

## 2023-11-08 PROCEDURE — G0416 PROSTATE BIOPSY, ANY MTHD: ICD-10-PCS | Mod: 26,,, | Performed by: PATHOLOGY

## 2023-11-08 PROCEDURE — 55700 TRANSRECTAL ULTRASOUND W/ BIOPSY: CPT | Mod: S$PBB,,, | Performed by: UROLOGY

## 2023-11-08 RX ORDER — CIPROFLOXACIN 500 MG/1
500 TABLET ORAL
Status: COMPLETED | OUTPATIENT
Start: 2023-11-08 | End: 2023-11-08

## 2023-11-08 RX ORDER — LIDOCAINE HYDROCHLORIDE 20 MG/ML
JELLY TOPICAL
Status: COMPLETED | OUTPATIENT
Start: 2023-11-08 | End: 2023-11-08

## 2023-11-08 RX ORDER — CEFTRIAXONE 1 G/1
1 INJECTION, POWDER, FOR SOLUTION INTRAMUSCULAR; INTRAVENOUS
Status: COMPLETED | OUTPATIENT
Start: 2023-11-08 | End: 2023-11-08

## 2023-11-08 RX ORDER — LIDOCAINE HYDROCHLORIDE 10 MG/ML
10 INJECTION INFILTRATION; PERINEURAL
Status: COMPLETED | OUTPATIENT
Start: 2023-11-08 | End: 2023-11-08

## 2023-11-08 RX ADMIN — CEFTRIAXONE SODIUM 1 G: 1 INJECTION, POWDER, FOR SOLUTION INTRAMUSCULAR; INTRAVENOUS at 08:11

## 2023-11-08 RX ADMIN — LIDOCAINE HYDROCHLORIDE 10 ML: 10 INJECTION, SOLUTION INFILTRATION; PERINEURAL at 08:11

## 2023-11-08 RX ADMIN — CIPROFLOXACIN 500 MG: 500 TABLET ORAL at 08:11

## 2023-11-08 RX ADMIN — LIDOCAINE HYDROCHLORIDE: 20 JELLY TOPICAL at 08:11

## 2023-11-08 NOTE — PROCEDURES
"Transrectal Ultrasound w/ Biopsy    Date/Time: 11/8/2023 8:15 AM    Performed by: Coretta Roque MD  Authorized by: Coretta Roque MD    Consent Done?:  Yes (Written)  Time out: Immediately prior to procedure a "time out" was called to verify the correct patient, procedure, equipment, support staff and site/side marked as required.    Indications: Elevated PSA    Preparation: Patient was prepped and draped in usual sterile fashion    Position:  Left lateral  Anesthesia:  10cc's 1% Lidocaine and Lidocaine jelly  Prostate Size:  45cc  Lesions:: Yes         Type:  Hypoechoic  Left Base Biopsies: 2  Left Mid Biopsies: 2  Left [place] Biopsies: 2  Right Base Biopsies: 2  Right Mid Biopsies: 2  Right [place] Biopsies: 2  Total Biopsies:  15    Patient tolerance:  Patient tolerated the procedure well with no immediate complications     3 of target lesion.   F/u with Dr. Werner.     "

## 2023-11-09 LAB
COMMENT: NORMAL
FINAL PATHOLOGIC DIAGNOSIS: NORMAL
GROSS: NORMAL
Lab: NORMAL

## 2023-11-10 ENCOUNTER — TELEPHONE (OUTPATIENT)
Dept: UROLOGY | Facility: CLINIC | Age: 68
End: 2023-11-10
Payer: MEDICARE

## 2023-11-10 NOTE — TELEPHONE ENCOUNTER
Spoke w pt encouraged to keep appt for 11/17   If sooner appt available would like to be considered for sooner   Pt informed will call if sooner

## 2023-11-10 NOTE — TELEPHONE ENCOUNTER
----- Message from Ariadna Kothari sent at 11/10/2023 10:28 AM CST -----  Contact: pt  Type: Needs Medical Advice  Who Called:  pt  Best Call Back Number: [phone]    Additional Information:   Pt had prostate biopsy he received results on my chart needs someone to go over results with him.Please call back and advise.

## 2023-11-13 ENCOUNTER — OFFICE VISIT (OUTPATIENT)
Dept: UROLOGY | Facility: CLINIC | Age: 68
End: 2023-11-13
Payer: MEDICARE

## 2023-11-13 VITALS
HEART RATE: 68 BPM | WEIGHT: 177 LBS | DIASTOLIC BLOOD PRESSURE: 87 MMHG | SYSTOLIC BLOOD PRESSURE: 129 MMHG | HEIGHT: 70 IN | BODY MASS INDEX: 25.34 KG/M2

## 2023-11-13 DIAGNOSIS — Z01.810 PREOP CARDIOVASCULAR EXAM: ICD-10-CM

## 2023-11-13 DIAGNOSIS — N13.8 BPH WITH OBSTRUCTION/LOWER URINARY TRACT SYMPTOMS: ICD-10-CM

## 2023-11-13 DIAGNOSIS — N40.1 BPH WITH OBSTRUCTION/LOWER URINARY TRACT SYMPTOMS: ICD-10-CM

## 2023-11-13 DIAGNOSIS — C61 PROSTATE CANCER: Primary | ICD-10-CM

## 2023-11-13 PROCEDURE — 99215 PR OFFICE/OUTPT VISIT, EST, LEVL V, 40-54 MIN: ICD-10-PCS | Mod: S$PBB,,, | Performed by: UROLOGY

## 2023-11-13 PROCEDURE — 99999 PR PBB SHADOW E&M-EST. PATIENT-LVL IV: ICD-10-PCS | Mod: PBBFAC,,, | Performed by: UROLOGY

## 2023-11-13 PROCEDURE — 99215 OFFICE O/P EST HI 40 MIN: CPT | Mod: S$PBB,,, | Performed by: UROLOGY

## 2023-11-13 PROCEDURE — 99999 PR PBB SHADOW E&M-EST. PATIENT-LVL IV: CPT | Mod: PBBFAC,,, | Performed by: UROLOGY

## 2023-11-13 PROCEDURE — 99214 OFFICE O/P EST MOD 30 MIN: CPT | Mod: PBBFAC,PO | Performed by: UROLOGY

## 2023-11-13 PROCEDURE — 99417 PR PROLONGED SVC, OUTPT, W/WO DIRECT PT CONTACT,  EA ADDTL 15 MIN: ICD-10-PCS | Mod: S$PBB,,, | Performed by: UROLOGY

## 2023-11-13 PROCEDURE — 99417 PROLNG OP E/M EACH 15 MIN: CPT | Mod: S$PBB,,, | Performed by: UROLOGY

## 2023-11-13 NOTE — PROGRESS NOTES
[facility] Progress Note:     Ashish Dave is a 67 y.o. male who presents for follow up of elevated psa s/p Uronav MRI fusion biopsy    He has been followed by Dr Kemp and noted to have BPH/LUTS since at least 2016. Has been taking prostate supplement capsule Now prostate health. Has been taking them daily, sometimes BID sometimes TID (Vitamin D-3, zinc, Selenium Saw Palmetto Phytosterols Quercetin Turmeric Root Pumpkin Seed Oil Lycopene Green Tea Extract Pomegranate flaxseed etc)  PSA: 2.8 on 3/1/18. Previously followed by Dr Ness, and then Dr Pradhan. Recalls his PSAs were always 1.1-1.2 and was told he had an enlarged prostate   Initial eval in 2/3/2019 noting:  AUA SS: 132 (3: urgency, weak stream; 2: emptying, straining; 1: frequency, intermittency, sleeping)  Usually no hesitancy, but does endorse weakening stream. 3/4 time is poor flow. Occ intermittency. Occ PV dribble  Father  at 87 and was diagnosed with prostate cancer in his 80s and it was removed. Generally averse to Rx meds. HEIDY 35g benign  Psa 19 was 2.5. Started flomax. Didn't follow up    Referred back by PCP in 2023 for psa elevation noting PSA 2.4 in 2020, 3.1 in , 3.22 in , 4/22 on 3/3/23, and 5.4 (15% free) on 23  AUA SS: 10/2 (2: intermittency, urgency, weak stream; 1: emptying, fgrequency, straining, sleeping)  Father had prostate cancer diagnosed in his late 80s  Quit most of his supplements as above - doing MVI with extra vitD and VitC and an OTC prostat formula, and garlic capsules, turmeric  In 2023 when psa up at routine screening with PCP noted recheck in 6 mos and refer back if elevated  As well noted -colonoscopy with Dr. Iglesias-RTC 5 years, EGD-Dr. Syed--gastric polyps removed recently.  Took flomax after visit 4 yrs ago but didn't continue. LUTS stable  - discussed cysto/prostate biopsy which he did not commit to scheduling but later sent message to schedule    10/5/23 MRI prostate  with 44g gland with neg PZ and a 2.3cm pirad3 lesion of left; region: base; zone: anterior transition zone.  Given equivocal lesion of anterior TZ advised options of cystoscopy and prostate biopsy locally with extra cores at anterior transition zone verses referral for MRI fusion prostate biopsy  10/11 did have concern for and NP visit for discolored semen but no change in LUTS and was given course of doxycycline. Elected to have Uronav  11/8/23 Uronav with Dr Roque - 15 cores, std 12 plus 3 cores of target lesion  PATH: target lesion negative but does have Reva 7 prostate ca as below  - LEFT APEX: Reva grade 3 + 4 = 7 (10% pattern 4), involving 20% total of 1/2 cores.   - LEFT MIDDLE: [place] grade 4 + 3 = 7 (60% pattern 4), involving 10% total of 1/2 cores.  HGPIN  - RIGHT APEX: [place] grade 4 + 3 = 7 (70% pattern 4), involving 30% total of 2/2 cores.      He returns today noting  Did well after biopsy no sig bleeding no fever no chills  AUA SS: 9/2 (2: Emptying, intermittency; 1: Frequency, urgency, weak stream, straining, sleeping)  Not taking Flomax, as in the past to not make much impact.  Review of MRI images note minimal trabeculations of bladder contour, and no significant intravesical extension  Reports that erections are not great, has ED.  Tried a few supplements over the counter previously which did not work.  Had heart rhyrthm problem years ago. Used to see cardiology  Only abd surg is lap chapin    Past Medical History:   Diagnosis Date    Arthritis     Chronic calculous cholecystitis 8/12/2019    COVID-19     Essential (primary) hypertension 3/8/2016    GERD (gastroesophageal reflux disease)        Past Surgical History:   Procedure Laterality Date    CHOLECYSTECTOMY      COLONOSCOPY  2016    Dr Saldaña- rtc 5 yr    ESOPHAGOGASTRODUODENOSCOPY N/A 6/6/2022    Procedure: EGD (ESOPHAGOGASTRODUODENOSCOPY);  Surgeon: Pj Syed MD;  Location: [facility];  Service: Endoscopy;  Laterality:  N/A;    GALLBLADDER SURGERY  08/2019    KNEE SURGERY Right     x's3    LAPAROSCOPIC CHOLECYSTECTOMY N/A 8/12/2019    Procedure: CHOLECYSTECTOMY, LAPAROSCOPIC;  Surgeon: Alphonso Freeman III, MD;  Location: [facility];  Service: General;  Laterality: N/A;    UPPER GASTROINTESTINAL ENDOSCOPY         Family History   Problem Relation Age of Onset    Stroke Mother     No Known Problems Father     Ulcerative colitis Son     Colon cancer Neg Hx     Colon polyps Neg Hx     Crohn's disease Neg Hx     Esophageal cancer Neg Hx     Stomach cancer Neg Hx        Social History     Socioeconomic History    Marital status:    Tobacco Use    Smoking status: Never    Smokeless tobacco: Never   Substance and Sexual Activity    Alcohol use: Not Currently    Drug use: Never     Social Determinants of Health     Financial Resource Strain: Low Risk  (11/11/2023)    Overall Financial Resource Strain (CARDIA)     Difficulty of Paying Living Expenses: Not hard at all   Food Insecurity: No Food Insecurity (11/11/2023)    Hunger Vital Sign     Worried About Running Out of Food in the Last Year: Never true     Ran Out of Food in the Last Year: Never true   Transportation Needs: No Transportation Needs (11/11/2023)    PRAPARE - Transportation     Lack of Transportation (Medical): No     Lack of Transportation (Non-Medical): No   Physical Activity: Insufficiently Active (11/11/2023)    Exercise Vital Sign     Days of Exercise per Week: 3 days     Minutes of Exercise per Session: 30 min   Stress: No Stress Concern Present (11/11/2023)    [place] [employer] - Occupational Stress Questionnaire     Feeling of Stress : Only a little   Social Connections: Unknown (11/11/2023)    Social Connection and Isolation Panel [NHANES]     Frequency of Communication with Friends and Family: More than three times a week     Frequency of Social Gatherings with Friends and Family: Once a week     Active Member of Clubs or Organizations:  "Yes     Attends Club or Organization Meetings: More than 4 times per year     Marital Status:    Housing Stability: Low Risk  (11/11/2023)    Housing Stability Vital Sign     Unable to Pay for Housing in the Last Year: No     Number of Places Lived in the Last Year: 1     Unstable Housing in the Last Year: No       Review of patient's allergies indicates:  No Known Allergies    Medications Reviewed: see MAR    Focused Physical Exam    Vitals:    11/13/23 1130   BP: 129/87   Pulse: 68     Body mass index is 25.4 kg/m². Weight: 80.3 kg (177 lb) Height: 5' 10" (177.8 cm)       Abdomen: Soft, non-tender, nondistended, no CVA tenderness      LABS:     1. PROSTATE, LEFT APEX, BIOPSY:  Prostatic adenocarcinoma, [place] grade 3 + 4 = 7 (10% pattern 4), involving 20% total of 1/2 cores.    2. PROSTATE, LEFT MIDDLE, BIOPSY:   Prostatic adenocarcinoma, Reva grade 4 + 3 = 7 (60% pattern 4), involving 10% total of 1/2 cores.    High-grade prostatic intraepithelial neoplasia (HGPIN).    3. PROSTATE, LEFT BASE, BIOPSY:  Benign prostatic tissue with atrophy.    Negative for malignancy.    4. PROSTATE, RIGHT APEX, BIOPSY:   Prostatic adenocarcinoma, Reva grade 4 + 3 = 7 (70% pattern 4), involving 30% total of 2/2 cores.    5. PROSTATE, RIGHT MIDDLE, BIOPSY:  High-grade prostatic intraepithelial neoplasia (HGPIN).    Negative for malignancy.      6. PROSTATE, RIGHT BASE, BIOPSY:  Benign prostatic tissue with atrophy.  Negative for malignancy.    7. PROSTATE, TARGET LESION, BIOPSY:   Benign prostatic tissue with atrophy and chronic inflammation.  Negative for malignancy.             Assessment/Diagnosis:    1. Prostate cancer  NM PET CT F 18 PYL PSMA, Midthigh to Vertex      2. BPH with obstruction/lower urinary tract symptoms        3. Preop cardiovascular exam  Ambulatory referral/consult to Cardiology          Plans:  I sat with the patient explained in detail his diagnosis of prostate cancer, including explanation of " maryam grading system, and went over all the treatment options for management of his prostate cancer. The treatment options include detailed discussions of surveillance, radiation treatment including external beam as well as brachytherapy, and surgical extirpative therapy namely robotic prostatectomy. We did discuss that though his maryam 7 CaP with unfavorable intermediate risk that he is not a candidate for active surveillance.  Therefore risks and benefits of radiation and surgery were discussed in detail and I answered many questions about various aspects of both options, of which I answered radiation related questions to the best of my ability.     We discussed radical prostatectomy. The procedure in general including hospital stay and postoperative process were discussed in detail. Risks of surgery were discussed including but not limited to up-front urinary incontinence and erectile dysfunction which we will work to overcome with kegel excercises and any number of ED therapies, versus side effects of radiation which are often minimal and irritative upfront with more troubling complications such as stricture and radiation cystitis occurring late. We also briefly discussed psa monitoring post-treatment and had brief discussion about psa recurrence, noting radiation could be used as salvage therapy after surgery but not often vice versa. We discussed concurrent pelvic lymphadenectomy with surgery, and that sometimes lymph nodes are included in radiation fields dependent on risk. Also discussed concurrent use of ADT with radiation and its side effects such as fatigue, hot flashes, ED.   He had several good questions which were all answered in detail. I also provided a copy of the  NCCN prostate cancer patient guideline booklet. At this time I encouraged he to read through the materials provided and make notes with any questions. I offered to make a referral to a radiation-oncologist to discuss radiation option  further, and he deferred at this time.      He is more interested in surgical extirpative therapy, and we did extensively review minimally invasive/robotic prostatectomy. Specifics to surgical procedure, hospital stay, and recovery were discussed.  He does have MRI of the prostate and pelvis with no gross extracapsular extension no significant lymphadenopathy, though given disease burden discussed proceeding with surgical management and without distinct high-risk features, reviewed the utility of PSMA PET scan for initial workup and staging.  Certainly if his PSA may PET scan is negative for extraprostatic disease, as was his MRI, would proceed with surgical management.      We did also review that surgical management would also relieve his longstanding prostate obstruction.  He reports LUTS are only mild-to-moderate at this time, that has been ongoing for quite some time for which he has utilized over-the-counter supplements mostly, trialed alpha blockers but prefers not to be on prescription medication has discontinued them not findings significant benefit.  We discussed the long-term risks of prostate obstruction and changes at the level of the bladder, and on review of his MRI he does have mild trabeculations at the bladder.  Certainly at the expense of recovery of his postop continence, would have relief of obstructive symptoms and better flow and voiding.  We did also review that in the case of radiotherapy, radiation would exacerbate baseline lower urinary tract symptoms, and if he did ultimately elect to proceed with radiotherapy, would recommend completing the cystoscopic evaluation that we deferred in favor of MRI fusion biopsy to assess for any minimally invasive interventions that could facilitate relief of obstruction before radiotherapy.     Extensive discussion on management of post prostatectomy erectile dysfunction including rehabilitative protocols, options for on demand erections, noting oral  medications, vacuum erection devices, intracavernosal injections, and ultimate possibility of penile prosthesis implantation should he not recover function on his own. Reviewed on demand options as workaround before spontaneous function return etc. Extensive review of recovery of continence postoperatively as well including kegel exercises    PSMA PET to be planned within next 2 weeks in [place]. Will review and advise, and check in on if he would like to proceed with surgical planning and/or consult with rad/onc. If he would like to proceed with surgery can set date and plan preop visit for him to return to review once more in detail with his family, and coordinate with hospital preop visit  As well, as he has interest in surgical management, with reported history of heart rhythm issue in past and no current cardiologist, as well as ae/HTN etc, would recommend cardiac workup and clearance preop. Referral placed    Total time spent in/on encounter today, including face to face time with patient, counseling, medical record review, interpretation of tests/results, , and treatment plan coordination: 85 minutes

## 2023-11-13 NOTE — Clinical Note
Can you help get him est by mid-dec Planning prostatectomy likely in raj Noted history of a cardiac rhythm abnormality but hasn't seen cardiology in  a while thanks

## 2023-11-20 ENCOUNTER — HOSPITAL ENCOUNTER (OUTPATIENT)
Dept: RADIOLOGY | Facility: HOSPITAL | Age: 68
Discharge: HOME | End: 2023-11-20
Attending: UROLOGY
Payer: MEDICARE

## 2023-11-20 ENCOUNTER — PATIENT MESSAGE (OUTPATIENT)
Dept: FAMILY MEDICINE | Facility: CLINIC | Age: 68
End: 2023-11-20
Payer: MEDICARE

## 2023-11-20 ENCOUNTER — TELEPHONE (OUTPATIENT)
Dept: FAMILY MEDICINE | Facility: CLINIC | Age: 68
End: 2023-11-20
Payer: MEDICARE

## 2023-11-20 ENCOUNTER — PATIENT MESSAGE (OUTPATIENT)
Dept: CARDIOLOGY | Facility: CLINIC | Age: 68
End: 2023-11-20
Payer: MEDICARE

## 2023-11-20 DIAGNOSIS — C61 PROSTATE CANCER: ICD-10-CM

## 2023-11-20 PROCEDURE — 78815 PET IMAGE W/CT SKULL-THIGH: CPT | Mod: TC,PS,PN

## 2023-11-20 PROCEDURE — 78815 PET IMAGE W/CT SKULL-THIGH: CPT | Mod: 26,PI,, | Performed by: RADIOLOGY

## 2023-11-20 PROCEDURE — 78815 NM PET CT F 18 PYL PSMA, MIDTHIGH TO VERTEX: ICD-10-PCS | Mod: 26,PI,, | Performed by: RADIOLOGY

## 2023-11-20 PROCEDURE — A9595 NM PET CT F 18 PYL PSMA, MIDTHIGH TO VERTEX: HCPCS | Mod: TB,PN

## 2023-11-20 NOTE — PROGRESS NOTES
PET Imaging Questionnaire    Are you a Diabetic? Recent Blood Sugar level? No    Are you anemic? Bone Marrow Stimulation Meds? No    Have you had a CT Scan, if so when & where was your last one? Yes -     Have you had a PET Scan, if so when & where was your last one? No    Chemotherapy or currently on Chemotherapy? No    Radiation therapy? No    Surgical History:   Past Surgical History:   Procedure Laterality Date    CHOLECYSTECTOMY      COLONOSCOPY  2016    Dr Saldaña- rtc 5 yr    ESOPHAGOGASTRODUODENOSCOPY N/A 6/6/2022    Procedure: EGD (ESOPHAGOGASTRODUODENOSCOPY);  Surgeon: Pj Syed MD;  Location: [facility];  Service: Endoscopy;  Laterality: N/A;    GALLBLADDER SURGERY  08/2019    KNEE SURGERY Right     x's3    LAPAROSCOPIC CHOLECYSTECTOMY N/A 8/12/2019    Procedure: CHOLECYSTECTOMY, LAPAROSCOPIC;  Surgeon: Alphonso Freeman III, MD;  Location: [facility];  Service: General;  Laterality: N/A;    UPPER GASTROINTESTINAL ENDOSCOPY          Have you been fasting for at least 6 hours? No    Is there any chance you may be pregnant or breastfeeding? No    Assay: 11.9 MCi@:12.47   Injection Site:lt ac     Residual: 1.49 mCi@: 12.49   Technologist: Isabel Chanel Injected:10.4mCi

## 2023-11-21 ENCOUNTER — PATIENT MESSAGE (OUTPATIENT)
Dept: HEMATOLOGY/ONCOLOGY | Facility: CLINIC | Age: 68
End: 2023-11-21
Payer: MEDICARE

## 2023-11-21 ENCOUNTER — TELEPHONE (OUTPATIENT)
Dept: UROLOGY | Facility: CLINIC | Age: 68
End: 2023-11-21
Payer: MEDICARE

## 2023-11-21 ENCOUNTER — TELEPHONE (OUTPATIENT)
Dept: HEMATOLOGY/ONCOLOGY | Facility: CLINIC | Age: 68
End: 2023-11-21
Payer: MEDICARE

## 2023-11-21 ENCOUNTER — PATIENT MESSAGE (OUTPATIENT)
Dept: UROLOGY | Facility: CLINIC | Age: 68
End: 2023-11-21
Payer: MEDICARE

## 2023-11-21 NOTE — TELEPHONE ENCOUNTER
----- Message from Sola Washington sent at 11/21/2023 11:49 AM CST -----  Contact: Pt  Type:  Needs Medical Advice    Who Called: Pt    Would the patient rather a call back or a response via [facility]? call  Best Call Back Number: [phone]  Additional Information: Pt would like to know if the oncologists at the [facility] perform prostate Cancer Surgery. Please call pt back to advise.

## 2023-11-21 NOTE — TELEPHONE ENCOUNTER
----- Message from Coretta Shelton RN sent at 11/21/2023  1:32 PM CST -----  Regarding: FW: UroOnc  Contact: Pt    ----- Message -----  From: Nara Hernandez RN  Sent: 11/21/2023  12:47 PM CST  To: [facility] Cancer Navigation  Subject: [facility]                                           Patient would like to speak to someone regarding a possible 2nd opinion.  He is interested in someone that is an oncologist and surgeon.      Amna Magallon    ----- Message -----  From: Sola Orellana  Sent: 11/21/2023  11:51 AM CST  To: [facility] Navigation Outpatient    Type:  Needs Medical Advice    Who Called: Pt    Would the patient rather a call back or a response via [facility]? call  Best Call Back Number: [phone]  Additional Information: Pt would like to know if the oncologists at the [facility] perform prostate Cancer Surgery. Please call pt back to advise.

## 2023-11-21 NOTE — TELEPHONE ENCOUNTER
Oncology nurse navigator returned call to patient to discuss second opinion of prostate cancer. Patient agreed to appointments with radiation oncology and urology oncology on 11/29/23 10/11:15am at [facility]. Nurse navigator direct contact information provided for continued assistance.

## 2023-11-21 NOTE — NURSING
Returned call to patient.  He was inquiring if the oncologist at [facility] do surgery on prostate.  Explained that the oncologist here do not do surgery.  Discussed that the Urologist in the area do the path and surgery.  He currently sees Dr. Werner and is interested in a 2nd opinion.  Offered to send message to  to discuss possible 2nd opinion with UroOnc.  Patient accepted.     Message send to [facility] Navigation to contact patient for discussion.

## 2023-11-21 NOTE — TELEPHONE ENCOUNTER
Reviewed PSMA PET scan noting negative, organ confined.  Was going to place a result note to offer next appointment, however patient had set MyChart message about next steps and bringing his wife in for further discussion.    On review of chart, he was set for urology and radiation oncology visits in [place] on 11/29 and on further discussion with patient, when he was getting his PSMA PET scan in [place] he notice that there was an [facility] sign, and therefore based on his knowledge of [facility] being the standard of Cancer Care, he called the facility where he had the PET scan stating he was interested in robotic surgery and they connected him to an oncology nurse navigator who planned all of his visits in [place].      I did review with the patient that as last we left it after reviewing diagnosis and management options, he was going to get his PSMA PET scan and as long as disease appeared organ confined he was most interested in robotic prostatectomy and would bring him back into clinic to review this with patient and his wife.  He nitially deferred to Radiation Oncology consultation, but certainly if he would like to pursue this option we can do so.  Again reviewed all the services are available locally, and he was just a bit confused after the phone call he placed to [facility] yesterday.  Reviewed [facility] affiliation with [facility] for Cancer Care system wide, and as well reviewed that locally our facilities have a full robotic program and a full Radiation Oncology program with all services for treatment of localized prostate cancer mirroring those available at [facility].    Reviewed all of the above in detail, and offered to see him and his wife on my next clinic date, which is Friday 12/1/23 (2 days after he was booked in [place]).  He was agreeable to this and ask that at this time his [facility] visits be canceled out, he will present at 9:15 a.m. on  12/1/23 for follow-up visit to review all treatment options again.  Again stated that he wished he brought his wife to the initial visit, which was recommended on call to bring him in sooner to discuss his pathology.  Encouraged him to review all materials provided at that visit including the office visit note, and make a list of questions before he comes in.  He did have multiple questions about his treatment options, progression of his pathology, etc., and reassured we will review all at his upcoming visit.    After discussion with patient and clarify all points as above, per his request, [facility]  Oncology and Radiation Oncology visits were canceled, and he was booked in for follow-up discussion on 12/1/23 at 9:15 a.m.

## 2023-11-22 ENCOUNTER — TELEPHONE (OUTPATIENT)
Dept: UROLOGY | Facility: CLINIC | Age: 68
End: 2023-11-22
Payer: MEDICARE

## 2023-11-22 NOTE — TELEPHONE ENCOUNTER
----- Message from Bozena Hough sent at 11/22/2023  9:27 AM CST -----  Regarding: Surgery Schedule  Contact: Patient  Type: Needs Medical Advice  Who Called:  Patient  Symptoms (please be specific):    How long has patient had these symptoms:    Pharmacy name and phone #:    Best Call Back Number: [phone] (mobile)    Additional Information: Patient would like to speak with someone regarding Dr. Werner's Surgery Schedule. Please call patient to advise.

## 2023-11-22 NOTE — TELEPHONE ENCOUNTER
Spoke w pt called and inquired about   Tino surgery days,having surgery in dec  Informed pt when comes in on 12/1   He can discuss with md regarding dates   Pt vu

## 2023-11-24 ENCOUNTER — TELEPHONE (OUTPATIENT)
Dept: UROLOGY | Facility: CLINIC | Age: 68
End: 2023-11-24
Payer: MEDICARE

## 2023-11-24 NOTE — TELEPHONE ENCOUNTER
----- Message from Herb Carmona sent at 11/24/2023  9:53 AM CST -----  Regarding: schedule procedure  Contact: jeancarlos at [phone]  Type: Needs Medical Advice    Who Called:  Jeancarlos    Best Call Back Number: [phone]    Additional Information: pt has 12/1 appt with Dr Werner but wants to schedule procedure as soon as possible. Please call to discuss.

## 2023-11-24 NOTE — TELEPHONE ENCOUNTER
Returned call and spoke with patient, would to discuss doing procedure sooner,  informed provider is out office, will send message, please allow time for response. Patient stated he would like to discuss some things with Ms Cordon, he will call her on Monday. Patient verbally understood.

## 2023-11-30 NOTE — PROGRESS NOTES
[facility] Urology Progress Note:      Ashish Dave is a 67 y.o. male who presents for follow up of elevated psa s/p Uronav MRI fusion biopsy     He has been followed by Dr Kemp and noted to have BPH/LUTS since at least 2016. Has been taking prostate supplement capsule Now prostate health. Has been taking them daily, sometimes BID sometimes TID (Vitamin D-3, zinc, Selenium Saw Palmetto Phytosterols Quercetin Turmeric Root Pumpkin Seed Oil Lycopene Green Tea Extract Pomegranate flaxseed etc)  PSA: 2.8 on 3/1/18. Previously followed by Dr Ness, and then Dr Pradhan. Recalls his PSAs were always 1.1-1.2 and was told he had an enlarged prostate   Initial eval in 2/3/2019 noting:  AUA SS: 132 (3: urgency, weak stream; 2: emptying, straining; 1: frequency, intermittency, sleeping)  Usually no hesitancy, but does endorse weakening stream. 3/4 time is poor flow. Occ intermittency. Occ PV dribble  Father  at 87 and was diagnosed with prostate cancer in his 80s and it was removed. Generally averse to Rx meds. HEIDY 35g benign  Psa 19 was 2.5. Started flomax. Didn't follow up     Referred back by PCP in 2023 for psa elevation noting PSA 2.4 in 2020, 3.1 in , 3.22 in , 4/22 on 3/3/23, and 5.4 (15% free) on 23  AUA SS: 10/2 (2: intermittency, urgency, weak stream; 1: emptying, fgrequency, straining, sleeping)  Father had prostate cancer diagnosed in his late 80s  Quit most of his supplements as above - doing MVI with extra vitD and VitC and an OTC prostat formula, and garlic capsules, turmeric  In 2023 when psa up at routine screening with PCP noted recheck in 6 mos and refer back if elevated  As well noted -colonoscopy with Dr. Iglesias-RTC 5 years, EGD-Dr. Syed--gastric polyps removed recently.  Took flomax after visit 4 yrs ago but didn't continue. LUTS stable  - discussed cysto/prostate biopsy which he did not commit to scheduling but later sent message to schedule     10/5/23 MRI  prostate with 44g gland with neg PZ and a 2.3cm pirad3 lesion of left; region: base; zone: anterior transition zone.  Given equivocal lesion of anterior TZ advised options of cystoscopy and prostate biopsy locally with extra cores at anterior transition zone verses referral for MRI fusion prostate biopsy  10/11 did have concern for and NP visit for discolored semen but no change in LUTS and was given course of doxycycline. Elected to have Uronav  11/8/23 Uronav with Dr Roque - 15 cores, std 12 plus 3 cores of target lesion  PATH: target lesion negative but does have [place] 7 prostate ca as below  - LEFT APEX: Bayard grade 3 + 4 = 7 (10% pattern 4), involving 20% total of 1/2 cores.   - LEFT MIDDLE: Bayard grade 4 + 3 = 7 (60% pattern 4), involving 10% total of 1/2 cores.  HGPIN  - RIGHT APEX: Reva grade 4 + 3 = 7 (70% pattern 4), involving 30% total of 2/2 cores.       11/13/23:   Did well after biopsy no sig bleeding no fever no chills  AUA SS: 9/2 (2: Emptying, intermittency; 1: Frequency, urgency, weak stream, straining, sleeping)  Not taking Flomax, as in the past to not make much impact.  Review of MRI images note minimal trabeculations of bladder contour, and no significant intravesical extension  Reports that erections are not great, has ED.  Tried a few supplements over the counter previously which did not work.  Had heart rhyrthm problem years ago. Used to see cardiology. Only abd surg is radha samson  Discussed treatment options and more interested in surgery than radiation so initiated cardiac clearance and PSMA pet having discussed likely surgery in Jan 2024 if he proceeded.   Was alone at that visit.     In interim, PSMA PET CT negative. Upon seeing negative PET results pt expressed desire to proceed with surgery and return ASAP with wife to discuss so booked in today.  He has rescheduled his visit with cardiology to [facility] from 12/20 to 12/7 (ASHER Apodaca)  - Had heart rhyrthm problem years ago.  "Used to see cardiology. Referred at last visit noting need for clearance prior to any intervention - hx palpitations, skipped bets, need for cardiac meds  Here with his wife to review prior discussion, and answer further questions for treatment planning  Most interested in surgery, had questions re timing, repeat psa, significance of HGPIN on path report, etc    Focused Physical Exam:    Vitals:    12/01/23 0912   BP: (!) 142/80   Pulse: 73     Body mass index is 25.11 kg/m². Weight: 79.4 kg (175 lb) Height: 5' 10" (177.8 cm)     Abdomen: Soft, non-tender, nondistended, no CVA tenderness    ASSESSMENT   1. Prostate cancer  Procedure Order to Urology    Ambulatory referral/consult to Radiation Oncology          Plan    Extensive review again, as per prior visit, of his diagnosis of [place] 7 prostate cancer and treatment recommendation as well as extensive review of details of and side effect profile of treatment options including robotic radical prostatectomy versus external beam radiation therapy with concurrent androgen deprivation therapy.  All prior treatment discussion as per last visit reviewed today in the presence of patient and his wife so both could ask all questions.  As above, especially in the setting of his reviewed negative PSMA scan and no concerns of extraprostatic extension on MRI, that general recommended timeline to intervene is within about 3 months of diagnosis.  Certainly he is not at risk in the coming weeks, and again, despite his anxiety and desire to proceed ASAP, after extensive discussion of all workup, natural history of prostate cancer, his specific staging, as well as the need for preoperative cardiac workup and clearance, he was more comfortable with this timeline.  In answering all of his questions, we did review that there is no further need for PSA, as PSA can fluctuate and will not change treatment decision, as all recommendations are based on composite of labs imaging and " pathology, but with pathology on file and imaging, no further need for blood work as all recommendations would stand.  We also reviewed that the HGPIN is clinically insignificant from his prostate biopsy report in the setting of prostate cancer, as if this was a solitary finding it may indicate up to 25% chance of finding prostate cancer on future biopsy, but he has a diagnosis of malignancy.  Otherwise all questions were about treatment options, and as noted, extensive discussion about surgery versus ADT plus XRT.    After extensive discussion, he is still most inclined to proceed with robotic prostatectomy and wanted to be scheduled as soon as possible after the 1st of the year, and elected to plan this for 1/3/24, but also wanted referral to Radiation Oncology to discuss the radiotherapy option in further detail to make the best informed treatment decision, and after discussing where to go from this visit, he wanted to have surgery scheduled, and is most likely sure he will proceed, but still talk to Radiation Oncology, which is most reasonable so referral was placed a message sent to assist in scheduling.  He does have upcoming visit to establish care with cardiology next week, and certainly would need appropriate cardiac workup and clearance to proceed with robotic prostatectomy, and if there were any concerns on cardiac workup, did note that his heart takes precedence, and therefore he will already have discussed with Radiation Oncology this alternative treatment option, in case it is not safe for recommended to proceed with surgery based on cardiac workup.      Risks of surgery were discussed including but not limited to urinary incontinence, erectile dysfunction, injury to intraabdominal structures, urine leak, anastamotic leak, rectal injury, damage to ureters, hernia, postoperative ileus. We discussed concurrent pelvic lymphadenectomy with surgery and it's associated risks.   Discussed implications of  treatment in context of his baseline erectile function, at baseline poor.  Reviewed the neurovascular bundle and it investment along the prostate capsule, thus wanting to avoid any contact with capsular positive margin. Extensive discussion on management of post prostatectomy erectile dysfunction including rehabilitative protocols, options for on demand erections, noting oral medications, vacuum erection devices, intracavernosal injections, and ultimate possibility of penile prosthesis implantation should he not recover function on his own.      Extensive review of Kegel exercises as well with patient returning demonstration after instruction.  Discussed the importance of regimented pelvic floor exercises for regain of continence postoperatively. Discuss possible utility of pelvic floor physical therapy if needed.  Noted the likely benefit, at the expense of postop BIRGIT, on relief of longstanding prostate obstruction, and once BIRGIT recovered, likely improved urinary quality of life, and certain prevention of progression of obstructive changes at level of bladder and future urinary tract infections and complications of obstruction. Discussed the possibility of OAB or unmask irritative symptoms confounding the recovery of BIRGIT for which management may be needed, and will follow closely in the postoperative period.  Counseled on Kegel techniques, demonstrated return, advised to be regimented a few times a day a few sets today preoperatively to help facilitate postop return of continence     Also had extensive discussion with patient and wife about timeline, recovery, etc.  Again noted average length of catheterization is 1 week, after which he will be functionally independent except for heavy lifting greater than 10 lb of which this restriction is lifted at 4 weeks.      Robotic prostatectomy booked 1/3/24 - noted to be tentative pending cardiac workup and radon consultation    Total time spent in/on encounter today,  including face to face time with patient, counseling, medical record review, interpretation of tests/results, , and treatment plan coordination: 75 minutes

## 2023-12-01 ENCOUNTER — TELEPHONE (OUTPATIENT)
Dept: UROLOGY | Facility: CLINIC | Age: 68
End: 2023-12-01
Payer: MEDICARE

## 2023-12-01 ENCOUNTER — OFFICE VISIT (OUTPATIENT)
Dept: UROLOGY | Facility: CLINIC | Age: 68
End: 2023-12-01
Payer: MEDICARE

## 2023-12-01 VITALS
DIASTOLIC BLOOD PRESSURE: 80 MMHG | WEIGHT: 175 LBS | SYSTOLIC BLOOD PRESSURE: 142 MMHG | HEART RATE: 73 BPM | HEIGHT: 70 IN | BODY MASS INDEX: 25.05 KG/M2

## 2023-12-01 DIAGNOSIS — C61 PROSTATE CANCER: Primary | ICD-10-CM

## 2023-12-01 PROCEDURE — 99215 OFFICE O/P EST HI 40 MIN: CPT | Mod: S$PBB,,, | Performed by: UROLOGY

## 2023-12-01 PROCEDURE — 99999 PR PBB SHADOW E&M-EST. PATIENT-LVL IV: ICD-10-PCS | Mod: PBBFAC,,, | Performed by: UROLOGY

## 2023-12-01 PROCEDURE — 99999 PR PBB SHADOW E&M-EST. PATIENT-LVL IV: CPT | Mod: PBBFAC,,, | Performed by: UROLOGY

## 2023-12-01 PROCEDURE — 99417 PR PROLONGED SVC, OUTPT, W/WO DIRECT PT CONTACT,  EA ADDTL 15 MIN: ICD-10-PCS | Mod: S$PBB,,, | Performed by: UROLOGY

## 2023-12-01 PROCEDURE — 99417 PROLNG OP E/M EACH 15 MIN: CPT | Mod: S$PBB,,, | Performed by: UROLOGY

## 2023-12-01 PROCEDURE — 99215 PR OFFICE/OUTPT VISIT, EST, LEVL V, 40-54 MIN: ICD-10-PCS | Mod: S$PBB,,, | Performed by: UROLOGY

## 2023-12-01 PROCEDURE — 99214 OFFICE O/P EST MOD 30 MIN: CPT | Mod: PBBFAC,PO | Performed by: UROLOGY

## 2023-12-01 NOTE — TELEPHONE ENCOUNTER
When pt and wife left visit, wanted to see radonc before commiitting to surgery   Hence no scheduling, and discussed placing referral to be seen  Please clarify that he intends to proceed with surgery and doesn't need referral at this time before closing chart and ordering referrals

## 2023-12-01 NOTE — TELEPHONE ENCOUNTER
----- Message from Dorie Floyd sent at 12/1/2023 11:45 AM CST -----  Type: Needs Medical Advice  Who Called:  pt  Best Call Back Number: [phone]    Additional Information: pt is calling in regards to getting his procedure scheduled on 01/03/24 earliest appointment available that morning. Please call back and advise. Thanks!

## 2023-12-01 NOTE — PROGRESS NOTES
Procedure Order to Urology [[id number]]    Electronically signed by: Phil Werner MD on 12/01/23 1432 Status: Active   Ordering user: Phil Werner MD 12/01/23 1432 Authorized by: Phil Werner MD   Ordering mode: Standard   Frequency:  12/01/23 -     Diagnoses  Prostate cancer [C61]   Questionnaire    Question Answer   Procedure Robotic Radical Prostatectomy/Pelvic LND Comment - 1/3/24   Facility Name: [facility] surgery admit(no post acute needs)  ( one midnight) , please order MAXIMO and SCD, CBC, BMP, PT/INR,Type and screen,U/A and culture  EKG, chest X ray, Start IV,NPO, General anesthesia,Ancef 2 grams ( alternative for PCN allergy cipro 400mg IV ) . (Will stay overnight in comments) High risk VTE with MAXIMO and SCD and reason for no pharmacologic VTE as risk of bleeding cpt code - 58235 lymph nodes - [id number]

## 2023-12-07 ENCOUNTER — OFFICE VISIT (OUTPATIENT)
Dept: CARDIOLOGY | Facility: CLINIC | Age: 68
End: 2023-12-07
Payer: MEDICARE

## 2023-12-07 VITALS
OXYGEN SATURATION: 98 % | RESPIRATION RATE: 16 BRPM | SYSTOLIC BLOOD PRESSURE: 130 MMHG | DIASTOLIC BLOOD PRESSURE: 90 MMHG | WEIGHT: 174 LBS | HEIGHT: 70 IN | HEART RATE: 68 BPM | BODY MASS INDEX: 24.91 KG/M2

## 2023-12-07 DIAGNOSIS — R00.2 PALPITATIONS: ICD-10-CM

## 2023-12-07 DIAGNOSIS — Z01.810 PREOPERATIVE CARDIOVASCULAR EXAMINATION: Primary | ICD-10-CM

## 2023-12-07 DIAGNOSIS — R94.31 EKG ABNORMALITIES: ICD-10-CM

## 2023-12-07 DIAGNOSIS — E78.2 MIXED HYPERLIPIDEMIA: ICD-10-CM

## 2023-12-07 DIAGNOSIS — I10 ESSENTIAL HYPERTENSION: ICD-10-CM

## 2023-12-07 DIAGNOSIS — R94.31 NONSPECIFIC ABNORMAL ELECTROCARDIOGRAM (ECG) (EKG): ICD-10-CM

## 2023-12-07 PROCEDURE — 99213 OFFICE O/P EST LOW 20 MIN: CPT | Mod: PBBFAC,PN | Performed by: INTERNAL MEDICINE

## 2023-12-07 PROCEDURE — 99205 PR OFFICE/OUTPT VISIT, NEW, LEVL V, 60-74 MIN: ICD-10-PCS | Mod: S$PBB,,, | Performed by: INTERNAL MEDICINE

## 2023-12-07 PROCEDURE — 93010 ELECTROCARDIOGRAM REPORT: CPT | Mod: S$PBB,,, | Performed by: INTERNAL MEDICINE

## 2023-12-07 PROCEDURE — 99999 PR PBB SHADOW E&M-EST. PATIENT-LVL III: CPT | Mod: PBBFAC,,, | Performed by: INTERNAL MEDICINE

## 2023-12-07 PROCEDURE — 99999 PR PBB SHADOW E&M-EST. PATIENT-LVL III: ICD-10-PCS | Mod: PBBFAC,,, | Performed by: INTERNAL MEDICINE

## 2023-12-07 PROCEDURE — 93005 ELECTROCARDIOGRAM TRACING: CPT | Mod: PBBFAC,PN | Performed by: INTERNAL MEDICINE

## 2023-12-07 PROCEDURE — 93010 EKG 12-LEAD: ICD-10-PCS | Mod: S$PBB,,, | Performed by: INTERNAL MEDICINE

## 2023-12-07 PROCEDURE — 99205 OFFICE O/P NEW HI 60 MIN: CPT | Mod: S$PBB,,, | Performed by: INTERNAL MEDICINE

## 2023-12-07 NOTE — PROGRESS NOTES
Subjective:    Patient ID:  Ashish Dave is a 67 y.o. male     Chief Complaint   Patient presents with    [facility] Care       HPI:  Mr Ashish Dave is a 67 y.o. male is here for initial consultation.    Patient has prostate issues and he is undergoing prostatectomy.  And is currently here for cardiac clearance because of his abnormal EKG.    Patient has been doing well no specific complaints at the present time.  His breathing has been good denies any shortness a breath or difficulty in breathing denies any chest pain or tightness or heaviness denies any dizziness or lightheadedness or loss of consciousness.    Way back many years ago in the late 80s patient had heart palpitations at that time he would have sudden onset of palpitations and they would lasts almost most of the day and intermittently he would have skipped beats also.  And he was evaluated by a cardiologist at that time and was started on verapamil and digoxin.  Over the years patient had really got better and his episodes have become much less frequent and at the in the recent past he just has occasional fluttering in the chest.  Patient also had history that when he would bend forward or kneel down he would have a sensation of blackout and lately has not had any of that.    Patient is taking all his medications regularly.        Review of patient's allergies indicates:  No Known Allergies    Past Medical History:   Diagnosis Date    Arthritis     Chronic calculous cholecystitis 8/12/2019    COVID-19     Essential (primary) hypertension 3/8/2016    GERD (gastroesophageal reflux disease)      Past Surgical History:   Procedure Laterality Date    CHOLECYSTECTOMY      COLONOSCOPY  2016    Dr Saldaña- rtc 5 yr    ESOPHAGOGASTRODUODENOSCOPY N/A 6/6/2022    Procedure: EGD (ESOPHAGOGASTRODUODENOSCOPY);  Surgeon: Pj Syed MD;  Location: [facility];  Service: Endoscopy;  Laterality: N/A;    GALLBLADDER SURGERY  08/2019    KNEE SURGERY Right      x's3    LAPAROSCOPIC CHOLECYSTECTOMY N/A 8/12/2019    Procedure: CHOLECYSTECTOMY, LAPAROSCOPIC;  Surgeon: Alphonso Freeman III, MD;  Location: [facility];  Service: General;  Laterality: N/A;    UPPER GASTROINTESTINAL ENDOSCOPY       Social History     Tobacco Use    Smoking status: Never    Smokeless tobacco: Never   Substance Use Topics    Alcohol use: Not Currently    Drug use: Never     Family History   Problem Relation Age of Onset    Stroke Mother     No Known Problems Father     Ulcerative colitis Son     Colon cancer Neg Hx     Colon polyps Neg Hx     Crohn's disease Neg Hx     Esophageal cancer Neg Hx     Stomach cancer Neg Hx         Review of Systems:   Constitution: Negative for diaphoresis and fever.   HEENT: Negative for nosebleeds.    Cardiovascular: Negative for chest pain       No dyspnea on exertion       No leg swelling        Occasional palpitations  Respiratory: Negative for shortness of breath and wheezing.    Hematologic/Lymphatic: Negative for bleeding problem. Does not bruise/bleed easily.   Skin: Negative for color change and rash.   Musculoskeletal: Negative for falls and myalgias.   Gastrointestinal: Negative for hematemesis and hematochezia.   Genitourinary: Negative for hematuria.   Neurological: Negative for dizziness and light-headedness.   Psychiatric/Behavioral: Negative for altered mental status and memory loss.          Objective:        Vitals:    12/07/23 1513   BP: (!) 130/90   Pulse: 68   Resp: 16       Lab Results   Component Value Date    WBC 5.7 03/03/2023    HGB 16.3 03/03/2023    HCT 50.1 (H) 03/03/2023     03/03/2023    CHOL 161 03/03/2023    TRIG 64 03/03/2023    HDL 71 03/03/2023    ALT 24 03/03/2023    AST 21 03/03/2023     03/03/2023    K 4.6 03/03/2023     03/03/2023    CREATININE 0.9 10/05/2023    BUN 13 03/03/2023    CO2 29 03/03/2023    TSH 2.48 01/31/2020    PSA 2.5 02/04/2019    INR 1.0 08/07/2019    MICROALBUR 0.2 02/25/2022         ECHOCARDIOGRAM RESULTS  No results found for this or any previous visit.        CURRENT/PREVIOUS VISIT EKG  Results for orders placed or performed during the hospital encounter of 07/20/20   EKG 12-lead    Collection Time: 07/20/20  8:11 AM    Narrative    Test Reason : R68.89,    Vent. Rate : 073 BPM     Atrial Rate : 073 BPM     P-R Int : 168 ms          QRS Dur : 080 ms      QT Int : 410 ms       P-R-T Axes : 053 -04 018 degrees     QTc Int : 451 ms    Normal sinus rhythm  Normal ECG  When compared with ECG of 07-AUG-2019 06:34,  No significant change was found  Confirmed by Phuc MCDANIELS, Phil OSMAN ([id number]) on 7/21/2020 8:28:58 AM    Referred By: ELLEN   SELF           Confirmed By:Phil Friend MD     No valid procedures specified.   No results found for this or any previous visit.      Physical Exam:  CONSTITUTIONAL: No fever, no chills  HEENT: Normocephalic, atraumatic,pupils reactive to light                 NECK:  No JVD no carotid bruit  CVS: S1S2+, RRR, systolic murmurs,   LUNGS: Clear  ABDOMEN: Soft, NT, BS+  EXTREMITIES: No cyanosis, edema  : No schafer catheter  NEURO: AAO X 3  PSY: Normal affect      Medication List with Changes/Refills   Current Medications    ASCORBIC ACID, VITAMIN C, ORAL    Take 4,000 mg by mouth 2 (two) times a day.    LOSARTAN (COZAAR) 25 MG TABLET    Take 1 tablet (25 mg total) by mouth once daily.    VITAMIN D (VITAMIN D3) 1000 UNITS TAB    Take 4,000 Units by mouth once daily.   Discontinued Medications    CIPROFLOXACIN HCL (CIPRO) 500 MG TABLET    Take 1 tablet (500 mg total) by mouth 2 (two) times daily.    LISINOPRIL 10 MG TABLET    Take 1 tablet (10 mg total) by mouth once daily.             Assessment:       1. Preoperative cardiovascular examination    2. EKG abnormalities    3. Palpitations    4. Essential hypertension    5. Mixed hyperlipidemia    6. Nonspecific abnormal electrocardiogram (ECG) (EKG)         Plan:   1. Preoperative cardiovascular examination.     Patient has had prior history of palpitations and was on medications before.  Also patient's EKG shows nonpathological Q-waves in the inferior wall can not rule out inferior infarct age undetermined.  And poor R-wave progression in the anterior precordial leads.  Because of these abnormal testing patient needs further cardiac testing including a stress myocardial perfusion study or a stress echo.  2. Palpitations   Patient has intermittent palpitations of skipped beats.  Prior to that patient used to have severe bouts of palpitations and of late he has not had any of those major events.  Will also do 1 week Zio to evaluate his palpitations.  3. Essential hypertension  Patient's blood pressure is stable at 1 30/90 and he is currently on losartan 25 mg p.o. daily continue the same.  4. Mixed hyperlipidemia   Patient is stable at the present time.  On his last blood work his total cholesterol is 161 HDL is 71 LDL is 76 triglycerides are 64 essentially not in any major statin therapy.     5.  EKG   Reviewed his EKG independently patient is in normal sinus rhythm with a heart rate of 68 beats per minute low-voltage QRS no acute ST T-wave changes.  Nonpathological Q-waves in the inferior leads and poor R-wave progression in the anterior precordial leads.  6. Discussed with patient that this is the holiday season and the many studies back up and patient is scheduled for surgery in the 1st week of January and might be difficult to get to it by that time.  7. Patient to follow-up with me in the office after the testing has been completed.            Problem List Items Addressed This Visit    None  Visit Diagnoses       Preoperative cardiovascular examination    -  Primary    EKG abnormalities        Palpitations        Essential hypertension        Mixed hyperlipidemia        Nonspecific abnormal electrocardiogram (ECG) (EKG)        Relevant Orders    IN OFFICE EKG 12-LEAD (to [place])            No follow-ups on file.

## 2023-12-08 ENCOUNTER — TELEPHONE (OUTPATIENT)
Dept: CARDIOLOGY | Facility: CLINIC | Age: 68
End: 2023-12-08
Payer: MEDICARE

## 2023-12-08 ENCOUNTER — TELEPHONE (OUTPATIENT)
Dept: CARDIOLOGY | Facility: HOSPITAL | Age: 68
End: 2023-12-08

## 2023-12-08 ENCOUNTER — TELEPHONE (OUTPATIENT)
Dept: UROLOGY | Facility: CLINIC | Age: 68
End: 2023-12-08
Payer: MEDICARE

## 2023-12-08 NOTE — TELEPHONE ENCOUNTER
Spoke w pt informed MD aware of cardio visit   And we have responded back to cardio to help to be expedited   Pt vu

## 2023-12-08 NOTE — TELEPHONE ENCOUNTER
Spoke w pt and confirmed aware of echo and stress testing pt was informed if not cleared will need to delay surgery   As last testing is on 12/26   Pt vu

## 2023-12-08 NOTE — TELEPHONE ENCOUNTER
----- Message from Daylin Gracia sent at 12/8/2023  9:22 AM CST -----  Type:  Needs Medical Advice    Who Called:  Pt    Would the patient rather a call back or a response via [facility]?  Call back    Best Call Back Number:  [phone]    Additional Information:  Pt is asking to speak with nurse about his cardiology appt he had yesterday.   Please call back to advise. Thanks!

## 2023-12-08 NOTE — TELEPHONE ENCOUNTER
Patient advised, [facility]   Will need to register on the first floor at the main entrance.   Patient advised that arrival time is 7:40am.  Patient advised that he may be here about 3.5-4 hours, and may want to bring something to occupy their time, as there will be periods of waiting.    Patient advised, may take his medications prior to testing if you need to.   Advised if he needs to eat to take his medications, please keep it light, like toast and juice.    Patient advised to avoid all caffeine 12 hours prior to testing.  This includes decaf tea and coffee.    Will provide peanut butter crackers for a snack after stress test.  If patient would prefer something else, please bring a snack from home.    Wear comfortable clothing.   No lotions, oils, or powders to the upper chest area. May wear deodorant.    No metal jewelry, buttons, or zippers to the upper body.  Patient verbalizes understanding of instructions.

## 2023-12-08 NOTE — TELEPHONE ENCOUNTER
We just had cancellation for Monday for stress test. We are contacting pt to see if he will take it.  The echo we can move up to 12/26

## 2023-12-08 NOTE — TELEPHONE ENCOUNTER
----- Message from Phil Werner MD sent at 12/8/2023  9:11 AM CST -----  Regarding: pts cards clearance testing timeline - pls advise  Akira Kirby Bs note indicating  6. Discussed with patient that this is the holiday season and the many studies back up and patient is scheduled for surgery in the 1st week of January and might be difficult to get to it by that time.    He is a VERY anxious prostate cancer pt and booked for surgery 1 month after initial cards visit for workup    Can you please advise of timeline or help expedite testing to avoid surgery delays?

## 2023-12-11 ENCOUNTER — HOSPITAL ENCOUNTER (OUTPATIENT)
Dept: RADIOLOGY | Facility: HOSPITAL | Age: 68
Discharge: HOME | End: 2023-12-11
Attending: INTERNAL MEDICINE
Payer: MEDICARE

## 2023-12-11 ENCOUNTER — HOSPITAL ENCOUNTER (OUTPATIENT)
Dept: CARDIOLOGY | Facility: HOSPITAL | Age: 68
Discharge: HOME | End: 2023-12-11
Attending: INTERNAL MEDICINE
Payer: MEDICARE

## 2023-12-11 DIAGNOSIS — I10 ESSENTIAL HYPERTENSION: ICD-10-CM

## 2023-12-11 DIAGNOSIS — Z01.810 PREOPERATIVE CARDIOVASCULAR EXAMINATION: ICD-10-CM

## 2023-12-11 DIAGNOSIS — R00.2 PALPITATIONS: ICD-10-CM

## 2023-12-11 DIAGNOSIS — E78.2 MIXED HYPERLIPIDEMIA: ICD-10-CM

## 2023-12-11 DIAGNOSIS — R94.31 NONSPECIFIC ABNORMAL ELECTROCARDIOGRAM (ECG) (EKG): ICD-10-CM

## 2023-12-11 DIAGNOSIS — R94.31 EKG ABNORMALITIES: ICD-10-CM

## 2023-12-11 PROCEDURE — 93018 NUCLEAR STRESS - CARDIOLOGY INTERPRETED (CUPID ONLY): ICD-10-PCS | Mod: ,,, | Performed by: INTERNAL MEDICINE

## 2023-12-11 PROCEDURE — 78452 NUCLEAR STRESS - CARDIOLOGY INTERPRETED (CUPID ONLY): ICD-10-PCS | Mod: 26,,, | Performed by: INTERNAL MEDICINE

## 2023-12-11 PROCEDURE — 93018 CV STRESS TEST I&R ONLY: CPT | Mod: ,,, | Performed by: INTERNAL MEDICINE

## 2023-12-11 PROCEDURE — A9502 TC99M TETROFOSMIN: HCPCS

## 2023-12-11 PROCEDURE — 78452 HT MUSCLE IMAGE SPECT MULT: CPT | Mod: 26,,, | Performed by: INTERNAL MEDICINE

## 2023-12-11 PROCEDURE — 78452 HT MUSCLE IMAGE SPECT MULT: CPT

## 2023-12-11 PROCEDURE — 93016 NUCLEAR STRESS - CARDIOLOGY INTERPRETED (CUPID ONLY): ICD-10-PCS | Mod: ,,, | Performed by: NURSE PRACTITIONER

## 2023-12-11 PROCEDURE — 93016 CV STRESS TEST SUPVJ ONLY: CPT | Mod: ,,, | Performed by: NURSE PRACTITIONER

## 2023-12-12 LAB
CV STRESS BASE HR: 56 BPM
DIASTOLIC BLOOD PRESSURE: 82 MMHG
NUC REST DIASTOLIC VOLUME INDEX: 68
NUC REST EJECTION FRACTION: 69
NUC REST SYSTOLIC VOLUME INDEX: 21
NUC STRESS DIASTOLIC VOLUME INDEX: 72
NUC STRESS EJECTION FRACTION: 69 %
NUC STRESS SYSTOLIC VOLUME INDEX: 22
OHS CV CPX 1 MINUTE RECOVERY HEART RATE: 121 BPM
OHS CV CPX 85 PERCENT MAX PREDICTED HEART RATE MALE: 128
OHS CV CPX ESTIMATED METS: 10
OHS CV CPX MAX PREDICTED HEART RATE: 151
OHS CV CPX PATIENT IS FEMALE: 0
OHS CV CPX PATIENT IS MALE: 1
OHS CV CPX PEAK DIASTOLIC BLOOD PRESSURE: 81 MMHG
OHS CV CPX PEAK HEAR RATE: 142 BPM
OHS CV CPX PEAK RATE PRESSURE PRODUCT: NORMAL
OHS CV CPX PEAK SYSTOLIC BLOOD PRESSURE: 167 MMHG
OHS CV CPX PERCENT MAX PREDICTED HEART RATE ACHIEVED: 94
OHS CV CPX RATE PRESSURE PRODUCT PRESENTING: 7448
STRESS ECHO POST EXERCISE DUR MIN: 7 MINUTES
STRESS ECHO POST EXERCISE DUR SEC: 1 SECONDS
SYSTOLIC BLOOD PRESSURE: 133 MMHG

## 2023-12-13 ENCOUNTER — TELEPHONE (OUTPATIENT)
Dept: CARDIOLOGY | Facility: CLINIC | Age: 68
End: 2023-12-13
Payer: MEDICARE

## 2023-12-13 DIAGNOSIS — R00.2 PALPITATIONS: Primary | ICD-10-CM

## 2023-12-13 NOTE — TELEPHONE ENCOUNTER
----- Message from Luciano Guardado sent at 12/13/2023 10:43 AM CST -----  Regarding: Return Call  Contact: patient  Type:  Patient Returning Call    Who Called:patient  Who Left Message for Patient:office staff  Does the patient know what this is regarding?:results/ appointment/ Monitor placement  Would the patient rather a call back or a response via [facility]? Please call  Best Call Back Number:[phone]  Additional Information:

## 2023-12-13 NOTE — TELEPHONE ENCOUNTER
Marlyn scheduled him for the holter on Friday at 10:30 we ascheduled his follow-up for 12/29/2023@ 1 pm. I mesaged meagan and macrina to override that appointment

## 2023-12-15 ENCOUNTER — HOSPITAL ENCOUNTER (OUTPATIENT)
Dept: CARDIOLOGY | Facility: CLINIC | Age: 68
Discharge: HOME | End: 2023-12-15
Attending: INTERNAL MEDICINE
Payer: MEDICARE

## 2023-12-15 DIAGNOSIS — R00.2 PALPITATIONS: ICD-10-CM

## 2023-12-15 PROCEDURE — 93244 EXT ECG>48HR<7D REV&INTERPJ: CPT | Mod: ,,, | Performed by: INTERNAL MEDICINE

## 2023-12-18 ENCOUNTER — TELEPHONE (OUTPATIENT)
Dept: CARDIOLOGY | Facility: CLINIC | Age: 68
End: 2023-12-18
Payer: MEDICARE

## 2023-12-18 NOTE — TELEPHONE ENCOUNTER
Spoke to Nila Ashish. He is wearing the zio monitor and he pushed the bottom. He was worried he turned it off.

## 2023-12-18 NOTE — TELEPHONE ENCOUNTER
----- Message from Migdalia Dukes sent at 12/18/2023 10:43 AM CST -----  Type: Need Medical Advice  Who Called:Patient   Best callback number: [phone]  Additional Information: Patient called with questions about his monitor   Please call to further assist, Thanks.

## 2023-12-22 ENCOUNTER — HOSPITAL ENCOUNTER (OUTPATIENT)
Dept: RADIOLOGY | Facility: HOSPITAL | Age: 68
Discharge: HOME | End: 2023-12-22
Attending: UROLOGY
Payer: MEDICARE

## 2023-12-22 ENCOUNTER — HOSPITAL ENCOUNTER (OUTPATIENT)
Dept: PREADMISSION TESTING | Facility: HOSPITAL | Age: 68
Discharge: HOME | End: 2023-12-22
Attending: UROLOGY
Payer: MEDICARE

## 2023-12-22 VITALS — HEIGHT: 70 IN | BODY MASS INDEX: 24.91 KG/M2 | WEIGHT: 174 LBS

## 2023-12-22 DIAGNOSIS — C61 PROSTATE CANCER: ICD-10-CM

## 2023-12-22 PROCEDURE — 71046 X-RAY EXAM CHEST 2 VIEWS: CPT | Mod: TC

## 2023-12-22 PROCEDURE — 71046 XR CHEST PA AND LATERAL: ICD-10-PCS | Mod: 26,,, | Performed by: RADIOLOGY

## 2023-12-22 PROCEDURE — 71046 X-RAY EXAM CHEST 2 VIEWS: CPT | Mod: 26,,, | Performed by: RADIOLOGY

## 2023-12-22 NOTE — DISCHARGE INSTRUCTIONS
To confirm, Your doctor has instructed you that surgery is scheduled for:     Please report to [facility], Registration the morning of surgery. You must check-in and receive a wristband before going to your procedure.  [address] [place] [address] [place] [id number]    Pre-Op will call the afternoon prior to surgery between 1:00 and 6:00 PM with the final arrival time.  Phone number: [phone]    PLEASE NOTE:  The surgery schedule has many variables which may affect the time of your surgery case.  Family members should be available if your surgery time changes.  Plan to be here the day of your procedure between 4-6 hours.    MEDICATIONS:  TAKE ONLY THESE MEDICATIONS WITH A SMALL SIP OF WATER THE MORNING OF YOUR PROCEDURE:      DO NOT TAKE THESE MEDICATIONS 5-7 DAYS PRIOR to your procedure or per your surgeon's request:   ASPIRIN, ALEVE, ADVIL, IBUPROFEN, FISH OIL VITAMIN E, HERBALS  (May take Tylenol)    ONLY if you are prescribed any types of blood thinners such as:  Aspirin, Coumadin, Plavix, Pradaxa, Xarelto, Aggrenox, Effient, Eliquis, Savasya, Brilinta, or any other, ask your surgeon whether you should stop taking them and how long before surgery you should stop.  You may also need to verify with the prescribing physician if it is ok to stop your medication.      INSTRUCTIONS IMPORTANT!!  Do not eat or drink anything between midnight and the time of your procedure- this includes gum, mints, and candy.  Do not smoke or drink alcoholic beverages 24 hours prior to your procedure.  Shower the night before AND the morning of your procedure with a Chlorhexidine wash such as Hibiclens or Dial antibacterial soap from the neck down.  Do not get it on your face or in your eyes.  You may use your own shampoo and face wash. This helps your skin to be as bacteria free as possible.    If you wear contact lenses, dentures, hearing aids or glasses, bring a container to put them in during surgery and give to a  family member for safe keeping.  Please leave all jewelry, piercing's and valuables at home. You must remove your false eyelashes prior to surgery.    DO NOT remove hair from the surgery site.  Do not shave the incision site unless you are given specific instructions to do so.    ONLY if you have been diagnosed with sleep apnea please bring your C-PAP machine.  ONLY if you wear home oxygen please bring your portable oxygen tank the day of your procedure.  ONLY if you have a history of OPEN HEART SURGERY you will need a clearance from your Cardiologist per Anesthesia.      ONLY for patients requiring bowel prep, written instructions will be given by your doctor's office.  ONLY if you have a neuro stimulator, please bring the controller with you the morning of surgery  ONLY if a type and screen test is needed before surgery, please return:  If your doctor has scheduled you for an overnight stay, bring a small overnight bag with any personal items you need.  Make arrangements in advance for transportation home by a responsible adult.  It is not safe to drive a vehicle during the 24 hours after anesthesia.          All  facilities and properties are tobacco free.  Smoking is NOT allowed.   If you have any questions about these instructions, call Pre-Op Admit  Nursing at [phone] or the Pre-Op Day Surgery Unit at [phone].

## 2023-12-26 ENCOUNTER — HOSPITAL ENCOUNTER (OUTPATIENT)
Dept: CARDIOLOGY | Facility: HOSPITAL | Age: 68
Discharge: HOME | End: 2023-12-26
Attending: INTERNAL MEDICINE
Payer: MEDICARE

## 2023-12-26 VITALS — BODY MASS INDEX: 24.91 KG/M2 | HEIGHT: 70 IN | WEIGHT: 174 LBS

## 2023-12-26 DIAGNOSIS — E78.2 MIXED HYPERLIPIDEMIA: ICD-10-CM

## 2023-12-26 DIAGNOSIS — R94.31 NONSPECIFIC ABNORMAL ELECTROCARDIOGRAM (ECG) (EKG): ICD-10-CM

## 2023-12-26 DIAGNOSIS — Z01.810 PREOPERATIVE CARDIOVASCULAR EXAMINATION: ICD-10-CM

## 2023-12-26 DIAGNOSIS — R00.2 PALPITATIONS: ICD-10-CM

## 2023-12-26 DIAGNOSIS — R94.31 EKG ABNORMALITIES: ICD-10-CM

## 2023-12-26 DIAGNOSIS — I10 ESSENTIAL HYPERTENSION: ICD-10-CM

## 2023-12-26 PROCEDURE — 93306 ECHO (CUPID ONLY): ICD-10-PCS | Mod: 26,,, | Performed by: INTERNAL MEDICINE

## 2023-12-26 PROCEDURE — 93306 TTE W/DOPPLER COMPLETE: CPT

## 2023-12-26 PROCEDURE — 93306 TTE W/DOPPLER COMPLETE: CPT | Mod: 26,,, | Performed by: INTERNAL MEDICINE

## 2023-12-28 LAB
AORTIC ROOT ANNULUS: 3 CM
AORTIC VALVE CUSP SEPERATION: 1.8 CM
AV INDEX (PROSTH): 0.88
AV MEAN GRADIENT: 2 MMHG
AV PEAK GRADIENT: 5 MMHG
AV VALVE AREA BY VELOCITY RATIO: 2.82 CM²
AV VALVE AREA: 2.77 CM²
AV VELOCITY RATIO: 0.9
BSA FOR ECHO PROCEDURE: 1.97 M2
CV ECHO LV RWT: 0.5 CM
DOP CALC AO PEAK VEL: 1.09 M/S
DOP CALC AO VTI: 24 CM
DOP CALC LVOT AREA: 3.1 CM2
DOP CALC LVOT DIAMETER: 2 CM
DOP CALC LVOT PEAK VEL: 0.98 M/S
DOP CALC LVOT STROKE VOLUME: 66.57 CM3
DOP CALCLVOT PEAK VEL VTI: 21.2 CM
E WAVE DECELERATION TIME: 197 MSEC
E/A RATIO: 1.11
E/E' RATIO: 9.8 M/S
ECHO LV POSTERIOR WALL: 0.97 CM (ref 0.6–1.1)
FRACTIONAL SHORTENING: 35 % (ref 28–44)
INTERVENTRICULAR SEPTUM: 0.93 CM (ref 0.6–1.1)
IVRT: 141 MSEC
LEFT INTERNAL DIMENSION IN SYSTOLE: 2.49 CM (ref 2.1–4)
LEFT VENTRICLE DIASTOLIC VOLUME INDEX: 32.64 ML/M2
LEFT VENTRICLE DIASTOLIC VOLUME: 64.3 ML
LEFT VENTRICLE MASS INDEX: 57 G/M2
LEFT VENTRICLE SYSTOLIC VOLUME INDEX: 11.2 ML/M2
LEFT VENTRICLE SYSTOLIC VOLUME: 22.1 ML
LEFT VENTRICULAR INTERNAL DIMENSION IN DIASTOLE: 3.86 CM (ref 3.5–6)
LEFT VENTRICULAR MASS: 111.75 G
LV LATERAL E/E' RATIO: 8.91 M/S
LV SEPTAL E/E' RATIO: 10.89 M/S
LVOT MG: 2 MMHG
LVOT MV: 0.65 CM/S
MV PEAK A VEL: 0.88 M/S
MV PEAK E VEL: 0.98 M/S
MV STENOSIS PRESSURE HALF TIME: 82 MS
MV VALVE AREA P 1/2 METHOD: 2.68 CM2
OHS CV EVENT MONITOR DAY: 6
OHS CV HOLTER HOOKUP DATE: NORMAL
OHS CV HOLTER HOOKUP TIME: NORMAL
OHS CV HOLTER LENGTH DECIMAL HOURS: 165
OHS CV HOLTER LENGTH HOURS: 21
OHS CV HOLTER LENGTH MINUTES: 0
OHS CV HOLTER SCAN DATE: NORMAL
OHS CV HOLTER SINUS AVERAGE HR: 68 BPM
OHS CV HOLTER SINUS MAX HR: 169 BPM
OHS CV HOLTER SINUS MIN HR: 43 BPM
OHS CV HOLTER STUDY END DATE: NORMAL
OHS CV HOLTER STUDY END TIME: NORMAL
PISA TR MAX VEL: 2 M/S
RA PRESSURE ESTIMATED: 3 MMHG
RIGHT VENTRICULAR END-DIASTOLIC DIMENSION: 2.05 CM
RV TB RVSP: 5 MMHG
TDI LATERAL: 0.11 M/S
TDI SEPTAL: 0.09 M/S
TDI: 0.1 M/S
TR MAX PG: 16 MMHG
TV REST PULMONARY ARTERY PRESSURE: 19 MMHG
Z-SCORE OF LEFT VENTRICULAR DIMENSION IN END DIASTOLE: -3.83
Z-SCORE OF LEFT VENTRICULAR DIMENSION IN END SYSTOLE: -2.65

## 2023-12-29 ENCOUNTER — OFFICE VISIT (OUTPATIENT)
Dept: CARDIOLOGY | Facility: CLINIC | Age: 68
End: 2023-12-29
Payer: MEDICARE

## 2023-12-29 VITALS
BODY MASS INDEX: 24.48 KG/M2 | HEIGHT: 70 IN | RESPIRATION RATE: 16 BRPM | DIASTOLIC BLOOD PRESSURE: 68 MMHG | HEART RATE: 74 BPM | OXYGEN SATURATION: 96 % | WEIGHT: 171 LBS | SYSTOLIC BLOOD PRESSURE: 136 MMHG

## 2023-12-29 DIAGNOSIS — Z01.810 PREOPERATIVE CARDIOVASCULAR EXAMINATION: Primary | ICD-10-CM

## 2023-12-29 DIAGNOSIS — R00.2 PALPITATIONS: ICD-10-CM

## 2023-12-29 DIAGNOSIS — R94.31 EKG ABNORMALITIES: ICD-10-CM

## 2023-12-29 DIAGNOSIS — I10 ESSENTIAL HYPERTENSION: ICD-10-CM

## 2023-12-29 DIAGNOSIS — I47.10 PAROXYSMAL SVT (SUPRAVENTRICULAR TACHYCARDIA): ICD-10-CM

## 2023-12-29 DIAGNOSIS — E78.2 MIXED HYPERLIPIDEMIA: ICD-10-CM

## 2023-12-29 PROCEDURE — 99213 OFFICE O/P EST LOW 20 MIN: CPT | Mod: PBBFAC,PN | Performed by: INTERNAL MEDICINE

## 2023-12-29 PROCEDURE — 99214 PR OFFICE/OUTPT VISIT, EST, LEVL IV, 30-39 MIN: ICD-10-PCS | Mod: S$PBB,,, | Performed by: INTERNAL MEDICINE

## 2023-12-29 PROCEDURE — 99999 PR PBB SHADOW E&M-EST. PATIENT-LVL III: CPT | Mod: PBBFAC,,, | Performed by: INTERNAL MEDICINE

## 2023-12-29 PROCEDURE — 99999 PR PBB SHADOW E&M-EST. PATIENT-LVL III: ICD-10-PCS | Mod: PBBFAC,,, | Performed by: INTERNAL MEDICINE

## 2023-12-29 PROCEDURE — 99214 OFFICE O/P EST MOD 30 MIN: CPT | Mod: S$PBB,,, | Performed by: INTERNAL MEDICINE

## 2023-12-29 NOTE — PROGRESS NOTES
Subjective:    Patient ID:  Ashish Dave is a 68 y.o. male     Chief Complaint   Patient presents with    Results     Stress, echo, and zio results     Takes his losartan prn per Dr. Kemp        HPI:  Mr Ashish Dave is a 68 y.o. male here for follow-up.    Patient has been doing well no specific complaints at the present time.  Patient is scheduled for surgery soon and is contemplating his surgery.  He also has an option of having radiation beam treatment.      Review of patient's allergies indicates:   Allergen Reactions    Adhesive Other (See Comments)     SKIN WAS RED       Past Medical History:   Diagnosis Date    Arthritis     Cancer 12/2023    PROSTATE    Chronic calculous cholecystitis 08/12/2019    COVID-19     Essential (primary) hypertension 03/08/2016    GERD (gastroesophageal reflux disease)      Past Surgical History:   Procedure Laterality Date    CHOLECYSTECTOMY      COLONOSCOPY  2016    Dr Sarbjit aquino 4 yr    ESOPHAGOGASTRODUODENOSCOPY N/A 6/6/2022    Procedure: EGD (ESOPHAGOGASTRODUODENOSCOPY);  Surgeon: Pj Syed MD;  Location: [facility];  Service: Endoscopy;  Laterality: N/A;    GALLBLADDER SURGERY  08/2019    KNEE SURGERY Right     x's3    LAPAROSCOPIC CHOLECYSTECTOMY N/A 8/12/2019    Procedure: CHOLECYSTECTOMY, LAPAROSCOPIC;  Surgeon: Alphonso Freeman III, MD;  Location: [facility];  Service: General;  Laterality: N/A;    UPPER GASTROINTESTINAL ENDOSCOPY       Social History     Tobacco Use    Smoking status: Never    Smokeless tobacco: Never   Substance Use Topics    Alcohol use: Not Currently    Drug use: Never     Family History   Problem Relation Age of Onset    Stroke Mother     No Known Problems Father     Ulcerative colitis Son     Colon cancer Neg Hx     Colon polyps Neg Hx     Crohn's disease Neg Hx     Esophageal cancer Neg Hx     Stomach cancer Neg Hx         Review of Systems:   Constitution: Negative for diaphoresis and fever.   HEENT: Negative for nosebleeds.     Cardiovascular: Negative for chest pain       No dyspnea on exertion       No leg swelling        No palpitations  Respiratory: Negative for shortness of breath and wheezing.    Hematologic/Lymphatic: Negative for bleeding problem. Does not bruise/bleed easily.   Skin: Negative for color change and rash.   Musculoskeletal: Negative for falls and myalgias.   Gastrointestinal: Negative for hematemesis and hematochezia.   Genitourinary: Negative for hematuria.   Neurological: Negative for dizziness and light-headedness.   Psychiatric/Behavioral: Negative for altered mental status and memory loss.          Objective:        Vitals:    12/29/23 1303   BP: 136/68   Pulse: 74   Resp: 16       Lab Results   Component Value Date    WBC 7.41 12/22/2023    HGB 15.4 12/22/2023    HCT 46.2 12/22/2023     12/22/2023    CHOL 161 03/03/2023    TRIG 64 03/03/2023    HDL 71 03/03/2023    ALT 24 03/03/2023    AST 21 03/03/2023     12/22/2023    K 3.9 12/22/2023     12/22/2023    CREATININE 0.8 12/22/2023    BUN 15 12/22/2023    CO2 25 12/22/2023    TSH 2.48 01/31/2020    PSA 2.5 02/04/2019    INR 1.0 12/22/2023    MICROALBUR 0.2 02/25/2022        ECHOCARDIOGRAM RESULTS  Results for orders placed during the hospital encounter of 12/26/23    Echo    Interpretation Summary    Left Ventricle: The left ventricle is normal in size. Normal wall thickness. There is concentric remodeling. Normal wall motion. There is normal systolic function with a visually estimated ejection fraction of 60 - 65%. There is normal diastolic function.    Right Ventricle: Normal right ventricular cavity size. Wall thickness is normal. Right ventricle wall motion  is normal. Systolic function is normal.    Pulmonary Artery: The estimated pulmonary artery systolic pressure is 19 mmHg.    IVC/SVC: Normal venous pressure at 3 mmHg.        CURRENT/PREVIOUS VISIT EKG  Results for orders placed or performed in visit on 12/07/23   IN OFFICE EKG  12-LEAD (to [place])    Collection Time: 12/07/23  3:09 PM    Narrative    Test Reason : R94.31,    Vent. Rate : 068 BPM     Atrial Rate : 068 BPM     P-R Int : 178 ms          QRS Dur : 080 ms      QT Int : 410 ms       P-R-T Axes : 061 029 052 degrees     QTc Int : 435 ms    Normal sinus rhythm  Low voltage QRS  Possible Inferior infarct (cited on or before 07-DEC-2023)  Cannot rule out Anterior infarct (cited on or before 07-DEC-2023)  Abnormal ECG  When compared with ECG of 07-DEC-2023 15:09,  No significant change was found  Confirmed by Mesfin Apodaca MD ([id number]) on 12/15/2023 6:55:01 PM    Referred By: SUKH BARLOW           Confirmed By:Mesfin Apodaca MD     No valid procedures specified.   Results for orders placed during the hospital encounter of 12/11/23    Nuclear Stress - Cardiology Interpreted    Interpretation Summary    Normal myocardial perfusion scan. There is no evidence of myocardial ischemia or infarction.    The gated perfusion images showed an ejection fraction of 69% at rest. The gated perfusion images showed an ejection fraction of 69% post stress.    There is normal wall motion at rest and post stress.    LV cavity size is normal at rest and normal at stress.    The ECG portion of the study is negative for ischemia.    The patient reported no chest pain during the stress test.    There were no arrhythmias during stress.    The patient exercised for 7 minutes 1 seconds on a Eddi protocol, corresponding to a functional capacity of 10 METS, achieving a peak heart rate of 142 bpm, which is 94 % of the age predicted maximum heart rate.      Physical Exam:  CONSTITUTIONAL: No fever, no chills  HEENT: Normocephalic, atraumatic,pupils reactive to light                 NECK:  No JVD no carotid bruit  CVS: S1S2+, RRR, systolic murmurs,   LUNGS: Clear  ABDOMEN: Soft, NT, BS+  EXTREMITIES: No cyanosis, edema  : No schafer catheter  NEURO: AAO X 3  PSY: Normal affect      Medication List with  Changes/Refills   Current Medications    ASCORBIC ACID, VITAMIN C, ORAL    Take 4,000 mg by mouth 2 (two) times a day.    LOSARTAN (COZAAR) 25 MG TABLET    Take 1 tablet (25 mg total) by mouth once daily.    VITAMIN D (VITAMIN D3) 1000 UNITS TAB    Take 4,000 Units by mouth once daily.     Conclusion    Predominant underlying rhythm was Sinus Rhythm.    Patient had a min HR of 43 bpm, max HR of 169 bpm, and avg HR of 68 bpm.    13 Supraventricular Tachycardia runs occurred, the run with the fastest interval lasting 4 beats with a max rate of 169 bpm, the longest lasting 9 beats with an avg rate of 112 bpm.    Isolated SVEs were rare (<1.0%, 799), SVE Couplets were rare (<1.0%, 73), and SVE Triplets were rare (<1.0%, 33).    Isolated VEs were rare (<1.0%, 50), VE Couplets were rare (<1.0%, 1), and no VE Triplets were present.    The patient complained of 2 episodes. The symptom(s) included palpitations. The corresponding rhythm to the patient reported event was normal sinus rhythm with a normal NE interval with rates of 67 bpm and 89 bpm.    There were 3 auto-triggered events corresponding with normal sinus rhythm with rates varying from 60 bpm to 95 bpm.    See full report     Assessment:       1. Preoperative cardiovascular examination    2. Paroxysmal SVT (supraventricular tachycardia)    3. Essential hypertension    4. Mixed hyperlipidemia    5. Palpitations    6. EKG abnormalities         Plan:   1. Preoperative cardiovascular examination   Patient underwent stress myocardial perfusion study.  And has normal myocardial perfusion study no evidence of reversible ischemia.  He also underwent 2D echocardiogram for LV function ejection fraction and he has normal LV function and normal RV function with normal ejection fraction.  2. Paroxysmal SVT   Patient had irregular beats and also had about 16 runs of very short 3-5 beat SVTs intermittently over the period of time.  No ventricular arrhythmias.  Patient would  benefit from a small dose of beta-blocker or calcium channel blocker.  His current heart rate is 74 an average heart rate on the Zio cardiac monitoring was 68 beats per minute.  Will try Cardizem 30 mg half a tablet p.o. t.i.d. to see how well it it tolerates it.  3. Essential hypertension  Patient's blood pressure is 136/68  and he is currently on losartan 25 mg p.o. daily continue the same.  4. Mixed hyperlipidemia   At the present time patient is not on any specific cholesterol medication.  He follows up with his primary physician  5. Continue current management and I will see him back in the office in 6 months time.            Problem List Items Addressed This Visit    None  Visit Diagnoses       Preoperative cardiovascular examination    -  Primary    Paroxysmal SVT (supraventricular tachycardia)        Essential hypertension        Mixed hyperlipidemia        Palpitations        EKG abnormalities                No follow-ups on file.

## 2024-01-02 ENCOUNTER — ANESTHESIA EVENT (OUTPATIENT)
Dept: SURGERY | Facility: HOSPITAL | Age: 69
DRG: 708 | End: 2024-01-02
Payer: MEDICARE

## 2024-01-02 ENCOUNTER — NURSE TRIAGE (OUTPATIENT)
Dept: ADMINISTRATIVE | Facility: CLINIC | Age: 69
End: 2024-01-02
Payer: MEDICARE

## 2024-01-02 NOTE — TELEPHONE ENCOUNTER
Pt called in with question- scheduled for surgery tomorrow with urology and asking if it is ok he use flonase right now being so close to his surgery. Unable to find info in pts pre-op info/instructions. Message routed to providers office for call back by nurse within one hour. Pt advised. No further questions at this time.  Reason for Disposition   Nursing judgment    Protocols used: Information Only Call - No Triage-A-OH

## 2024-01-02 NOTE — OR NURSING
"Dr. Apodaca's office note (Cardiologist) shown to Dr. Reynolds with Anesthesia to make sure OK to proceed due to the note not specifically stating, "cleared for surgery." Stated OK to proceed.  "

## 2024-01-03 ENCOUNTER — ANESTHESIA (OUTPATIENT)
Dept: SURGERY | Facility: HOSPITAL | Age: 69
DRG: 708 | End: 2024-01-03
Payer: MEDICARE

## 2024-01-03 ENCOUNTER — HOSPITAL ENCOUNTER (INPATIENT)
Facility: HOSPITAL | Age: 69
LOS: 1 days | Discharge: HOME | DRG: 708 | End: 2024-01-04
Attending: UROLOGY | Admitting: UROLOGY
Payer: MEDICARE

## 2024-01-03 DIAGNOSIS — C61 PROSTATE CANCER: ICD-10-CM

## 2024-01-03 LAB
ANION GAP SERPL CALC-SCNC: 7 MMOL/L (ref 8–16)
BASOPHILS # BLD AUTO: 0.05 K/UL (ref 0–0.2)
BASOPHILS NFR BLD: 0.3 % (ref 0–1.9)
BUN SERPL-MCNC: 13 MG/DL (ref 8–23)
CALCIUM SERPL-MCNC: 7.6 MG/DL (ref 8.7–10.5)
CHLORIDE SERPL-SCNC: 105 MMOL/L (ref 95–110)
CO2 SERPL-SCNC: 25 MMOL/L (ref 23–29)
CREAT SERPL-MCNC: 0.8 MG/DL (ref 0.5–1.4)
DIFFERENTIAL METHOD BLD: ABNORMAL
EOSINOPHIL # BLD AUTO: 0 K/UL (ref 0–0.5)
EOSINOPHIL NFR BLD: 0 % (ref 0–8)
ERYTHROCYTE [DISTWIDTH] IN BLOOD BY AUTOMATED COUNT: 13.9 % (ref 11.5–14.5)
EST. GFR  (NO RACE VARIABLE): >60 ML/MIN/1.73 M^2
GLUCOSE SERPL-MCNC: 149 MG/DL (ref 70–110)
HCT VFR BLD AUTO: 37.6 % (ref 40–54)
HGB BLD-MCNC: 12.5 G/DL (ref 14–18)
IMM GRANULOCYTES # BLD AUTO: 0.05 K/UL (ref 0–0.04)
IMM GRANULOCYTES NFR BLD AUTO: 0.3 % (ref 0–0.5)
LYMPHOCYTES # BLD AUTO: 0.4 K/UL (ref 1–4.8)
LYMPHOCYTES NFR BLD: 2.5 % (ref 18–48)
MCH RBC QN AUTO: 30.6 PG (ref 27–31)
MCHC RBC AUTO-ENTMCNC: 33.2 G/DL (ref 32–36)
MCV RBC AUTO: 92 FL (ref 82–98)
MONOCYTES # BLD AUTO: 1 K/UL (ref 0.3–1)
MONOCYTES NFR BLD: 6.4 % (ref 4–15)
NEUTROPHILS # BLD AUTO: 14 K/UL (ref 1.8–7.7)
NEUTROPHILS NFR BLD: 90.5 % (ref 38–73)
NRBC BLD-RTO: 0 /100 WBC
PLATELET # BLD AUTO: 178 K/UL (ref 150–450)
PMV BLD AUTO: 10 FL (ref 9.2–12.9)
POTASSIUM SERPL-SCNC: 4.3 MMOL/L (ref 3.5–5.1)
RBC # BLD AUTO: 4.08 M/UL (ref 4.6–6.2)
SODIUM SERPL-SCNC: 137 MMOL/L (ref 136–145)
WBC # BLD AUTO: 15.41 K/UL (ref 3.9–12.7)

## 2024-01-03 PROCEDURE — 63600175 PHARM REV CODE 636 W HCPCS: Performed by: UROLOGY

## 2024-01-03 PROCEDURE — 25000003 PHARM REV CODE 250: Performed by: UROLOGY

## 2024-01-03 PROCEDURE — 36415 COLL VENOUS BLD VENIPUNCTURE: CPT | Performed by: UROLOGY

## 2024-01-03 PROCEDURE — 63600175 PHARM REV CODE 636 W HCPCS: Performed by: NURSE ANESTHETIST, CERTIFIED REGISTERED

## 2024-01-03 PROCEDURE — S5010 5% DEXTROSE AND 0.45% SALINE: HCPCS | Performed by: UROLOGY

## 2024-01-03 PROCEDURE — 71000039 HC RECOVERY, EACH ADD'L HOUR: Performed by: UROLOGY

## 2024-01-03 PROCEDURE — 36000713 HC OR TIME LEV V EA ADD 15 MIN: Performed by: UROLOGY

## 2024-01-03 PROCEDURE — 07BC4ZX EXCISION OF PELVIS LYMPHATIC, PERCUTANEOUS ENDOSCOPIC APPROACH, DIAGNOSTIC: ICD-10-PCS | Performed by: UROLOGY

## 2024-01-03 PROCEDURE — 36000712 HC OR TIME LEV V 1ST 15 MIN: Performed by: UROLOGY

## 2024-01-03 PROCEDURE — 88307 TISSUE EXAM BY PATHOLOGIST: CPT | Mod: TC,59 | Performed by: PATHOLOGY

## 2024-01-03 PROCEDURE — 80048 BASIC METABOLIC PNL TOTAL CA: CPT | Performed by: UROLOGY

## 2024-01-03 PROCEDURE — 85025 COMPLETE CBC W/AUTO DIFF WBC: CPT | Performed by: UROLOGY

## 2024-01-03 PROCEDURE — C1729 CATH, DRAINAGE: HCPCS | Performed by: UROLOGY

## 2024-01-03 PROCEDURE — 94799 UNLISTED PULMONARY SVC/PX: CPT | Mod: XB

## 2024-01-03 PROCEDURE — 8E0W4CZ ROBOTIC ASSISTED PROCEDURE OF TRUNK REGION, PERCUTANEOUS ENDOSCOPIC APPROACH: ICD-10-PCS | Performed by: UROLOGY

## 2024-01-03 PROCEDURE — D9220A PRA ANESTHESIA: Mod: CRNA,,, | Performed by: NURSE ANESTHETIST, CERTIFIED REGISTERED

## 2024-01-03 PROCEDURE — D9220A PRA ANESTHESIA: Mod: ANES,,, | Performed by: ANESTHESIOLOGY

## 2024-01-03 PROCEDURE — 11000001 HC ACUTE MED/SURG PRIVATE ROOM

## 2024-01-03 PROCEDURE — 25000003 PHARM REV CODE 250: Performed by: ANESTHESIOLOGY

## 2024-01-03 PROCEDURE — 27201423 OPTIME MED/SURG SUP & DEVICES STERILE SUPPLY: Performed by: UROLOGY

## 2024-01-03 PROCEDURE — 0VT04ZZ RESECTION OF PROSTATE, PERCUTANEOUS ENDOSCOPIC APPROACH: ICD-10-PCS | Performed by: UROLOGY

## 2024-01-03 PROCEDURE — 38571 LAPAROSCOPY LYMPHADENECTOMY: CPT | Mod: 51,,, | Performed by: UROLOGY

## 2024-01-03 PROCEDURE — 71000033 HC RECOVERY, INTIAL HOUR: Performed by: UROLOGY

## 2024-01-03 PROCEDURE — 55866 LAPS SURG PRST8ECT RPBIC RAD: CPT | Mod: ,,, | Performed by: UROLOGY

## 2024-01-03 PROCEDURE — 25000003 PHARM REV CODE 250: Performed by: NURSE ANESTHETIST, CERTIFIED REGISTERED

## 2024-01-03 PROCEDURE — 37000009 HC ANESTHESIA EA ADD 15 MINS: Performed by: UROLOGY

## 2024-01-03 PROCEDURE — 37000008 HC ANESTHESIA 1ST 15 MINUTES: Performed by: UROLOGY

## 2024-01-03 RX ORDER — CEFAZOLIN SODIUM 2 G/50ML
2 SOLUTION INTRAVENOUS
Status: DISCONTINUED | OUTPATIENT
Start: 2024-01-03 | End: 2024-01-04 | Stop reason: HOSPADM

## 2024-01-03 RX ORDER — LIDOCAINE HYDROCHLORIDE 20 MG/ML
INJECTION INTRAVENOUS
Status: DISCONTINUED | OUTPATIENT
Start: 2024-01-03 | End: 2024-01-03

## 2024-01-03 RX ORDER — ACETAMINOPHEN 10 MG/ML
INJECTION, SOLUTION INTRAVENOUS
Status: DISCONTINUED | OUTPATIENT
Start: 2024-01-03 | End: 2024-01-03

## 2024-01-03 RX ORDER — HEPARIN SODIUM 10000 [USP'U]/ML
INJECTION, SOLUTION INTRAVENOUS; SUBCUTANEOUS
Status: DISCONTINUED | OUTPATIENT
Start: 2024-01-03 | End: 2024-01-03 | Stop reason: HOSPADM

## 2024-01-03 RX ORDER — FAMOTIDINE 20 MG/1
20 TABLET, FILM COATED ORAL 2 TIMES DAILY
Status: DISCONTINUED | OUTPATIENT
Start: 2024-01-03 | End: 2024-01-04 | Stop reason: HOSPADM

## 2024-01-03 RX ORDER — SUCCINYLCHOLINE CHLORIDE 20 MG/ML
INJECTION INTRAMUSCULAR; INTRAVENOUS
Status: DISCONTINUED | OUTPATIENT
Start: 2024-01-03 | End: 2024-01-03

## 2024-01-03 RX ORDER — DIPHENHYDRAMINE HYDROCHLORIDE 50 MG/ML
12.5 INJECTION INTRAMUSCULAR; INTRAVENOUS EVERY 6 HOURS PRN
Status: DISCONTINUED | OUTPATIENT
Start: 2024-01-03 | End: 2024-01-03

## 2024-01-03 RX ORDER — FENTANYL CITRATE 50 UG/ML
25 INJECTION, SOLUTION INTRAMUSCULAR; INTRAVENOUS EVERY 5 MIN PRN
Status: DISCONTINUED | OUTPATIENT
Start: 2024-01-03 | End: 2024-01-03

## 2024-01-03 RX ORDER — POLYETHYLENE GLYCOL 3350 17 G/17G
17 POWDER, FOR SOLUTION ORAL DAILY
Status: DISCONTINUED | OUTPATIENT
Start: 2024-01-04 | End: 2024-01-04 | Stop reason: HOSPADM

## 2024-01-03 RX ORDER — DEXTROSE MONOHYDRATE AND SODIUM CHLORIDE 5; .45 G/100ML; G/100ML
INJECTION, SOLUTION INTRAVENOUS CONTINUOUS
Status: DISCONTINUED | OUTPATIENT
Start: 2024-01-03 | End: 2024-01-04 | Stop reason: HOSPADM

## 2024-01-03 RX ORDER — ROCURONIUM BROMIDE 10 MG/ML
INJECTION, SOLUTION INTRAVENOUS
Status: DISCONTINUED | OUTPATIENT
Start: 2024-01-03 | End: 2024-01-03

## 2024-01-03 RX ORDER — ACETAMINOPHEN 325 MG/1
650 TABLET ORAL EVERY 4 HOURS PRN
Status: DISCONTINUED | OUTPATIENT
Start: 2024-01-03 | End: 2024-01-04 | Stop reason: HOSPADM

## 2024-01-03 RX ORDER — ONDANSETRON HYDROCHLORIDE 2 MG/ML
4 INJECTION, SOLUTION INTRAVENOUS EVERY 6 HOURS PRN
Status: DISCONTINUED | OUTPATIENT
Start: 2024-01-03 | End: 2024-01-04 | Stop reason: HOSPADM

## 2024-01-03 RX ORDER — PROPOFOL 10 MG/ML
VIAL (ML) INTRAVENOUS
Status: DISCONTINUED | OUTPATIENT
Start: 2024-01-03 | End: 2024-01-03

## 2024-01-03 RX ORDER — MUPIROCIN 20 MG/G
OINTMENT TOPICAL 2 TIMES DAILY
Status: DISCONTINUED | OUTPATIENT
Start: 2024-01-03 | End: 2024-01-04 | Stop reason: HOSPADM

## 2024-01-03 RX ORDER — LIDOCAINE HYDROCHLORIDE 10 MG/ML
1 INJECTION, SOLUTION EPIDURAL; INFILTRATION; INTRACAUDAL; PERINEURAL ONCE
Status: DISCONTINUED | OUTPATIENT
Start: 2024-01-03 | End: 2024-01-03

## 2024-01-03 RX ORDER — DEXAMETHASONE SODIUM PHOSPHATE 4 MG/ML
INJECTION, SOLUTION INTRA-ARTICULAR; INTRALESIONAL; INTRAMUSCULAR; INTRAVENOUS; SOFT TISSUE
Status: DISCONTINUED | OUTPATIENT
Start: 2024-01-03 | End: 2024-01-03

## 2024-01-03 RX ORDER — TALC
6 POWDER (GRAM) TOPICAL NIGHTLY PRN
Status: DISCONTINUED | OUTPATIENT
Start: 2024-01-03 | End: 2024-01-04 | Stop reason: HOSPADM

## 2024-01-03 RX ORDER — TRAMADOL HYDROCHLORIDE 50 MG/1
50 TABLET ORAL EVERY 6 HOURS PRN
Status: DISCONTINUED | OUTPATIENT
Start: 2024-01-03 | End: 2024-01-04 | Stop reason: HOSPADM

## 2024-01-03 RX ORDER — DOCUSATE SODIUM 100 MG/1
100 CAPSULE, LIQUID FILLED ORAL 2 TIMES DAILY
Status: DISCONTINUED | OUTPATIENT
Start: 2024-01-03 | End: 2024-01-04 | Stop reason: HOSPADM

## 2024-01-03 RX ORDER — FENTANYL CITRATE 50 UG/ML
INJECTION, SOLUTION INTRAMUSCULAR; INTRAVENOUS
Status: DISCONTINUED | OUTPATIENT
Start: 2024-01-03 | End: 2024-01-03

## 2024-01-03 RX ORDER — CEFAZOLIN SODIUM 2 G/50ML
2 SOLUTION INTRAVENOUS
Status: COMPLETED | OUTPATIENT
Start: 2024-01-03 | End: 2024-01-03

## 2024-01-03 RX ORDER — MIDAZOLAM HYDROCHLORIDE 1 MG/ML
INJECTION INTRAMUSCULAR; INTRAVENOUS
Status: DISCONTINUED | OUTPATIENT
Start: 2024-01-03 | End: 2024-01-03

## 2024-01-03 RX ORDER — LOSARTAN POTASSIUM 25 MG/1
25 TABLET ORAL DAILY
Status: DISCONTINUED | OUTPATIENT
Start: 2024-01-03 | End: 2024-01-04 | Stop reason: HOSPADM

## 2024-01-03 RX ORDER — KETOROLAC TROMETHAMINE 30 MG/ML
INJECTION, SOLUTION INTRAMUSCULAR; INTRAVENOUS
Status: DISCONTINUED | OUTPATIENT
Start: 2024-01-03 | End: 2024-01-03

## 2024-01-03 RX ORDER — BUPIVACAINE HYDROCHLORIDE 5 MG/ML
INJECTION, SOLUTION EPIDURAL; INTRACAUDAL
Status: DISCONTINUED | OUTPATIENT
Start: 2024-01-03 | End: 2024-01-03 | Stop reason: HOSPADM

## 2024-01-03 RX ORDER — HYDROMORPHONE HYDROCHLORIDE 2 MG/ML
0.2 INJECTION, SOLUTION INTRAMUSCULAR; INTRAVENOUS; SUBCUTANEOUS EVERY 5 MIN PRN
Status: DISCONTINUED | OUTPATIENT
Start: 2024-01-03 | End: 2024-01-03

## 2024-01-03 RX ORDER — ONDANSETRON HYDROCHLORIDE 2 MG/ML
INJECTION, SOLUTION INTRAMUSCULAR; INTRAVENOUS
Status: DISCONTINUED | OUTPATIENT
Start: 2024-01-03 | End: 2024-01-03

## 2024-01-03 RX ORDER — PHENYLEPHRINE HYDROCHLORIDE 10 MG/ML
INJECTION INTRAVENOUS
Status: DISCONTINUED | OUTPATIENT
Start: 2024-01-03 | End: 2024-01-03

## 2024-01-03 RX ORDER — SIMETHICONE 80 MG
2 TABLET,CHEWABLE ORAL 3 TIMES DAILY
Status: DISCONTINUED | OUTPATIENT
Start: 2024-01-03 | End: 2024-01-04 | Stop reason: HOSPADM

## 2024-01-03 RX ORDER — ACETAMINOPHEN 10 MG/ML
1000 INJECTION, SOLUTION INTRAVENOUS EVERY 8 HOURS
Status: COMPLETED | OUTPATIENT
Start: 2024-01-03 | End: 2024-01-04

## 2024-01-03 RX ORDER — SODIUM CHLORIDE, SODIUM LACTATE, POTASSIUM CHLORIDE, CALCIUM CHLORIDE 600; 310; 30; 20 MG/100ML; MG/100ML; MG/100ML; MG/100ML
INJECTION, SOLUTION INTRAVENOUS CONTINUOUS
Status: DISCONTINUED | OUTPATIENT
Start: 2024-01-03 | End: 2024-01-03

## 2024-01-03 RX ORDER — MORPHINE SULFATE 2 MG/ML
2 INJECTION, SOLUTION INTRAMUSCULAR; INTRAVENOUS EVERY 4 HOURS PRN
Status: DISCONTINUED | OUTPATIENT
Start: 2024-01-03 | End: 2024-01-04 | Stop reason: HOSPADM

## 2024-01-03 RX ORDER — OXYCODONE HYDROCHLORIDE 5 MG/1
5 TABLET ORAL
Status: DISCONTINUED | OUTPATIENT
Start: 2024-01-03 | End: 2024-01-03

## 2024-01-03 RX ORDER — ONDANSETRON HYDROCHLORIDE 2 MG/ML
4 INJECTION, SOLUTION INTRAVENOUS ONCE AS NEEDED
Status: DISCONTINUED | OUTPATIENT
Start: 2024-01-03 | End: 2024-01-03

## 2024-01-03 RX ORDER — MEPERIDINE HYDROCHLORIDE 50 MG/ML
12.5 INJECTION INTRAMUSCULAR; INTRAVENOUS; SUBCUTANEOUS ONCE AS NEEDED
Status: DISCONTINUED | OUTPATIENT
Start: 2024-01-03 | End: 2024-01-03

## 2024-01-03 RX ORDER — KETOROLAC TROMETHAMINE 30 MG/ML
15 INJECTION, SOLUTION INTRAMUSCULAR; INTRAVENOUS EVERY 6 HOURS
Status: DISCONTINUED | OUTPATIENT
Start: 2024-01-03 | End: 2024-01-04 | Stop reason: HOSPADM

## 2024-01-03 RX ADMIN — LIDOCAINE HYDROCHLORIDE 75 MG: 20 INJECTION, SOLUTION INTRAVENOUS at 07:01

## 2024-01-03 RX ADMIN — SODIUM CHLORIDE, SODIUM GLUCONATE, SODIUM ACETATE, POTASSIUM CHLORIDE AND MAGNESIUM CHLORIDE: 526; 502; 368; 37; 30 INJECTION, SOLUTION INTRAVENOUS at 11:01

## 2024-01-03 RX ADMIN — CEFAZOLIN SODIUM 2 G: 2 SOLUTION INTRAVENOUS at 07:01

## 2024-01-03 RX ADMIN — ACETAMINOPHEN 1000 MG: 10 INJECTION INTRAVENOUS at 01:01

## 2024-01-03 RX ADMIN — ROCURONIUM BROMIDE 20 MG: 10 INJECTION, SOLUTION INTRAVENOUS at 09:01

## 2024-01-03 RX ADMIN — CEFAZOLIN SODIUM 2 G: 2 SOLUTION INTRAVENOUS at 11:01

## 2024-01-03 RX ADMIN — PHENYLEPHRINE HYDROCHLORIDE 200 MCG: 10 INJECTION INTRAVENOUS at 11:01

## 2024-01-03 RX ADMIN — ROCURONIUM BROMIDE 20 MG: 10 INJECTION, SOLUTION INTRAVENOUS at 10:01

## 2024-01-03 RX ADMIN — ONDANSETRON 4 MG: 2 INJECTION INTRAMUSCULAR; INTRAVENOUS at 11:01

## 2024-01-03 RX ADMIN — SUCCINYLCHOLINE CHLORIDE 120 MG: 20 INJECTION, SOLUTION INTRAMUSCULAR; INTRAVENOUS at 07:01

## 2024-01-03 RX ADMIN — FAMOTIDINE 20 MG: 20 TABLET, FILM COATED ORAL at 09:01

## 2024-01-03 RX ADMIN — ROCURONIUM BROMIDE 5 MG: 10 INJECTION, SOLUTION INTRAVENOUS at 07:01

## 2024-01-03 RX ADMIN — TRAMADOL HYDROCHLORIDE 50 MG: 50 TABLET, COATED ORAL at 01:01

## 2024-01-03 RX ADMIN — KETOROLAC TROMETHAMINE 30 MG: 30 INJECTION, SOLUTION INTRAMUSCULAR; INTRAVENOUS at 11:01

## 2024-01-03 RX ADMIN — PHENYLEPHRINE HYDROCHLORIDE 100 MCG: 10 INJECTION INTRAVENOUS at 07:01

## 2024-01-03 RX ADMIN — ROCURONIUM BROMIDE 10 MG: 10 INJECTION, SOLUTION INTRAVENOUS at 08:01

## 2024-01-03 RX ADMIN — DEXAMETHASONE SODIUM PHOSPHATE 4 MG: 4 INJECTION, SOLUTION INTRA-ARTICULAR; INTRALESIONAL; INTRAMUSCULAR; INTRAVENOUS; SOFT TISSUE at 07:01

## 2024-01-03 RX ADMIN — FENTANYL CITRATE 100 MCG: 50 INJECTION, SOLUTION INTRAMUSCULAR; INTRAVENOUS at 07:01

## 2024-01-03 RX ADMIN — PROPOFOL 120 MG: 10 INJECTION, EMULSION INTRAVENOUS at 07:01

## 2024-01-03 RX ADMIN — SIMETHICONE 160 MG: 80 TABLET, CHEWABLE ORAL at 01:01

## 2024-01-03 RX ADMIN — PHENYLEPHRINE HYDROCHLORIDE 100 MCG: 10 INJECTION INTRAVENOUS at 08:01

## 2024-01-03 RX ADMIN — DEXTROSE AND SODIUM CHLORIDE: 5; 450 INJECTION, SOLUTION INTRAVENOUS at 11:01

## 2024-01-03 RX ADMIN — ROCURONIUM BROMIDE 35 MG: 10 INJECTION, SOLUTION INTRAVENOUS at 07:01

## 2024-01-03 RX ADMIN — DEXTROSE AND SODIUM CHLORIDE: 5; 450 INJECTION, SOLUTION INTRAVENOUS at 02:01

## 2024-01-03 RX ADMIN — DOCUSATE SODIUM 100 MG: 100 CAPSULE, LIQUID FILLED ORAL at 09:01

## 2024-01-03 RX ADMIN — KETOROLAC TROMETHAMINE 15 MG: 30 INJECTION, SOLUTION INTRAMUSCULAR; INTRAVENOUS at 11:01

## 2024-01-03 RX ADMIN — MIDAZOLAM HYDROCHLORIDE 2 MG: 1 INJECTION, SOLUTION INTRAMUSCULAR; INTRAVENOUS at 07:01

## 2024-01-03 RX ADMIN — MUPIROCIN: 20 OINTMENT TOPICAL at 09:01

## 2024-01-03 RX ADMIN — PHENYLEPHRINE HYDROCHLORIDE 200 MCG: 10 INJECTION INTRAVENOUS at 07:01

## 2024-01-03 RX ADMIN — ACETAMINOPHEN 1000 MG: 10 INJECTION INTRAVENOUS at 09:01

## 2024-01-03 RX ADMIN — ACETAMINOPHEN 1000 MG: 10 INJECTION, SOLUTION INTRAVENOUS at 07:01

## 2024-01-03 RX ADMIN — SIMETHICONE 160 MG: 80 TABLET, CHEWABLE ORAL at 03:01

## 2024-01-03 RX ADMIN — CEFAZOLIN SODIUM 2 G: 2 SOLUTION INTRAVENOUS at 06:01

## 2024-01-03 RX ADMIN — SIMETHICONE 160 MG: 80 TABLET, CHEWABLE ORAL at 09:01

## 2024-01-03 RX ADMIN — SUGAMMADEX 200 MG: 100 INJECTION, SOLUTION INTRAVENOUS at 11:01

## 2024-01-03 RX ADMIN — KETOROLAC TROMETHAMINE 15 MG: 30 INJECTION, SOLUTION INTRAMUSCULAR; INTRAVENOUS at 07:01

## 2024-01-03 RX ADMIN — PHENYLEPHRINE HYDROCHLORIDE 0.2 MCG/KG/MIN: 10 INJECTION INTRAVENOUS at 07:01

## 2024-01-03 RX ADMIN — ONDANSETRON 4 MG: 2 INJECTION INTRAMUSCULAR; INTRAVENOUS at 07:01

## 2024-01-03 RX ADMIN — SODIUM CHLORIDE, SODIUM GLUCONATE, SODIUM ACETATE, POTASSIUM CHLORIDE AND MAGNESIUM CHLORIDE: 526; 502; 368; 37; 30 INJECTION, SOLUTION INTRAVENOUS at 06:01

## 2024-01-03 NOTE — PROGRESS NOTES
Criteria per anesthesia for transfer to room  Tolerating po fluids Pain addressed with po analgesics Transferred per bed to 314 with telemetry Report given to Maulik

## 2024-01-03 NOTE — OP NOTE
[facility] Urology Operative Report     Date: 01/03/2024     Staff Surgeon: Phil Werner MD     Bedside Assistant: DIPAK Encarnacion     Pre-Op Diagnosis: prostate cancer     Post-Op Diagnosis: prostate cancer     Procedure(s) Performed:   Robot-assisted laparoscopic radical prostatectomy  Robot-assisted laparoscopic bilateral pelvic lymphadenectomy     Specimen(s):   1. PROSTATE AND SEMINAL VESICLES  2. PERIPROSTATIC FAT AND LYMPH NODES  3. RIGHT PELVIC LYMPH NODES  4. LEFT PELVIC LYMPH NODES  5. Posterior BLADDER NECK      Anesthesia: General endotracheal anesthesia     Findings: normal pelvic anatomy     Estimated Blood Loss: 150cc     Drains: 20fr schafer, 7 SENIA drain     Complications: none     Indications for procedure:  69 yo M with history of bph/luts which he preferred observation and recently returned with elevated psa yielding diagnosis of unfavorable intermediate risk prostate cancer maryam 4+3=7 appearing localized on staging imaging with MRI and PSMA scan. After extensive discussion of management options for his prostate cancer, he elected to proceed with robotic radical prostatectomy. The patient was made aware of the risks and benefits of the procedure including erectile dysfunction, incontinence, pain, infection, bleeding, injury to intraabdominal structures, including the rectum, urine leak, anastamotic leak, postoperative ileus, persistence recurrence and/or positive margin, and the risks of general anesthesia including heart attack, stroke, blood clot, and death.  He accepted the risks and he elects to proceed with the aforementioned procedure, and appropriate informed consent obtained.     Procedure in detail:  After appropriate informed consent was obtained, the patient was taken to the operating room and placed in supine split-leg position, pressure points padded, arms tucked, and legs secured with foam bolsters and silk tape. He was prepped and draped in sterile fashion. Preoperative  antibiotics, were given and WHO-approved time-out was performed. An 18 [place] schafer catheter was placed steriley on the field.      Veress needle used to insufflate to 15 mmHg after incision made supraumbilical midline  Once pneumoperitoneum was achieved, an 8mm robotic trocar was placed at this site without complication, and robotic endoscope passed through noting normal pelvic anatomy. Two left abdominal 8mm robotic trocars, and 1 right abdominal 8mm trocar were also placed under direct vision at this time, followed by 12mm right lower quadrant lateral and 5mm right upper quadrant medial assistant ports     Once all ports were placed, the patient was placed in steep Trendelenburg position and the robot was docked in standard fashion. We then began in the anterior approach by dropping the patients bladder in the standard fashion by dividing the bilateral medial umbilical ligaments and the space of retzius developed and bladder dropped in standard fashion.       With the bladder dropped and prostatic anatomy clear and periprostatic fat removed, proceeded with pelvic lymphadenectomy. Right pelvic Lymphadenectomy was performed using the standard template 1st identifying the external iliac vein and skeletonizing the lymph node packet away from it with blunt dissection tracing this packet above the pubic bone and using hemo lock clips to lyse high in coming lymphatics.  The lymph node packet was retracted away from the pelvic sidewall and the obturator nerve and vessels were visualized so the lymph node packet could be dissected free from the structures bluntly, and removed to sent for pathologic analysis.   Surgicel was placed in the bed of resection  after visualizing hemostasis. Left pelvic lymphadenectomy repeated in manner as described above.     Upon adequate defatting of the prostate and completion of lymph node dissection, the bilateral endopelvic fascia were incised from base to apex of the prostate taking  caution to sweep the levator muscles laterally. This did allow us to place a dorsal venous complex stitch. A 2-0 Vicryl on a CT-1 needle was passed into the pelvis, it was anchored to the periosteum of the pubic symphysis on the right with a Lapra-Ty and wrapped around the dorsal venous complex twice, with suture anchoring to the periosteum of the pubic symphysis on the left as well.  Once the dorsal venous complex stitch was placed, we did check the Coello catheter, which moved freely back and forth at this time, and proceeded with bladder neck dissection.       We then proceeded with anterior bladder neck dissection.  This was identified by placing traction on the Coello catheter and following the curve of the prostate around laterally. Once the anterior bladder neck was incised to make a cystotomy, the Coello catheter balloon was deflated and passed through the anterior cystotomy and placed on traction using the fourth robotic arm. The posterior bladder neck was incised to allow the posterior dissection to continue to develop the space between Denonvilliers' fascia between the bladder at the base of the prostate, after carefully tracing bladder neck left to right and lysing pedicles hemostatically to free circumferentially around right bladder neck to keep it in tact. Vessel sealer used on the prostatic pedicles to assist in opening up the space, after which time the ampulla of the vas deferens were identified bilaterally. For the posterior dissection, the vas deferens were freed up and transected low and placed to traction with the fourth arm to assist in dissecting free the bilateral seminal vesicles, which once dissected free were all placed to traction on the fourth arm of the prostate with the bilateral ampulla of the vas deferens.      After hemostatically taking lateral pedicles, Posterior dissection completed centrally after taking some of the lateral pedicles hemostatically, and pre rectal fat identified  and rectum dropped, and lateral borders now freed as above with view of the rectum down at all times.  After free posteriorly and laterally, dissection then carried forward with anterior dissection through the dorsal venous complex until the catheter could be visualized in the urethra, which was dissected around to gain adequate urethral length and then transected and freed any remaining rectourethralis attachments to the prostate.  Once the prostate was cut free, it was passed into the right lower quadrant for later retrieval. Again checked that the bed of resection was hemostatic, though spot bipolar used near pedicles at this time.       At this time, a single-armed 3-0 quill suture was passed into the abdomen and used as a running Isrrael stitch to reapproximate the Denonvilliers fascia at the base of the bladder to the rectourethralis muscle below the urethra for fascial reapproximation.  Before doing so, a specimen of posterior bladder neck was trimmed and sent for pathology.  Then a double-armed 3-0 Quill anastomotic suture was passed in. The first stitch of each was placed through the 5 o'clock position running one around to the left to reapproximate the posterior plate, and right handed anteriorly. Both sutures run around laterally until meeting anteriorly to complete anastamosis, reversing the suture across to bladder neck to tie down to complete the anastomosis. A small gap central posterior seen though schafer able to carefully pass without any violation or opening and good mucosal apposition of remainder of anastamosis and no issue passing final schafer.     After exchanging the 18-Argentine Schafer catheter for a 20-Argentine final Schafer catheter once all sutures tied down, the balloon filled with 12 mL sterile water. 150cc normal saline were then instilled through the schafer into the bladder and no leak noted x2. Despite this out of precuation from the above, SENIA drain utilized     Under direct vision, CrossBows  Regis-Nirmal-type fascial closing device was used to close the fascia of the lateral assistant port with #1 PDS suture, which was loosely placed to a hemostat so the port could be reintroduced. EndoCatch bag was placed into the abdomen through this lateral assistant port and the prostate and seminal vesicles and lymph node packets were placed  inside of it.  Suction again used to clear the lateral borders of resection, pelvic gutters, and abdominal cavity of any fluid. 7mm SENIA drain passed into left pelvis through 4th arm robotic trocar and secured with 2-0 nylon drain suture. Robotic instruments removed and the robot was undocked at this time completely.      At this time, laparoscopic needle drivers were used to pass the specimen bag to the midline camera port and the specimen was removed through the midline camera port after extending it approximately 1 cm. The fascia of this extraction site was closed with a running #1 PDS suture to achieve excellent fascial reapproximation.  A layer of 2 0 Vicryl deep dermal sutures was then placed overlying this tight fascial closure in the midline.  The fascial suture in the right lateral trocar site from the Regis-Nirmal was then tied down as well. With the fascia closed, 4-0 Monocryl skin sutures were then used in a subcuticular fashion to close all the remaining skin incisions which were overlaid with Dermabond. drain sponge/tegaderm to drain site.  Coello catheter was placed to the patient's leg with a StatLock and to gravity drainage.      The patient tolerated the procedure well. There were no complications. All instrument,sponge, and needle counts were correct x2 at the end of the case.     Disposition: Extubated and transferred to PACU without incident. The patient will be kept overnight and discharged once ambulatory and tolerating regular diet, likely tomorrow. Coello will remain indwelling 7-10 days postop

## 2024-01-03 NOTE — TRANSFER OF CARE
"Anesthesia Transfer of Care Note    Patient: Ashish Dave    Procedure(s) Performed: Procedure(s) (LRB):  ROBOTIC PROSTATECTOMY (N/A)  ROBOTIC LYMPHADENECTOMY, PELVIS (N/A)    Patient location: PACU    Anesthesia Type: general    Transport from OR: Transported from OR on 2-3 L/min O2 by NC with adequate spontaneous ventilation    Post pain: adequate analgesia    Post assessment: no apparent anesthetic complications and tolerated procedure well    Post vital signs: stable    Level of consciousness: responds to stimulation and sedated    Nausea/Vomiting: no nausea/vomiting    Complications: none    Transfer of care protocol was followed      Last vitals: Visit Vitals  /76 (BP Location: Right arm, Patient Position: Lying)   Pulse 79   Temp 36.9 °C (98.4 °F) (Temporal)   Resp (!) 30   Ht 5' 10" (1.778 m)   Wt 77.6 kg (171 lb)   SpO2 99%   BMI 24.54 kg/m²     "

## 2024-01-03 NOTE — H&P
[facility] Urology Progress Note:      Ashish Dave is a 67 y.o. male who presents for follow up of elevated psa s/p Uronav MRI fusion biopsy     He has been followed by Dr Kemp and noted to have BPH/LUTS since at least 2016. Has been taking prostate supplement capsule Now prostate health. Has been taking them daily, sometimes BID sometimes TID (Vitamin D-3, zinc, Selenium Saw Palmetto Phytosterols Quercetin Turmeric Root Pumpkin Seed Oil Lycopene Green Tea Extract Pomegranate flaxseed etc)  PSA: 2.8 on 3/1/18. Previously followed by Dr Ness, and then Dr Pradhan. Recalls his PSAs were always 1.1-1.2 and was told he had an enlarged prostate   Initial eval in 2/3/2019 noting:  AUA SS: 132 (3: urgency, weak stream; 2: emptying, straining; 1: frequency, intermittency, sleeping)  Usually no hesitancy, but does endorse weakening stream. 3/4 time is poor flow. Occ intermittency. Occ PV dribble  Father  at 87 and was diagnosed with prostate cancer in his 80s and it was removed. Generally averse to Rx meds. HEIDY 35g benign  Psa 19 was 2.5. Started flomax. Didn't follow up     Referred back by PCP in 2023 for psa elevation noting PSA 2.4 in 2020, 3.1 in , 3.22 in , 4/22 on 3/3/23, and 5.4 (15% free) on 23  AUA SS: 10/2 (2: intermittency, urgency, weak stream; 1: emptying, fgrequency, straining, sleeping)  Father had prostate cancer diagnosed in his late 80s  Quit most of his supplements as above - doing MVI with extra vitD and VitC and an OTC prostat formula, and garlic capsules, turmeric  In 2023 when psa up at routine screening with PCP noted recheck in 6 mos and refer back if elevated  As well noted -colonoscopy with Dr. Iglesias-RTC 5 years, EGD-Dr. Syed--gastric polyps removed recently.  Took flomax after visit 4 yrs ago but didn't continue. LUTS stable  - discussed cysto/prostate biopsy which he did not commit to scheduling but later sent message to schedule     10/5/23 MRI  prostate with 44g gland with neg PZ and a 2.3cm pirad3 lesion of left; region: base; zone: anterior transition zone.  Given equivocal lesion of anterior TZ advised options of cystoscopy and prostate biopsy locally with extra cores at anterior transition zone verses referral for MRI fusion prostate biopsy  10/11 did have concern for and NP visit for discolored semen but no change in LUTS and was given course of doxycycline. Elected to have Uronav  11/8/23 Uronav with Dr Roque - 15 cores, std 12 plus 3 cores of target lesion  PATH: target lesion negative but does have [place] 7 prostate ca as below  - LEFT APEX: [place] grade 3 + 4 = 7 (10% pattern 4), involving 20% total of 1/2 cores.   - LEFT MIDDLE: [place] grade 4 + 3 = 7 (60% pattern 4), involving 10% total of 1/2 cores.  HGPIN  - RIGHT APEX: Reva grade 4 + 3 = 7 (70% pattern 4), involving 30% total of 2/2 cores.       11/13/23:   Did well after biopsy no sig bleeding no fever no chills  AUA SS: 9/2 (2: Emptying, intermittency; 1: Frequency, urgency, weak stream, straining, sleeping)  Not taking Flomax, as in the past to not make much impact.  Review of MRI images note minimal trabeculations of bladder contour, and no significant intravesical extension  Reports that erections are not great, has ED.  Tried a few supplements over the counter previously which did not work.  Had heart rhyrthm problem years ago. Used to see cardiology. Only abd surg is radha samson  Discussed treatment options and more interested in surgery than radiation so initiated cardiac clearance and PSMA pet having discussed likely surgery in Jan 2024 if he proceeded.   Was alone at that visit.      In interim, PSMA PET CT negative. Upon seeing negative PET results pt expressed desire to proceed with surgery and return ASAP with wife to discuss so booked in today.  He has rescheduled his visit with cardiology to [facility] from 12/20 to 12/7 (ASHER Apodaca)  - Had heart rhyrthm problem years  "ago. Used to see cardiology. Referred at last visit noting need for clearance prior to any intervention - hx palpitations, skipped bets, need for cardiac meds  Here with his wife to review prior discussion, and answer further questions for treatment planning  Most interested in surgery, had questions re timing, repeat psa, significance of HGPIN on path report, etc     Focused Physical Exam:         Vitals:     12/01/23 0912   BP: (!) 142/80   Pulse: 73      Body mass index is 25.11 kg/m². Weight: 79.4 kg (175 lb) Height: 5' 10" (177.8 cm)      Abdomen: Soft, non-tender, nondistended, no CVA tenderness  RRR  CTAB    10 pt ros neg except as noted       ASSESSMENT   1. Prostate cancer  Procedure Order to Urology     Ambulatory referral/consult to Radiation Oncology                Plan   Extensive review again, as per prior visit, of his diagnosis of [place] 7 prostate cancer and treatment recommendation as well as extensive review of details of and side effect profile of treatment options including robotic radical prostatectomy versus external beam radiation therapy with concurrent androgen deprivation therapy.  All prior treatment discussion as per last visit reviewed today in the presence of patient and his wife so both could ask all questions.  As above, especially in the setting of his reviewed negative PSMA scan and no concerns of extraprostatic extension on MRI, that general recommended timeline to intervene is within about 3 months of diagnosis.  Certainly he is not at risk in the coming weeks, and again, despite his anxiety and desire to proceed ASAP, after extensive discussion of all workup, natural history of prostate cancer, his specific staging, as well as the need for preoperative cardiac workup and clearance, he was more comfortable with this timeline.  In answering all of his questions, we did review that there is no further need for PSA, as PSA can fluctuate and will not change treatment decision, as " all recommendations are based on composite of labs imaging and pathology, but with pathology on file and imaging, no further need for blood work as all recommendations would stand.  We also reviewed that the HGPIN is clinically insignificant from his prostate biopsy report in the setting of prostate cancer, as if this was a solitary finding it may indicate up to 25% chance of finding prostate cancer on future biopsy, but he has a diagnosis of malignancy.  Otherwise all questions were about treatment options, and as noted, extensive discussion about surgery versus ADT plus XRT.     After extensive discussion, he is still most inclined to proceed with robotic prostatectomy and wanted to be scheduled as soon as possible after the 1st of the year, and elected to plan this for 1/3/24, but also wanted referral to Radiation Oncology to discuss the radiotherapy option in further detail to make the best informed treatment decision, and after discussing where to go from this visit, he wanted to have surgery scheduled, and is most likely sure he will proceed, but still talk to Radiation Oncology, which is most reasonable so referral was placed a message sent to assist in scheduling.  He does have upcoming visit to establish care with cardiology next week, and certainly would need appropriate cardiac workup and clearance to proceed with robotic prostatectomy, and if there were any concerns on cardiac workup, did note that his heart takes precedence, and therefore he will already have discussed with Radiation Oncology this alternative treatment option, in case it is not safe for recommended to proceed with surgery based on cardiac workup.      Risks of surgery were discussed including but not limited to urinary incontinence, erectile dysfunction, injury to intraabdominal structures, urine leak, anastamotic leak, rectal injury, damage to ureters, hernia, postoperative ileus. We discussed concurrent pelvic lymphadenectomy with  surgery and it's associated risks.   Discussed implications of treatment in context of his baseline erectile function, at baseline poor.  Reviewed the neurovascular bundle and it investment along the prostate capsule, thus wanting to avoid any contact with capsular positive margin. Extensive discussion on management of post prostatectomy erectile dysfunction including rehabilitative protocols, options for on demand erections, noting oral medications, vacuum erection devices, intracavernosal injections, and ultimate possibility of penile prosthesis implantation should he not recover function on his own.      Extensive review of Kegel exercises as well with patient returning demonstration after instruction.  Discussed the importance of regimented pelvic floor exercises for regain of continence postoperatively. Discuss possible utility of pelvic floor physical therapy if needed.  Noted the likely benefit, at the expense of postop BIRGIT, on relief of longstanding prostate obstruction, and once BIRGIT recovered, likely improved urinary quality of life, and certain prevention of progression of obstructive changes at level of bladder and future urinary tract infections and complications of obstruction. Discussed the possibility of OAB or unmask irritative symptoms confounding the recovery of BIRGIT for which management may be needed, and will follow closely in the postoperative period.  Counseled on Kegel techniques, demonstrated return, advised to be regimented a few times a day a few sets today preoperatively to help facilitate postop return of continence      Also had extensive discussion with patient and wife about timeline, recovery, etc.  Again noted average length of catheterization is 1 week, after which he will be functionally independent except for heavy lifting greater than 10 lb of which this restriction is lifted at 4 weeks.       Robotic prostatectomy booked 1/3/24 - noted to be tentative pending cardiac workup and  radonc consultation  -> cards workup and cleared. Normal echo/stress but PSVT on holter x a few runs and on cardizem

## 2024-01-03 NOTE — ANESTHESIA PROCEDURE NOTES
Intubation    Date/Time: 1/3/2024 7:17 AM    Performed by: Jan Martinez CRNA  Authorized by: Brent Montez MD    Intubation:     Induction:  Intravenous    Intubated:  Postinduction    Mask Ventilation:  Easy mask    Attempts:  1    Attempted By:  Student    Method of Intubation:  Video laryngoscopy    Blade:  Gaona 3    Laryngeal View Grade: Grade I - full view of cords      Difficult Airway Encountered?: No      Complications:  None    Airway Device:  Oral endotracheal tube    Airway Device Size:  7.5    Style/Cuff Inflation:  Cuffed (inflated to minimal occlusive pressure)    Tube secured:  22    Secured at:  The lips    Placement Verified By:  Capnometry    Complicating Factors:  None    Findings Post-Intubation:  BS equal bilateral and atraumatic/condition of teeth unchanged

## 2024-01-03 NOTE — PLAN OF CARE
Pt did not receive prescription for cardizem from cardiology. Hooked to monitor. Anesthesia aware and examined at bedside.  aware. Ok to proceed with surgery per MD.       Pt prepped for surgery. Consents at bedside. Incentive spirometry taught by respiratory. Pt belongings given to pt's family. Wife set up with text alerts.

## 2024-01-03 NOTE — ANESTHESIA PREPROCEDURE EVALUATION
01/03/2024  Ashihs Dave is a 68 y.o., male.      Pre-op Assessment    I have reviewed the Patient Summary Reports.     I have reviewed the Nursing Notes. I have reviewed the NPO Status.   I have reviewed the Medications.     Review of Systems  Anesthesia Hx:  No problems with previous Anesthesia                Cardiovascular:     Hypertension    Dysrhythmias           Neg stress test  Normal EF                   Hypertension         Pulmonary:  Pneumonia                  Pulmonary Infection:  Pneumonia.     Hepatic/GI:     GERD      Gerd          Musculoskeletal:  Arthritis        Arthritis     Spine Disorders: cervical            Neurological:      Arthritis                               Physical Exam  General: Well nourished    Airway:  Mallampati: II   Mouth Opening: Normal  TM Distance: Normal  Neck ROM: Normal ROM        Anesthesia Plan  Type of Anesthesia, risks & benefits discussed:    Anesthesia Type: Gen ETT  Intra-op Monitoring Plan: Standard ASA Monitors  Post Op Pain Control Plan: multimodal analgesia  Induction:  IV  Airway Plan: Video  Informed Consent: Informed consent signed with the Patient and all parties understand the risks and agree with anesthesia plan.  All questions answered.   ASA Score: 3    Ready For Surgery From Anesthesia Perspective.     .

## 2024-01-04 ENCOUNTER — NURSE TRIAGE (OUTPATIENT)
Dept: ADMINISTRATIVE | Facility: CLINIC | Age: 69
End: 2024-01-04
Payer: MEDICARE

## 2024-01-04 VITALS
SYSTOLIC BLOOD PRESSURE: 112 MMHG | OXYGEN SATURATION: 99 % | DIASTOLIC BLOOD PRESSURE: 63 MMHG | HEART RATE: 69 BPM | BODY MASS INDEX: 25.38 KG/M2 | RESPIRATION RATE: 18 BRPM | TEMPERATURE: 98 F | HEIGHT: 70 IN | WEIGHT: 177.25 LBS

## 2024-01-04 DIAGNOSIS — C61 PROSTATE CANCER: Primary | ICD-10-CM

## 2024-01-04 LAB
ANION GAP SERPL CALC-SCNC: 7 MMOL/L (ref 8–16)
BASOPHILS # BLD AUTO: 0.04 K/UL (ref 0–0.2)
BASOPHILS NFR BLD: 0.4 % (ref 0–1.9)
BODY FLUID SOURCE, CREATININE: NORMAL
BUN SERPL-MCNC: 9 MG/DL (ref 8–23)
CALCIUM SERPL-MCNC: 8.2 MG/DL (ref 8.7–10.5)
CHLORIDE SERPL-SCNC: 102 MMOL/L (ref 95–110)
CO2 SERPL-SCNC: 27 MMOL/L (ref 23–29)
CREAT FLD-MCNC: 0.7 MG/DL
CREAT SERPL-MCNC: 0.8 MG/DL (ref 0.5–1.4)
DIFFERENTIAL METHOD BLD: ABNORMAL
EOSINOPHIL # BLD AUTO: 0 K/UL (ref 0–0.5)
EOSINOPHIL NFR BLD: 0.2 % (ref 0–8)
ERYTHROCYTE [DISTWIDTH] IN BLOOD BY AUTOMATED COUNT: 14.2 % (ref 11.5–14.5)
EST. GFR  (NO RACE VARIABLE): >60 ML/MIN/1.73 M^2
GLUCOSE SERPL-MCNC: 106 MG/DL (ref 70–110)
HCT VFR BLD AUTO: 38.7 % (ref 40–54)
HGB BLD-MCNC: 12.9 G/DL (ref 14–18)
IMM GRANULOCYTES # BLD AUTO: 0.02 K/UL (ref 0–0.04)
IMM GRANULOCYTES NFR BLD AUTO: 0.2 % (ref 0–0.5)
LYMPHOCYTES # BLD AUTO: 1.8 K/UL (ref 1–4.8)
LYMPHOCYTES NFR BLD: 18.7 % (ref 18–48)
MAGNESIUM SERPL-MCNC: 2.2 MG/DL (ref 1.6–2.6)
MCH RBC QN AUTO: 30.4 PG (ref 27–31)
MCHC RBC AUTO-ENTMCNC: 33.3 G/DL (ref 32–36)
MCV RBC AUTO: 91 FL (ref 82–98)
MONOCYTES # BLD AUTO: 1.3 K/UL (ref 0.3–1)
MONOCYTES NFR BLD: 13.2 % (ref 4–15)
NEUTROPHILS # BLD AUTO: 6.6 K/UL (ref 1.8–7.7)
NEUTROPHILS NFR BLD: 67.3 % (ref 38–73)
NRBC BLD-RTO: 0 /100 WBC
PHOSPHATE SERPL-MCNC: 3 MG/DL (ref 2.7–4.5)
PLATELET # BLD AUTO: 181 K/UL (ref 150–450)
PMV BLD AUTO: 9.9 FL (ref 9.2–12.9)
POTASSIUM SERPL-SCNC: 3.9 MMOL/L (ref 3.5–5.1)
RBC # BLD AUTO: 4.25 M/UL (ref 4.6–6.2)
SODIUM SERPL-SCNC: 136 MMOL/L (ref 136–145)
WBC # BLD AUTO: 9.79 K/UL (ref 3.9–12.7)

## 2024-01-04 PROCEDURE — 82570 ASSAY OF URINE CREATININE: CPT | Performed by: UROLOGY

## 2024-01-04 PROCEDURE — 84100 ASSAY OF PHOSPHORUS: CPT | Performed by: UROLOGY

## 2024-01-04 PROCEDURE — 80048 BASIC METABOLIC PNL TOTAL CA: CPT | Performed by: UROLOGY

## 2024-01-04 PROCEDURE — 85025 COMPLETE CBC W/AUTO DIFF WBC: CPT | Performed by: UROLOGY

## 2024-01-04 PROCEDURE — 36415 COLL VENOUS BLD VENIPUNCTURE: CPT | Performed by: UROLOGY

## 2024-01-04 PROCEDURE — 83735 ASSAY OF MAGNESIUM: CPT | Performed by: UROLOGY

## 2024-01-04 PROCEDURE — 25000003 PHARM REV CODE 250: Performed by: UROLOGY

## 2024-01-04 PROCEDURE — 63600175 PHARM REV CODE 636 W HCPCS: Performed by: UROLOGY

## 2024-01-04 PROCEDURE — 94799 UNLISTED PULMONARY SVC/PX: CPT | Mod: XB

## 2024-01-04 PROCEDURE — 94761 N-INVAS EAR/PLS OXIMETRY MLT: CPT

## 2024-01-04 RX ORDER — SIMETHICONE 80 MG
80 TABLET,CHEWABLE ORAL 3 TIMES DAILY PRN
Refills: 0 | COMMUNITY
Start: 2024-01-04 | End: 2024-03-12

## 2024-01-04 RX ORDER — ACETAMINOPHEN 325 MG/1
650 TABLET ORAL EVERY 6 HOURS PRN
Refills: 0 | COMMUNITY
Start: 2024-01-04

## 2024-01-04 RX ORDER — TRAMADOL HYDROCHLORIDE 50 MG/1
50 TABLET ORAL EVERY 8 HOURS PRN
Qty: 6 TABLET | Refills: 0 | Status: SHIPPED | OUTPATIENT
Start: 2024-01-04 | End: 2024-03-12

## 2024-01-04 RX ORDER — DOCUSATE SODIUM 100 MG/1
100 CAPSULE, LIQUID FILLED ORAL 2 TIMES DAILY
Refills: 0 | COMMUNITY
Start: 2024-01-04

## 2024-01-04 RX ORDER — SULFAMETHOXAZOLE AND TRIMETHOPRIM 800; 160 MG/1; MG/1
1 TABLET ORAL DAILY
Qty: 7 TABLET | Refills: 0 | Status: SHIPPED | OUTPATIENT
Start: 2024-01-04 | End: 2024-01-11

## 2024-01-04 RX ORDER — POLYETHYLENE GLYCOL 3350 17 G/17G
17 POWDER, FOR SOLUTION ORAL DAILY
Refills: 0 | COMMUNITY
Start: 2024-01-05 | End: 2024-03-12

## 2024-01-04 RX ADMIN — FAMOTIDINE 20 MG: 20 TABLET, FILM COATED ORAL at 09:01

## 2024-01-04 RX ADMIN — SODIUM CHLORIDE 500 ML: 9 INJECTION, SOLUTION INTRAVENOUS at 07:01

## 2024-01-04 RX ADMIN — KETOROLAC TROMETHAMINE 15 MG: 30 INJECTION, SOLUTION INTRAMUSCULAR; INTRAVENOUS at 05:01

## 2024-01-04 RX ADMIN — ACETAMINOPHEN 1000 MG: 10 INJECTION INTRAVENOUS at 05:01

## 2024-01-04 RX ADMIN — CEFAZOLIN SODIUM 2 G: 2 SOLUTION INTRAVENOUS at 04:01

## 2024-01-04 RX ADMIN — POLYETHYLENE GLYCOL (3350) 17 G: 17 POWDER, FOR SOLUTION ORAL at 09:01

## 2024-01-04 RX ADMIN — SIMETHICONE 160 MG: 80 TABLET, CHEWABLE ORAL at 09:01

## 2024-01-04 RX ADMIN — DOCUSATE SODIUM 100 MG: 100 CAPSULE, LIQUID FILLED ORAL at 09:01

## 2024-01-04 RX ADMIN — MUPIROCIN: 20 OINTMENT TOPICAL at 09:01

## 2024-01-04 NOTE — PLAN OF CARE
POC reviewed patient verbalizes understanding VSS afebrile up in chair for meals denies pain this shift Repositions self up ad cam in room, call light in easy reach Drain site WNL, dressing CDI Dc instructions explained verbalizes understanding

## 2024-01-04 NOTE — NURSING
Patient ambulated in davis around nurses station twice. Tolerated well. No increased pain. No nausea noted. Up in chair at this time. Fluids infusing.

## 2024-01-04 NOTE — DISCHARGE INSTRUCTIONS
Eat balanced meals drink plenty of water record urinary output daily , no heavy lifting pulling or pushing until released by Dr Werner no driving while catheter in place Shower with dressing off to drain site pat dry apply new dressing when drain site stops draining you can leave dressing off, if no bowel movement in 3-4 days take over the counter maalox, If you develop redness swelling or drainage at incision sites call Dr Werner office or go to nearest emergency room if you develop painful urination call dr Werner office

## 2024-01-04 NOTE — ANESTHESIA POSTPROCEDURE EVALUATION
Anesthesia Post Evaluation    Patient: Ashish Dave    Procedure(s) Performed: Procedure(s) (LRB):  ROBOTIC PROSTATECTOMY (N/A)  ROBOTIC LYMPHADENECTOMY, PELVIS (N/A)    Final Anesthesia Type: general      Patient location during evaluation: PACU  Patient participation: Yes- Able to Participate  Level of consciousness: awake  Post-procedure vital signs: reviewed and stable  Pain management: adequate  Airway patency: patent    PONV status at discharge: No PONV  Anesthetic complications: no      Cardiovascular status: blood pressure returned to baseline  Respiratory status: unassisted  Hydration status: euvolemic  Follow-up not needed.              Vitals Value Taken Time   /57 01/04/24 0312   Temp 36.4 °C (97.6 °F) 01/04/24 0312   Pulse 62 01/04/24 0312   Resp 19 01/04/24 0312   SpO2 95 % 01/04/24 0312         Event Time   Out of Recovery 14:25:00         Pain/Misbah Score: Pain Rating Prior to Med Admin: 0 (1/4/2024  5:39 AM)  Pain Rating Post Med Admin: 1 (1/4/2024  6:07 AM)  Misbah Score: 10 (1/3/2024  2:15 PM)

## 2024-01-04 NOTE — NURSING
SENIA drain removed per Dr Werner orders tolerated well Suture removed Senia Drain pulled 75 ml discarded cleansed site with alcohol prep pad covered site with sterile 3x3 Island dressing,

## 2024-01-04 NOTE — NURSING
Stat lock's applied to right and left thigh Gu Bag disconnected with spouse and son watching applied leg bag Moved leg bag to left thigh with spouse and son observing, Verbalized understanding of bag teaching .

## 2024-01-04 NOTE — NURSING
Pt ambulated to door and back to bed x3 times. Tolerated well with no increase in pain. Pt passed gas x1 occurrence.

## 2024-01-04 NOTE — NURSING
Instructed patient spouse and son on importance of changing leg bag from right to left leg to decrease risk of DVT secondary to tightness of leg bag straps, instructed patient to remove dressing from drain site shower pat dry and apply new dressing,

## 2024-01-04 NOTE — CARE UPDATE
01/04/24 0743   Patient Assessment/Suction   Level of Consciousness (AVPU) alert   Respiratory Effort Unlabored   PRE-TX-O2   Device (Oxygen Therapy) room air   SpO2 100 %   Pulse Oximetry Type Intermittent   $ Pulse Oximetry - Multiple Charge Pulse Oximetry - Multiple   Incentive Spirometer   $ Incentive Spirometer Charges done with encouragement   Administration (IS) proper technique demonstrated   Number of Repetitions (IS) 10   Level Incentive Spirometer (mL) 3500   Patient Tolerance (IS) good;no adverse signs/symptoms present

## 2024-01-04 NOTE — PLAN OF CARE
The pt is cleared for discharge home from case management    01/04/24 1153   Final Note   Assessment Type Final Discharge Note   Anticipated Discharge Disposition Home   What phone number can be called within the next 1-3 days to see how you are doing after discharge? [phone]

## 2024-01-04 NOTE — NURSING
Patient has been sitting in chair since ambulating in room. Tolerating well. Helped back to bed and SCDs reapplied and turned on.

## 2024-01-04 NOTE — PLAN OF CARE
Plan of care reviewed with patient and spouse. Verbalized understanding. IV intact and patent with fluids infusing. Coello in place and draining clear urine. SENIA drain intact with minimal drainage noted. Tele in place and being monitored. Lap sites to abdomen intact with no redness or swelling noted. Pain managed with scheduled medications. Patient is passing gas. SCDs in place. No nausea noted. Patient alert and able to make needs known. Safety maintained. Call light in reach and instructed to call for assistance. Will continue to monitor.

## 2024-01-04 NOTE — PLAN OF CARE
POC/Meds reviewed, pt verbalized understanding. Vitals stable. Afebrile. Remains on room air. IVPB abx administered. Tele In place-NSR. Coello in place, good UOP. IS at bedside, instructed on use and return demonstration performed. PRN pain meds administered. Repositions self. Hourly/Q2hr rounding performed, safety maintained. Bed in lowest position, wheels locked, SR up x2, call light in easy reach. No complaints at this time. Will continue to monitor.

## 2024-01-05 ENCOUNTER — NURSE TRIAGE (OUTPATIENT)
Dept: ADMINISTRATIVE | Facility: CLINIC | Age: 69
End: 2024-01-05
Payer: MEDICARE

## 2024-01-05 NOTE — TELEPHONE ENCOUNTER
Pt calling to clarify pain management instructions. Advised to alternate Ibuprofen and Tylenol per instructions and to take Ultram 50 mg as ordered if pain is not relieved with OTC meds. Advised to speak with provider per protocol. VU. Encounter routed to provider.        Reason for Disposition   [1] Caller requesting NON-URGENT health information AND [2] PCP's office is the best resource    Protocols used: Information Only Call-A-AH

## 2024-01-05 NOTE — DISCHARGE SUMMARY
[facility]/Surg  Urology  Discharge Summary      Patient Name: Ashish Dave  MRN: [id number]  Admission Date: 1/3/2024  Hospital Length of Stay: 1 days  Discharge Date and Time: 1/4/2024  1:27 PM  Attending Physician: Phil Werner MD  Discharging Provider: Phil Werner MD  Primary Care Physician: Phil Kemp MD    HPI:   69 yo M with history of bph/luts which he preferred observation and recently returned with elevated psa yielding diagnosis of unfavorable intermediate risk prostate cancer maryam 4+3=7 appearing localized on staging imaging with MRI and PSMA scan. After extensive discussion of management options for his prostate cancer, he elected to proceed with robotic radical prostatectomy     Procedure(s) (LRB):  ROBOTIC PROSTATECTOMY (N/A)  ROBOTIC LYMPHADENECTOMY, PELVIS (N/A)     Indwelling Lines/Drains at time of discharge:   Lines/Drains/Airways       Drain  Duration                  Urethral Catheter 01/03/24 Latex;Straight-tip 20 Fr. 1 day                    Hospital Course   Uncomplicated  [facility]  POD 0 was ambulatory and madalyn clears and passing flatus  Telemetry noted no abnnormal rhythms or runs of SVT through to AM of pod1 with HR 40s-90s.   Overnigh pod0 to early AM pod1 mild low BP 100s systolic. Asymp. H/H stable. HR 60s. Urine clear. Abdomen soft nt nd. No complaints  500cc bolus. Remained stable 105s-110s.   Ambulated well. Schafer teaching complete  JOSELIN drian 75cc with normal JOSELIN cr so joselin drain removed  Did schafer teaching with son present (wife on facetime)  Abdomen soft nt nd. Schafer draining clear yellow urine.  Reviewed all dc recs and fu plans.  Dced home late am pod1 in stable condition    Goals of Care Treatment Preferences:  Code Status: Full Code        Pending Diagnostic Studies:       Procedure Component Value Units Date/Time    Specimen to Pathology - Surgery [[id number]] Collected: 01/03/24 1105    Order Status: Sent Lab Status:  "No result     Specimen: Tissue             Final Active Diagnoses:    Diagnosis Date Noted POA    PRINCIPAL PROBLEM:  Prostate cancer [C61] 01/04/2024 Yes      Problems Resolved During this Admission:         Discharged Condition: good    Disposition: Home or Self Care    Follow Up:   Follow-up Information       Phil Werner MD Follow up on 1/10/2024.    Specialty: Urology  Why: arrive to hospital outpt registraion between 6829-5330 for cystogram - no prep no fasting - bring depend  Contact information:  [address] [place] [address] [address]  [place] [address]  [phone]                           Patient Instructions:      No dressing needed   Order Comments: Will see surgical glue over incisions eventually start to peel, then ok at that time to assist in removing in shower     Change dressing (specify)   Order Comments: To drain site only as needed for drainage for first few days, bandaid, then when drainage stops leave open to air     Activity as tolerated   Order Comments: 1. Continue stool softeners and miralax until regular again, though If no BM in 3-4 days at home, take milk of magnesia as directed. Do not push or strain. No enemas  2. No lifting/pushing/pulling >10 lbs x 4 weeks  3. No driving until schafer removed  4. No aspirin/fish oil/José Miguel/bloodthinners/supplements x3-5 days.   5. Ok to shower, pat incisions dry. No baths/soaks. Can help "surgical glue" peel off in shower starting in 1 week once starts to flake up. Stitches will dissolve on their own. No ointments  6. Drink a lot of water to keep urine clear  7. Take incentive spiromoter home and continue use to prevent pneumonia  8. Use large schafer bag at night. Leg bag when ambulatory during day. Alternate legs every day to every other day  9. If discomfort at end of penis from catheter, ok to use small amount of plain KY or plain vaseline for lubrication at tip to ease catheter discomfort  10. Alternate tylenol (500-650mg every 6 hours) and " advil/motrin (400-600mg every 6 hours) - so something avail Q3 -  before resorting to Rx pain meds  11. Urine drip around schafer when ambulatory doesn't happen often, but is within normal - as is intermittent blood tinge of urine - stay well hydrated  12. Continue to be ambulatory to facilitate passing gas  13. Take home BP in am before BP meds as if SBP (top number) below 120 do not take, hold til next day     Medications:  Reconciled Home Medications:      Medication List        START taking these medications      acetaminophen 325 MG tablet  Commonly known as: TYLENOL  Take 2 tablets (650 mg total) by mouth every 6 (six) hours as needed (alternating with motrin/ibuprofen 400-600 every 6 hrs so something avail aevery 3 if needed).     docusate sodium 100 MG capsule  Commonly known as: COLACE  Take 1 capsule (100 mg total) by mouth 2 (two) times daily.     polyethylene glycol 17 gram Pwpk  Commonly known as: GLYCOLAX  Take 17 g by mouth once daily.  Start taking on: January 5, 2024     simethicone 80 MG chewable tablet  Commonly known as: MYLICON  Take 1 tablet (80 mg total) by mouth 3 (three) times daily as needed (gas pain/bloating).     sulfamethoxazole-trimethoprim 800-160mg 800-160 mg Tab  Commonly known as: BACTRIM DS  Take 1 tablet by mouth once daily. for 7 days     traMADoL 50 mg tablet  Commonly known as: ULTRAM  Take 1 tablet (50 mg total) by mouth every 8 (eight) hours as needed (pain not relieved by otc agents).            CONTINUE taking these medications      ASCORBIC ACID (VITAMIN C) ORAL  Take 4,000 mg by mouth 2 (two) times a day.     losartan 25 MG tablet  Commonly known as: COZAAR  Take 1 tablet (25 mg total) by mouth once daily.     vitamin D 1000 units Tab  Commonly known as: VITAMIN D3  Take 4,000 Units by mouth once daily.              Time spent on the discharge of patient: 35 minutes    Phil Werner MD  Urology  [facility]/Surg

## 2024-01-05 NOTE — TELEPHONE ENCOUNTER
Pt states he had SX Joseph 3. States one of he incision sites abvoe belly button. States site is puffy, No pain. No signs of infection. Thinks there is air under the site. Half golf ball size area of swelling. First noticed it today. Asking if this is normal. States tried to call provider but no answer. No redness or drainage. Care advice per protocol. Advised would route high priority to provider asking for outreach. Advised if does not hear back by 530p call back and would call OCP. Advised to monitor and to call back with concerns or worsening symptoms. Verbalized understanding.   Reason for Disposition   Caller has NON-URGENT question and triager unable to answer question    Additional Information   Negative: Major abdominal surgical incision and wound gaping open with visible internal organs   Negative: Sounds like a life-threatening emergency to the triager   Negative: Bleeding from incision and won't stop after 10 minutes of direct pressure   Negative: Bleeding (more than a few drops) from incision and after tracheostomy or blood vessel surgery (e.g., carotid endarterectomy, femoral bypass graft, kidney dialysis fistula)   Negative: Bright red, wide-spread, sunburn-like rash   Negative: SEVERE pain in the incision   Negative: Incision gaping open and < 2 days (48 hours) since wound re-opened   Negative: Incision gaping open and length of opening > 2 inches (5 cm)   Negative: Patient sounds very sick or weak to the triager   Negative: Sounds like a serious complication to the triager   Negative: Fever > 100.4 F (38.0 C)   Negative: Incision looks infected (spreading redness, pain)   Negative: Red streak runs from the incision and longer than 1 inch (2.5 cm)   Negative: Pus or bad-smelling fluid draining from incision   Negative: Dressing soaked with blood or body fluid (e.g., drainage)   Negative: Raised bruise and size > 2 inches (5 cm) and expanding   Negative: Scant bleeding (e.g., few drops) from incision and  after tracheostomy or blood vessel surgery (e.g., carotid endarterectomy, femoral bypass graft, kidney dialysis fistula   Negative: Caller has URGENT question and triager unable to answer question   Negative: Incision on the face gaping open and length of opening > 1/4 inch (6 mm), and over 2 days since wound re-opened   Negative: Incision gaping open and length of opening > 1/2 inch (1 cm) and over 2 days since wound re-opened   Negative: Clear or blood-tinged fluid draining from wound and no fever   Negative: Suture or staple removal is overdue   Negative: Patient wants to be seen    Protocols used: Post-Op Incision Symptoms and [id number]-ET

## 2024-01-05 NOTE — TELEPHONE ENCOUNTER
Spoke w pt who called regarding alternating pain meds/inflamm prescribed   Pt was informed ok to alternate one inflamm with ultram not both every 4 -6 hrs as needed   Pt vu   Encouraged hydration   Stool softners to prevent constipation   Pt vu

## 2024-01-06 ENCOUNTER — PATIENT MESSAGE (OUTPATIENT)
Dept: UROLOGY | Facility: CLINIC | Age: 69
End: 2024-01-06
Payer: MEDICARE

## 2024-01-06 NOTE — TELEPHONE ENCOUNTER
"Pt called in earlier today but never received a call back from MD for advisement.   Prostatectomy 1/3/24.  Incision above naval is "puffy, about the size of half of a golf ball. It's under my skin, it almost feels like air under the skin". Approximately an inch and a half in size. Noticed it today.   Current temp via infrared 97.7.  No pain with palpation.   Hx of Lt inguinal hernia.   Call out to MD Shelton for recommendation. Advised to continue to monitor at home.   Per nursing judgement, further instructed to monitor for increased swelling, pain at site, redness, drainage, or fever. Both pt and wife VU     Reason for Disposition   [1] Raised bruise and [2] size > 2 inches (5 cm) and expanding    Additional Information   Negative: [1] Major abdominal surgical incision AND [2] wound gaping open AND [3] visible internal organs   Negative: Sounds like a life-threatening emergency to the triager   Negative: [1] Bleeding from incision AND [2] won't stop after 10 minutes of direct pressure   Negative: [1] Bleeding (more than a few drops) from incision AND [2] tracheostomy or blood vessel surgery (e.g., carotid endarterectomy, femoral bypass graft, kidney dialysis fistula)   Negative: [1] Widespread rash AND [2] bright red, sunburn-like   Negative: Severe pain in the incision   Negative: [1] Incision gaping open AND [2] < 48 hours since wound re-opened   Negative: [1] Incision gaping open AND [2] length of opening > 2 inches (5 cm)   Negative: Patient sounds very sick or weak to the triager   Negative: Sounds like a serious complication to the triager   Negative: Fever > 100.4 F (38.0 C)   Negative: [1] Incision looks infected (spreading redness, pain) AND [2] fever > 99.5 F (37.5 C)   Negative: [1] Incision looks infected (spreading redness, pain) AND [2] large red area (> 2 in. or 5 cm)   Negative: [1] Incision looks infected (spreading redness, pain) AND [2] face wound   Negative: [1] Red streak runs from the incision " AND [2] longer than 1 inch (2.5 cm)   Negative: [1] Pus or bad-smelling fluid draining from incision AND [2] no fever   Negative: [1] Post-op pain AND [2] not controlled with pain medications   Negative: Dressing soaked with blood or body fluid (e.g., drainage)   Negative: [1] Scant bleeding (e.g., few drops) from incision AND AND [2] tracheostomy or blood vessel surgery (e.g., carotid endarterectomy, femoral bypass graft, kidney dialysis fistula)    Protocols used: Post-Op Incision Symptoms and [id number]-VA

## 2024-01-07 ENCOUNTER — NURSE TRIAGE (OUTPATIENT)
Dept: ADMINISTRATIVE | Facility: CLINIC | Age: 69
End: 2024-01-07
Payer: MEDICARE

## 2024-01-07 ENCOUNTER — HOSPITAL ENCOUNTER (EMERGENCY)
Facility: HOSPITAL | Age: 69
Discharge: HOME | End: 2024-01-07
Attending: EMERGENCY MEDICINE
Payer: MEDICARE

## 2024-01-07 VITALS
OXYGEN SATURATION: 100 % | HEART RATE: 79 BPM | SYSTOLIC BLOOD PRESSURE: 154 MMHG | HEIGHT: 70 IN | WEIGHT: 172 LBS | DIASTOLIC BLOOD PRESSURE: 70 MMHG | BODY MASS INDEX: 24.62 KG/M2 | TEMPERATURE: 98 F | RESPIRATION RATE: 18 BRPM

## 2024-01-07 DIAGNOSIS — S30.22XA HEMATOMA OF SCROTUM: Primary | ICD-10-CM

## 2024-01-07 PROCEDURE — 99281 EMR DPT VST MAYX REQ PHY/QHP: CPT

## 2024-01-07 NOTE — TELEPHONE ENCOUNTER
Pt concerned about amount of swelling post op. Reports he believes he may have 10 additional pounds of fluid. No fever/pain. Advised, per protocol. Verbalizes understanding.    Reason for Disposition   General information question, no triage required and triager able to answer question   Sounds like a serious complication to the triager    Additional Information   Negative: Sounds like a life-threatening emergency to the triager   Negative: [1] Widespread rash AND [2] bright red, sunburn-like    Protocols used: Post-Op Symptoms and [id number]-S-UD, Information Only Call - No Triage-A-AH

## 2024-01-08 NOTE — ED PROVIDER NOTES
Encounter Date: 1/7/2024       History     Chief Complaint   Patient presents with    Post-op Problem     1/3 prostatectomy, scrotal swelling that is large and dark red in color      Chief complaint:  Scrotal swelling and discoloration    HPI:  68-year-old male 4 days status post robotic prostatectomy presents with a 1 day history of scrotal swelling with discoloration.  He denies any fever and has no significant pain.  Catheter remains in place and has draining.  He reports that the urine has been clear yellow and now has a reddish discoloration.  Past medical history is also significant for prostate cancer, hypertension, GERD and acalculous cholecystitis.      Review of patient's allergies indicates:   Allergen Reactions    Adhesive Other (See Comments)     SKIN WAS RED     Past Medical History:   Diagnosis Date    Arthritis     Cancer 12/2023    PROSTATE    Chronic calculous cholecystitis 08/12/2019    COVID-19     Essential (primary) hypertension 03/08/2016    GERD (gastroesophageal reflux disease)      Past Surgical History:   Procedure Laterality Date    CHOLECYSTECTOMY      COLONOSCOPY  2016    Dr Saldaña- rtc 5 yr    ESOPHAGOGASTRODUODENOSCOPY N/A 6/6/2022    Procedure: EGD (ESOPHAGOGASTRODUODENOSCOPY);  Surgeon: Pj Syed MD;  Location: [facility];  Service: Endoscopy;  Laterality: N/A;    GALLBLADDER SURGERY  08/2019    KNEE SURGERY Right     x's3    LAPAROSCOPIC CHOLECYSTECTOMY N/A 8/12/2019    Procedure: CHOLECYSTECTOMY, LAPAROSCOPIC;  Surgeon: Alphonso Freeman III, MD;  Location: [facility] OR;  Service: General;  Laterality: N/A;    ROBOT-ASSISTED LAPAROSCOPIC PELVIC LYMPHADENECTOMY N/A 1/3/2024    Procedure: ROBOTIC LYMPHADENECTOMY, PELVIS;  Surgeon: Phil Werner MD;  Location: [facility] OR;  Service: Urology;  Laterality: N/A;    ROBOT-ASSISTED LAPAROSCOPIC PROSTATECTOMY N/A 1/3/2024    Procedure: ROBOTIC PROSTATECTOMY;  Surgeon: hPil Werner MD;  Location: [facility] OR;  Service: Urology;   Laterality: N/A;    UPPER GASTROINTESTINAL ENDOSCOPY       Family History   Problem Relation Age of Onset    Stroke Mother     No Known Problems Father     Ulcerative colitis Son     Colon cancer Neg Hx     Colon polyps Neg Hx     Crohn's disease Neg Hx     Esophageal cancer Neg Hx     Stomach cancer Neg Hx      Social History     Tobacco Use    Smoking status: Never    Smokeless tobacco: Never   Substance Use Topics    Alcohol use: Not Currently    Drug use: Never     Review of Systems   Constitutional:  Negative for activity change, appetite change, chills, fatigue and fever.   Eyes:  Negative for visual disturbance.   Respiratory:  Negative for apnea and shortness of breath.    Cardiovascular:  Negative for chest pain and palpitations.   Gastrointestinal:  Negative for abdominal distention and abdominal pain.   Genitourinary:  Positive for scrotal swelling. Negative for decreased urine volume, difficulty urinating, dysuria, enuresis, flank pain, frequency, genital sores, hematuria, penile discharge, penile pain, penile swelling, testicular pain and urgency.   Musculoskeletal:  Negative for neck pain.   Skin:  Negative for pallor and rash.   Neurological:  Negative for headaches.   Hematological:  Does not bruise/bleed easily.   Psychiatric/Behavioral:  Negative for agitation.        Physical Exam     Initial Vitals [01/07/24 2023]   BP Pulse Resp Temp SpO2   (!) 154/70 79 18 98.3 °F (36.8 °C) 100 %      MAP       --         Physical Exam    Constitutional: Vital signs are normal. He is not diaphoretic.  Non-toxic appearance. He does not have a sickly appearance. No distress.   HENT:   Head: Normocephalic and atraumatic.   Eyes: Conjunctivae are normal.   Neck: Phonation normal. Neck supple. No thyromegaly present. No tracheal deviation present.   Cardiovascular:  Normal rate.           Abdominal: He exhibits no distension. There is no abdominal tenderness. There is no rebound and no guarding.   Genitourinary:     Genitourinary Comments: Marked scrotal swelling with reddish purple discoloration.  No tenderness or calor     Musculoskeletal:      Cervical back: Neck supple.     Neurological: He is alert and oriented to person, place, and time.   Skin: Skin is warm, dry and intact. No pallor.   Healing laparoscopic wounds of the abdomen with no swelling, erythema or calor   Psychiatric: He has a normal mood and affect. His speech is normal and behavior is normal. Thought content normal.         ED Course   Procedures  Labs Reviewed - No data to display       Imaging Results    None          Medications - No data to display  Medical Decision Making  68-year-old male 4 days status post prostatectomy presents with scrotal discoloration and swelling.  He has no pain.  He has no dizziness, tachycardia or hypotension with excessive blood loss unlikely.  I discussed the case with Dr. Werner who feels that this is a routine postoperative hematoma.  Patient is encouraged to return for worsening swelling, pain, dizziness or fever.  He has a follow up appointment with Dr. Werner in 3 days.                                      Clinical Impression:  Final diagnoses:  [S30.22XA] Hematoma of scrotum (Primary)          ED Disposition Condition    Discharge Stable          ED Prescriptions    None       Follow-up Information       Follow up With Specialties Details Why Contact Info    Phil Werner MD Urology On 1/10/2024  [address] [place] [address] DR   [facility] [address]  [phone]               Lauro Nagel III, MD  01/07/24 3301

## 2024-01-08 NOTE — ED NOTES
Pt has prostatectomy on Wednesday. States he was told his scrotum may swell but grew concerned today after the swelling increased and scrotum is now red. Pt denies any pain. Coello cath is in place and patent.

## 2024-01-08 NOTE — ED NOTES
Discharge instructions given to pt and wife. Instructed to follow up with urology and return to the ED for worsening of symptoms.

## 2024-01-10 ENCOUNTER — HOSPITAL ENCOUNTER (OUTPATIENT)
Dept: RADIOLOGY | Facility: HOSPITAL | Age: 69
Discharge: HOME | End: 2024-01-10
Attending: UROLOGY
Payer: MEDICARE

## 2024-01-10 DIAGNOSIS — C61 PROSTATE CANCER: ICD-10-CM

## 2024-01-10 PROCEDURE — 74430 CONTRAST X-RAY BLADDER: CPT | Mod: TC

## 2024-01-10 PROCEDURE — 51600 INJECTION FOR BLADDER X-RAY: CPT | Mod: 58,,, | Performed by: UROLOGY

## 2024-01-10 PROCEDURE — 87086 URINE CULTURE/COLONY COUNT: CPT | Performed by: UROLOGY

## 2024-01-10 PROCEDURE — 74430 CONTRAST X-RAY BLADDER: CPT | Mod: 26,,, | Performed by: UROLOGY

## 2024-01-10 PROCEDURE — 25500020 PHARM REV CODE 255

## 2024-01-10 RX ADMIN — DIATRIZOATE MEGLUMINE 150 ML: 300 INJECTION, SOLUTION INTRAVENOUS at 07:01

## 2024-01-10 NOTE — PROGRESS NOTES
Cystogram     DATE: 1/10/24     Indications: 1 week s/p robotic prostatectomy. Has done well at home, though has had scrotal swelling/bruising. On exam today Abdomen soft nt nd lap sites cdi. Mild dimpling at top of midline extraction site - no hematoma swelling or concern. Penoscrotal edema mild, erythema/ecchymosis completely resolved. Schafer draining clear yellow urine     PRE-PROCEDURE DIAGNOSIS: Prostate cancer, status post prostatectomy     POST-PROCEDURE DIAGNOSIS: same     PROCEDURE:  1. Cystogram (with injection of contrast for cystogram)  2. Fluoroscopy <1 hour  3. Interpretation of fluoroscopic images     INDICATIONS: as above     PROCEDURE:   Patient was brought to fluoroscopy suite and placed in supine position on fluoro table.   An AP  film was taken after schafer clamped and sterile aspirate urine collected for culture  Cystografin passively instilled into bladder using dual 60cc cath tip syringe system.  Filled to 150cc.   AP image taken noting good bladder filling and normal contour though trabeculated with no extravasation.  Oblique images taken bilaterally with no evidence of extravasation  Patient's bladder drained and post drainage films taken AP, as well as oblique films, and no residual contrast, no extravasation.   Cystogram negative and schafer removed  Patient tolerated procedure well      ASSESSMENT   Prostate cancer      Plan    Pathology reviewed. Noted 3+4 [id number] +pni neg margin resection  Discussed kegels again and compliance 6-10 purposeful contractions in a row, 5-10x/day (can do in conjunction with timed voiding)  Advised lifting restrictions <10lbs stand until 4 weeks from surgery, including riding mower.  Also reviewed timed voiding and minimizing bladder irritants to decrease urgency/frequency, and time with kegels.   Some dermabond peeled off, and encouraged to remove remainder   RTC ~ 1month for recheck of continence

## 2024-01-11 DIAGNOSIS — Z00.00 ENCOUNTER FOR MEDICARE ANNUAL WELLNESS EXAM: ICD-10-CM

## 2024-01-12 ENCOUNTER — PATIENT MESSAGE (OUTPATIENT)
Dept: UROLOGY | Facility: CLINIC | Age: 69
End: 2024-01-12
Payer: MEDICARE

## 2024-01-13 LAB — BACTERIA UR CULT: NO GROWTH

## 2024-01-15 ENCOUNTER — NURSE TRIAGE (OUTPATIENT)
Dept: ADMINISTRATIVE | Facility: CLINIC | Age: 69
End: 2024-01-15
Payer: MEDICARE

## 2024-01-21 ENCOUNTER — NURSE TRIAGE (OUTPATIENT)
Dept: ADMINISTRATIVE | Facility: CLINIC | Age: 69
End: 2024-01-21
Payer: MEDICARE

## 2024-01-21 DIAGNOSIS — R30.0 DYSURIA: Primary | ICD-10-CM

## 2024-01-22 ENCOUNTER — LAB VISIT (OUTPATIENT)
Dept: LAB | Facility: HOSPITAL | Age: 69
End: 2024-01-22
Attending: UROLOGY
Payer: MEDICARE

## 2024-01-22 ENCOUNTER — TELEPHONE (OUTPATIENT)
Dept: UROLOGY | Facility: CLINIC | Age: 69
End: 2024-01-22
Payer: MEDICARE

## 2024-01-22 DIAGNOSIS — R30.0 DYSURIA: ICD-10-CM

## 2024-01-22 PROCEDURE — 87086 URINE CULTURE/COLONY COUNT: CPT | Performed by: UROLOGY

## 2024-01-22 NOTE — TELEPHONE ENCOUNTER
Please contact pt with concern for foul smelling urine  He reported to triage nurse blood cleared (see prior portal message)  Has f/u 2/5/24  But this week can go for lab visit for ua, ua micro, ucx at lab to make sure no concerns  (Can also reassure his ucx from cystogrsm 1/10 was negative)  Advise of clean catch midstream technique  Encourage hydration and kegels until visit  Will fu via [facility] re urine results  Orders placed

## 2024-01-22 NOTE — TELEPHONE ENCOUNTER
Spoke w pt informed initial urine on review is neg   Will call back with final result of urine culture   Pt vu

## 2024-01-22 NOTE — TELEPHONE ENCOUNTER
Reason for Disposition   [1] Caller has URGENT question AND [2] triager unable to answer question    Additional Information   Negative: Sounds like a life-threatening emergency to the triager   Negative: Chest pain   Negative: Difficulty breathing   Negative: Acting confused (e.g., disoriented, slurred speech) or excessively sleepy   Negative: Post-Op tonsil and adenoid surgery, symptoms or questions about   Negative: Surgical incision symptoms and questions   Negative: [1] Pain or burning with passing urine (urination) AND [2] male   Negative: [1] Pain or burning with passing urine (urination) AND [2] female   Negative: Constipation   Negative: New or worsening leg (calf, thigh) pain   Negative: New or worsening leg swelling   Negative: Dizziness is severe, or persists > 24 hours after surgery   Negative: Pain, redness, swelling, or pus at IV Site   Negative: Symptoms arising from use of a urinary catheter (e.g., Coude, Coello)   Negative: Cast problems or questions   Negative: Medication question   Negative: [1] Widespread rash AND [2] bright red, sunburn-like   Negative: [1] SEVERE headache AND [2] after spinal (epidural) anesthesia   Negative: [1] Vomiting AND [2] persists > 4 hours   Negative: [1] Vomiting AND [2] abdomen looks much more swollen than usual   Negative: [1] Drinking very little AND [2] dehydration suspected (e.g., no urine > 12 hours, very dry mouth, very lightheaded)   Negative: Patient sounds very sick or weak to the triager   Negative: Sounds like a serious complication to the triager   Negative: Fever > 100.4 F (38.0 C)   Negative: [1] SEVERE post-op pain (e.g., excruciating, pain scale 8-10) AND [2] not controlled with pain medications    Protocols used: Post-Op Symptoms and [id number]  pt called re surg on 1/3 prostatectomy. 1/10 took cath out. pt states now having foul smelling urine started fri pm. afeb. occas blood in urine- denies today. no N/V. aching on R side- lite pain worse with  mvmt thought to be muscular since surg at R hip. pt denies pain with urinating, urine darker yellow in am. rating post op pain=0. +flatus. eating reg diet. spoke with dr Debbi petty to call office in am for urine, stay hydrated. Pt notified.

## 2024-01-22 NOTE — TELEPHONE ENCOUNTER
----- Message from Zohreh Gray sent at 1/22/2024  4:27 PM CST -----  Regarding: advise  Contact: patient  Type: Needs Medical Advice  Who Called:  patient   Symptoms (please be specific):    How long has patient had these symptoms:    Pharmacy name and phone #:    Best Call Back Number: [phone]    Additional Information: seeking results call to advise thanks!         No

## 2024-01-23 ENCOUNTER — OFFICE VISIT (OUTPATIENT)
Dept: CARDIOLOGY | Facility: CLINIC | Age: 69
End: 2024-01-23
Payer: MEDICARE

## 2024-01-23 VITALS
OXYGEN SATURATION: 98 % | HEIGHT: 70 IN | SYSTOLIC BLOOD PRESSURE: 126 MMHG | RESPIRATION RATE: 16 BRPM | DIASTOLIC BLOOD PRESSURE: 80 MMHG | WEIGHT: 168 LBS | BODY MASS INDEX: 24.05 KG/M2

## 2024-01-23 DIAGNOSIS — I10 ESSENTIAL HYPERTENSION: ICD-10-CM

## 2024-01-23 DIAGNOSIS — E78.2 MIXED HYPERLIPIDEMIA: ICD-10-CM

## 2024-01-23 DIAGNOSIS — I47.10 PAROXYSMAL SVT (SUPRAVENTRICULAR TACHYCARDIA): Primary | ICD-10-CM

## 2024-01-23 DIAGNOSIS — R00.2 PALPITATIONS: ICD-10-CM

## 2024-01-23 DIAGNOSIS — R94.31 EKG ABNORMALITIES: ICD-10-CM

## 2024-01-23 PROCEDURE — 99999 PR PBB SHADOW E&M-EST. PATIENT-LVL III: CPT | Mod: PBBFAC,,, | Performed by: INTERNAL MEDICINE

## 2024-01-23 PROCEDURE — 99214 OFFICE O/P EST MOD 30 MIN: CPT | Mod: S$PBB,,, | Performed by: INTERNAL MEDICINE

## 2024-01-23 PROCEDURE — 99213 OFFICE O/P EST LOW 20 MIN: CPT | Mod: PBBFAC,PN | Performed by: INTERNAL MEDICINE

## 2024-01-23 NOTE — PROGRESS NOTES
Subjective:    Patient ID:  Ashish Dave is a 68 y.o. male     Chief Complaint   Patient presents with    Results       HPI:  Mr Ashish Dave is a 68 y.o. male here for follow-up.    Patient has been doing well no specific complaints recently patient had surgery on January 3rd and his catheter was removed on January 10th and is doing very well at the present time.  His breathing has been stable denies any shortness a breath or difficulty in breathing denies any chest pain or tightness or heaviness.  Denies any dizziness or lightheadedness or loss of consciousness falls or head injury.  And denies any palpitations or rapid heart rate.    Patient is taking all his medications regularly except that he is stopped taking losartan because his blood pressure had been running on the normal range.  He also lost some weight and has been eating better since his diagnosis of prostate cancer.          Review of patient's allergies indicates:   Allergen Reactions    Adhesive Other (See Comments)     SKIN WAS RED       Past Medical History:   Diagnosis Date    Arthritis     Cancer 12/2023    PROSTATE    Chronic calculous cholecystitis 08/12/2019    COVID-19     Essential (primary) hypertension 03/08/2016    GERD (gastroesophageal reflux disease)      Past Surgical History:   Procedure Laterality Date    CHOLECYSTECTOMY      COLONOSCOPY  2016    Dr Saldaña- [place] 5 yr    ESOPHAGOGASTRODUODENOSCOPY N/A 6/6/2022    Procedure: EGD (ESOPHAGOGASTRODUODENOSCOPY);  Surgeon: Pj Syed MD;  Location: [facility];  Service: Endoscopy;  Laterality: N/A;    GALLBLADDER SURGERY  08/2019    KNEE SURGERY Right     x's3    LAPAROSCOPIC CHOLECYSTECTOMY N/A 8/12/2019    Procedure: CHOLECYSTECTOMY, LAPAROSCOPIC;  Surgeon: Alphonso Freeman III, MD;  Location: [facility];  Service: General;  Laterality: N/A;    ROBOT-ASSISTED LAPAROSCOPIC PELVIC LYMPHADENECTOMY N/A 1/3/2024    Procedure: ROBOTIC LYMPHADENECTOMY, PELVIS;  Surgeon: Phil Werner  MD RENETTA;  Location: [facility];  Service: Urology;  Laterality: N/A;    ROBOT-ASSISTED LAPAROSCOPIC PROSTATECTOMY N/A 1/3/2024    Procedure: ROBOTIC PROSTATECTOMY;  Surgeon: Phil Werner MD;  Location: [facility];  Service: Urology;  Laterality: N/A;    UPPER GASTROINTESTINAL ENDOSCOPY       Social History     Tobacco Use    Smoking status: Never    Smokeless tobacco: Never   Substance Use Topics    Alcohol use: Not Currently    Drug use: Never     Family History   Problem Relation Age of Onset    Stroke Mother     No Known Problems Father     Ulcerative colitis Son     Colon cancer Neg Hx     Colon polyps Neg Hx     Crohn's disease Neg Hx     Esophageal cancer Neg Hx     Stomach cancer Neg Hx         Review of Systems:   Constitution: Negative for diaphoresis and fever.   HEENT: Negative for nosebleeds.    Cardiovascular: Negative for chest pain       No dyspnea on exertion       No leg swelling        No palpitations  Respiratory: Negative for shortness of breath and wheezing.    Hematologic/Lymphatic: Negative for bleeding problem. Does not bruise/bleed easily.   Skin: Negative for color change and rash.   Musculoskeletal: Negative for falls and myalgias.   Gastrointestinal: Negative for hematemesis and hematochezia.   Genitourinary: Negative for hematuria.   Neurological: Negative for dizziness and light-headedness.   Psychiatric/Behavioral: Negative for altered mental status and memory loss.          Objective:        Vitals:    01/23/24 0922   BP: 126/80   Pulse: (P) 60   Resp: 16       Lab Results   Component Value Date    WBC 9.79 01/04/2024    HGB 12.9 (L) 01/04/2024    HCT 38.7 (L) 01/04/2024     01/04/2024    CHOL 161 03/03/2023    TRIG 64 03/03/2023    HDL 71 03/03/2023    ALT 24 03/03/2023    AST 21 03/03/2023     01/04/2024    K 3.9 01/04/2024     01/04/2024    CREATININE 0.8 01/04/2024    BUN 9 01/04/2024    CO2 27 01/04/2024    TSH 2.48 01/31/2020    PSA 2.5 02/04/2019    INR 1.0  12/22/2023    MICROALBUR 0.2 02/25/2022        ECHOCARDIOGRAM RESULTS  Results for orders placed during the hospital encounter of 12/26/23    Echo    Interpretation Summary    Left Ventricle: The left ventricle is normal in size. Normal wall thickness. There is concentric remodeling. Normal wall motion. There is normal systolic function with a visually estimated ejection fraction of 60 - 65%. There is normal diastolic function.    Right Ventricle: Normal right ventricular cavity size. Wall thickness is normal. Right ventricle wall motion  is normal. Systolic function is normal.    Pulmonary Artery: The estimated pulmonary artery systolic pressure is 19 mmHg.    IVC/SVC: Normal venous pressure at 3 mmHg.        CURRENT/PREVIOUS VISIT EKG  Results for orders placed or performed in visit on 12/07/23   IN OFFICE EKG 12-LEAD (to [email or web address])    Collection Time: 12/07/23  3:09 PM    Narrative    Test Reason : R94.31,    Vent. Rate : 068 BPM     Atrial Rate : 068 BPM     P-R Int : 178 ms          QRS Dur : 080 ms      QT Int : 410 ms       P-R-T Axes : 061 029 052 degrees     QTc Int : 435 ms    Normal sinus rhythm  Low voltage QRS  Possible Inferior infarct (cited on or before 07-DEC-2023)  Cannot rule out Anterior infarct (cited on or before 07-DEC-2023)  Abnormal ECG  When compared with ECG of 07-DEC-2023 15:09,  No significant change was found  Confirmed by Mesfin Apodaca MD ([id number]) on 12/15/2023 6:55:01 PM    Referred By: SUKH BARLOW           Confirmed By:Mesfin Apodaca MD     No valid procedures specified.   Results for orders placed during the hospital encounter of 12/11/23    Nuclear Stress - Cardiology Interpreted    Interpretation Summary    Normal myocardial perfusion scan. There is no evidence of myocardial ischemia or infarction.    The gated perfusion images showed an ejection fraction of 69% at rest. The gated perfusion images showed an ejection fraction of 69% post stress.    There is normal wall motion at  rest and post stress.    LV cavity size is normal at rest and normal at stress.    The ECG portion of the study is negative for ischemia.    The patient reported no chest pain during the stress test.    There were no arrhythmias during stress.    The patient exercised for 7 minutes 1 seconds on a Eddi protocol, corresponding to a functional capacity of 10 METS, achieving a peak heart rate of 142 bpm, which is 94 % of the age predicted maximum heart rate.      Physical Exam:  CONSTITUTIONAL: No fever, no chills  HEENT: Normocephalic, atraumatic,pupils reactive to light                 NECK:  No JVD no carotid bruit  CVS: S1S2+, RRR, systolic murmurs,   LUNGS: Clear  ABDOMEN: Soft, NT, BS+  EXTREMITIES: No cyanosis, edema  : No schafer catheter  NEURO: AAO X 3  PSY: Normal affect      Medication List with Changes/Refills   Current Medications    ACETAMINOPHEN (TYLENOL) 325 MG TABLET    Take 2 tablets (650 mg total) by mouth every 6 (six) hours as needed (alternating with motrin/ibuprofen 400-600 every 6 hrs so something avail aevery 3 if needed).    ASCORBIC ACID, VITAMIN C, ORAL    Take 4,000 mg by mouth 2 (two) times a day.    DOCUSATE SODIUM (COLACE) 100 MG CAPSULE    Take 1 capsule (100 mg total) by mouth 2 (two) times daily.    LOSARTAN (COZAAR) 25 MG TABLET    Take 1 tablet (25 mg total) by mouth once daily.    POLYETHYLENE GLYCOL (GLYCOLAX) 17 GRAM PWPK    Take 17 g by mouth once daily.    SIMETHICONE (MYLICON) 80 MG CHEWABLE TABLET    Take 1 tablet (80 mg total) by mouth 3 (three) times daily as needed (gas pain/bloating).    TRAMADOL (ULTRAM) 50 MG TABLET    Take 1 tablet (50 mg total) by mouth every 8 (eight) hours as needed (pain not relieved by otc agents).    VITAMIN D (VITAMIN D3) 1000 UNITS TAB    Take 4,000 Units by mouth once daily.             Assessment:       1. Paroxysmal SVT (supraventricular tachycardia)    2. Essential hypertension    3. Mixed hyperlipidemia    4. Palpitations    5. EKG  abnormalities         Plan:   1. Paroxysmal SVT   Patient is currently in sinus rhythm.  His heart rate is around 60 beats per minute.  He has currently not on any specific medications.  And has not had any recent episodes.  2. Essential hypertension  Patient's blood pressure is 126/80 he was on losartan 25 mg p.o. daily but he stopped taking losartan because his blood pressure was running on the lower side when he takes it.  He has currently not taking because his blood pressure runs in the normal range.  3. Mixed hyperlipidemia  He has currently not on any specific statin therapy and his cholesterol levels have been good he follows up with his primary physician in regards with his cholesterol.  4. Palpitations he has not had any further palpitations.  5. Patient to continue current management and I will see him back in the office in a year's time.              Problem List Items Addressed This Visit    None  Visit Diagnoses       Paroxysmal SVT (supraventricular tachycardia)    -  Primary    Essential hypertension        Mixed hyperlipidemia        Palpitations        EKG abnormalities                No follow-ups on file.

## 2024-01-25 ENCOUNTER — PATIENT MESSAGE (OUTPATIENT)
Dept: UROLOGY | Facility: CLINIC | Age: 69
End: 2024-01-25
Payer: MEDICARE

## 2024-01-25 LAB — BACTERIA UR CULT: NO GROWTH

## 2024-02-05 ENCOUNTER — OFFICE VISIT (OUTPATIENT)
Dept: UROLOGY | Facility: CLINIC | Age: 69
End: 2024-02-05
Payer: MEDICARE

## 2024-02-05 VITALS
HEIGHT: 70 IN | HEART RATE: 64 BPM | BODY MASS INDEX: 24.34 KG/M2 | SYSTOLIC BLOOD PRESSURE: 149 MMHG | WEIGHT: 170 LBS | DIASTOLIC BLOOD PRESSURE: 81 MMHG

## 2024-02-05 DIAGNOSIS — N39.3 SUI (STRESS URINARY INCONTINENCE), MALE: ICD-10-CM

## 2024-02-05 DIAGNOSIS — C61 PROSTATE CANCER: Primary | ICD-10-CM

## 2024-02-05 LAB
BILIRUBIN, UA POC OHS: NEGATIVE
BLOOD, UA POC OHS: NEGATIVE
CLARITY, UA POC OHS: CLEAR
COLOR, UA POC OHS: YELLOW
GLUCOSE, UA POC OHS: NEGATIVE
KETONES, UA POC OHS: NEGATIVE
LEUKOCYTES, UA POC OHS: NEGATIVE
NITRITE, UA POC OHS: NEGATIVE
PH, UA POC OHS: 7
PROTEIN, UA POC OHS: NEGATIVE
SPECIFIC GRAVITY, UA POC OHS: 1.01
UROBILINOGEN, UA POC OHS: 0.2

## 2024-02-05 PROCEDURE — 81003 URINALYSIS AUTO W/O SCOPE: CPT | Mod: PBBFAC,PO | Performed by: UROLOGY

## 2024-02-05 PROCEDURE — 99999 PR PBB SHADOW E&M-EST. PATIENT-LVL III: CPT | Mod: PBBFAC,,, | Performed by: UROLOGY

## 2024-02-05 PROCEDURE — 99024 POSTOP FOLLOW-UP VISIT: CPT | Mod: POP,,, | Performed by: UROLOGY

## 2024-02-05 PROCEDURE — 99213 OFFICE O/P EST LOW 20 MIN: CPT | Mod: PBBFAC,PO | Performed by: UROLOGY

## 2024-02-05 PROCEDURE — 99999PBSHW POCT URINALYSIS(INSTRUMENT): Mod: PBBFAC,,,

## 2024-02-05 RX ORDER — OXYBUTYNIN CHLORIDE 5 MG/1
5 TABLET, EXTENDED RELEASE ORAL DAILY
Qty: 30 TABLET | Refills: 11 | Status: SHIPPED | OUTPATIENT
Start: 2024-02-05 | End: 2024-04-05

## 2024-02-05 NOTE — Clinical Note
We did discuss the critical timeline in the 1st 6 months of continence recovery and certainly only a few weeks postop, has not lost any ground, however he is quite anxious and does have a lot of resting Tone which was quite evident in his posturing on attempting to have him be seated and contract pelvic floor musculature, as he increases stone throughout his whole-body, and this can affect his continence recovery.  Therefore we did discuss the role of pelvic floor physical therapy and helping to recover postprostatectomy stress urinary incontinence, and typically if at initial three-month visit, no significant progress is made would refer, however given his overall anxiety, increased resting tone, and the extensive time we spent working on this today, will go ahead and place referral.  We did discuss the often have a long waiting list and if he has not contacted prior to our next appointment that is okay should continue his home exercise regimen program, but certainly can present if called sooner.  Will cc

## 2024-02-05 NOTE — PROGRESS NOTES
[facility] Urology Progress Note    Ashish Dave is a 68 y.o. male who presents for prostaectomy follow up    Long history of BPH/LUTS on observation/supplements who on eval for rising psa to 5.4 and mild LUTS progression had MRI with pirad3 lesion of ant TZ but was negative though did have diffuse maryam 7 including 4+3=7 prostate cancer for which he elected to have robotic prostatectomy, which he underwent on 1/3/24  Preop: AUA SS: 10/2 (2: intermittency, urgency, weak stream; 1: emptying, fgrequency, straining, sleeping) . ++ anxiety. Reports that erections are not great, has ED.  Tried a few supplements over the counter previously which did not work. Had heart rhyrthm problem years ago. Used to see cardiology.  Preop saw cardiology for PSVT. He did well with surgery and was DCed on POD 1.   PATH:  3+4 [id number] (+pni, though neg margin resection)    Since then has had concerns about incisional swelling, scrotal swelling, blood in urine, foul smelling urine  ER 1/7 for scrotal swelling no pain c/w hematoma as discussed with pt in messaging, confirmed at time of negative cystogram on exam 1/10 and schafer removed and no incision concerns  Reassured small blood in urine while healing normal and days later concerned urine foul smelling and was yellow not clear  Ucx at time of cystogram negative. As precaution for pt concerns, 1/22/24 UA neg x trc blood with mirco only 2 rbc 1 wbc, Ucx neg. He was concerns results were false    He presents today noting  Followed up with cardiology 1/30/24, bp controlled, of one med, rate controlled, no further palpitations or concerns  Water goes through him, and if siting a while as soon as he stands up will be going to the bathroom.  Sometimes urgent, but also just goes just to go when he gets up  If on his feet as while will drip out him sometimes without knowing it and feels is constant drip from amount diaper.  6-8 per day, down from a lot more.  Has been doing  kegels in row in  "am and pm  Still on colace. When BM harder feels more pelvic discomfort  Some swelling persists lower abdomen, some pain on hard surfaces        Focused Physical Exam:    Vitals:    02/05/24 1258   BP: (!) 149/81   Pulse: 64     Body mass index is 24.39 kg/m². Weight: 77.1 kg (170 lb) Height: 5' 10" (177.8 cm)     Abdomen: Soft, non-tender, nondistended, no CVA tenderness, lap incisions all healed well, no hernia, mild edema and tissue shelf over the dimpling of subcu suture at the superior apex of the midline extraction site incision  : all penoscrotal edema and bruising resolved. Some swelling of sueprficial left groin tissue without hernia    Udip: negative    ASSESSMENT   1. Prostate cancer  Prostate Specific Antigen, Diagnostic    POCT Urinalysis(Instrument)          Plan    He is doing very well postoperatively, with favorable pathology, organ confined disease, negative margins. Discussed importance of PSA screening postoperatively to monitor for recurrence, which will start at 3 months postoperatively and continue initially Q3 months x1 years, then Q6 months to 5 years, then annually.  The focus of our discussion was on his postprostatectomy continence recovery.  Reviewed mechanism of post-prostatectomy stress urinary incontinence with patient and also reviewed appropriate technique for Kegel exercises and encouraged continued use, holding contraction for at least 5 seconds, and practicing at least 5-10 times per day. Stressed the importance of doing this multiple times a day to facilitate resolution of stress incontinence post-prostatectomy.   However, also again stressed not to overdo it.  Noting this is a small muscle and muscle fatigue can be counterproductive.  As well on return demonstration, tightened lower abdominal muscles.  We did review technique, and discussed recommendations to isolate pelvic floor musculature for contraction, and he did demonstrate better return.  Certainly his  Kegel " exercises at a time can be counterproductive, and can also contribute to residual perineal discomfort.  I did again review with the patient and wife, and right down, doing about 5-10 in a row, 10 would be an appropriate maximum, however feels fatigued difficult around 8 or 9 should stop there, and repeating the cycle every 2-3 hours conjunction with timed voiding to make sure bladder is empty before starting.  We also discuss the behavioral component to voiding and if there is no urge to void he should not go to the bathroom to make efforts to void every time he stands up, as some leakage is expected.  Extensive time spent in reviewing all of this, techniques, etc..  And also discussed that given his preoperative long-term standing obstruction, there is some postobstructive urgency frequency which he may not be aware of as with relief of longstanding prostate obstruction there can be increases in urgency and frequency, and certainly if sphincter weakness is at play, may contribute to some of the leakage without stress maneuvers.  After risk benefit discussion, will utilize low-dose anticholinergic at this time with Ditropan 5 XL, while he focuses on his pelvic floor exercises and recovery of his stress urinary incontinence.  As he is greater than 1 month from surgery, he no longer has formal lifting restrictions or activity restrictions.  However given his significant incontinence at present, distinctly recommended making gains on continence before adding activity or weight.  Should refrain from any perineal activities such as bicycle etcetera indefinitely at this time pending further recovery.  However, should continue to improve with the refocus on technique regimen and compliance, as well as the likely near resolution of what was probably some pelvic hematoma given the resolution of all scrotal and perineal ecchymosis, and improvement in discomfort, although some sitting on hard surface remains uncomfortable.  We  did discuss the critical timeline in the 1st 6 months of continence recovery and certainly only a few weeks postop, has not lost any ground, however he is quite anxious and does have a lot of resting Tone which was quite evident in his posturing on attempting to have him be seated and contract pelvic floor musculature, as he increases stone throughout his whole-body, and this can affect his continence recovery.  Therefore we did discuss the role of pelvic floor physical therapy and helping to recover postprostatectomy stress urinary incontinence, and typically if at initial three-month visit, no significant progress is made would refer, however given his overall anxiety, increased resting tone, and the extensive time we spent working on this today, will go ahead and place referral.  We did discuss the often have a long waiting list and if he has not contacted prior to our next appointment that is okay should continue his home exercise regimen program, but certainly can present if called sooner.  Will cc  Briefly reviewed post prostatectomy ED management regimens and will discuss further at next o/v  RTC 2 mos with psa prior

## 2024-02-20 ENCOUNTER — CLINICAL SUPPORT (OUTPATIENT)
Dept: REHABILITATION | Facility: HOSPITAL | Age: 69
End: 2024-02-20
Payer: MEDICARE

## 2024-02-20 DIAGNOSIS — N39.3 MALE URINARY STRESS INCONTINENCE: ICD-10-CM

## 2024-02-20 DIAGNOSIS — C61 PROSTATE CANCER: ICD-10-CM

## 2024-02-20 DIAGNOSIS — M62.81 MUSCLE WEAKNESS: ICD-10-CM

## 2024-02-20 DIAGNOSIS — M62.89 PELVIC FLOOR DYSFUNCTION: Primary | ICD-10-CM

## 2024-02-20 PROCEDURE — 97162 PT EVAL MOD COMPLEX 30 MIN: CPT | Mod: PO

## 2024-02-20 PROCEDURE — 97530 THERAPEUTIC ACTIVITIES: CPT | Mod: PO

## 2024-02-20 NOTE — PATIENT INSTRUCTIONS
"KEGELS - sitting and leaning forward  1. Sit comfortably with legs and buttocks relaxed. Spread knees and lean forward resting on your elbows   2. Contract and LIFT the pelvic floor muscles as if you're trying to stop the stream of urine and passage of gas.  3. Hold LIFT for about 5 seconds without holding breath. You should be able to talk out loud the entire time.  4. Release the pelvic muscles right away for 10 seconds REST.  5. Repeat 10 times, 3-4 sets per day. Spread throughout the day.   No Kegels while urinating!            Bowel Movement Mechanics  1. Sit on the toilet comfortably with legs and buttocks relaxed.  2. Put your feet on a waste basket or squatty potty  3. Lean forward while keeping your back straight, forearms rest on knees.  4. Keep your knees apart.  5. Fully relax pelvic floor muscles - think about softening, opening, dropping  6. Use gentle belly breaths and bulge lower abdominals like making a beer belly  7. Keep belly big on exhalation  8. Exhale like blowing out birthday candles  9. Keep breathing like this through entire bowel movement  10. Make sure to give enough time, ok for it to take several minutes     Do not strain and Do not hold your breath.       (Search "Squatty Potty" on [employer])        "

## 2024-02-20 NOTE — PLAN OF CARE
"[facility]  Pelvic Health Physical Therapy Initial Evaluation    Date: 2/20/2024   Name: Ashish Dave  Clinic Number: [id number]  Therapy Diagnosis:   Encounter Diagnoses   Name Primary?    Prostate cancer     Pelvic floor dysfunction Yes    Male urinary stress incontinence     Muscle weakness      Physician: Phil Werner MD    Physician Orders: PT Eval and Treat   Medical Diagnosis from Referral: Prostate cancer [C61]   Evaluation Date: 2/20/2024  Authorization Period Expiration: 2/5/2025  Plan of Care Expiration: 5/20/2024  Visit # / Visits authorized: 1/ 1    FOTO 1/3 ON 2/20/2024      Time In: 8:30  Time Out: 9:25  Total Appointment Time (timed & untimed codes): 55 minutes    Precautions:  prostatectomy 1/3/2024; catheter out on 1/10/2024    Subjective     Date of onset: January 2024    History of current condition - Ashish reports: had his prostate out beginning of January; still in [place]. Most of the time it happens when he's standing. He tries to drink at least 1/2 gallon of water/day; more water he drinks, more he'll leak. He'll have little drips when he's standing but can't really feel that; if he has little squirts he can feel that but he can't stop it. He's fine when he's sitting or standing. If he's sitting for a prolonged period of time, he knows he'll have more leakage; tries to go to the bathroom when he stands up but Dr. Werner told him not to get into bad habits.   He has "achey" feeling in lower abdomen when he has to go to the bathroom (urine or bowel).   He was doing a lot of Kegels before he checked in with Dr. Werner and he was told that he was doing too many - now he is doing 3x10/day (AM, mid-day, PM) 3-5 seconds with 10 seconds rest.       Bladder/Bowel History:   Frequency of urination:   Daytime: not sure, trying to wait until he has an urge.            Nighttime: 1x/night   Difficulty initiating urine stream: No  Urine stream: strong in the morning when he hasn't " gone for a while; during the day it's a little weaker.   Complete emptying: Yes  Bladder leakage: Yes  Frequency of incidents: daily, multiple times  Amount leaked (urine): variable based on fluid intake and   Urinary Urgency: No, other than when he's been in the car for a while (ex: sitting in [place] to pickup his grandson, is in the car for about an hour total) - has to walk somewhat fast to get to the bathroom to avoid more urinary incontinence in [place].   Frequency of bowel movements: once a day  Difficulty initiating BM: no; trying not to strain, therefore sometimes a few hours later he hasn't go back and go again.   Form of protection: brief  Number of pads required in 24 hours: 4-5/day; 1 pack lasts 3 days (15 in a pack)       Medical History: Ashish  has a past medical history of Arthritis, Cancer (12/2023), Chronic calculous cholecystitis (08/12/2019), COVID-19, Essential (primary) hypertension (03/08/2016), and GERD (gastroesophageal reflux disease).     Surgical History: Ashish Dave  has a past surgical history that includes Knee surgery (Right); Laparoscopic cholecystectomy (N/A, 8/12/2019); Gallbladder surgery (08/2019); Colonoscopy (2016); Upper gastrointestinal endoscopy; Cholecystectomy; Esophagogastroduodenoscopy (N/A, 6/6/2022); Robot-assisted laparoscopic prostatectomy (N/A, 1/3/2024); and Robot-assisted laparoscopic pelvic lymphadenectomy (N/A, 1/3/2024).    Medications: Ashish has a current medication list which includes the following prescription(s): acetaminophen, ascorbic acid, docusate sodium, losartan, oxybutynin, polyethylene glycol, simethicone, tramadol, and vitamin d.    Allergies:   Review of patient's allergies indicates:   Allergen Reactions    Adhesive Other (See Comments)     SKIN WAS RED        Prior Therapy/Previous treatment included: none   Prior Level of Function: independent   Current Level of Function: see above     Types of fluid intake: tries to get at last 1/2  gallon water/day     Pts goals: resolve leakage; get out of [place]     OBJECTIVE     Informed consent provided 2/20/2024  Chaperone: declined      ABDOMINAL WALL ASSESSMENT  Scarring: well-healed surgical incisions   Pelvic Floor Muscle and Transverse Abdominus Synergy: present    BREATHING MECHANICS ASSESSMENT   Thorax Assessment During Quiet Respiration: WNL excursion of ribcage and WNL excursion of abdominal wall  Thorax Assessment During Deep Respiration: Decreased excursion bilaterally of lateral ribs  and Decreased excursion of abdominal wall         RUSI assessment 2/20/2024   - descent of pelvic floor muscles noted with verbal cues for pelvic floor contraction; able to minimize with verbal cues for correct contraction with mild lift noted   - *able to sense pelvic floor muscle contraction without glute co-contraction in seated child's pose     Limitation/Restriction for FOTO Pelvic Survey    Therapist reviewed FOTO scores for Ashish Dave on 2/20/2024.   FOTO documents entered into EPIC - see Media section.       TREATMENT     Treatment Time In: 8:45  Treatment Time Out: 9:25  Total Treatment time (time-based codes) separate from Evaluation: 40 minutes      Therapeutic Activity Patient participated in dynamic functional therapeutic activities to improve functional performance for 40 minutes. Including: Education as described below:     Patient Education provided:   general anatomy/physiology of urinary/ bowel  system and benefits of treatment were discussed with the pt. Additionally, anatomy/physiology of pelvic floor, posture/body mechanices, kegels, fluid intake/dietary modifications, and behavior modifications were reviewed.     Home Exercises provided:  Written Home Exercises provided: yes.  Exercises were reviewed and Ashish was able to demonstrate them prior to the end of the session.    Ashish demonstrated good  understanding of the education provided.     See EMR under Patient Instructions for  exercises provided 2/20/2024.    Assessment     Ashish is a 68 y.o. male referred to outpatient Physical Therapy with a medical diagnosis of Prostate cancer [C61] . Pt presents with poor knowledge of body mechanics and posture, poor trunk stability, decreased pelvic muscle strength, decreased endurance of the pelvic muscles, decreased phasic ability of the pelvic muscles, poor quality of pelvic muscle contraction, poor coordination of pelvic floor muscles during ADL's leading to urinary or fecal leakage, dysfunctional voiding, and unable to co-contract or co-relax abdominal wall and pelvic floor muscles. Normal presentation for 7 weeks post-op with volume of stress urinary incontinence directly correlated to fluid intake and amount of IAP. Positioning and verbal cues provided for proper Kegel contraction; initial home exercise program provided.     Pt prognosis is Good.   Pt will benefit from skilled outpatient Physical Therapy to address the deficits stated above and in the chart below, provide pt/family education, and to maximize pt's level of independence.     Plan of care discussed with patient: Yes  Pt's spiritual, cultural and educational needs considered and patient is agreeable to the plan of care and goals as stated below:     Anticipated Barriers for therapy: post-op    Medical Necessity is demonstrated by the following  History  Co-morbidities and personal factors that may impact the plan of care [] LOW: no personal factors / co-morbidities  [x] MODERATE: 1-2 personal factors / co-morbidities  [] HIGH: 3+ personal factors / co-morbidities    Moderate / High Support Documentation:   Co-morbidities affecting plan of care:  has a past medical history of Arthritis, Cancer (12/2023), Chronic calculous cholecystitis (08/12/2019), COVID-19, Essential (primary) hypertension (03/08/2016), and GERD (gastroesophageal reflux disease).     Personal Factors:   age     Examination  Body Structures and Functions,  activity limitations and participation restrictions that may impact the plan of care [] LOW: addressing 1-2 elements  [x] MODERATE: 3+ elements  [] HIGH: 4+ elements (please support below)    Moderate / High Support Documentation: see evaluation     Clinical Presentation [] LOW: stable  [x] MODERATE: Evolving  [] HIGH: Unstable     Decision Making/ Complexity Score: moderate         Goals:  Short Term Goals: 6 weeks   - Pt will demonstrate excellent knowledge and adherence to HEP to facilitate optimal recovery.  - Pt will demonstrate proper PFM contraction, relaxation, and lengthening coordinated with TA and breath for improved muscle coordination needed for functional activity.    Long Term Goals: 12 weeks   - Pt will demonstrate excellent knowledge and adherence to HEP for continued self-maintenance of symptoms.  - Pt will report FOTO score of 10% improvement or more indicating clinically relevant increase in function.  - Pt will report voiding interval of 2-3 hours for improved ADL tolerance.  - Pt will report ability to delay urinary urge for at least 15 minutes to maintain continence with ADL/IADLs.   - Pt will report little (drops) to no incidence of urinary incontinence 6/7 days for improved hygiene and ADL/IADL tolerance.   - Pt will report needing </= 1 pad/day indicating improved PFM function needed to maintain continence  - Pt will demonstrate PFM strength of at least 3/5 MMT for improved strength needed to maintain continence.   - Pt will report bearing down appropriately 100% of the time for improved bowel function and decreased stress on adjacent pelvic structures.         Plan     Plan of care Certification: 2/20/2024 to 5/20/2024.    Outpatient Physical Therapy 2 times weekly for 12 weeks to include the following interventions: therapeutic exercises, therapeutic activity, neuromuscular re-education, manual therapy, modalities PRN, patient/family education, dry needling, and self care/home  management    Kayli Vee, PT, DPT, WCS

## 2024-02-20 NOTE — PROGRESS NOTES
"[facility]  Pelvic Health Physical Therapy Initial Evaluation    Date: 2/20/2024   Name: Ashish Dave  Clinic Number: [id number]  Therapy Diagnosis:   Encounter Diagnoses   Name Primary?    Prostate cancer     Pelvic floor dysfunction Yes    Male urinary stress incontinence     Muscle weakness      Physician: Phil Werner MD    Physician Orders: PT Eval and Treat   Medical Diagnosis from Referral: Prostate cancer [C61]   Evaluation Date: 2/20/2024  Authorization Period Expiration: 2/5/2025  Plan of Care Expiration: 5/20/2024  Visit # / Visits authorized: 1/ 1    FOTO 1/3 ON 2/20/2024      Time In: 8:30  Time Out: 9:25  Total Appointment Time (timed & untimed codes): 55 minutes    Precautions:  prostatectomy 1/3/2024; catheter out on 1/10/2024    Subjective     Date of onset: January 2024    History of current condition - Ashish reports: had his prostate out beginning of January; still in [place]. Most of the time it happens when he's standing. He tries to drink at least 1/2 gallon of water/day; more water he drinks, more he'll leak. He'll have little drips when he's standing but can't really feel that; if he has little squirts he can feel that but he can't stop it. He's fine when he's sitting or standing. If he's sitting for a prolonged period of time, he knows he'll have more leakage; tries to go to the bathroom when he stands up but Dr. Werner told him not to get into bad habits.   He has "achey" feeling in lower abdomen when he has to go to the bathroom (urine or bowel).   He was doing a lot of Kegels before he checked in with Dr. Werner and he was told that he was doing too many - now he is doing 3x10/day (AM, mid-day, PM) 3-5 seconds with 10 seconds rest.       Bladder/Bowel History:   Frequency of urination:   Daytime: not sure, trying to wait until he has an urge.            Nighttime: 1x/night   Difficulty initiating urine stream: No  Urine stream: strong in the morning when he hasn't " gone for a while; during the day it's a little weaker.   Complete emptying: Yes  Bladder leakage: Yes  Frequency of incidents: daily, multiple times  Amount leaked (urine): variable based on fluid intake and   Urinary Urgency: No, other than when he's been in the car for a while (ex: sitting in [place] to pickup his grandson, is in the car for about an hour total) - has to walk somewhat fast to get to the bathroom to avoid more urinary incontinence in [place].   Frequency of bowel movements: once a day  Difficulty initiating BM: no; trying not to strain, therefore sometimes a few hours later he hasn't go back and go again.   Form of protection: brief  Number of pads required in 24 hours: 4-5/day; 1 pack lasts 3 days (15 in a pack)       Medical History: Ashish  has a past medical history of Arthritis, Cancer (12/2023), Chronic calculous cholecystitis (08/12/2019), COVID-19, Essential (primary) hypertension (03/08/2016), and GERD (gastroesophageal reflux disease).     Surgical History: Ashish Dave  has a past surgical history that includes Knee surgery (Right); Laparoscopic cholecystectomy (N/A, 8/12/2019); Gallbladder surgery (08/2019); Colonoscopy (2016); Upper gastrointestinal endoscopy; Cholecystectomy; Esophagogastroduodenoscopy (N/A, 6/6/2022); Robot-assisted laparoscopic prostatectomy (N/A, 1/3/2024); and Robot-assisted laparoscopic pelvic lymphadenectomy (N/A, 1/3/2024).    Medications: Ashish has a current medication list which includes the following prescription(s): acetaminophen, ascorbic acid, docusate sodium, losartan, oxybutynin, polyethylene glycol, simethicone, tramadol, and vitamin d.    Allergies:   Review of patient's allergies indicates:   Allergen Reactions    Adhesive Other (See Comments)     SKIN WAS RED        Prior Therapy/Previous treatment included: none   Prior Level of Function: independent   Current Level of Function: see above     Types of fluid intake: tries to get at last 1/2  gallon water/day     Pts goals: resolve leakage; get out of [place]     OBJECTIVE     Informed consent provided 2/20/2024  Chaperone: declined      ABDOMINAL WALL ASSESSMENT  Scarring: well-healed surgical incisions   Pelvic Floor Muscle and Transverse Abdominus Synergy: present    BREATHING MECHANICS ASSESSMENT   Thorax Assessment During Quiet Respiration: WNL excursion of ribcage and WNL excursion of abdominal wall  Thorax Assessment During Deep Respiration: Decreased excursion bilaterally of lateral ribs  and Decreased excursion of abdominal wall         RUSI assessment 2/20/2024   - descent of pelvic floor muscles noted with verbal cues for pelvic floor contraction; able to minimize with verbal cues for correct contraction with mild lift noted   - *able to sense pelvic floor muscle contraction without glute co-contraction in seated child's pose     Limitation/Restriction for FOTO Pelvic Survey    Therapist reviewed FOTO scores for Ashish Dave on 2/20/2024.   FOTO documents entered into EPIC - see Media section.       TREATMENT     Treatment Time In: 8:45  Treatment Time Out: 9:25  Total Treatment time (time-based codes) separate from Evaluation: 40 minutes      Therapeutic Activity Patient participated in dynamic functional therapeutic activities to improve functional performance for 40 minutes. Including: Education as described below:     Patient Education provided:   general anatomy/physiology of urinary/ bowel  system and benefits of treatment were discussed with the pt. Additionally, anatomy/physiology of pelvic floor, posture/body mechanices, kegels, fluid intake/dietary modifications, and behavior modifications were reviewed.     Home Exercises provided:  Written Home Exercises provided: yes.  Exercises were reviewed and Ashish was able to demonstrate them prior to the end of the session.    Ashish demonstrated good  understanding of the education provided.     See EMR under Patient Instructions for  exercises provided 2/20/2024.    Assessment     Ashish is a 68 y.o. male referred to outpatient Physical Therapy with a medical diagnosis of Prostate cancer [C61] . Pt presents with poor knowledge of body mechanics and posture, poor trunk stability, decreased pelvic muscle strength, decreased endurance of the pelvic muscles, decreased phasic ability of the pelvic muscles, poor quality of pelvic muscle contraction, poor coordination of pelvic floor muscles during ADL's leading to urinary or fecal leakage, dysfunctional voiding, and unable to co-contract or co-relax abdominal wall and pelvic floor muscles. Normal presentation for 7 weeks post-op with volume of stress urinary incontinence directly correlated to fluid intake and amount of IAP. Positioning and verbal cues provided for proper Kegel contraction; initial home exercise program provided.     Pt prognosis is Good.   Pt will benefit from skilled outpatient Physical Therapy to address the deficits stated above and in the chart below, provide pt/family education, and to maximize pt's level of independence.     Plan of care discussed with patient: Yes  Pt's spiritual, cultural and educational needs considered and patient is agreeable to the plan of care and goals as stated below:     Anticipated Barriers for therapy: post-op    Medical Necessity is demonstrated by the following  History  Co-morbidities and personal factors that may impact the plan of care [] LOW: no personal factors / co-morbidities  [x] MODERATE: 1-2 personal factors / co-morbidities  [] HIGH: 3+ personal factors / co-morbidities    Moderate / High Support Documentation:   Co-morbidities affecting plan of care:  has a past medical history of Arthritis, Cancer (12/2023), Chronic calculous cholecystitis (08/12/2019), COVID-19, Essential (primary) hypertension (03/08/2016), and GERD (gastroesophageal reflux disease).     Personal Factors:   age     Examination  Body Structures and Functions,  activity limitations and participation restrictions that may impact the plan of care [] LOW: addressing 1-2 elements  [x] MODERATE: 3+ elements  [] HIGH: 4+ elements (please support below)    Moderate / High Support Documentation: see evaluation     Clinical Presentation [] LOW: stable  [x] MODERATE: Evolving  [] HIGH: Unstable     Decision Making/ Complexity Score: moderate         Goals:  Short Term Goals: 6 weeks   - Pt will demonstrate excellent knowledge and adherence to HEP to facilitate optimal recovery.  - Pt will demonstrate proper PFM contraction, relaxation, and lengthening coordinated with TA and breath for improved muscle coordination needed for functional activity.    Long Term Goals: 12 weeks   - Pt will demonstrate excellent knowledge and adherence to HEP for continued self-maintenance of symptoms.  - Pt will report FOTO score of 10% improvement or more indicating clinically relevant increase in function.  - Pt will report voiding interval of 2-3 hours for improved ADL tolerance.  - Pt will report ability to delay urinary urge for at least 15 minutes to maintain continence with ADL/IADLs.   - Pt will report little (drops) to no incidence of urinary incontinence 6/7 days for improved hygiene and ADL/IADL tolerance.   - Pt will report needing </= 1 pad/day indicating improved PFM function needed to maintain continence  - Pt will demonstrate PFM strength of at least 3/5 MMT for improved strength needed to maintain continence.   - Pt will report bearing down appropriately 100% of the time for improved bowel function and decreased stress on adjacent pelvic structures.         Plan     Plan of care Certification: 2/20/2024 to 5/20/2024.    Outpatient Physical Therapy 2 times weekly for 12 weeks to include the following interventions: therapeutic exercises, therapeutic activity, neuromuscular re-education, manual therapy, modalities PRN, patient/family education, dry needling, and self care/home  management    Kayli Vee, PT, DPT, WCS

## 2024-02-23 ENCOUNTER — TELEPHONE (OUTPATIENT)
Dept: FAMILY MEDICINE | Facility: CLINIC | Age: 69
End: 2024-02-23
Payer: MEDICARE

## 2024-02-23 DIAGNOSIS — Z79.899 ENCOUNTER FOR LONG-TERM (CURRENT) USE OF OTHER MEDICATIONS: Primary | ICD-10-CM

## 2024-02-23 DIAGNOSIS — C61 PROSTATE CANCER: ICD-10-CM

## 2024-02-23 DIAGNOSIS — I10 BENIGN ESSENTIAL HTN: ICD-10-CM

## 2024-02-27 ENCOUNTER — DOCUMENTATION ONLY (OUTPATIENT)
Dept: REHABILITATION | Facility: HOSPITAL | Age: 69
End: 2024-02-27

## 2024-02-27 ENCOUNTER — CLINICAL SUPPORT (OUTPATIENT)
Dept: REHABILITATION | Facility: HOSPITAL | Age: 69
End: 2024-02-27
Payer: MEDICARE

## 2024-02-27 DIAGNOSIS — N39.3 MALE URINARY STRESS INCONTINENCE: ICD-10-CM

## 2024-02-27 DIAGNOSIS — M62.81 MUSCLE WEAKNESS: ICD-10-CM

## 2024-02-27 DIAGNOSIS — M62.89 PELVIC FLOOR DYSFUNCTION: Primary | ICD-10-CM

## 2024-02-27 PROCEDURE — 97530 THERAPEUTIC ACTIVITIES: CPT | Mod: PO,CQ

## 2024-02-27 PROCEDURE — 97112 NEUROMUSCULAR REEDUCATION: CPT | Mod: PO,CQ

## 2024-02-27 NOTE — PROGRESS NOTES
Pelvic Health Physical Therapy   Treatment Note     Name: Ashish Dave  Clinic Number: [id number]    Therapy Diagnosis:   Encounter Diagnoses   Name Primary?    Pelvic floor dysfunction Yes    Male urinary stress incontinence     Muscle weakness      Physician: Phil Werner MD    Visit Date: 2/27/2024  Physician Orders: PT Eval and Treat   Medical Diagnosis from Referral: Prostate cancer [C61]   Evaluation Date: 2/20/2024  Authorization Period Expiration: 2/5/2025  Plan of Care Expiration: 5/20/2024  Visit # / Visits authorized: 1/ 1     FOTO 1/3 ON 2/20/2024       Time In: 8:30  Time Out: 9:15  Total Appointment Time (timed & untimed codes): 55 minutes     Precautions:  prostatectomy 1/3/2024; catheter out on 1/10/2024      Subjective     Pt reports: Has been performing the home exercise program without problems. Still has leakage when active.  He was compliant with home exercise program.  Response to previous treatment: good  Functional change: unremarkable     Pain: 0/10  Location: right knee can problematic     Objective     Ashish received therapeutic exercises to develop  strength, endurance, ROM, flexibility, posture, and core stabilization for 0 minutes including:  the following exercises       Ashish received the following manual therapy techniques: to develop flexibility, extensibility, and desensitization for 0 minutes including:         Ashish participated in neuromuscular re-education activities to develop Coordination, Control, Down training, Posture, Proprioception, Kinesthetic, and Sense for 10 minutes including:       Hooklying kegel with 5 sec hold and  10 sec relax  3 minutes   Bridges with kegel and exhale 3 x 10     Ashish participated in dynamic functional therapeutic activities to improve functional performance for 30  minutes, including:    Sit to stand 2 x 5 with kegel and exhale   Box lifts from chair with 10 # with kegel and exhale   Urge suppression techniques      Education on  Pelvic Floor anatomy, intraabdominal pressure, lifting restrictions     Home Exercises Provided and Patient Education Provided     Education provided:   - anatomy/physiology of pelvic floor, posture/body mechanices, and Coordination of kegels with functional activities such as cough, laugh, sneeze, lift, etc.   Discussed progression of plan of care with patient; educated pt in activity modification; reviewed HEP with pt. Pt demonstrated and verbalized understanding of all instruction and was not provided with a handout of HEP (see Patient Instructions).  -     Written Home Exercises Provided: Patient instructed to cont prior HEP.  Exercises were reviewed and Ashish was able to demonstrate them prior to the end of the session.  Ashish demonstrated good  understanding of the education provided.     See EMR under Patient Instructions for exercises provided prior visit.    Assessment     Patient presented to clinic engaged and willing to participate with therapy. He was educated on Pelvic Floor anatomy, intraabdominal pressure, and urge suppression techniques. He was unsure if Dr. Werner has cleared him to lift more than 10 pounds but he is eager to start lifting more in order to take some of the household chores off of his wife's shoulders. This clinician reached out to Dr. Werner for clarification; currently awaiting reply. Patient is expected to do very well in therapy.     Ashish Is progressing well towards his goals.   Pt prognosis is Good.     Pt will continue to benefit from skilled outpatient physical therapy to address the deficits listed in the problem list box on initial evaluation, provide pt/family education and to maximize pt's level of independence in the home and community environment.     Pt's spiritual, cultural and educational needs considered and pt agreeable to plan of care and goals.     Anticipated barriers to physical therapy: post-op     Goals: Goals:  Short Term Goals: 6 weeks   - Pt will  demonstrate excellent knowledge and adherence to HEP to facilitate optimal recovery.  - Pt will demonstrate proper PFM contraction, relaxation, and lengthening coordinated with TA and breath for improved muscle coordination needed for functional activity.     Long Term Goals: 12 weeks   - Pt will demonstrate excellent knowledge and adherence to HEP for continued self-maintenance of symptoms.  - Pt will report FOTO score of 10% improvement or more indicating clinically relevant increase in function.  - Pt will report voiding interval of 2-3 hours for improved ADL tolerance.  - Pt will report ability to delay urinary urge for at least 15 minutes to maintain continence with ADL/IADLs.   - Pt will report little (drops) to no incidence of urinary incontinence 6/7 days for improved hygiene and ADL/IADL tolerance.   - Pt will report needing </= 1 pad/day indicating improved PFM function needed to maintain continence  - Pt will demonstrate PFM strength of at least 3/5 MMT for improved strength needed to maintain continence.   - Pt will report bearing down appropriately 100% of the time for improved bowel function and decreased stress on adjacent pelvic structures    Plan     Plan of care Certification: 2/20/2024 to 5/20/2024.     Outpatient Physical Therapy 2 times weekly for 12 weeks    Lindy Johnson, DEVANTE

## 2024-03-01 ENCOUNTER — CLINICAL SUPPORT (OUTPATIENT)
Dept: REHABILITATION | Facility: HOSPITAL | Age: 69
End: 2024-03-01
Payer: MEDICARE

## 2024-03-01 DIAGNOSIS — N39.3 MALE URINARY STRESS INCONTINENCE: ICD-10-CM

## 2024-03-01 DIAGNOSIS — M62.81 MUSCLE WEAKNESS: ICD-10-CM

## 2024-03-01 DIAGNOSIS — M62.89 PELVIC FLOOR DYSFUNCTION: Primary | ICD-10-CM

## 2024-03-01 PROCEDURE — 97112 NEUROMUSCULAR REEDUCATION: CPT | Mod: PO,CQ

## 2024-03-01 PROCEDURE — 97530 THERAPEUTIC ACTIVITIES: CPT | Mod: PO,CQ

## 2024-03-01 NOTE — PROGRESS NOTES
Pelvic Health Physical Therapy   Treatment Note     Name: Ashish Dave  Clinic Number: [id number]    Therapy Diagnosis:   Encounter Diagnoses   Name Primary?    Pelvic floor dysfunction Yes    Male urinary stress incontinence     Muscle weakness      Physician: Phil Werner MD    Visit Date: 3/1/2024  Physician Orders: PT Eval and Treat   Medical Diagnosis from Referral: Prostate cancer [C61]   Evaluation Date: 2/20/2024  Authorization Period Expiration: 2/5/2025  Plan of Care Expiration: 5/20/2024  Visit # / Visits authorized: 2/ 12     FOTO 1/3 ON 2/20/2024       Time In: 0750  Time Out: 0845  Total Appointment Time (timed & untimed codes): 53 minutes     Precautions:  prostatectomy 1/3/2024; catheter out on 1/10/2024      Subjective     Pt reports: When they took out his prostate they removed 2 lymph nodes as well. When he performs his Kegel he experiences left lower abdominal irritation (soreness) afterwards and its where he experiences continued swelling as well.     He was compliant with home exercise program.  Response to previous treatment: good  Functional change: unremarkable     Pain: 0/10  Location: right knee can problematic     Objective     Ashish received therapeutic exercises to develop  strength, endurance, ROM, flexibility, posture, and core stabilization for 0 minutes including:  the following exercises       Ashish received the following manual therapy techniques: to develop flexibility, extensibility, and desensitization for 0 minutes including:         Ashish participated in neuromuscular re-education activities to develop Coordination, Control, Down training, Posture, Proprioception, Kinesthetic, and Sense for 23 minutes including:       Hooklying kegel with 5 sec hold and  10 sec relax  3 minutes   Bridges with kegel and exhale 3 x 10   Clams with Purple theraband 3 x 10 repetitions   Transverse abdominus activation 5 seconds holds x 3 minutes    With shushing x 3 minutes   Bent knee  fall out with PTB x 3 minutes       Ashish participated in dynamic functional therapeutic activities to improve functional performance for 30  minutes, including:    Sit to stand 2 x 5 with kegel and exhale   Box lifts from chair with 10 # with kegel and exhale   Deal lifts with 15 pounds 2 x 10 repetitions with focus on breathing techniques   Urge suppression techniques      Education on Pelvic Floor anatomy, intraabdominal pressure, lifting restrictions     Home Exercises Provided and Patient Education Provided     Education provided:   - anatomy/physiology of pelvic floor, posture/body mechanices, and Coordination of kegels with functional activities such as cough, laugh, sneeze, lift, etc.   Discussed progression of plan of care with patient; educated pt in activity modification; reviewed HEP with pt. Pt demonstrated and verbalized understanding of all instruction and was not provided with a handout of HEP (see Patient Instructions).  -     Written Home Exercises Provided: Patient instructed to cont prior HEP.  Exercises were reviewed and Ashish was able to demonstrate them prior to the end of the session.  Ashish demonstrated good  understanding of the education provided.     See EMR under Patient Instructions for exercises provided prior visit.    Assessment     Reviewed 4 week lifting restrictions which his Medical Doctor has released him from. Provided education on lymphatic system, core/pelvic synergy, lifting techniques with proper breathing, and review of kegels at home to ensure proper activation. Ashish was able to return demonstration of all education without issues. Treatment exercises focused on improving strength of pelvic floor as well as accessory musculature. Ashish will continue to benefit from skilled Physical Therapy to maximize strength gains as able.     Ashish Is progressing well towards his goals.   Pt prognosis is Good.     Pt will continue to benefit from skilled outpatient physical  therapy to address the deficits listed in the problem list box on initial evaluation, provide pt/family education and to maximize pt's level of independence in the home and community environment.     Pt's spiritual, cultural and educational needs considered and pt agreeable to plan of care and goals.     Anticipated barriers to physical therapy: post-op     Goals: Goals:  Short Term Goals: 6 weeks   - Pt will demonstrate excellent knowledge and adherence to HEP to facilitate optimal recovery.  - Pt will demonstrate proper PFM contraction, relaxation, and lengthening coordinated with TA and breath for improved muscle coordination needed for functional activity.     Long Term Goals: 12 weeks   - Pt will demonstrate excellent knowledge and adherence to HEP for continued self-maintenance of symptoms.  - Pt will report FOTO score of 10% improvement or more indicating clinically relevant increase in function.  - Pt will report voiding interval of 2-3 hours for improved ADL tolerance.  - Pt will report ability to delay urinary urge for at least 15 minutes to maintain continence with ADL/IADLs.   - Pt will report little (drops) to no incidence of urinary incontinence 6/7 days for improved hygiene and ADL/IADL tolerance.   - Pt will report needing </= 1 pad/day indicating improved PFM function needed to maintain continence  - Pt will demonstrate PFM strength of at least 3/5 MMT for improved strength needed to maintain continence.   - Pt will report bearing down appropriately 100% of the time for improved bowel function and decreased stress on adjacent pelvic structures    Plan     Plan of care Certification: 2/20/2024 to 5/20/2024.     Outpatient Physical Therapy 2 times weekly for 12 weeks    Janell Hansen, DEVANTE

## 2024-03-02 LAB
ALBUMIN SERPL-MCNC: 4.4 G/DL (ref 3.6–5.1)
ALBUMIN/CREAT UR: 10 MCG/MG CREAT
ALBUMIN/GLOB SERPL: 1.8 (CALC) (ref 1–2.5)
ALP SERPL-CCNC: 62 U/L (ref 35–144)
ALT SERPL-CCNC: 14 U/L (ref 9–46)
APPEARANCE UR: ABNORMAL
AST SERPL-CCNC: 17 U/L (ref 10–35)
BACTERIA #/AREA URNS HPF: ABNORMAL /HPF
BACTERIA UR CULT: ABNORMAL
BACTERIA UR CULT: ABNORMAL
BASOPHILS # BLD AUTO: 141 CELLS/UL (ref 0–200)
BASOPHILS NFR BLD AUTO: 3 %
BILIRUB SERPL-MCNC: 0.8 MG/DL (ref 0.2–1.2)
BILIRUB UR QL STRIP: NEGATIVE
BUN SERPL-MCNC: 11 MG/DL (ref 7–25)
BUN/CREAT SERPL: NORMAL (CALC) (ref 6–22)
CALCIUM SERPL-MCNC: 9.8 MG/DL (ref 8.6–10.3)
CHLORIDE SERPL-SCNC: 105 MMOL/L (ref 98–110)
CHOLEST SERPL-MCNC: 134 MG/DL
CHOLEST/HDLC SERPL: 2.1 (CALC)
CO2 SERPL-SCNC: 26 MMOL/L (ref 20–32)
COLOR UR: YELLOW
CREAT SERPL-MCNC: 0.72 MG/DL (ref 0.7–1.35)
CREAT UR-MCNC: 108 MG/DL (ref 20–320)
EGFR: 99 ML/MIN/1.73M2
EOSINOPHIL # BLD AUTO: 230 CELLS/UL (ref 15–500)
EOSINOPHIL NFR BLD AUTO: 4.9 %
ERYTHROCYTE [DISTWIDTH] IN BLOOD BY AUTOMATED COUNT: 13 % (ref 11–15)
GLOBULIN SER CALC-MCNC: 2.4 G/DL (CALC) (ref 1.9–3.7)
GLUCOSE SERPL-MCNC: 83 MG/DL (ref 65–99)
GLUCOSE UR QL STRIP: NEGATIVE
HCT VFR BLD AUTO: 49.9 % (ref 38.5–50)
HDLC SERPL-MCNC: 63 MG/DL
HGB BLD-MCNC: 15.6 G/DL (ref 13.2–17.1)
HGB UR QL STRIP: NEGATIVE
HYALINE CASTS #/AREA URNS LPF: ABNORMAL /LPF
KETONES UR QL STRIP: NEGATIVE
LDLC SERPL CALC-MCNC: 57 MG/DL (CALC)
LEUKOCYTE ESTERASE UR QL STRIP: ABNORMAL
LYMPHOCYTES # BLD AUTO: 1659 CELLS/UL (ref 850–3900)
LYMPHOCYTES NFR BLD AUTO: 35.3 %
MCH RBC QN AUTO: 29.1 PG (ref 27–33)
MCHC RBC AUTO-ENTMCNC: 31.3 G/DL (ref 32–36)
MCV RBC AUTO: 93.1 FL (ref 80–100)
MICROALBUMIN UR-MCNC: 1.1 MG/DL
MONOCYTES # BLD AUTO: 418 CELLS/UL (ref 200–950)
MONOCYTES NFR BLD AUTO: 8.9 %
NEUTROPHILS # BLD AUTO: 2251 CELLS/UL (ref 1500–7800)
NEUTROPHILS NFR BLD AUTO: 47.9 %
NITRITE UR QL STRIP: NEGATIVE
NONHDLC SERPL-MCNC: 71 MG/DL (CALC)
PH UR STRIP: 5.5 [PH] (ref 5–8)
PLATELET # BLD AUTO: 215 THOUSAND/UL (ref 140–400)
PMV BLD REES-ECKER: 11.1 FL (ref 7.5–12.5)
POTASSIUM SERPL-SCNC: 4.4 MMOL/L (ref 3.5–5.3)
PROT SERPL-MCNC: 6.8 G/DL (ref 6.1–8.1)
PROT UR QL STRIP: NEGATIVE
RBC # BLD AUTO: 5.36 MILLION/UL (ref 4.2–5.8)
RBC #/AREA URNS HPF: ABNORMAL /HPF
SERVICE CMNT-IMP: ABNORMAL
SODIUM SERPL-SCNC: 141 MMOL/L (ref 135–146)
SP GR UR STRIP: 1.01 (ref 1–1.03)
SQUAMOUS #/AREA URNS HPF: ABNORMAL /HPF
TRIGL SERPL-MCNC: 61 MG/DL
TSH SERPL-ACNC: 2.9 MIU/L (ref 0.4–4.5)
WBC # BLD AUTO: 4.7 THOUSAND/UL (ref 3.8–10.8)
WBC #/AREA URNS HPF: ABNORMAL /HPF

## 2024-03-03 NOTE — PROGRESS NOTES
Call patient.  Urinalysis is showing some possible UTI infection in the bladder.  Is he having any symptoms?  If so we can treat this with Bactrim DS 1 p.o. b.i.d. for 7 days, if no symptoms no need to treat

## 2024-03-04 ENCOUNTER — TELEPHONE (OUTPATIENT)
Dept: FAMILY MEDICINE | Facility: CLINIC | Age: 69
End: 2024-03-04
Payer: MEDICARE

## 2024-03-04 NOTE — TELEPHONE ENCOUNTER
Spoke to patient, said he is having incontinence because he had his prostate removed 2 months ago. Said we can hold off for now on antibiotic. Not exactly experiencing UTI symptoms. Will call if they develop

## 2024-03-04 NOTE — TELEPHONE ENCOUNTER
----- Message from Phil Kemp MD sent at 3/3/2024  5:51 PM CST -----  Call patient.  Urinalysis is showing some possible UTI infection in the bladder.  Is he having any symptoms?  If so we can treat this with Bactrim DS 1 p.o. b.i.d. for 7 days, if no symptoms no need to treat

## 2024-03-05 ENCOUNTER — CLINICAL SUPPORT (OUTPATIENT)
Dept: REHABILITATION | Facility: HOSPITAL | Age: 69
End: 2024-03-05
Payer: MEDICARE

## 2024-03-05 DIAGNOSIS — N39.3 MALE URINARY STRESS INCONTINENCE: ICD-10-CM

## 2024-03-05 DIAGNOSIS — M62.81 MUSCLE WEAKNESS: ICD-10-CM

## 2024-03-05 DIAGNOSIS — M62.89 PELVIC FLOOR DYSFUNCTION: Primary | ICD-10-CM

## 2024-03-05 PROCEDURE — 97530 THERAPEUTIC ACTIVITIES: CPT | Mod: PO,CQ

## 2024-03-05 PROCEDURE — 97112 NEUROMUSCULAR REEDUCATION: CPT | Mod: PO,CQ

## 2024-03-05 NOTE — PROGRESS NOTES
Pelvic Health Physical Therapy   Treatment Note     Name: Ashish Dave  Clinic Number: [id number]    Therapy Diagnosis:   Encounter Diagnoses   Name Primary?    Pelvic floor dysfunction Yes    Male urinary stress incontinence     Muscle weakness      Physician: Phil Werner MD    Visit Date: 3/5/2024  Physician Orders: PT Eval and Treat   Medical Diagnosis from Referral: Prostate cancer [C61]   Evaluation Date: 2/20/2024  Authorization Period Expiration: 2/5/2025  Plan of Care Expiration: 5/20/2024  Visit # / Visits authorized: 3/ 12     FOTO 1/3 ON 2/20/2024       Time In: 0705  Time Out: 0750  Total Appointment Time (timed & untimed codes): 45 minutes     Precautions:  prostatectomy 1/3/2024; catheter out on 1/10/2024      Subjective     Pt reports: no new issues since his last visit. He is doing more around the house and yard without incident.      He was compliant with home exercise program.  Response to previous treatment: good  Functional change: unremarkable     Pain: 0/10  Location: right knee can problematic     Objective     Ashish received therapeutic exercises to develop  strength, endurance, ROM, flexibility, posture, and core stabilization for 0 minutes including:  the following exercises       Ashish received the following manual therapy techniques: to develop flexibility, extensibility, and desensitization for 0 minutes including:         Ashish participated in neuromuscular re-education activities to develop Coordination, Control, Down training, Posture, Proprioception, Kinesthetic, and Sense for 15 minutes including:       Hooklying kegel with 5 sec hold and  10 sec relax  3 minutes   Bridges with kegel and exhale 3 x 10   Clams with Purple theraband 3 x 10 repetitions   Transverse abdominus activation 5 seconds holds x 3 minutes    With shushing x 3 minutes   Bent knee fall out with PTB x 3 minutes       Ashish participated in dynamic functional therapeutic activities to improve functional  performance for 30  minutes, including:    Sit to stand 3 x 5 with kegel and exhale  15# kettle moise   Box lifts from floor with 15 # with kegel and exhale   Deal lifts with 15 pounds 3 x 5 repetitions from chair height   Urge suppression techniques      Education on Pelvic Floor anatomy, intraabdominal pressure, lifting restrictions     Home Exercises Provided and Patient Education Provided     Education provided:   - anatomy/physiology of pelvic floor, posture/body mechanices, and Coordination of kegels with functional activities such as cough, laugh, sneeze, lift, etc.   Discussed progression of plan of care with patient; educated pt in activity modification; reviewed HEP with pt. Pt demonstrated and verbalized understanding of all instruction and was not provided with a handout of HEP (see Patient Instructions).  -     Written Home Exercises Provided: Patient instructed to cont prior HEP.  Exercises were reviewed and Ashish was able to demonstrate them prior to the end of the session.  Ashish demonstrated good  understanding of the education provided.     See EMR under Patient Instructions for exercises provided prior visit.    Assessment     Today treatment focused on improving strength of pelvic floor and core musculature. Continued focus on functional lifting with appropriate muscle engagement and management of intraabdominal pressure. Ashish will continue to benefit from skilled Physical Therapy to maximize strength gains as able.     Ashish Is progressing well towards his goals.   Pt prognosis is Good.     Pt will continue to benefit from skilled outpatient physical therapy to address the deficits listed in the problem list box on initial evaluation, provide pt/family education and to maximize pt's level of independence in the home and community environment.     Pt's spiritual, cultural and educational needs considered and pt agreeable to plan of care and goals.     Anticipated barriers to physical therapy:  post-op     Goals: Goals:  Short Term Goals: 6 weeks   - Pt will demonstrate excellent knowledge and adherence to HEP to facilitate optimal recovery.  - Pt will demonstrate proper PFM contraction, relaxation, and lengthening coordinated with TA and breath for improved muscle coordination needed for functional activity.     Long Term Goals: 12 weeks   - Pt will demonstrate excellent knowledge and adherence to HEP for continued self-maintenance of symptoms.  - Pt will report FOTO score of 10% improvement or more indicating clinically relevant increase in function.  - Pt will report voiding interval of 2-3 hours for improved ADL tolerance.  - Pt will report ability to delay urinary urge for at least 15 minutes to maintain continence with ADL/IADLs.   - Pt will report little (drops) to no incidence of urinary incontinence 6/7 days for improved hygiene and ADL/IADL tolerance.   - Pt will report needing </= 1 pad/day indicating improved PFM function needed to maintain continence  - Pt will demonstrate PFM strength of at least 3/5 MMT for improved strength needed to maintain continence.   - Pt will report bearing down appropriately 100% of the time for improved bowel function and decreased stress on adjacent pelvic structures    Plan     Plan of care Certification: 2/20/2024 to 5/20/2024.     Outpatient Physical Therapy 2 times weekly for 12 weeks    Lindy Johnson, DEVANTE

## 2024-03-08 ENCOUNTER — CLINICAL SUPPORT (OUTPATIENT)
Dept: REHABILITATION | Facility: HOSPITAL | Age: 69
End: 2024-03-08
Payer: MEDICARE

## 2024-03-08 DIAGNOSIS — M62.81 MUSCLE WEAKNESS: ICD-10-CM

## 2024-03-08 DIAGNOSIS — N39.3 MALE URINARY STRESS INCONTINENCE: ICD-10-CM

## 2024-03-08 DIAGNOSIS — M62.89 PELVIC FLOOR DYSFUNCTION: Primary | ICD-10-CM

## 2024-03-08 PROCEDURE — 97530 THERAPEUTIC ACTIVITIES: CPT | Mod: PO,CQ

## 2024-03-08 PROCEDURE — 97112 NEUROMUSCULAR REEDUCATION: CPT | Mod: PO,CQ

## 2024-03-08 NOTE — PROGRESS NOTES
Pelvic Health Physical Therapy   Treatment Note     Name: Ashish Dave  Clinic Number: [id number]    Therapy Diagnosis:   Encounter Diagnoses   Name Primary?    Pelvic floor dysfunction Yes    Male urinary stress incontinence     Muscle weakness      Physician: Phil Werner MD    Visit Date: 3/8/2024  Physician Orders: PT Eval and Treat   Medical Diagnosis from Referral: Prostate cancer [C61]   Evaluation Date: 2/20/2024  Authorization Period Expiration: 2/5/2025  Plan of Care Expiration: 5/20/2024  Visit # / Visits authorized: 4/ 12     FOTO 1/3 ON 2/20/2024       Time In: 0751  Time Out: 0830  Total Appointment Time (timed & untimed codes): 39 minutes     Precautions:  prostatectomy 1/3/2024; catheter out on 1/10/2024      Subjective     Pt reports: Still having leakage if he drinks a lot of water at once. Continues to experience squirting if he coughs or sneezes.     He was compliant with home exercise program.  Response to previous treatment: good  Functional change: unremarkable     Pain: 0/10  Location: right knee can problematic     Objective     Ashish received therapeutic exercises to develop  strength, endurance, ROM, flexibility, posture, and core stabilization for 0 minutes including:  the following exercises       Ashish received the following manual therapy techniques: to develop flexibility, extensibility, and desensitization for 0 minutes including:         Ashish participated in neuromuscular re-education activities to develop Coordination, Control, Down training, Posture, Proprioception, Kinesthetic, and Sense for 15 minutes including:       Hooklying kegel with 5 sec hold and  10 sec relax  3 minutes   Bridges with kegel and exhale 3 x 10   Clams with Purple theraband 3 x 10 repetitions   Transverse abdominus activation 5 seconds holds x 3 minutes    With shushing x 3 minutes   Bent knee fall out with PTB x 3 minutes       Ashish participated in dynamic functional therapeutic activities to  improve functional performance for 24  minutes, including:    Sit to stand 3 x 5 with kegel and exhale  15# kettle moise   Box lifts from floor with 15 # with kegel and exhale   Deal lifts with 15 pounds 3 x 5 repetitions from chair height   Urge suppression techniques      Education on Pelvic Floor anatomy, intraabdominal pressure, lifting restrictions     Home Exercises Provided and Patient Education Provided     Education provided:   - anatomy/physiology of pelvic floor, posture/body mechanices, and Coordination of kegels with functional activities such as cough, laugh, sneeze, lift, etc.   Discussed progression of plan of care with patient; educated pt in activity modification; reviewed HEP with pt. Pt demonstrated and verbalized understanding of all instruction and was not provided with a handout of HEP (see Patient Instructions).  -     Written Home Exercises Provided: Patient instructed to cont prior HEP.  Exercises were reviewed and Ashish was able to demonstrate them prior to the end of the session.  Ashish demonstrated good  understanding of the education provided.     See EMR under Patient Instructions for exercises provided prior visit.    Assessment     Continued treatment focused on improving strength of pelvic floor and core musculature during functional lifting with appropriate muscle engagement and management of intraabdominal pressure. During transverse abdominus activation he noted with significant oblique dominance and required significant cueing to improve isolation of transverse abdominus.  Ashish will continue to benefit from skilled Physical Therapy to maximize strength gains as able.     Ashish Is progressing well towards his goals.   Pt prognosis is Good.     Pt will continue to benefit from skilled outpatient physical therapy to address the deficits listed in the problem list box on initial evaluation, provide pt/family education and to maximize pt's level of independence in the home and  community environment.     Pt's spiritual, cultural and educational needs considered and pt agreeable to plan of care and goals.     Anticipated barriers to physical therapy: post-op     Goals: Goals:  Short Term Goals: 6 weeks   - Pt will demonstrate excellent knowledge and adherence to HEP to facilitate optimal recovery.  - Pt will demonstrate proper PFM contraction, relaxation, and lengthening coordinated with TA and breath for improved muscle coordination needed for functional activity.     Long Term Goals: 12 weeks   - Pt will demonstrate excellent knowledge and adherence to HEP for continued self-maintenance of symptoms.  - Pt will report FOTO score of 10% improvement or more indicating clinically relevant increase in function.  - Pt will report voiding interval of 2-3 hours for improved ADL tolerance.  - Pt will report ability to delay urinary urge for at least 15 minutes to maintain continence with ADL/IADLs.   - Pt will report little (drops) to no incidence of urinary incontinence 6/7 days for improved hygiene and ADL/IADL tolerance.   - Pt will report needing </= 1 pad/day indicating improved PFM function needed to maintain continence  - Pt will demonstrate PFM strength of at least 3/5 MMT for improved strength needed to maintain continence.   - Pt will report bearing down appropriately 100% of the time for improved bowel function and decreased stress on adjacent pelvic structures    Plan     Plan of care Certification: 2/20/2024 to 5/20/2024.     Outpatient Physical Therapy 2 times weekly for 12 weeks    Janell Hansen PTA

## 2024-03-12 ENCOUNTER — OFFICE VISIT (OUTPATIENT)
Dept: FAMILY MEDICINE | Facility: CLINIC | Age: 69
End: 2024-03-12
Attending: FAMILY MEDICINE
Payer: MEDICARE

## 2024-03-12 VITALS
WEIGHT: 165 LBS | SYSTOLIC BLOOD PRESSURE: 104 MMHG | HEIGHT: 70 IN | BODY MASS INDEX: 23.62 KG/M2 | DIASTOLIC BLOOD PRESSURE: 70 MMHG | HEART RATE: 50 BPM

## 2024-03-12 DIAGNOSIS — Z90.79 HISTORY OF ROBOT-ASSISTED LAPAROSCOPIC RADICAL PROSTATECTOMY: ICD-10-CM

## 2024-03-12 DIAGNOSIS — M17.0 PRIMARY OSTEOARTHRITIS OF BOTH KNEES: ICD-10-CM

## 2024-03-12 DIAGNOSIS — M12.811 ROTATOR CUFF ARTHROPATHY OF BOTH SHOULDERS: ICD-10-CM

## 2024-03-12 DIAGNOSIS — B96.20 E. COLI UTI: Primary | ICD-10-CM

## 2024-03-12 DIAGNOSIS — N39.0 E. COLI UTI: Primary | ICD-10-CM

## 2024-03-12 DIAGNOSIS — F51.01 PRIMARY INSOMNIA: ICD-10-CM

## 2024-03-12 DIAGNOSIS — M62.89 PELVIC FLOOR DYSFUNCTION: ICD-10-CM

## 2024-03-12 DIAGNOSIS — M12.812 ROTATOR CUFF ARTHROPATHY OF BOTH SHOULDERS: ICD-10-CM

## 2024-03-12 DIAGNOSIS — Z77.090 ASBESTOS EXPOSURE: ICD-10-CM

## 2024-03-12 DIAGNOSIS — C61 PROSTATE CANCER: ICD-10-CM

## 2024-03-12 DIAGNOSIS — K20.90 ESOPHAGITIS: ICD-10-CM

## 2024-03-12 DIAGNOSIS — N39.3 MALE URINARY STRESS INCONTINENCE: ICD-10-CM

## 2024-03-12 PROCEDURE — 99214 OFFICE O/P EST MOD 30 MIN: CPT | Mod: S$GLB,,, | Performed by: FAMILY MEDICINE

## 2024-03-12 RX ORDER — SULFAMETHOXAZOLE AND TRIMETHOPRIM 800; 160 MG/1; MG/1
1 TABLET ORAL 2 TIMES DAILY
Qty: 14 TABLET | Refills: 0 | Status: SHIPPED | OUTPATIENT
Start: 2024-03-12

## 2024-03-12 NOTE — PROGRESS NOTES
SUBJECTIVE:    Patient ID: Ashish Dave is a 68 y.o. male.    Chief Complaint: Follow-up (No bottles, review Lab-results, incontinence, abc )    This 68-year-old male had a prostatectomy on 01/03/2024 with Dr. Werner.  He had a [place] 7 prostate cancer PSA around 5 or above.  PET scan was negative.  He had a robotic prostatectomy, still has some urinary leaking even on oxybutynin 5 mg daily.  Currently going to pelvic floor physical therapy 2 times a week.  His urine does have a foul smell but he has no burning or fever.  Taking azo at this time.    He was positive for COVID in the summer of 2023 December 2023 had cardiac nuclear stress testing which was normal no ischemia found.  Ejection fraction was 69%.  Dr. Apodaca is the cardiologist.    Hypertension-no longer takes losartan and blood pressure remains normal, he has lost 5 lb eating healthier diet.    Osteoarthritis-right knee greater than left knee, painful and gives out at times.  He can still walk around the block with his wife for exercise 3-4 days a week.  He takes no NSAIDs or pain medications.  He is trying to put up with it  and does not want knee surgery at this time.  Both shoulders are achy with arthritis as well can not lift a jug of milk out of the refrigerator with 1 hand.    Follow-up  Pertinent negatives include no chest pain, headaches or vomiting.       Telephone on 02/23/2024   Component Date Value Ref Range Status    WBC 02/28/2024 4.7  3.8 - 10.8 Thousand/uL Final    RBC 02/28/2024 5.36  4.20 - 5.80 Million/uL Final    Hemoglobin 02/28/2024 15.6  13.2 - 17.1 g/dL Final    Hematocrit 02/28/2024 49.9  38.5 - 50.0 % Final    MCV 02/28/2024 93.1  80.0 - 100.0 fL Final    MCH 02/28/2024 29.1  27.0 - 33.0 pg Final    MCHC 02/28/2024 31.3 (L)  32.0 - 36.0 g/dL Final    RDW 02/28/2024 13.0  11.0 - 15.0 % Final    Platelets 02/28/2024 215  140 - 400 Thousand/uL Final    MPV 02/28/2024 11.1  7.5 - 12.5 fL Final    Neutrophils, Abs 02/28/2024  2,251  1,500 - 7,800 cells/uL Final    Lymph # 02/28/2024 1,659  850 - 3,900 cells/uL Final    Mono # 02/28/2024 418  200 - 950 cells/uL Final    Eos # 02/28/2024 230  15 - 500 cells/uL Final    Baso # 02/28/2024 141  0 - 200 cells/uL Final    Neutrophils Relative 02/28/2024 47.9  % Final    Lymph % 02/28/2024 35.3  % Final    Mono % 02/28/2024 8.9  % Final    Eosinophil % 02/28/2024 4.9  % Final    Basophil % 02/28/2024 3.0  % Final    Glucose 02/28/2024 83  65 - 99 mg/dL Final    BUN 02/28/2024 11  7 - 25 mg/dL Final    Creatinine 02/28/2024 0.72  0.70 - 1.35 mg/dL Final    eGFR 02/28/2024 99  > OR = 60 mL/min/1.73m2 Final    BUN/Creatinine Ratio 02/28/2024 SEE NOTE:  6 - 22 (calc) Final    Sodium 02/28/2024 141  135 - 146 mmol/L Final    Potassium 02/28/2024 4.4  3.5 - 5.3 mmol/L Final    Chloride 02/28/2024 105  98 - 110 mmol/L Final    CO2 02/28/2024 26  20 - 32 mmol/L Final    Calcium 02/28/2024 9.8  8.6 - 10.3 mg/dL Final    Total Protein 02/28/2024 6.8  6.1 - 8.1 g/dL Final    Albumin 02/28/2024 4.4  3.6 - 5.1 g/dL Final    Globulin, Total 02/28/2024 2.4  1.9 - 3.7 g/dL (calc) Final    Albumin/Globulin Ratio 02/28/2024 1.8  1.0 - 2.5 (calc) Final    Total Bilirubin 02/28/2024 0.8  0.2 - 1.2 mg/dL Final    Alkaline Phosphatase 02/28/2024 62  35 - 144 U/L Final    AST 02/28/2024 17  10 - 35 U/L Final    ALT 02/28/2024 14  9 - 46 U/L Final    Cholesterol 02/28/2024 134  <200 mg/dL Final    HDL 02/28/2024 63  > OR = 40 mg/dL Final    Triglycerides 02/28/2024 61  <150 mg/dL Final    LDL Cholesterol 02/28/2024 57  mg/dL (calc) Final    HDL/Cholesterol Ratio 02/28/2024 2.1  <5.0 (calc) Final    Non HDL Chol. (LDL+VLDL) 02/28/2024 71  <130 mg/dL (calc) Final    Creatinine, Urine 02/28/2024 108  20 - 320 mg/dL Final    Microalb, Ur 02/28/2024 1.1  See Note: mg/dL Final    Microalb/Creat Ratio 02/28/2024 10  <30 mcg/mg creat Final    TSH w/reflex to FT4 02/28/2024 2.90  0.40 - 4.50 mIU/L Final    Color, UA 02/28/2024  YELLOW  YELLOW Final    Appearance, UA 02/28/2024 CLOUDY (A)  CLEAR Final    Specific Gravity, UA 02/28/2024 1.014  1.001 - 1.035 Final    pH, UA 02/28/2024 5.5  5.0 - 8.0 Final    Glucose, UA 02/28/2024 NEGATIVE  NEGATIVE Final    Bilirubin, UA 02/28/2024 NEGATIVE  NEGATIVE Final    Ketones, UA 02/28/2024 NEGATIVE  NEGATIVE Final    Occult Blood UA 02/28/2024 NEGATIVE  NEGATIVE Final    Protein, UA 02/28/2024 NEGATIVE  NEGATIVE Final    Nitrite, UA 02/28/2024 NEGATIVE  NEGATIVE Final    Leukocytes, UA 02/28/2024 1+ (A)  NEGATIVE Final    WBC Casts, UA 02/28/2024 10-20 (A)  < OR = 5 /HPF Final    RBC Casts, UA 02/28/2024 NONE SEEN  < OR = 2 /HPF Final    Squam Epithel, UA 02/28/2024 0-5  < OR = 5 /HPF Final    Bacteria, UA 02/28/2024 FEW (A)  NONE SEEN /HPF Final    Hyaline Casts, UA 02/28/2024 NONE SEEN  NONE SEEN /LPF Final    Service Cmt: 02/28/2024    Final    Reflexive Urine Culture 02/28/2024    Final    Urine Culture, Routine 02/28/2024  (A)   Final   Office Visit on 02/05/2024   Component Date Value Ref Range Status    Color, POC UA 02/05/2024 Yellow  Yellow, Straw, Colorless Final    Clarity, POC UA 02/05/2024 Clear  Clear Final    Glucose, POC UA 02/05/2024 Negative  Negative Final    Bilirubin, POC UA 02/05/2024 Negative  Negative Final    Ketones, POC UA 02/05/2024 Negative  Negative Final    Spec Grav POC UA 02/05/2024 1.015  1.005 - 1.030 Final    Blood, POC UA 02/05/2024 Negative  Negative Final    pH, POC UA 02/05/2024 7.0  5.0 - 8.0 Final    Protein, POC UA 02/05/2024 Negative  Negative Final    Urobilinogen, POC UA 02/05/2024 0.2  <=1.0 Final    Nitrite, POC UA 02/05/2024 Negative  Negative Final    WBC, POC UA 02/05/2024 Negative  Negative Final   Lab Visit on 01/22/2024   Component Date Value Ref Range Status    RBC, UA 01/22/2024 2  0 - 4 /hpf Final    WBC, UA 01/22/2024 1  0 - 5 /hpf Final    Bacteria 01/22/2024 None  None-Occ /hpf Final    Microscopic Comment 01/22/2024 SEE COMMENT   Final     Specimen UA 01/22/2024 Urine, Clean Catch   Final    Color, UA 01/22/2024 Colorless (A)  Yellow, Straw, Samantha Final    Appearance, UA 01/22/2024 Clear  Clear Final    pH, UA 01/22/2024 7.0  5.0 - 8.0 Final    Specific Gravity, UA 01/22/2024 <1.005 (A)  1.005 - 1.030 Final    Protein, UA 01/22/2024 Negative  Negative Final    Glucose, UA 01/22/2024 Negative  Negative Final    Ketones, UA 01/22/2024 Negative  Negative Final    Bilirubin (UA) 01/22/2024 Negative  Negative Final    Occult Blood UA 01/22/2024 Trace (A)  Negative Final    Nitrite, UA 01/22/2024 Negative  Negative Final    Urobilinogen, UA 01/22/2024 Negative  <2.0 EU/dL Final    Leukocytes, UA 01/22/2024 Negative  Negative Final   Lab Visit on 01/22/2024   Component Date Value Ref Range Status    Urine Culture, Routine 01/22/2024 No growth   Final   Hospital Outpatient Visit on 01/10/2024   Component Date Value Ref Range Status    Urine Culture, Routine 01/10/2024 No growth   Final   Admission on 01/03/2024, Discharged on 01/04/2024   Component Date Value Ref Range Status    Sodium 01/03/2024 137  136 - 145 mmol/L Final    Potassium 01/03/2024 4.3  3.5 - 5.1 mmol/L Final    Chloride 01/03/2024 105  95 - 110 mmol/L Final    CO2 01/03/2024 25  23 - 29 mmol/L Final    Glucose 01/03/2024 149 (H)  70 - 110 mg/dL Final    BUN 01/03/2024 13  8 - 23 mg/dL Final    Creatinine 01/03/2024 0.8  0.5 - 1.4 mg/dL Final    Calcium 01/03/2024 7.6 (L)  8.7 - 10.5 mg/dL Final    Anion Gap 01/03/2024 7 (L)  8 - 16 mmol/L Final    eGFR 01/03/2024 >60  >60 mL/min/1.73 m^2 Final    WBC 01/03/2024 15.41 (H)  3.90 - 12.70 K/uL Final    RBC 01/03/2024 4.08 (L)  4.60 - 6.20 M/uL Final    Hemoglobin 01/03/2024 12.5 (L)  14.0 - 18.0 g/dL Final    Hematocrit 01/03/2024 37.6 (L)  40.0 - 54.0 % Final    MCV 01/03/2024 92  82 - 98 fL Final    MCH 01/03/2024 30.6  27.0 - 31.0 pg Final    MCHC 01/03/2024 33.2  32.0 - 36.0 g/dL Final    RDW 01/03/2024 13.9  11.5 - 14.5 % Final    Platelets  01/03/2024 178  150 - 450 K/uL Final    MPV 01/03/2024 10.0  9.2 - 12.9 fL Final    Immature Granulocytes 01/03/2024 0.3  0.0 - 0.5 % Final    Gran # (ANC) 01/03/2024 14.0 (H)  1.8 - 7.7 K/uL Final    Immature Grans (Abs) 01/03/2024 0.05 (H)  0.00 - 0.04 K/uL Final    Lymph # 01/03/2024 0.4 (L)  1.0 - 4.8 K/uL Final    Mono # 01/03/2024 1.0  0.3 - 1.0 K/uL Final    Eos # 01/03/2024 0.0  0.0 - 0.5 K/uL Final    Baso # 01/03/2024 0.05  0.00 - 0.20 K/uL Final    nRBC 01/03/2024 0  0 /100 WBC Final    Gran % 01/03/2024 90.5 (H)  38.0 - 73.0 % Final    Lymph % 01/03/2024 2.5 (L)  18.0 - 48.0 % Final    Mono % 01/03/2024 6.4  4.0 - 15.0 % Final    Eosinophil % 01/03/2024 0.0  0.0 - 8.0 % Final    Basophil % 01/03/2024 0.3  0.0 - 1.9 % Final    Differential Method 01/03/2024 Automated   Final    Sodium 01/04/2024 136  136 - 145 mmol/L Final    Potassium 01/04/2024 3.9  3.5 - 5.1 mmol/L Final    Chloride 01/04/2024 102  95 - 110 mmol/L Final    CO2 01/04/2024 27  23 - 29 mmol/L Final    Glucose 01/04/2024 106  70 - 110 mg/dL Final    BUN 01/04/2024 9  8 - 23 mg/dL Final    Creatinine 01/04/2024 0.8  0.5 - 1.4 mg/dL Final    Calcium 01/04/2024 8.2 (L)  8.7 - 10.5 mg/dL Final    Anion Gap 01/04/2024 7 (L)  8 - 16 mmol/L Final    eGFR 01/04/2024 >60  >60 mL/min/1.73 m^2 Final    Magnesium 01/04/2024 2.2  1.6 - 2.6 mg/dL Final    Phosphorus 01/04/2024 3.0  2.7 - 4.5 mg/dL Final    WBC 01/04/2024 9.79  3.90 - 12.70 K/uL Final    RBC 01/04/2024 4.25 (L)  4.60 - 6.20 M/uL Final    Hemoglobin 01/04/2024 12.9 (L)  14.0 - 18.0 g/dL Final    Hematocrit 01/04/2024 38.7 (L)  40.0 - 54.0 % Final    MCV 01/04/2024 91  82 - 98 fL Final    MCH 01/04/2024 30.4  27.0 - 31.0 pg Final    MCHC 01/04/2024 33.3  32.0 - 36.0 g/dL Final    RDW 01/04/2024 14.2  11.5 - 14.5 % Final    Platelets 01/04/2024 181  150 - 450 K/uL Final    MPV 01/04/2024 9.9  9.2 - 12.9 fL Final    Immature Granulocytes 01/04/2024 0.2  0.0 - 0.5 % Final    Gran # (ANC)  01/04/2024 6.6  1.8 - 7.7 K/uL Final    Immature Grans (Abs) 01/04/2024 0.02  0.00 - 0.04 K/uL Final    Lymph # 01/04/2024 1.8  1.0 - 4.8 K/uL Final    Mono # 01/04/2024 1.3 (H)  0.3 - 1.0 K/uL Final    Eos # 01/04/2024 0.0  0.0 - 0.5 K/uL Final    Baso # 01/04/2024 0.04  0.00 - 0.20 K/uL Final    nRBC 01/04/2024 0  0 /100 WBC Final    Gran % 01/04/2024 67.3  38.0 - 73.0 % Final    Lymph % 01/04/2024 18.7  18.0 - 48.0 % Final    Mono % 01/04/2024 13.2  4.0 - 15.0 % Final    Eosinophil % 01/04/2024 0.2  0.0 - 8.0 % Final    Basophil % 01/04/2024 0.4  0.0 - 1.9 % Final    Differential Method 01/04/2024 Automated   Final    Body Fluid Source, Creatinine 01/04/2024 SENIA Drainage   Final    Creatinine, Body Fluid 01/04/2024 0.7  Not established mg/dL Final   Hospital Outpatient Visit on 12/26/2023   Component Date Value Ref Range Status    BSA 12/26/2023 1.97  m2 Final    LVOT stroke volume 12/26/2023 66.57  cm3 Final    LVIDd 12/26/2023 3.86  3.5 - 6.0 cm Final    LV Systolic Volume 12/26/2023 22.10  mL Final    LV Systolic Volume Index 12/26/2023 11.2  mL/m2 Final    LVIDs 12/26/2023 2.49  2.1 - 4.0 cm Final    LV Diastolic Volume 12/26/2023 64.30  mL Final    LV Diastolic Volume Index 12/26/2023 32.64  mL/m2 Final    IVS 12/26/2023 0.93  0.6 - 1.1 cm Final    LVOT diameter 12/26/2023 2.00  cm Final    LVOT area 12/26/2023 3.1  cm2 Final    FS 12/26/2023 35  28 - 44 % Final    Left Ventricle Relative Wall Thick* 12/26/2023 0.50  cm Final    Posterior Wall 12/26/2023 0.97  0.6 - 1.1 cm Final    LV mass 12/26/2023 111.75  g Final    LV Mass Index 12/26/2023 57  g/m2 Final    MV Peak E Rodolfo 12/26/2023 0.98  m/s Final    TDI LATERAL 12/26/2023 0.11  m/s Final    TDI SEPTAL 12/26/2023 0.09  m/s Final    E/E' ratio 12/26/2023 9.80  m/s Final    MV Peak A Rodolfo 12/26/2023 0.88  m/s Final    TR Max Rodolfo 12/26/2023 2.00  m/s Final    E/A ratio 12/26/2023 1.11   Final    IVRT 12/26/2023 141.00  msec Final    E wave deceleration time  12/26/2023 197.00  msec Final    LV SEPTAL E/E' RATIO 12/26/2023 10.89  m/s Final    LV LATERAL E/E' RATIO 12/26/2023 8.91  m/s Final    LVOT peak rashad 12/26/2023 0.98  m/s Final    Left Ventricular Outflow Tract Betty* 12/26/2023 0.65  cm/s Final    Left Ventricular Outflow Tract Betty* 12/26/2023 2.00  mmHg Final    RVDD 12/26/2023 2.05  cm Final    AV mean gradient 12/26/2023 2  mmHg Final    AV peak gradient 12/26/2023 5  mmHg Final    Ao peak rashad 12/26/2023 1.09  m/s Final    Ao VTI 12/26/2023 24.00  cm Final    LVOT peak VTI 12/26/2023 21.20  cm Final    AV valve area 12/26/2023 2.77  cm² Final    AV Velocity Ratio 12/26/2023 0.90   Final    AV index (prosthetic) 12/26/2023 0.88   Final    ONEIDA by Velocity Ratio 12/26/2023 2.82  cm² Final    MV stenosis pressure 1/2 time 12/26/2023 82.00  ms Final    MV valve area p 1/2 method 12/26/2023 2.68  cm2 Final    Triscuspid Valve Regurgitation Pea* 12/26/2023 16  mmHg Final    Ao root annulus 12/26/2023 3.00  cm Final    Mean e' 12/26/2023 0.10  m/s Final    ZLVIDS 12/26/2023 -2.65   Final    ZLVIDD 12/26/2023 -3.83   Final    AORTIC VALVE CUSP SEPERATION 12/26/2023 1.80  cm Final    TV resting pulmonary artery pressu* 12/26/2023 19  mmHg Final    RV TB RVSP 12/26/2023 5  mmHg Final    Est. RA pres 12/26/2023 3  mmHg Final   Lab Visit on 12/22/2023   Component Date Value Ref Range Status    Sodium 12/22/2023 141  136 - 145 mmol/L Final    Potassium 12/22/2023 3.9  3.5 - 5.1 mmol/L Final    Chloride 12/22/2023 104  95 - 110 mmol/L Final    CO2 12/22/2023 25  23 - 29 mmol/L Final    Glucose 12/22/2023 93  70 - 110 mg/dL Final    BUN 12/22/2023 15  8 - 23 mg/dL Final    Creatinine 12/22/2023 0.8  0.5 - 1.4 mg/dL Final    Calcium 12/22/2023 9.7  8.7 - 10.5 mg/dL Final    Anion Gap 12/22/2023 12  8 - 16 mmol/L Final    eGFR 12/22/2023 >60  >60 mL/min/1.73 m^2 Final    WBC 12/22/2023 7.41  3.90 - 12.70 K/uL Final    RBC 12/22/2023 5.11  4.60 - 6.20 M/uL Final    Hemoglobin  12/22/2023 15.4  14.0 - 18.0 g/dL Final    Hematocrit 12/22/2023 46.2  40.0 - 54.0 % Final    MCV 12/22/2023 90  82 - 98 fL Final    MCH 12/22/2023 30.1  27.0 - 31.0 pg Final    MCHC 12/22/2023 33.3  32.0 - 36.0 g/dL Final    RDW 12/22/2023 14.2  11.5 - 14.5 % Final    Platelets 12/22/2023 184  150 - 450 K/uL Final    MPV 12/22/2023 10.6  9.2 - 12.9 fL Final    Immature Granulocytes 12/22/2023 0.1  0.0 - 0.5 % Final    Gran # (ANC) 12/22/2023 5.0  1.8 - 7.7 K/uL Final    Immature Grans (Abs) 12/22/2023 0.01  0.00 - 0.04 K/uL Final    Lymph # 12/22/2023 1.4  1.0 - 4.8 K/uL Final    Mono # 12/22/2023 0.7  0.3 - 1.0 K/uL Final    Eos # 12/22/2023 0.1  0.0 - 0.5 K/uL Final    Baso # 12/22/2023 0.10  0.00 - 0.20 K/uL Final    nRBC 12/22/2023 0  0 /100 WBC Final    Gran % 12/22/2023 67.8  38.0 - 73.0 % Final    Lymph % 12/22/2023 19.2  18.0 - 48.0 % Final    Mono % 12/22/2023 10.0  4.0 - 15.0 % Final    Eosinophil % 12/22/2023 1.6  0.0 - 8.0 % Final    Basophil % 12/22/2023 1.3  0.0 - 1.9 % Final    Differential Method 12/22/2023 Automated   Final    Prothrombin Time 12/22/2023 10.6  9.0 - 12.5 sec Final    INR 12/22/2023 1.0  0.8 - 1.2 Final    Specimen UA 12/22/2023 Urine, Clean Catch   Final    Color, UA 12/22/2023 Yellow  Yellow, Straw, Samantha Final    Appearance, UA 12/22/2023 Clear  Clear Final    pH, UA 12/22/2023 6.0  5.0 - 8.0 Final    Specific Gravity, UA 12/22/2023 1.025  1.005 - 1.030 Final    Protein, UA 12/22/2023 Negative  Negative Final    Glucose, UA 12/22/2023 Negative  Negative Final    Ketones, UA 12/22/2023 Trace (A)  Negative Final    Bilirubin (UA) 12/22/2023 Negative  Negative Final    Occult Blood UA 12/22/2023 Negative  Negative Final    Nitrite, UA 12/22/2023 Negative  Negative Final    Urobilinogen, UA 12/22/2023 Negative  <2.0 EU/dL Final    Leukocytes, UA 12/22/2023 Negative  Negative Final    Urine Culture, Routine 12/22/2023 No growth   Final    Group & Rh 12/22/2023 O POS   Final     Indirect Vianey 12/22/2023 NEG   Final   Hospital Outpatient Visit on 12/15/2023   Component Date Value Ref Range Status    Holter Hookup Date 12/15/2023 16647866   Final    Holter Hookup Time 12/15/2023 [id number]   Final    Holter Study End Date 12/15/2023 [id number]   Final    Holter Study End Time 12/15/2023 [id number]   Final    Holter Scan Date 12/15/2023 [id number]   Final    Sinus min HR 12/15/2023 43  bpm Final    Sinus max hr 12/15/2023 169  bpm Final    Sinus avg hr 12/15/2023 68  bpm Final    Event Monitor Day 12/15/2023 6   Final    Holter length hours 12/15/2023 21   Final    holter length minutes 12/15/2023 0   Final    holter length dec hours 12/15/2023 165.00   Final   Hospital Outpatient Visit on 12/11/2023   Component Date Value Ref Range Status    85% Max Predicted HR 12/11/2023 128   Final    Max Predicted HR 12/11/2023 151   Final    [facility] CV CPX PATIENT IS MALE 12/11/2023 1.0   Final    [facility] CV CPX PATIENT IS FEMALE 12/11/2023 0.0   Final    Nuc Rest EF 12/11/2023 69   Final    Nuc Rest Diastolic Volume Index 12/11/2023 68   Final    Nuc Rest Systolic Volume Index 12/11/2023 21   Final    Nuc Stress EF 12/11/2023 69  % Final    Nuc Rest Diastolic Volume Index 12/11/2023 72   Final    Nuc Rest Systolic Volume Index 12/11/2023 22   Final    HR at rest 12/11/2023 56  bpm Final    Systolic blood pressure 12/11/2023 133  mmHg Final    Diastolic blood pressure 12/11/2023 82  mmHg Final    RPP 12/11/2023 7,448   Final    Exercise duration (min) 12/11/2023 7  minutes Final    Exercise duration (sec) 12/11/2023 1  seconds Final    Peak HR 12/11/2023 142  bpm Final    Peak Systolic BP 12/11/2023 167  mmHg Final    Peak Diatolic BP 12/11/2023 81  mmHg Final    Peak RPP 12/11/2023 23,714   Final    Estimated METs 12/11/2023 10   Final    % Max HR Achieved 12/11/2023 94   Final    1 Minute Recovery HR 12/11/2023 121  bpm Final   There may be more visits with results that are not included.       Past Medical History:    Diagnosis Date    Arthritis     Cancer 12/2023    PROSTATE    Chronic calculous cholecystitis 08/12/2019    COVID-19     Essential (primary) hypertension 03/08/2016    GERD (gastroesophageal reflux disease)      Social History     Socioeconomic History    Marital status:    Tobacco Use    Smoking status: Never    Smokeless tobacco: Never   Substance and Sexual Activity    Alcohol use: Not Currently    Drug use: Never     Social Determinants of Health     Financial Resource Strain: Low Risk  (11/11/2023)    Overall Financial Resource Strain (CARDIA)     Difficulty of Paying Living Expenses: Not hard at all   Food Insecurity: No Food Insecurity (11/11/2023)    Hunger Vital Sign     Worried About Running Out of Food in the Last Year: Never true     Ran Out of Food in the Last Year: Never true   Transportation Needs: No Transportation Needs (11/11/2023)    PRAPARE - Transportation     Lack of Transportation (Medical): No     Lack of Transportation (Non-Medical): No   Physical Activity: Insufficiently Active (11/11/2023)    Exercise Vital Sign     Days of Exercise per Week: 3 days     Minutes of Exercise per Session: 30 min   Stress: No Stress Concern Present (11/11/2023)    [employer] - Occupational Stress Questionnaire     Feeling of Stress : Only a little   Social Connections: Unknown (11/11/2023)    Social Connection and Isolation Panel [NHANES]     Frequency of Communication with Friends and Family: More than three times a week     Frequency of Social Gatherings with Friends and Family: Once a week     Active Member of Clubs or Organizations: Yes     Attends Club or Organization Meetings: More than 4 times per year     Marital Status:    Housing Stability: Low Risk  (11/11/2023)    Housing Stability Vital Sign     Unable to Pay for Housing in the Last Year: No     Number of Places Lived in the Last Year: 1     Unstable Housing in the Last Year: No     Past Surgical  History:   Procedure Laterality Date    CHOLECYSTECTOMY      COLONOSCOPY  2016    Dr Saldaña- rtc 5 yr    ESOPHAGOGASTRODUODENOSCOPY N/A 6/6/2022    Procedure: EGD (ESOPHAGOGASTRODUODENOSCOPY);  Surgeon: Pj Syed MD;  Location: [facility];  Service: Endoscopy;  Laterality: N/A;    GALLBLADDER SURGERY  08/2019    KNEE SURGERY Right     x's3    LAPAROSCOPIC CHOLECYSTECTOMY N/A 8/12/2019    Procedure: CHOLECYSTECTOMY, LAPAROSCOPIC;  Surgeon: Alphonso Freeman III, MD;  Location: [facility];  Service: General;  Laterality: N/A;    ROBOT-ASSISTED LAPAROSCOPIC PELVIC LYMPHADENECTOMY N/A 1/3/2024    Procedure: ROBOTIC LYMPHADENECTOMY, PELVIS;  Surgeon: Phil Werner MD;  Location: [facility];  Service: Urology;  Laterality: N/A;    ROBOT-ASSISTED LAPAROSCOPIC PROSTATECTOMY N/A 1/3/2024    Procedure: ROBOTIC PROSTATECTOMY;  Surgeon: Phil Werner MD;  Location: [facility];  Service: Urology;  Laterality: N/A;    UPPER GASTROINTESTINAL ENDOSCOPY       Family History   Problem Relation Age of Onset    Stroke Mother     No Known Problems Father     Ulcerative colitis Son     Colon cancer Neg Hx     Colon polyps Neg Hx     Crohn's disease Neg Hx     Esophageal cancer Neg Hx     Stomach cancer Neg Hx        The 10-year CVD risk score (D'Agostino, et al., 2008) is: 10.1%    Values used to calculate the score:      Age: 68 years      Sex: Male      Diabetic: No      Tobacco smoker: No      Systolic Blood Pressure: 104 mmHg      Is BP treated: Yes      HDL Cholesterol: 63 mg/dL      Total Cholesterol: 134 mg/dL    All of your core healthy metrics are met.      Review of patient's allergies indicates:   Allergen Reactions    Adhesive Other (See Comments)     SKIN WAS RED       Current Outpatient Medications:     acetaminophen (TYLENOL) 325 MG tablet, Take 2 tablets (650 mg total) by mouth every 6 (six) hours as needed (alternating with motrin/ibuprofen 400-600 every 6 hrs so something avail aevery 3 if needed)., Disp: ,  "Rfl: 0    ASCORBIC ACID, VITAMIN C, ORAL, Take 4,000 mg by mouth 2 (two) times a day., Disp: , Rfl:     docusate sodium (COLACE) 100 MG capsule, Take 1 capsule (100 mg total) by mouth 2 (two) times daily., Disp: , Rfl: 0    oxybutynin (DITROPAN-XL) 5 MG TR24, Take 1 tablet (5 mg total) by mouth once daily., Disp: 30 tablet, Rfl: 11    vitamin D (VITAMIN D3) 1000 units Tab, Take 4,000 Units by mouth once daily., Disp: , Rfl:     losartan (COZAAR) 25 MG tablet, Take 1 tablet (25 mg total) by mouth once daily. (Patient not taking: Reported on 3/12/2024), Disp: 90 tablet, Rfl: 3    polyethylene glycol (GLYCOLAX) 17 gram PwPk, Take 17 g by mouth once daily., Disp: , Rfl: 0    simethicone (MYLICON) 80 MG chewable tablet, Take 1 tablet (80 mg total) by mouth 3 (three) times daily as needed (gas pain/bloating). (Patient not taking: Reported on 3/12/2024), Disp: , Rfl: 0    sulfamethoxazole-trimethoprim 800-160mg (BACTRIM DS) 800-160 mg Tab, Take 1 tablet by mouth 2 (two) times daily., Disp: 14 tablet, Rfl: 0    traMADoL (ULTRAM) 50 mg tablet, Take 1 tablet (50 mg total) by mouth every 8 (eight) hours as needed (pain not relieved by otc agents)., Disp: 6 tablet, Rfl: 0    Review of Systems   Constitutional:  Positive for activity change.   HENT:  Positive for hearing loss. Negative for trouble swallowing.    Eyes:  Negative for discharge.   Respiratory:  Negative for chest tightness and wheezing.    Cardiovascular:  Negative for chest pain and palpitations.   Gastrointestinal:  Negative for constipation, diarrhea and vomiting.   Genitourinary:  Negative for difficulty urinating and hematuria.        Foul-smelling urine   Neurological:  Negative for headaches.   Psychiatric/Behavioral:  Negative for dysphoric mood.            Objective:      Vitals:    03/12/24 0821   BP: 104/70   Pulse: (!) 50   Weight: 74.8 kg (165 lb)   Height: 5' 10" (1.778 m)     Physical Exam  Vitals and nursing note reviewed.   Constitutional:       " General: He is not in acute distress.     Appearance: Normal appearance. He is well-developed. He is not toxic-appearing.   HENT:      Head: Normocephalic and atraumatic.      Right Ear: Tympanic membrane and external ear normal.      Left Ear: Tympanic membrane and external ear normal.      Nose: Nose normal.      Mouth/Throat:      Pharynx: Oropharynx is clear.   Eyes:      Pupils: Pupils are equal, round, and reactive to light.   Neck:      Thyroid: No thyromegaly.      Vascular: No carotid bruit.   Cardiovascular:      Rate and Rhythm: Normal rate and regular rhythm.      Heart sounds: Normal heart sounds. No murmur heard.  Pulmonary:      Effort: Pulmonary effort is normal.      Breath sounds: Normal breath sounds. No wheezing or rales.   Abdominal:      General: Bowel sounds are normal. There is no distension.      Palpations: Abdomen is soft.      Tenderness: There is no abdominal tenderness.      Comments: Laparoscopy sites have healed well.  Nontender belly to palpation   Musculoskeletal:         General: No tenderness or deformity. Normal range of motion.      Cervical back: Normal range of motion and neck supple.      Lumbar back: Normal. No spasms.      Comments: Bends 90 degrees at  waist, shoulders have full range of motion but are somewhat painful, right knee is very crepitant left knee is mildly crepitant.  No pitting edema to lower extremities   Lymphadenopathy:      Cervical: No cervical adenopathy.   Skin:     General: Skin is warm and dry.      Findings: No rash.   Neurological:      Mental Status: He is alert and oriented to person, place, and time.      Cranial Nerves: No cranial nerve deficit.      Coordination: Coordination normal.      Gait: Gait normal.   Psychiatric:         Mood and Affect: Mood normal.         Behavior: Behavior normal.         Thought Content: Thought content normal.         Judgment: Judgment normal.           Assessment:       1. E. coli UTI    2. Pelvic floor  dysfunction    3. Male urinary stress incontinence    4. Prostate cancer    5. Esophagitis    6. Rotator cuff arthropathy of both shoulders    7. Primary osteoarthritis of both knees    8. Primary insomnia    9. Asbestos exposure    10. History of robot-assisted laparoscopic radical prostatectomy         Plan:       E. coli UTI  -     sulfamethoxazole-trimethoprim 800-160mg (BACTRIM DS) 800-160 mg Tab; Take 1 tablet by mouth 2 (two) times daily.  Dispense: 14 tablet; Refill: 0  e coli growing in urine culture will treat with Bactrim DS for 1 week.  Can follow-up with cranberry tablets to prevent UTIs.  Pelvic floor dysfunction  Continue physical therapy 2 times a week  Male urinary stress incontinence  Oxybutynin to be continued 5 mg  Prostate cancer  Status post  laparoscopic prostatectomy follows up with Dr. Werner  Esophagitis    Rotator cuff arthropathy of both shoulders    Primary osteoarthritis of both knees  Not on NSAIDs at this time  Primary insomnia    Asbestos exposure    History of robot-assisted laparoscopic radical prostatectomy  PSA due March 28th with Dr. Werner    Follow up in about 6 months (around 9/12/2024), or prostate ca  OA.        3/12/2024 Phil Kemp

## 2024-03-13 ENCOUNTER — CLINICAL SUPPORT (OUTPATIENT)
Dept: REHABILITATION | Facility: HOSPITAL | Age: 69
End: 2024-03-13
Payer: MEDICARE

## 2024-03-13 DIAGNOSIS — N39.3 MALE URINARY STRESS INCONTINENCE: ICD-10-CM

## 2024-03-13 DIAGNOSIS — M62.89 PELVIC FLOOR DYSFUNCTION: Primary | ICD-10-CM

## 2024-03-13 DIAGNOSIS — M62.81 MUSCLE WEAKNESS: ICD-10-CM

## 2024-03-13 PROCEDURE — 97112 NEUROMUSCULAR REEDUCATION: CPT | Mod: PO,CQ

## 2024-03-13 PROCEDURE — 97530 THERAPEUTIC ACTIVITIES: CPT | Mod: PO,CQ

## 2024-03-13 NOTE — PROGRESS NOTES
Pelvic Health Physical Therapy   Treatment Note     Name: Ashish Dave  Clinic Number: [id number]    Therapy Diagnosis:   Encounter Diagnoses   Name Primary?    Pelvic floor dysfunction Yes    Male urinary stress incontinence     Muscle weakness      Physician: Phil Werner MD    Visit Date: 3/13/2024  Physician Orders: PT Eval and Treat   Medical Diagnosis from Referral: Prostate cancer [C61]   Evaluation Date: 2/20/2024  Authorization Period Expiration: 2/5/2025  Plan of Care Expiration: 5/20/2024  Visit # / Visits authorized: 5/ 12     FOTO 1/3 ON 2/20/2024       Time In: 0751  Time Out: 0830  Total Appointment Time (timed & untimed codes): 39 minutes     Precautions:  prostatectomy 1/3/2024; catheter out on 1/10/2024      Subjective     Pt reports: He has been experiencing strong smelling urine. Went to his doctor yesterday who prescribed medication for a UTI. Will be on this medication for approx 1 week. No changes in leakage from last week.     He was compliant with home exercise program.  Response to previous treatment: good  Functional change: unremarkable     Pain: 0/10  Location: right knee can problematic     Objective     Ashish received therapeutic exercises to develop  strength, endurance, ROM, flexibility, posture, and core stabilization for 0 minutes including:  the following exercises       Ashish received the following manual therapy techniques: to develop flexibility, extensibility, and desensitization for 0 minutes including:         Ashish participated in neuromuscular re-education activities to develop Coordination, Control, Down training, Posture, Proprioception, Kinesthetic, and Sense for 15 minutes including:       Hooklying kegel with 5 sec hold and  10 sec relax  3 minutes   Bridges with kegel and exhale 3 x 10   Clams with Purple theraband 3 x 10 repetitions   Transverse abdominus activation 5 seconds holds x 3 minutes    With shushing x 3 minutes   Bent knee fall out with PTB x 3  minutes       Ashish participated in dynamic functional therapeutic activities to improve functional performance for 24  minutes, including:    Sit to stand 3 x 5 with kegel and exhale  15# kettle moise   Box lifts from floor with 15 # with kegel and exhale   Deal lifts with 15 pounds 3 x 5 repetitions from chair height   Urge suppression techniques      Education on Pelvic Floor anatomy, intraabdominal pressure, lifting restrictions     Home Exercises Provided and Patient Education Provided     Education provided:   - anatomy/physiology of pelvic floor, posture/body mechanices, and Coordination of kegels with functional activities such as cough, laugh, sneeze, lift, etc.   Discussed progression of plan of care with patient; educated pt in activity modification; reviewed HEP with pt. Pt demonstrated and verbalized understanding of all instruction and was not provided with a handout of HEP (see Patient Instructions).  -     Written Home Exercises Provided: Patient instructed to cont prior HEP.  Exercises were reviewed and Ashish was able to demonstrate them prior to the end of the session.  Ashish demonstrated good  understanding of the education provided.     See EMR under Patient Instructions for exercises provided prior visit.    Assessment     Improved core activation noted this date. Able to progress resistance with functional strengthening well. Will continue to benefit from skilled Physical Therapy to improve pelvic floor strength to reduce occurences of leakage with activities of daily living.     Ashish Is progressing well towards his goals.   Pt prognosis is Good.     Pt will continue to benefit from skilled outpatient physical therapy to address the deficits listed in the problem list box on initial evaluation, provide pt/family education and to maximize pt's level of independence in the home and community environment.     Pt's spiritual, cultural and educational needs considered and pt agreeable to plan of  care and goals.     Anticipated barriers to physical therapy: post-op     Goals: Goals:  Short Term Goals: 6 weeks   - Pt will demonstrate excellent knowledge and adherence to HEP to facilitate optimal recovery.  - Pt will demonstrate proper PFM contraction, relaxation, and lengthening coordinated with TA and breath for improved muscle coordination needed for functional activity.     Long Term Goals: 12 weeks   - Pt will demonstrate excellent knowledge and adherence to HEP for continued self-maintenance of symptoms.  - Pt will report FOTO score of 10% improvement or more indicating clinically relevant increase in function.  - Pt will report voiding interval of 2-3 hours for improved ADL tolerance.  - Pt will report ability to delay urinary urge for at least 15 minutes to maintain continence with ADL/IADLs.   - Pt will report little (drops) to no incidence of urinary incontinence 6/7 days for improved hygiene and ADL/IADL tolerance.   - Pt will report needing </= 1 pad/day indicating improved PFM function needed to maintain continence  - Pt will demonstrate PFM strength of at least 3/5 MMT for improved strength needed to maintain continence.   - Pt will report bearing down appropriately 100% of the time for improved bowel function and decreased stress on adjacent pelvic structures    Plan     Plan of care Certification: 2/20/2024 to 5/20/2024.     Outpatient Physical Therapy 2 times weekly for 12 weeks    Janell Hansen, DEVANTE

## 2024-03-22 ENCOUNTER — CLINICAL SUPPORT (OUTPATIENT)
Dept: REHABILITATION | Facility: HOSPITAL | Age: 69
End: 2024-03-22
Payer: MEDICARE

## 2024-03-22 DIAGNOSIS — M62.89 PELVIC FLOOR DYSFUNCTION: Primary | ICD-10-CM

## 2024-03-22 DIAGNOSIS — M62.81 MUSCLE WEAKNESS: ICD-10-CM

## 2024-03-22 DIAGNOSIS — N39.3 MALE URINARY STRESS INCONTINENCE: ICD-10-CM

## 2024-03-22 PROCEDURE — 97162 PT EVAL MOD COMPLEX 30 MIN: CPT | Mod: PO

## 2024-03-22 PROCEDURE — 97112 NEUROMUSCULAR REEDUCATION: CPT | Mod: PO

## 2024-03-22 NOTE — PROGRESS NOTES
Pelvic Health Physical Therapy   PROGRESS NOTE and Treatment Note     Name: Ashish Dave  Clinic Number: [id number]    Therapy Diagnosis:   Encounter Diagnoses   Name Primary?    Pelvic floor dysfunction Yes    Male urinary stress incontinence     Muscle weakness      Physician: Phil Werner MD    Visit Date: 3/22/2024  Physician Orders: PT Eval and Treat   Medical Diagnosis from Referral: Prostate cancer [C61]   Evaluation Date: 2/20/2024  Authorization Period Expiration: 2/5/2025  Plan of Care Expiration: 5/20/2024  Visit # / Visits authorized: 5/ 12     FOTO 1/3 ON 2/20/2024 - attempted 2 /3 on 3/22 but patient didn't have reading glasses, will do next session        Time In: 8:30  Time Out: 9:15  Total Appointment Time (timed & untimed codes): 45 minutes     Precautions:  prostatectomy 1/3/2024; catheter out on 1/10/2024      Subjective     Pt reports: still has leakage - most trouble seems to be when he's drinking water. Tries to drink 1/2 gallon throughout the day, so whenever he's drinking water and he's active/up on his feet he has more leakage. If he drinks water and sits in the recliner he's fine.   Further goal updates in assessment        He was compliant with home exercise program.  Response to previous treatment: good  Functional change: unremarkable     Pain: 0/10  Location: right knee can problematic     Objective     Ashish received therapeutic exercises to develop  strength, endurance, ROM, flexibility, posture, and core stabilization for 0 minutes including:  the following exercises       Ashish received the following manual therapy techniques: to develop flexibility, extensibility, and desensitization for 0 minutes including:         Ashish participated in neuromuscular re-education activities to develop Coordination, Control, Down training, Posture, Proprioception, Kinesthetic, and Sense for 45 minutes including:       Hooklying kegel with 5 sec hold and  10 sec relax  3 minutes    Bridges with kegel and exhale 3 x 10   Clams with Purple theraband 3 x 10 repetitions   Transverse abdominus activation 5 seconds holds x 3 minutes    With shushing x 3 minutes   Bent knee fall out with PTB x 3 minutes       Ashish participated in dynamic functional therapeutic activities to improve functional performance for 00 minutes, including:    Sit to stand 3 x 5 with kegel and exhale  15# kettle moise   Box lifts from floor with 15 # with kegel and exhale   Deal lifts with 15 pounds 3 x 5 repetitions from chair height   Urge suppression techniques      Education on Pelvic Floor anatomy, intraabdominal pressure, lifting restrictions     Home Exercises Provided and Patient Education Provided     Education provided:   - anatomy/physiology of pelvic floor, posture/body mechanices, and Coordination of kegels with functional activities such as cough, laugh, sneeze, lift, etc.   Discussed progression of plan of care with patient; educated pt in activity modification; reviewed HEP with pt. Pt demonstrated and verbalized understanding of all instruction and was not provided with a handout of HEP (see Patient Instructions).  -     Written Home Exercises Provided: Patient instructed to cont prior HEP.  Exercises were reviewed and Ashish was able to demonstrate them prior to the end of the session.  Ashish demonstrated good  understanding of the education provided.     See EMR under Patient Instructions for exercises provided prior visit.    Assessment     Assessment of progress made today. Pt demonstrates increased progress towards goals; volume of leakage to be expected for this stage of healing.    Will continue to benefit from skilled Physical Therapy to improve pelvic floor strength to reduce occurences of leakage with activities of daily living.     Ashish Is progressing well towards his goals.   Pt prognosis is Good.     Pt will continue to benefit from skilled outpatient physical therapy to address the deficits  listed in the problem list box on initial evaluation, provide pt/family education and to maximize pt's level of independence in the home and community environment.     Pt's spiritual, cultural and educational needs considered and pt agreeable to plan of care and goals.     Anticipated barriers to physical therapy: post-op     Goals: Goals:  Short Term Goals: 6 weeks   - Pt will demonstrate excellent knowledge and adherence to HEP to facilitate optimal recovery.  - Pt will demonstrate proper PFM contraction, relaxation, and lengthening coordinated with TA and breath for improved muscle coordination needed for functional activity.     Long Term Goals: 12 weeks   - Pt will demonstrate excellent knowledge and adherence to HEP for continued self-maintenance of symptoms.  - Pt will report FOTO score of 10% improvement or more indicating clinically relevant increase in function.  - Pt will report voiding interval of 2-3 hours for improved ADL tolerance.   PROGRESSING - probably could if he's not drinking a lot of water and being active. Also when he's leaking more his bladder volume doesn't build up as much. When he's sitting down he's fine. Still waking up a couple times/night.   - Pt will report ability to delay urinary urge for at least 15 minutes to maintain continence with ADL/IADLs.   PROGRESSING - could delay 15 min if he's sitting; if he's up walking around he may be able to delay but not without leakage.   - Pt will report little (drops) to no incidence of urinary incontinence 6/7 days for improved hygiene and ADL/IADL tolerance.   PROGRESSING - sometimes comes out as squirts, and he can't really feel that coming, happens more when he's trying to stay hydrated.   - Pt will report needing </= 1 pad/day indicating improved PFM function needed to maintain continence  PROGRESSING - wears a Depends out in public. Wears pads at home but goes through at least 6 pads/day, states they are pretty full when he's changing  them.   - Pt will demonstrate PFM strength of at least 3/5 MMT for improved strength needed to maintain continence.   - Pt will report bearing down appropriately 100% of the time for improved bowel function and decreased stress on adjacent pelvic structures    Plan     Plan of care Certification: 2/20/2024 to 5/20/2024.     Outpatient Physical Therapy 2 times weekly for 12 weeks    Kayli Vee, PT

## 2024-03-27 ENCOUNTER — CLINICAL SUPPORT (OUTPATIENT)
Dept: REHABILITATION | Facility: HOSPITAL | Age: 69
End: 2024-03-27
Payer: MEDICARE

## 2024-03-27 DIAGNOSIS — N39.3 MALE URINARY STRESS INCONTINENCE: ICD-10-CM

## 2024-03-27 DIAGNOSIS — M62.89 PELVIC FLOOR DYSFUNCTION: Primary | ICD-10-CM

## 2024-03-27 DIAGNOSIS — M62.81 MUSCLE WEAKNESS: ICD-10-CM

## 2024-03-27 PROCEDURE — 97112 NEUROMUSCULAR REEDUCATION: CPT | Mod: PO

## 2024-03-27 NOTE — PROGRESS NOTES
"  Pelvic Health Physical Therapy   Treatment Note     Name: Ashish Dave  Clinic Number: [id number]    Therapy Diagnosis:   Encounter Diagnoses   Name Primary?    Pelvic floor dysfunction Yes    Male urinary stress incontinence     Muscle weakness      Physician: Phil Werner MD    Visit Date: 3/27/2024  Physician Orders: PT Eval and Treat   Medical Diagnosis from Referral: Prostate cancer [C61]   Evaluation Date: 2/20/2024  Authorization Period Expiration: 2/5/2025  Plan of Care Expiration: 5/20/2024  Visit # / Visits authorized: 7/ 12     FOTO 2 /3        Time In: 8:30  Time Out: 9:15  Total Appointment Time (timed & untimed codes): 45 minutes     Precautions:  prostatectomy 1/3/2024; catheter out on 1/10/2024      Subjective     Pt reports: he had an "unusually bad day yesterday" - started the morning having to go to the [employer] to mop up some water from a pipe leak. Started off the morning without anything to eat or drink, but with all of the moping/etc and ended up going through a lot of pads the entire day yesterday, even after he got home from the [employer]. From the morning until about 1:00 PM he went through about 8 pads, they were fairly full.   Other than yesterday he's been maintaining current progress.   Sees Dr. Werner again on 4/5.     He was compliant with home exercise program.  Response to previous treatment: good  Functional change: unremarkable     Pain: 0/10  Location: right knee can problematic     Objective     Ashish received therapeutic exercises to develop  strength, endurance, ROM, flexibility, posture, and core stabilization for 0 minutes including:  the following exercises       Ashish received the following manual therapy techniques: to develop flexibility, extensibility, and desensitization for 0 minutes including:         Ashish participated in neuromuscular re-education activities to develop Coordination, Control, Down training, Posture, Proprioception, Kinesthetic, and Sense for " 45 minutes including:       Hooklying kegel with 5 sec hold and  10 sec relax  3 minutes   Bridges with kegel and exhale 3 x 10   Clams with Purple theraband 3 x 10 repetitions   Transverse abdominus activation 5 seconds holds x 3 minutes    With shushing x 3 minutes   Bent knee fall out with PTB x 3 minutes       Ashish participated in dynamic functional therapeutic activities to improve functional performance for 00 minutes, including:    Sit to stand 3 x 5 with kegel and exhale  15# kettle moise   Box lifts from floor with 15 # with kegel and exhale   Deal lifts with 15 pounds 3 x 5 repetitions from chair height   Urge suppression techniques      Education on Pelvic Floor anatomy, intraabdominal pressure, lifting restrictions     Home Exercises Provided and Patient Education Provided     Education provided:   - anatomy/physiology of pelvic floor, posture/body mechanices, and Coordination of kegels with functional activities such as cough, laugh, sneeze, lift, etc.   Discussed progression of plan of care with patient; educated pt in activity modification; reviewed HEP with pt. Pt demonstrated and verbalized understanding of all instruction and was not provided with a handout of HEP (see Patient Instructions).  -     Written Home Exercises Provided: Patient instructed to cont prior HEP.  Exercises were reviewed and Ashish was able to demonstrate them prior to the end of the session.  Ashish demonstrated good  understanding of the education provided.     See EMR under Patient Instructions for exercises provided prior visit.    Assessment     Increased leakage yesterday likely due to dehydration and increased activity, will monitor.  Patient progressing well for this stage of healing. Mild leakage with sit<>stand with 10# weight but reports no urinary incontinence with use of kegel prior to standing.    Will continue to benefit from skilled Physical Therapy to improve pelvic floor strength to reduce occurences of  leakage with activities of daily living.     Ashish Is progressing well towards his goals.   Pt prognosis is Good.     Pt will continue to benefit from skilled outpatient physical therapy to address the deficits listed in the problem list box on initial evaluation, provide pt/family education and to maximize pt's level of independence in the home and community environment.     Pt's spiritual, cultural and educational needs considered and pt agreeable to plan of care and goals.     Anticipated barriers to physical therapy: post-op     Goals: Goals:  Short Term Goals: 6 weeks   - Pt will demonstrate excellent knowledge and adherence to HEP to facilitate optimal recovery.  - Pt will demonstrate proper PFM contraction, relaxation, and lengthening coordinated with TA and breath for improved muscle coordination needed for functional activity.     Long Term Goals: 12 weeks   - Pt will demonstrate excellent knowledge and adherence to HEP for continued self-maintenance of symptoms.  - Pt will report FOTO score of 10% improvement or more indicating clinically relevant increase in function.  - Pt will report voiding interval of 2-3 hours for improved ADL tolerance.   PROGRESSING - probably could if he's not drinking a lot of water and being active. Also when he's leaking more his bladder volume doesn't build up as much. When he's sitting down he's fine. Still waking up a couple times/night.   - Pt will report ability to delay urinary urge for at least 15 minutes to maintain continence with ADL/IADLs.   PROGRESSING - could delay 15 min if he's sitting; if he's up walking around he may be able to delay but not without leakage.   - Pt will report little (drops) to no incidence of urinary incontinence 6/7 days for improved hygiene and ADL/IADL tolerance.   PROGRESSING - sometimes comes out as squirts, and he can't really feel that coming, happens more when he's trying to stay hydrated.   - Pt will report needing </= 1 pad/day  indicating improved PFM function needed to maintain continence  PROGRESSING - wears a Depends out in public. Wears pads at home but goes through at least 6 pads/day, states they are pretty full when he's changing them.   - Pt will demonstrate PFM strength of at least 3/5 MMT for improved strength needed to maintain continence.   - Pt will report bearing down appropriately 100% of the time for improved bowel function and decreased stress on adjacent pelvic structures    Plan     Plan of care Certification: 2/20/2024 to 5/20/2024.     Outpatient Physical Therapy 2 times weekly for 12 weeks    Kayli Vee, PT

## 2024-03-28 ENCOUNTER — LAB VISIT (OUTPATIENT)
Dept: LAB | Facility: HOSPITAL | Age: 69
End: 2024-03-28
Attending: UROLOGY
Payer: MEDICARE

## 2024-03-28 DIAGNOSIS — C61 PROSTATE CANCER: ICD-10-CM

## 2024-03-28 LAB — COMPLEXED PSA SERPL-MCNC: <0.01 NG/ML (ref 0–4)

## 2024-03-28 PROCEDURE — 84153 ASSAY OF PSA TOTAL: CPT | Performed by: UROLOGY

## 2024-03-28 PROCEDURE — 36415 COLL VENOUS BLD VENIPUNCTURE: CPT | Performed by: UROLOGY

## 2024-03-30 NOTE — TELEPHONE ENCOUNTER
PSA=5.4, previous=4.22 on 3/3/2023. Ov with Dr Kemp 9/19   Alert and oriented to person, place and time

## 2024-04-05 ENCOUNTER — OFFICE VISIT (OUTPATIENT)
Dept: UROLOGY | Facility: CLINIC | Age: 69
End: 2024-04-05
Payer: MEDICARE

## 2024-04-05 VITALS
WEIGHT: 164.88 LBS | SYSTOLIC BLOOD PRESSURE: 147 MMHG | HEIGHT: 70 IN | BODY MASS INDEX: 23.6 KG/M2 | DIASTOLIC BLOOD PRESSURE: 93 MMHG | HEART RATE: 64 BPM

## 2024-04-05 DIAGNOSIS — Z85.46 HISTORY OF PROSTATE CANCER: Primary | ICD-10-CM

## 2024-04-05 LAB
BILIRUBIN, UA POC OHS: NEGATIVE
BLOOD, UA POC OHS: NEGATIVE
CLARITY, UA POC OHS: CLEAR
COLOR, UA POC OHS: YELLOW
GLUCOSE, UA POC OHS: NEGATIVE
KETONES, UA POC OHS: NEGATIVE
LEUKOCYTES, UA POC OHS: NEGATIVE
NITRITE, UA POC OHS: NEGATIVE
PH, UA POC OHS: 5.5
POC RESIDUAL URINE VOLUME: 0 ML (ref 0–100)
PROTEIN, UA POC OHS: NEGATIVE
SPECIFIC GRAVITY, UA POC OHS: 1.02
UROBILINOGEN, UA POC OHS: 0.2

## 2024-04-05 PROCEDURE — 51798 US URINE CAPACITY MEASURE: CPT | Mod: PBBFAC,PO | Performed by: UROLOGY

## 2024-04-05 PROCEDURE — 99417 PROLNG OP E/M EACH 15 MIN: CPT | Mod: S$PBB,,, | Performed by: UROLOGY

## 2024-04-05 PROCEDURE — 99213 OFFICE O/P EST LOW 20 MIN: CPT | Mod: PBBFAC,PO,25 | Performed by: UROLOGY

## 2024-04-05 PROCEDURE — 99999PBSHW POCT BLADDER SCAN: Mod: PBBFAC,,,

## 2024-04-05 PROCEDURE — 99999 PR PBB SHADOW E&M-EST. PATIENT-LVL III: CPT | Mod: PBBFAC,,, | Performed by: UROLOGY

## 2024-04-05 PROCEDURE — G2211 COMPLEX E/M VISIT ADD ON: HCPCS | Mod: S$PBB,,, | Performed by: UROLOGY

## 2024-04-05 PROCEDURE — 81003 URINALYSIS AUTO W/O SCOPE: CPT | Mod: PBBFAC,PO | Performed by: UROLOGY

## 2024-04-05 PROCEDURE — 99215 OFFICE O/P EST HI 40 MIN: CPT | Mod: S$PBB,,, | Performed by: UROLOGY

## 2024-04-05 PROCEDURE — 99999PBSHW POCT URINALYSIS(INSTRUMENT): Mod: PBBFAC,,,

## 2024-04-05 RX ORDER — OXYBUTYNIN CHLORIDE 10 MG/1
10 TABLET, EXTENDED RELEASE ORAL DAILY
Qty: 30 TABLET | Refills: 11 | Status: SHIPPED | OUTPATIENT
Start: 2024-04-05 | End: 2025-04-05

## 2024-04-05 NOTE — PROGRESS NOTES
[facility] U[facility] Progress Note     Ashish Dave is a 68 y.o. male who presents for prostate cancer follow up     Long history of BPH/LUTS on observation/supplements who on eval for rising psa to 5.4 and mild LUTS progression had MRI with pirad3 lesion of ant TZ but was negative though did have diffuse maryam 7 including 4+3=7 prostate cancer for which he elected to have robotic prostatectomy, which he underwent on 1/3/24  Preop: AUA SS: 10/2 (2: intermittency, urgency, weak stream; 1: emptying, fgrequency, straining, sleeping) . ++ anxiety. Reports that erections are not great, has ED.  Tried a few supplements over the counter previously which did not work. Had heart rhyrthm problem years ago. Used to see cardiology.  Preop saw cardiology for PSVT. He did well with surgery and was DCed on POD 1.   PATH:  3+4 [id number] (+pni, though neg margin resection)     Since then has had concerns about incisional swelling, scrotal swelling, blood in urine, foul smelling urine  ER 1/7 for scrotal swelling no pain c/w hematoma as discussed with pt in messaging, confirmed at time of negative cystogram on exam 1/10 and schafer removed and no incision concerns  Reassured small blood in urine while healing normal and days later concerned urine foul smelling and was yellow not clear  Ucx at time of cystogram negative. As precaution for pt concerns, 1/22/24 UA neg x trc blood with mirco only 2 rbc 1 wbc, Ucx neg. He was concerns results were false     2/5/24: Followed up with cardiology 1/30/24, bp controlled, of one med, rate controlled, no further palpitations or concerns  Water goes through him, and if siting a while as soon as he stands up will be going to the bathroom.  Sometimes urgent, but also just goes just to go when he gets up  If on his feet as while will drip out him sometimes without knowing it and feels is constant drip from amount diaper.  6-8 per day, down from a lot more. Has been doing  kegels in row in am and pm.  "Still on colace. When BM harder feels more pelvic discomfort  Some swelling persists lower abdomen, some pain on hard surfaces. Udip negative  - significant anxiety about continence recovery so ref to PFPT, started ditropan 5mg XL    In interim, per chart review, has been seeing PFPT  3/5: doing more around house, yardwork, no incident  3/8: Still having leakage if he drinks a lot of water at once. Continues to experience squirting if he coughs or sneezes   3/22: still has leakage - most trouble seems to be when he's drinking water. Tries to drink 1/2 gallon throughout the day, so whenever he's drinking water and he's active/up on his feet he has more leakage. If he drinks water and sits in the recliner he's fine.   3/27: overall stable though "unusually bad day yesterday" due to started the morning having to go to the [employer] to mop up some water from a pipe leak and used more pads  On review  Had routine labs 2/28 for upcoming pcp visit 3/13 including UA noted cloudy only with 1+ leuks and micro 10-20 wbc and few bacteria)  Advised concerning for infection and if having any symptoms could start bactrim though pt noted only incontinence form prostatectomy no symptoms so held off on abx  2/28/24 Urine culture final as below for esbl coli, and when seen 3/13 in pcp given 1 week bactrim DS    Result:            Greater than 100,000 CFU/mL of Escherichia coli (ESBL)                               E.coli (ESBL)                             ----------------                             INT   MALVIN      AMOX/CLAVULANATE       I     16      AMPICILLIN             R     >=32 **1      AMP/SULBACTAM          R     >=32     CEFAZOLIN              R     >=64 **2      CEFEPIME               S     <=1     CEFTAZIDIME            S     4      CEFTRIAXONE            R     >=64     CIPROFLOXACIN          R     >=4     GENTAMICIN             S     <=1     IMIPENEM               S     <=0.25     LEVOFLOXACIN           R     >=8     " "NITROFURANTOIN         S     <=16     PIP/TAZOBACTAM         S     <=4     TOBRAMYCIN             S     <=1     TRIMETHOPRIM/SULFA     S     <=20     ESBL RESULT:           *   **3     He returns today noting  PSA <0.01 on 3/28/24  In addition to urine above, labs 2/28/24 include H/H 15.6/49.9, WBC 4.7, Cr 0.72, eGFR 99  Urinalysis dipstick today is negative.  Most of his leakage is when he is on his feet being very active and drinking water.  Especially yd work and moving around a lot or bending.  Nighttime is no problem.  Nocturia 1-2 times, and seldom leak at night.  Taking oxybutynin 5 XL.  Mild dry mouth otherwise tolerating well.  No constipation.  Working with pelvic floor physical therapy as summarized above.  He is compliant with home exercise regimen doing for sets of 10 Kegel exercises per day  He has wearing a brief/depend with a pad inside.  Rarely does it spell over from the pad to the brief but he is going through about 7 pads per day, not just when moist for comfort but usually they are quite saturated.  Inquires about going fishing  Reports being asymptomatic from the above urine culture and was just found from routine screening labs        Focused Physical Exam:    Vitals:    04/05/24 0905   BP: (!) 147/93   Pulse: 64     Body mass index is 23.66 kg/m². Weight: 74.8 kg (164 lb 14.5 oz) Height: 5' 10" (177.8 cm)     Abdomen: Soft, non-tender, nondistended, no CVA tenderness  Lap incisions healed well, no hernia, some trace left groin edema of soft tissue superficially, no palpable hernia      Recent Results (from the past 336 hour(s))   Prostate Specific Antigen, Diagnostic    Collection Time: 03/28/24  9:25 AM   Result Value Ref Range    PSA Diagnostic <0.01 0.00 - 4.00 ng/mL   POCT Urinalysis(Instrument)    Collection Time: 04/05/24  9:10 AM   Result Value Ref Range    Color, POC UA Yellow Yellow, Straw, Colorless    Clarity, POC UA Clear Clear    Glucose, POC UA Negative Negative    Bilirubin, " POC UA Negative Negative    Ketones, POC UA Negative Negative    Spec Grav POC UA 1.025 1.005 - 1.030    Blood, POC UA Negative Negative    pH, POC UA 5.5 5.0 - 8.0    Protein, POC UA Negative Negative    Urobilinogen, POC UA 0.2 <=1.0    Nitrite, POC UA Negative Negative    WBC, POC UA Negative Negative   POCT Bladder Scan    Collection Time: 04/05/24  9:11 AM   Result Value Ref Range    POC Residual Urine Volume 0 0 - 100 mL       ASSESSMENT   1. History of prostate cancer  POCT Urinalysis(Instrument)    POCT Bladder Scan    Prostate Specific Antigen, Diagnostic          Plan    Overall doing very well three-month status postprostatectomy with favorable pathology and undetectable PSA with no evidence of disease at this time.  Encouraged by his oncologic cancer-free status, and will continue to follow his PSA every 3 months in year 1, every 6 months out to year 5, then annually.  Despite his significant anxiety he was in quite good spirits today, and is working closely with pelvic floor physical therapy and finds much benefit from it and feels that he is progressing in his recovery.    Certainly we did discuss his volume of leakage per day, as well as noting that we utilize pelvic floor physical therapy early due to his significant distress surrounding his continence, and he is making improvements.  Encouraged continued compliance with his home exercise regimen, and continued follow-up and working with pelvic floor physical therapy.  Certainly as months 3-6 critical in continence recovery, we did discuss extending his pelvic floor physical therapy and I will contact the department to do so, as he is done about 7 of his 12 authorized visits, but we did engage them very early in his recovery and typically utilize them after 3-6 months of patient's home efforts without much improvement.  Still has a good bit of stress incontinence, however it does sound like he is just leaking as well throughout the day sometimes  without stress maneuvers even though he knows when he is active.  We again reviewed the significant BPH/LUTS component prior, and postobstructive urgency frequency and uninhibited bladder contractions, which may be yielding some leakage due to his continued weak sphincteric muscles, and therefore as he is tolerating anticholinergic therapy well, and emptying well with a PVR of 0 cc, recommended increasing dose of Ditropan 5 XL to Ditropan 10 XL and new prescription was sent to pharmacy noting he can double his tablets until the new prescription is obtained.    I did review his interim primary care follow-up and labs including his ESBL E coli urinary tract infection, though unclear if this was a true infection or contaminant.  Certainly in the early part of his postop course was concern for foul-smelling odor and possible UTI in question the results of negative urine cultures, and his urinalysis microscopic exam at time of positive culture was largely unremarkable but culture positive and asymptomatic but ultimately treated with antibiotics.  May have been contaminant versus true infection but he is emptying well today, and his urinalysis dipstick is completely negative.  Will continue to check urine follow-up visits.     Extensive review of pelvic floor physical therapy evaluation and management, and discussion with the patient on his continence.  As well, on exam reviewed his concerns of left groin discomfort, noting that his swelling is superficial, and there has no hernia.  He did have a small fat containing contralateral right-sided hernia on MRI of the prostate preoperatively, and there was no evidence of this on exam today either.  Reminded he did have penoscrotal swelling and scrotal hematoma postop, and this is likely just some trace residual scrotal edema which is resolving as it was superficial.  No concerns.  Can continue supportive measures.    We briefly reviewed postprostatectomy ED management and  protocols, briefly reviewing rehabilitative and on demand options, over it this time and went the distinct focused to be on his continence, and therefore at our next three-month visit will start postprostatectomy ED management and rehabilitative protocols.  Will cc pelvic floor physical therapy in the interim to extend care    RTC 3 mos with psa prior  Continue PFPT in interim    Total time spent in/on encounter today, including face to face time with patient, counseling, medical record review, interpretation of tests/results, , and treatment plan coordination: 85 minutes    *Visit today included increased complexity associated with the current or anticipated ongoing medical longitudinal care and management related to this patient's serious and/or complex managed problem(s) as described above.

## 2024-04-05 NOTE — Clinical Note
Noted he is at 7/12 visits and I did refer him to utilize your services very very early in his postoperative recovery, earlier than usual.  Maximum benefit for him to continue would be over the next 3 months as well, so are you able to extend this so he can continue pending are follow up in 3 months.  Do I need a new referral order?  I already advise the patient I would ask you to extend, and even if his benefits only allowed X number of visits, the value in continuing to attend, even if after some point the visits are spaced Q 2 weeks from Q one to 2 weeks when he is making significant improvements this would be okay.  Please let me know your thoughts and had improved forward continuing his pelvic floor therapy

## 2024-04-10 ENCOUNTER — DOCUMENTATION ONLY (OUTPATIENT)
Dept: REHABILITATION | Facility: HOSPITAL | Age: 69
End: 2024-04-10
Payer: MEDICARE

## 2024-04-10 ENCOUNTER — CLINICAL SUPPORT (OUTPATIENT)
Dept: REHABILITATION | Facility: HOSPITAL | Age: 69
End: 2024-04-10
Payer: MEDICARE

## 2024-04-10 DIAGNOSIS — N39.3 MALE URINARY STRESS INCONTINENCE: ICD-10-CM

## 2024-04-10 DIAGNOSIS — M62.81 MUSCLE WEAKNESS: ICD-10-CM

## 2024-04-10 DIAGNOSIS — M62.89 PELVIC FLOOR DYSFUNCTION: Primary | ICD-10-CM

## 2024-04-10 PROCEDURE — 97112 NEUROMUSCULAR REEDUCATION: CPT | Mod: PO,CQ

## 2024-04-10 PROCEDURE — 97110 THERAPEUTIC EXERCISES: CPT | Mod: PO,CQ

## 2024-04-10 NOTE — PROGRESS NOTES
Pelvic Health Physical Therapy   Treatment Note     Name: Ashish Dave  Clinic Number: [id number]    Therapy Diagnosis:   Encounter Diagnoses   Name Primary?    Pelvic floor dysfunction Yes    Male urinary stress incontinence     Muscle weakness      Physician: Phil Werner MD    Visit Date: 4/10/2024  Physician Orders: PT Eval and Treat   Medical Diagnosis from Referral: Prostate cancer [C61]   Evaluation Date: 2/20/2024  Authorization Period Expiration: 2/5/2025  Plan of Care Expiration: 5/20/2024  Visit # / Visits authorized: 8/ 12     FOTO 2 /3        Time In: 7:45  Time Out: 8:30  Total Appointment Time (timed & untimed codes): 45 minutes     Precautions:  prostatectomy 1/3/2024; catheter out on 1/10/2024      Subjective     Pt reports: still has issues on busy days.     He was compliant with home exercise program.  Response to previous treatment: good  Functional change: unremarkable     Pain: 0/10  Location: right knee can problematic     Objective     Ashish received therapeutic exercises to develop  strength, endurance, ROM, flexibility, posture, and core stabilization for 0 minutes including:  the following exercises       Ashish received the following manual therapy techniques: to develop flexibility, extensibility, and desensitization for 0 minutes including:         Ashish participated in neuromuscular re-education activities to develop Coordination, Control, Down training, Posture, Proprioception, Kinesthetic, and Sense for 30 minutes including:       Hooklying kegel with 5 sec hold and  10 sec relax  3 minutes Not performed today   Bridges with kegel and exhale 3 x 10   Clams with Purple theraband 3 x 10 repetitions   Transverse abdominus activation 5 seconds holds x 3 minutes    With shushing x 3 minutes   Bent knee fall out with PTB x 3 minutes   Standing shoulder extension 40# 3 x 10   Diagonal chops 40# high to low       Ashish participated in dynamic functional therapeutic activities to  improve functional performance for 15 minutes, including:    Shuttle 75# 3 x 10   Sit to stand 3 x 5 with kegel and exhale  15# kettle moise   Box lifts from floor with 20 # with kegel and exhale   Deal lifts with 20 pounds 3 x 5 repetitions from chair height   Urge suppression techniques      Education on Pelvic Floor anatomy, intraabdominal pressure, lifting restrictions     Home Exercises Provided and Patient Education Provided     Education provided:   - anatomy/physiology of pelvic floor, posture/body mechanices, and Coordination of kegels with functional activities such as cough, laugh, sneeze, lift, etc.   Discussed progression of plan of care with patient; educated pt in activity modification; reviewed HEP with pt. Pt demonstrated and verbalized understanding of all instruction and was not provided with a handout of HEP (see Patient Instructions).  -     Written Home Exercises Provided: Patient instructed to cont prior HEP.  Exercises were reviewed and Ashish was able to demonstrate them prior to the end of the session.  Ashish demonstrated good  understanding of the education provided.     See EMR under Patient Instructions for exercises provided prior visit.    Assessment     Today's treatment focused on increased strength and endurance of core and Pelvic Floor musculature.    Will continue to benefit from skilled Physical Therapy to improve pelvic floor strength to reduce occurences of leakage with activities of daily living.     Ashish Is progressing well towards his goals.   Pt prognosis is Good.     Pt will continue to benefit from skilled outpatient physical therapy to address the deficits listed in the problem list box on initial evaluation, provide pt/family education and to maximize pt's level of independence in the home and community environment.     Pt's spiritual, cultural and educational needs considered and pt agreeable to plan of care and goals.     Anticipated barriers to physical therapy:  post-op     Goals: Goals:  Short Term Goals: 6 weeks   - Pt will demonstrate excellent knowledge and adherence to HEP to facilitate optimal recovery.  - Pt will demonstrate proper PFM contraction, relaxation, and lengthening coordinated with TA and breath for improved muscle coordination needed for functional activity.     Long Term Goals: 12 weeks   - Pt will demonstrate excellent knowledge and adherence to HEP for continued self-maintenance of symptoms.  - Pt will report FOTO score of 10% improvement or more indicating clinically relevant increase in function.  - Pt will report voiding interval of 2-3 hours for improved ADL tolerance.   PROGRESSING - probably could if he's not drinking a lot of water and being active. Also when he's leaking more his bladder volume doesn't build up as much. When he's sitting down he's fine. Still waking up a couple times/night.   - Pt will report ability to delay urinary urge for at least 15 minutes to maintain continence with ADL/IADLs.   PROGRESSING - could delay 15 min if he's sitting; if he's up walking around he may be able to delay but not without leakage.   - Pt will report little (drops) to no incidence of urinary incontinence 6/7 days for improved hygiene and ADL/IADL tolerance.   PROGRESSING - sometimes comes out as squirts, and he can't really feel that coming, happens more when he's trying to stay hydrated.   - Pt will report needing </= 1 pad/day indicating improved PFM function needed to maintain continence  PROGRESSING - wears a Depends out in public. Wears pads at home but goes through at least 6 pads/day, states they are pretty full when he's changing them.   - Pt will demonstrate PFM strength of at least 3/5 MMT for improved strength needed to maintain continence.   - Pt will report bearing down appropriately 100% of the time for improved bowel function and decreased stress on adjacent pelvic structures    Plan     Plan of care Certification: 2/20/2024 to  5/20/2024.     Outpatient Physical Therapy 2 times weekly for 12 weeks    Lindy Johnson, PTA

## 2024-04-16 ENCOUNTER — CLINICAL SUPPORT (OUTPATIENT)
Dept: REHABILITATION | Facility: HOSPITAL | Age: 69
End: 2024-04-16
Payer: MEDICARE

## 2024-04-16 DIAGNOSIS — M62.89 PELVIC FLOOR DYSFUNCTION: Primary | ICD-10-CM

## 2024-04-16 DIAGNOSIS — N39.3 MALE URINARY STRESS INCONTINENCE: ICD-10-CM

## 2024-04-16 DIAGNOSIS — M62.81 MUSCLE WEAKNESS: ICD-10-CM

## 2024-04-16 PROCEDURE — 97112 NEUROMUSCULAR REEDUCATION: CPT | Mod: PO,CQ

## 2024-04-16 PROCEDURE — 97530 THERAPEUTIC ACTIVITIES: CPT | Mod: PO,CQ

## 2024-04-16 NOTE — PROGRESS NOTES
Pelvic Health Physical Therapy   Treatment Note     Name: Ashish Dave  Clinic Number: [id number]    Therapy Diagnosis:   Encounter Diagnoses   Name Primary?    Pelvic floor dysfunction Yes    Male urinary stress incontinence     Muscle weakness      Physician: Phil Werner MD    Visit Date: 4/16/2024  Physician Orders: PT Eval and Treat   Medical Diagnosis from Referral: Prostate cancer [C61]   Evaluation Date: 2/20/2024  Authorization Period Expiration: 2/5/2025  Plan of Care Expiration: 5/20/2024  Visit # / Visits authorized: 9/ 12     FOTO 2 /3        Time In: 7:00  Time Out: 7:45  Total Appointment Time (timed & untimed codes): 45 minutes     Precautions:  prostatectomy 1/3/2024; catheter out on 1/10/2024      Subjective     Pt reports: consistent improvement     He was compliant with home exercise program.  Response to previous treatment: good  Functional change: unremarkable     Pain: 0/10  Location: right knee can problematic     Objective     Ashish received therapeutic exercises to develop  strength, endurance, ROM, flexibility, posture, and core stabilization for 0 minutes including:  the following exercises       Ashish received the following manual therapy techniques: to develop flexibility, extensibility, and desensitization for 0 minutes including:         Ashish participated in neuromuscular re-education activities to develop Coordination, Control, Down training, Posture, Proprioception, Kinesthetic, and Sense for 20 minutes including:       Bent knee fall out with PTB x 3 minutes   Standing shoulder extension 40# 3 x 10   Diagonal chops 40# high to low     Not performed today   Hooklying kegel with 5 sec hold and  10 sec relax  3 minutes Not performed today   Bridges with kegel and exhale 3 x 10   Clams with Purple theraband 3 x 10 repetitions   Transverse abdominus activation 5 seconds holds x 3 minutes    With shushing x 3 minutes      Ashish participated in dynamic functional therapeutic  activities to improve functional performance for 25 minutes, including:    Shuttle 75# 3 x 10 with proprioception disc   Shuttle single leg 50# with disc   Sit to stand 3 x 5 with kegel and exhale  15# kettle moise   Box lifts from floor with 20 # with kegel and exhale   Deal lifts with 20 pounds 3 x 5 repetitions from chair height   Urge suppression techniques      Education on Pelvic Floor anatomy, intraabdominal pressure, lifting restrictions     Home Exercises Provided and Patient Education Provided     Education provided:   - anatomy/physiology of pelvic floor, posture/body mechanices, and Coordination of kegels with functional activities such as cough, laugh, sneeze, lift, etc.   Discussed progression of plan of care with patient; educated pt in activity modification; reviewed HEP with pt. Pt demonstrated and verbalized understanding of all instruction and was not provided with a handout of HEP (see Patient Instructions).  -     Written Home Exercises Provided: Patient instructed to cont prior HEP.  Exercises were reviewed and Ashish was able to demonstrate them prior to the end of the session.  Ashish demonstrated good  understanding of the education provided.     See EMR under Patient Instructions for exercises provided prior visit.    Assessment     Today's treatment focused on increased strength and endurance of core and Pelvic Floor musculature. Patient is tolerating advancements without incident. Patient is expected to continue progressing towards therapy goals.     Ashish Is progressing well towards his goals.   Pt prognosis is Good.     Pt will continue to benefit from skilled outpatient physical therapy to address the deficits listed in the problem list box on initial evaluation, provide pt/family education and to maximize pt's level of independence in the home and community environment.     Pt's spiritual, cultural and educational needs considered and pt agreeable to plan of care and goals.      Anticipated barriers to physical therapy: post-op     Goals: Goals:  Short Term Goals: 6 weeks   - Pt will demonstrate excellent knowledge and adherence to HEP to facilitate optimal recovery.  - Pt will demonstrate proper PFM contraction, relaxation, and lengthening coordinated with TA and breath for improved muscle coordination needed for functional activity.     Long Term Goals: 12 weeks   - Pt will demonstrate excellent knowledge and adherence to HEP for continued self-maintenance of symptoms.  - Pt will report FOTO score of 10% improvement or more indicating clinically relevant increase in function.  - Pt will report voiding interval of 2-3 hours for improved ADL tolerance.   PROGRESSING - probably could if he's not drinking a lot of water and being active. Also when he's leaking more his bladder volume doesn't build up as much. When he's sitting down he's fine. Still waking up a couple times/night.   - Pt will report ability to delay urinary urge for at least 15 minutes to maintain continence with ADL/IADLs.   PROGRESSING - could delay 15 min if he's sitting; if he's up walking around he may be able to delay but not without leakage.   - Pt will report little (drops) to no incidence of urinary incontinence 6/7 days for improved hygiene and ADL/IADL tolerance.   PROGRESSING - sometimes comes out as squirts, and he can't really feel that coming, happens more when he's trying to stay hydrated.   - Pt will report needing </= 1 pad/day indicating improved PFM function needed to maintain continence  PROGRESSING - wears a Depends out in public. Wears pads at home but goes through at least 6 pads/day, states they are pretty full when he's changing them.   - Pt will demonstrate PFM strength of at least 3/5 MMT for improved strength needed to maintain continence.   - Pt will report bearing down appropriately 100% of the time for improved bowel function and decreased stress on adjacent pelvic structures    Plan     Plan   care Certification: 2/20/2024 to 5/20/2024.     Outpatient Physical Therapy 2 times weekly for 12 weeks    Lindy Johnson, PTA

## 2024-04-18 ENCOUNTER — CLINICAL SUPPORT (OUTPATIENT)
Dept: REHABILITATION | Facility: HOSPITAL | Age: 69
End: 2024-04-18
Payer: MEDICARE

## 2024-04-18 DIAGNOSIS — M62.89 PELVIC FLOOR DYSFUNCTION: Primary | ICD-10-CM

## 2024-04-18 DIAGNOSIS — M62.81 MUSCLE WEAKNESS: ICD-10-CM

## 2024-04-18 DIAGNOSIS — N39.3 MALE URINARY STRESS INCONTINENCE: ICD-10-CM

## 2024-04-18 PROCEDURE — 97530 THERAPEUTIC ACTIVITIES: CPT | Mod: PO,CQ

## 2024-04-18 PROCEDURE — 97112 NEUROMUSCULAR REEDUCATION: CPT | Mod: PO,CQ

## 2024-04-18 NOTE — PROGRESS NOTES
Pelvic Health Physical Therapy   Treatment Note     Name: Ashish Dave  Clinic Number: [id number]    Therapy Diagnosis:   Encounter Diagnoses   Name Primary?    Pelvic floor dysfunction Yes    Male urinary stress incontinence     Muscle weakness      Physician: Phil Werner MD    Visit Date: 4/18/2024  Physician Orders: PT Eval and Treat   Medical Diagnosis from Referral: Prostate cancer [C61]   Evaluation Date: 2/20/2024  Authorization Period Expiration: 2/5/2025  Plan of Care Expiration: 5/20/2024  Visit # / Visits authorized: 10/ 12     FOTO 2 /3        Time In: 7:05  Time Out: 7:55  Total Appointment Time (timed & untimed codes): 55 minutes     Precautions:  prostatectomy 1/3/2024; catheter out on 1/10/2024      Subjective     Pt reports: consistent improvement. No soreness after last treatment.     He was compliant with home exercise program.  Response to previous treatment: good  Functional change: unremarkable     Pain: 0/10  Location: right knee can problematic     Objective     Ashish received therapeutic exercises to develop  strength, endurance, ROM, flexibility, posture, and core stabilization for 5 minutes including:  the following exercises     5 minutes bike     Ashish received the following manual therapy techniques: to develop flexibility, extensibility, and desensitization for 0 minutes including:         Ashish participated in neuromuscular re-education activities to develop Coordination, Control, Down training, Posture, Proprioception, Kinesthetic, and Sense for 25 minutes including:       Standing shoulder extension 40# 3 x 10   Diagonal chops 40# high to low   Diagonal chops 30# low to high   Bridges with kegel and exhale 3 x 10   Bent knee fall out with PTB 3 x 10     Not performed today   Hooklying kegel with 5 sec hold and  10 sec relax  3 minutes Not performed today   Clams with Purple theraband 3 x 10 repetitions   Transverse abdominus activation 5 seconds holds x 3 minutes    With  shushing x 3 minutes      Ashish participated in dynamic functional therapeutic activities to improve functional performance for 24 minutes, including:    Shuttle 75# 3 x 10 with proprioception disc   Shuttle single leg 50# with disc   Box lifts from floor with 20 # with kegel and exhale   Deal lifts with 20 pounds 3 x 5 repetitions from chair height     Not performed today   Sit to stand 3 x 5 with kegel and exhale  15# kettle bell   Education on Pelvic Floor anatomy, intraabdominal pressure, lifting restrictions     Home Exercises Provided and Patient Education Provided     Education provided:   - anatomy/physiology of pelvic floor, posture/body mechanices, and Coordination of kegels with functional activities such as cough, laugh, sneeze, lift, etc.   Discussed progression of plan of care with patient; educated pt in activity modification; reviewed HEP with pt. Pt demonstrated and verbalized understanding of all instruction and was not provided with a handout of HEP (see Patient Instructions).  -     Written Home Exercises Provided: Patient instructed to cont prior HEP.  Exercises were reviewed and Ashish was able to demonstrate them prior to the end of the session.  Ashish demonstrated good  understanding of the education provided.     See EMR under Patient Instructions for exercises provided prior visit.    Assessment     Today's treatment focused on increased strength and endurance of core and Pelvic Floor musculature. Patient tolerated additional  advancements without incident. Patient is expected to continue progressing towards therapy goals.     Ashish Is progressing well towards his goals.   Pt prognosis is Good.     Pt will continue to benefit from skilled outpatient physical therapy to address the deficits listed in the problem list box on initial evaluation, provide pt/family education and to maximize pt's level of independence in the home and community environment.     Pt's spiritual, cultural and  educational needs considered and pt agreeable to plan of care and goals.     Anticipated barriers to physical therapy: post-op     Goals: Goals:  Short Term Goals: 6 weeks   - Pt will demonstrate excellent knowledge and adherence to HEP to facilitate optimal recovery.  - Pt will demonstrate proper PFM contraction, relaxation, and lengthening coordinated with TA and breath for improved muscle coordination needed for functional activity.     Long Term Goals: 12 weeks   - Pt will demonstrate excellent knowledge and adherence to HEP for continued self-maintenance of symptoms.  - Pt will report FOTO score of 10% improvement or more indicating clinically relevant increase in function.  - Pt will report voiding interval of 2-3 hours for improved ADL tolerance.   PROGRESSING - probably could if he's not drinking a lot of water and being active. Also when he's leaking more his bladder volume doesn't build up as much. When he's sitting down he's fine. Still waking up a couple times/night.   - Pt will report ability to delay urinary urge for at least 15 minutes to maintain continence with ADL/IADLs.   PROGRESSING - could delay 15 min if he's sitting; if he's up walking around he may be able to delay but not without leakage.   - Pt will report little (drops) to no incidence of urinary incontinence 6/7 days for improved hygiene and ADL/IADL tolerance.   PROGRESSING - sometimes comes out as squirts, and he can't really feel that coming, happens more when he's trying to stay hydrated.   - Pt will report needing </= 1 pad/day indicating improved PFM function needed to maintain continence  PROGRESSING - wears a Depends out in public. Wears pads at home but goes through at least 6 pads/day, states they are pretty full when he's changing them.   - Pt will demonstrate PFM strength of at least 3/5 MMT for improved strength needed to maintain continence.   - Pt will report bearing down appropriately 100% of the time for improved bowel  function and decreased stress on adjacent pelvic structures    Plan     Plan of care Certification: 2/20/2024 to 5/20/2024.     Outpatient Physical Therapy 2 times weekly for 12 weeks    Lindy Johnson, DEVANTE

## 2024-04-23 ENCOUNTER — CLINICAL SUPPORT (OUTPATIENT)
Dept: REHABILITATION | Facility: HOSPITAL | Age: 69
End: 2024-04-23
Payer: MEDICARE

## 2024-04-23 DIAGNOSIS — N39.3 MALE URINARY STRESS INCONTINENCE: ICD-10-CM

## 2024-04-23 DIAGNOSIS — M62.81 MUSCLE WEAKNESS: ICD-10-CM

## 2024-04-23 DIAGNOSIS — M62.89 PELVIC FLOOR DYSFUNCTION: Primary | ICD-10-CM

## 2024-04-23 PROCEDURE — 97112 NEUROMUSCULAR REEDUCATION: CPT | Mod: PO,CQ

## 2024-04-23 PROCEDURE — 97530 THERAPEUTIC ACTIVITIES: CPT | Mod: PO,CQ

## 2024-04-23 NOTE — PROGRESS NOTES
Pelvic Health Physical Therapy   Treatment Note     Name: Ashish Dave  Clinic Number: [id number]    Therapy Diagnosis:   Encounter Diagnoses   Name Primary?    Pelvic floor dysfunction Yes    Male urinary stress incontinence     Muscle weakness      Physician: Phil Werner MD    Visit Date: 4/23/2024  Physician Orders: PT Eval and Treat   Medical Diagnosis from Referral: Prostate cancer [C61]   Evaluation Date: 2/20/2024  Authorization Period Expiration: 2/5/2025  Plan of Care Expiration: 5/20/2024  Visit # / Visits authorized: 11/ 12     FOTO 2 /3        Time In: 7:05  Time Out: 7:50  Total Appointment Time (timed & untimed codes): 45 minutes     Precautions:  prostatectomy 1/3/2024; catheter out on 1/10/2024      Subjective     Pt reports: some low back pain today.     He was compliant with home exercise program.  Response to previous treatment: good  Functional change: unremarkable     Pain: 0/10  Location: right knee can problematic     Objective     Ashish received therapeutic exercises to develop  strength, endurance, ROM, flexibility, posture, and core stabilization for 5 minutes including:  the following exercises     5 minutes bike     Ashish received the following manual therapy techniques: to develop flexibility, extensibility, and desensitization for 0 minutes including:         Ashish participated in neuromuscular re-education activities to develop Coordination, Control, Down training, Posture, Proprioception, Kinesthetic, and Sense for 25 minutes including:       Standing lumbar extensions to address low back pain 20x   Standing shoulder extension 40# 3 x 10   Diagonal chops 40# high to low   Diagonal chops 30# low to high   Bridges with kegel and exhale 3 x 10   Bent knee fall out with PTB 3 x 10     Not performed today   Hooklying kegel with 5 sec hold and  10 sec relax  3 minutes Not performed today   Clams with Purple theraband 3 x 10 repetitions   Transverse abdominus activation 5 seconds  holds x 3 minutes    With shushing x 3 minutes      Ashish participated in dynamic functional therapeutic activities to improve functional performance for 14 minutes, including:    Shuttle 75# 3 x 10 with proprioception disc   Shuttle single leg 50# with disc     Not performed today   Box lifts from floor with 20 # with kegel and exhale   Deal lifts with 20 pounds 3 x 5 repetitions from chair height     Not performed today   Sit to stand 3 x 5 with kegel and exhale  15# kettle bell   Education on Pelvic Floor anatomy, intraabdominal pressure, lifting restrictions     Home Exercises Provided and Patient Education Provided     Education provided:   - anatomy/physiology of pelvic floor, posture/body mechanices, and Coordination of kegels with functional activities such as cough, laugh, sneeze, lift, etc.   Discussed progression of plan of care with patient; educated pt in activity modification; reviewed HEP with pt. Pt demonstrated and verbalized understanding of all instruction and was not provided with a handout of HEP (see Patient Instructions).  -     Written Home Exercises Provided: Patient instructed to cont prior HEP.  Exercises were reviewed and Ashish was able to demonstrate them prior to the end of the session.  Ashish demonstrated good  understanding of the education provided.     See EMR under Patient Instructions for exercises provided prior visit.    Assessment     Today's treatment focused on increased strength and endurance of core and Pelvic Floor musculature. Lumbar extensions were added to treatment to address low back pain. Some of the heavy lifting exercises were held today in deference to patient's subjective reporting. Patient tolerated additional  advancements without incident. Patient is expected to continue progressing towards therapy goals.     Ashish Is progressing well towards his goals.   Pt prognosis is Good.     Pt will continue to benefit from skilled outpatient physical therapy to  address the deficits listed in the problem list box on initial evaluation, provide pt/family education and to maximize pt's level of independence in the home and community environment.     Pt's spiritual, cultural and educational needs considered and pt agreeable to plan of care and goals.     Anticipated barriers to physical therapy: post-op     Goals: Goals:  Short Term Goals: 6 weeks   - Pt will demonstrate excellent knowledge and adherence to HEP to facilitate optimal recovery.  - Pt will demonstrate proper PFM contraction, relaxation, and lengthening coordinated with TA and breath for improved muscle coordination needed for functional activity.     Long Term Goals: 12 weeks   - Pt will demonstrate excellent knowledge and adherence to HEP for continued self-maintenance of symptoms.  - Pt will report FOTO score of 10% improvement or more indicating clinically relevant increase in function.  - Pt will report voiding interval of 2-3 hours for improved ADL tolerance.   PROGRESSING - probably could if he's not drinking a lot of water and being active. Also when he's leaking more his bladder volume doesn't build up as much. When he's sitting down he's fine. Still waking up a couple times/night.   - Pt will report ability to delay urinary urge for at least 15 minutes to maintain continence with ADL/IADLs.   PROGRESSING - could delay 15 min if he's sitting; if he's up walking around he may be able to delay but not without leakage.   - Pt will report little (drops) to no incidence of urinary incontinence 6/7 days for improved hygiene and ADL/IADL tolerance.   PROGRESSING - sometimes comes out as squirts, and he can't really feel that coming, happens more when he's trying to stay hydrated.   - Pt will report needing </= 1 pad/day indicating improved PFM function needed to maintain continence  PROGRESSING - wears a Depends out in public. Wears pads at home but goes through at least 6 pads/day, states they are pretty full  when he's changing them.   - Pt will demonstrate PFM strength of at least 3/5 MMT for improved strength needed to maintain continence.   - Pt will report bearing down appropriately 100% of the time for improved bowel function and decreased stress on adjacent pelvic structures    Plan     Plan of care Certification: 2/20/2024 to 5/20/2024.     Outpatient Physical Therapy 2 times weekly for 12 weeks    Lindy Johnson, PTA

## 2024-04-29 ENCOUNTER — CLINICAL SUPPORT (OUTPATIENT)
Dept: REHABILITATION | Facility: HOSPITAL | Age: 69
End: 2024-04-29
Payer: MEDICARE

## 2024-04-29 DIAGNOSIS — M62.89 PELVIC FLOOR DYSFUNCTION: Primary | ICD-10-CM

## 2024-04-29 DIAGNOSIS — N39.3 MALE URINARY STRESS INCONTINENCE: ICD-10-CM

## 2024-04-29 DIAGNOSIS — M62.81 MUSCLE WEAKNESS: ICD-10-CM

## 2024-04-29 PROCEDURE — 97530 THERAPEUTIC ACTIVITIES: CPT | Mod: PO

## 2024-04-29 PROCEDURE — 97112 NEUROMUSCULAR REEDUCATION: CPT | Mod: PO

## 2024-04-29 NOTE — PATIENT INSTRUCTIONS
KEGELS IN Sitting   1. Sit comfortably with legs and buttocks relaxed.  2. Contract and LIFT the pelvic floor muscles as if you're trying to stop the stream of urine and passage of gas.  3. Hold LIFT for 10 seconds without holding breath. You should be able to talk out loud the entire time.  4. Release the pelvic muscles right away for 10 seconds rest.  5. Repeat 10 times, 3-4 sets per day. Spread throughout the day.   No Kegels while urinating!   ** also try to do a few standing periodically - if you're doing them correctly no one will be able to tell what you're doing!

## 2024-04-29 NOTE — PROGRESS NOTES
Pelvic Health Physical Therapy   PROGRESS NOTE and Treatment Note     Name: Ashish Dave  Clinic Number: [id number]    Therapy Diagnosis:   Encounter Diagnoses   Name Primary?    Pelvic floor dysfunction Yes    Male urinary stress incontinence     Muscle weakness        Physician: Phil Werner MD    Visit Date: 4/29/2024  Physician Orders: PT Eval and Treat   Medical Diagnosis from Referral: Prostate cancer [C61]   Evaluation Date: 2/20/2024  Authorization Period Expiration: 2/5/2025  Plan of Care Expiration: 5/20/2024  Visit # / Visits authorized: 11/ 12     FOTO 3 /3        Time In: 8:45  Time Out:9:20  Total Appointment Time (timed & untimed codes): 35 minutes     Precautions:  prostatectomy 1/3/2024; catheter out on 1/10/2024      Subjective     Pt reports: on a busy day where he's drinking more and up on his feet a lot he'll need 7-8 pads/day (lets them get full). On a good day where he's not as active he'll need 3-4. Hardly has any leakage while he's sleeping.   Urinary incontinence is always associated with some sort of physical activity - physical labor/drinking more fluids     He was compliant with home exercise program.  Response to previous treatment: good  Functional change: unremarkable     Pain: 0/10  Location: right knee can problematic     Objective       Limitation/Restriction for FOTO Pelvic Survey    Therapist reviewed FOTO scores for Ashish Dave on 4/29/2024.   FOTO documents entered into [employer] - see Media section.             Ashish received therapeutic exercises to develop  strength, endurance, ROM, flexibility, posture, and core stabilization for 00 minutes including:  the following exercises     5 minutes bike     Ashish received the following manual therapy techniques: to develop flexibility, extensibility, and desensitization for 0 minutes including:         Ashish participated in neuromuscular re-education activities to develop Coordination, Control, Down training, Posture,  Proprioception, Kinesthetic, and Sense for 15 minutes including:       Standing lumbar extensions to address low back pain 20x   Standing shoulder extension 40# 3 x 10   Diagonal chops 40# high to low   Diagonal chops 30# low to high   Bridges with kegel and exhale 3 x 10   Bent knee fall out with PTB 3 x 10     Not performed today   Hooklying kegel with 5 sec hold and  10 sec relax  3 minutes Not performed today   Clams with Purple theraband 3 x 10 repetitions   Transverse abdominus activation 5 seconds holds x 3 minutes    With shushing x 3 minutes      Ashish participated in dynamic functional therapeutic activities to improve functional performance for 20 minutes, including:    Shuttle 75# 3 x 10 with proprioception disc   Shuttle single leg 50# with disc     Not performed today   Box lifts from floor with 20 # with kegel and exhale   Deal lifts with 20 pounds 3 x 5 repetitions from chair height     Not performed today   Sit to stand 3 x 5 with kegel and exhale  15# kettle bell   Education on Pelvic Floor anatomy, intraabdominal pressure, lifting restrictions     Home Exercises Provided and Patient Education Provided     Education provided:   - anatomy/physiology of pelvic floor, posture/body mechanices, and Coordination of kegels with functional activities such as cough, laugh, sneeze, lift, etc.   Discussed progression of plan of care with patient; educated pt in activity modification; reviewed HEP with pt. Pt demonstrated and verbalized understanding of all instruction and was not provided with a handout of HEP (see Patient Instructions).  -     Written Home Exercises Provided: Patient instructed to cont prior HEP.  Exercises were reviewed and Ashish was able to demonstrate them prior to the end of the session.  Ashish demonstrated good  understanding of the education provided.     See EMR under Patient Instructions for exercises provided prior visit.    Assessment     Assessment of progress made today. Pt  demonstrates good progress since start of therapy, however, since he is only 3 months out of surgery and he is very physically active, patient is still experiencing large volume of stress urinary incontinence, needing up to 8 pads/day that are fairly full when he changes them.   Discussed continuing therapy to decrease pad usage before patient is ready to continue with strengthening ex's independently. Patient is expected to continue progressing towards therapy goals.     Ashish Is progressing well towards his goals.   Pt prognosis is Good.     Pt will continue to benefit from skilled outpatient physical therapy to address the deficits listed in the problem list box on initial evaluation, provide pt/family education and to maximize pt's level of independence in the home and community environment.     Pt's spiritual, cultural and educational needs considered and pt agreeable to plan of care and goals.     Anticipated barriers to physical therapy: post-op     Goals: Goals:  Short Term Goals: 6 weeks   - Pt will demonstrate excellent knowledge and adherence to HEP to facilitate optimal recovery.  - Pt will demonstrate proper PFM contraction, relaxation, and lengthening coordinated with TA and breath for improved muscle coordination needed for functional activity.     Long Term Goals: 12 weeks   - Pt will demonstrate excellent knowledge and adherence to HEP for continued self-maintenance of symptoms.  - Pt will report FOTO score of 10% improvement or more indicating clinically relevant increase in function.  - Pt will report voiding interval of 2-3 hours for improved ADL tolerance.   ACHIEVED  - Pt will report ability to delay urinary urge for at least 15 minutes to maintain continence with ADL/IADLs.   ACHIEVED - can delay if he's sitting   - Pt will report little (drops) to no incidence of urinary incontinence 6/7 days for improved hygiene and ADL/IADL tolerance.   PROGRESSING - still has stress urinary incontinence  that is associated with physical activity; occasionally has a few drops that come out before he gets to the bathroom.   - Pt will report needing </= 1 pad/day indicating improved PFM function needed to maintain continence  PROGRESSING - on an active day patient requires 7-8 pads/day (pads are full when he changes them); on a less active day he needs 3-4 pads (still full when changed)   - Pt will demonstrate PFM strength of at least 3/5 MMT for improved strength needed to maintain continence.   PROGRESSING - patient states he can cut off urine stream sometimes.   - Pt will report bearing down appropriately 100% of the time for improved bowel function and decreased stress on adjacent pelvic structures  ACHIEVED    Plan     Plan of care Certification: 2/20/2024 to 5/20/2024.     Outpatient Physical Therapy 2 times weekly for 12 weeks    Kayli Vee, PT

## 2024-05-02 ENCOUNTER — CLINICAL SUPPORT (OUTPATIENT)
Dept: REHABILITATION | Facility: HOSPITAL | Age: 69
End: 2024-05-02
Payer: MEDICARE

## 2024-05-02 DIAGNOSIS — N39.3 MALE URINARY STRESS INCONTINENCE: ICD-10-CM

## 2024-05-02 DIAGNOSIS — M62.89 PELVIC FLOOR DYSFUNCTION: Primary | ICD-10-CM

## 2024-05-02 DIAGNOSIS — M62.81 MUSCLE WEAKNESS: ICD-10-CM

## 2024-05-02 PROCEDURE — 97112 NEUROMUSCULAR REEDUCATION: CPT | Mod: PO,CQ

## 2024-05-02 PROCEDURE — 97530 THERAPEUTIC ACTIVITIES: CPT | Mod: PO,CQ

## 2024-05-02 NOTE — PROGRESS NOTES
Pelvic Health Physical Therapy   PROGRESS NOTE and Treatment Note     Name: Ashish Dave  Clinic Number: [id number]    Therapy Diagnosis:   Encounter Diagnoses   Name Primary?    Pelvic floor dysfunction Yes    Male urinary stress incontinence     Muscle weakness        Physician: Phil Werner MD    Visit Date: 5/2/2024  Physician Orders: PT Eval and Treat   Medical Diagnosis from Referral: Prostate cancer [C61]   Evaluation Date: 2/20/2024  Authorization Period Expiration: 2/5/2025  Plan of Care Expiration: 5/20/2024  Visit # / Visits authorized: 13/ 20     FOTO 3 /3        Time In: 7:05  Time Out: 8:00  Total Appointment Time (timed & untimed codes): 55 minutes     Precautions:  prostatectomy 1/3/2024; catheter out on 1/10/2024      Subjective     Pt reports: continued urinary incontinence associated with some sort of physical activity could be as low level activity as sweeping or high intensity like chopping down a tree in his yard.     He was compliant with home exercise program.  Response to previous treatment: good  Functional change: unremarkable     Pain: 0/10  Location: right knee can problematic     Objective       Limitation/Restriction for FOTO Pelvic Survey    Therapist reviewed FOTO scores for Ashish Dave on 5/2/2024.   FOTO documents entered into [employer] - see Media section.             Ashish received therapeutic exercises to develop  strength, endurance, ROM, flexibility, posture, and core stabilization for 00 minutes including:  the following exercises     5 minutes bike     Ashish received the following manual therapy techniques: to develop flexibility, extensibility, and desensitization for 0 minutes including:         Ashish participated in neuromuscular re-education activities to develop Coordination, Control, Down training, Posture, Proprioception, Kinesthetic, and Sense for 35 minutes including:       Diagonal chops 40# high to low 3 minutes each direction standing on airex pad    Bridges with kegel and exhale 3 x 10   Bent knee fall out with PTB 3 x 10   Rev crunches 1 minute with ball  Diagonal rev crunches with ball 1 minute     Not performed today   Standing lumbar extensions to address low back pain 20x   Standing shoulder extension 40# 3 x 10   Diagonal chops 30# low to high       Not performed today   Hooklying kegel with 5 sec hold and  10 sec relax  3 minutes Not performed today   Clams with Purple theraband 3 x 10 repetitions   Transverse abdominus activation 5 seconds holds x 3 minutes    With shushing x 3 minutes      Ashish participated in dynamic functional therapeutic activities to improve functional performance for 20 minutes, including:    Shuttle 100# with proprioception disc 4.5  minutes   Shuttle single leg 50# with disc Not performed today     Box lifts from stool with 20 # with kegel and exhale  25 X   Deal lifts with 20 pounds 3 x 5 repetitions from chair height Not performed today     Not performed today   Sit to stand 3 x 5 with kegel and exhale  15# kettle bell       Home Exercises Provided and Patient Education Provided     Education provided:   - anatomy/physiology of pelvic floor, posture/body mechanices, and Coordination of kegels with functional activities such as cough, laugh, sneeze, lift, etc.   Discussed progression of plan of care with patient; educated pt in activity modification; reviewed HEP with pt. Pt demonstrated and verbalized understanding of all instruction and was not provided with a handout of HEP (see Patient Instructions).  -     Written Home Exercises Provided: Patient instructed to cont prior HEP.  Exercises were reviewed and Ashish was able to demonstrate them prior to the end of the session.  Ashish demonstrated good  understanding of the education provided.     See EMR under Patient Instructions for exercises provided prior visit.    Assessment      Today's treatment shifted focus into endurance training to better serve patient's needs  outside of clinic. He tolerated all adjustments of care today without incident but the need for this type of work was reinforced by patient's feedback. He will continue to benefit from high intensity training in therapy to address continued leakage during his highly active daily life.     Ashish Is progressing well towards his goals.   Pt prognosis is Good.     Pt will continue to benefit from skilled outpatient physical therapy to address the deficits listed in the problem list box on initial evaluation, provide pt/family education and to maximize pt's level of independence in the home and community environment.     Pt's spiritual, cultural and educational needs considered and pt agreeable to plan of care and goals.     Anticipated barriers to physical therapy: post-op     Goals: Goals:  Short Term Goals: 6 weeks   - Pt will demonstrate excellent knowledge and adherence to HEP to facilitate optimal recovery.  - Pt will demonstrate proper PFM contraction, relaxation, and lengthening coordinated with TA and breath for improved muscle coordination needed for functional activity.     Long Term Goals: 12 weeks   - Pt will demonstrate excellent knowledge and adherence to HEP for continued self-maintenance of symptoms.  - Pt will report FOTO score of 10% improvement or more indicating clinically relevant increase in function.  - Pt will report voiding interval of 2-3 hours for improved ADL tolerance.   ACHIEVED  - Pt will report ability to delay urinary urge for at least 15 minutes to maintain continence with ADL/IADLs.   ACHIEVED - can delay if he's sitting   - Pt will report little (drops) to no incidence of urinary incontinence 6/7 days for improved hygiene and ADL/IADL tolerance.   PROGRESSING - still has stress urinary incontinence that is associated with physical activity; occasionally has a few drops that come out before he gets to the bathroom.   - Pt will report needing </= 1 pad/day indicating improved PFM  function needed to maintain continence  PROGRESSING - on an active day patient requires 7-8 pads/day (pads are full when he changes them); on a less active day he needs 3-4 pads (still full when changed)   - Pt will demonstrate PFM strength of at least 3/5 MMT for improved strength needed to maintain continence.   PROGRESSING - patient states he can cut off urine stream sometimes.   - Pt will report bearing down appropriately 100% of the time for improved bowel function and decreased stress on adjacent pelvic structures  ACHIEVED    Plan     Plan of care Certification: 2/20/2024 to 5/20/2024.     Outpatient Physical Therapy 2 times weekly for 12 weeks    Lindy Johnson, PTA

## 2024-05-07 ENCOUNTER — CLINICAL SUPPORT (OUTPATIENT)
Dept: REHABILITATION | Facility: HOSPITAL | Age: 69
End: 2024-05-07
Payer: MEDICARE

## 2024-05-07 DIAGNOSIS — N39.3 MALE URINARY STRESS INCONTINENCE: ICD-10-CM

## 2024-05-07 DIAGNOSIS — M62.81 MUSCLE WEAKNESS: ICD-10-CM

## 2024-05-07 DIAGNOSIS — M62.89 PELVIC FLOOR DYSFUNCTION: Primary | ICD-10-CM

## 2024-05-07 PROCEDURE — 97530 THERAPEUTIC ACTIVITIES: CPT | Mod: PO,CQ

## 2024-05-07 PROCEDURE — 97112 NEUROMUSCULAR REEDUCATION: CPT | Mod: PO,CQ

## 2024-05-07 NOTE — PROGRESS NOTES
Pelvic Health Physical Therapy   PROGRESS NOTE and Treatment Note     Name: Ashish Dave  Clinic Number: [id number]    Therapy Diagnosis:   Encounter Diagnoses   Name Primary?    Pelvic floor dysfunction Yes    Male urinary stress incontinence     Muscle weakness        Physician: Phil Werner MD    Visit Date: 5/7/2024  Physician Orders: PT Eval and Treat   Medical Diagnosis from Referral: Prostate cancer [C61]   Evaluation Date: 2/20/2024  Authorization Period Expiration: 2/5/2025  Plan of Care Expiration: 5/20/2024  Visit # / Visits authorized: 14/ 20     FOTO 3 /3        Time In: 7:05  Time Out: 8:00  Total Appointment Time (timed & untimed codes): 45 minutes     Precautions:  prostatectomy 1/3/2024; catheter out on 1/10/2024      Subjective     Pt reports: an incident over the weekend where he was returning home and got caught up talking with his neighbor and didn't realize he had leakage until he was back inside. Still in need of further therapy.     He was compliant with home exercise program.  Response to previous treatment: good  Functional change: unremarkable     Pain: 0/10  Location: right knee can problematic     Objective       Limitation/Restriction for FOTO Pelvic Survey    Therapist reviewed FOTO scores for Ashish Dave on 5/7/2024.   FOTO documents entered into [employer] - see Media section.             Ashish received therapeutic exercises to develop  strength, endurance, ROM, flexibility, posture, and core stabilization for 05 minutes including:  the following exercises     5 minutes bike     Ashish received the following manual therapy techniques: to develop flexibility, extensibility, and desensitization for 0 minutes including:         Ashish participated in neuromuscular re-education activities to develop Coordination, Control, Down training, Posture, Proprioception, Kinesthetic, and Sense for 30 minutes including:       Diagonal chops 40# high to low 3 minutes each direction standing on  airex pad   Paloff walk outs 20 x with double green band   Bridges with kegel and exhale 3 x 10   Bent knee fall out with PTB 3 x 10   Rev crunches 1 minute with ball  Diagonal rev crunches with ball 1 minute     Not performed today   Standing lumbar extensions to address low back pain 20x   Standing shoulder extension 40# 3 x 10   Diagonal chops 30# low to high       Not performed today   Hooklying kegel with 5 sec hold and  10 sec relax  3 minutes Not performed today   Clams with Purple theraband 3 x 10 repetitions   Transverse abdominus activation 5 seconds holds x 3 minutes    With shushing x 3 minutes      Ashish participated in dynamic functional therapeutic activities to improve functional performance for 20 minutes, including:    Shuttle 100# with proprioception disc 4.5  minutes   Shuttle single leg 75# with disc 20 x     Box lifts from stool with 20 # with kegel and exhale  25 X   Deal lifts with 20 pounds 3 x 5 repetitions from chair height Not performed today       Sit to stand 3 x 5 with kegel and exhale  15# kettle bell       Home Exercises Provided and Patient Education Provided     Education provided:   - anatomy/physiology of pelvic floor, posture/body mechanices, and Coordination of kegels with functional activities such as cough, laugh, sneeze, lift, etc.   Discussed progression of plan of care with patient; educated pt in activity modification; reviewed HEP with pt. Pt demonstrated and verbalized understanding of all instruction and was not provided with a handout of HEP (see Patient Instructions).  -     Written Home Exercises Provided: Patient instructed to cont prior HEP.  Exercises were reviewed and Ashish was able to demonstrate them prior to the end of the session.  Ashish demonstrated good  understanding of the education provided.     See EMR under Patient Instructions for exercises provided prior visit.    Assessment      Today's treatment shifted focus into endurance training to better  serve patient's needs outside of clinic. Therapeutic exercises are being performed on a timed basis rather than number of repetitions. He will continue to benefit from high intensity training in therapy to address continued leakage during his highly active daily life.     Ashish Is progressing well towards his goals.   Pt prognosis is Good.     Pt will continue to benefit from skilled outpatient physical therapy to address the deficits listed in the problem list box on initial evaluation, provide pt/family education and to maximize pt's level of independence in the home and community environment.     Pt's spiritual, cultural and educational needs considered and pt agreeable to plan of care and goals.     Anticipated barriers to physical therapy: post-op     Goals: Goals:  Short Term Goals: 6 weeks   - Pt will demonstrate excellent knowledge and adherence to HEP to facilitate optimal recovery.  - Pt will demonstrate proper PFM contraction, relaxation, and lengthening coordinated with TA and breath for improved muscle coordination needed for functional activity.     Long Term Goals: 12 weeks   - Pt will demonstrate excellent knowledge and adherence to HEP for continued self-maintenance of symptoms.  - Pt will report FOTO score of 10% improvement or more indicating clinically relevant increase in function.  - Pt will report voiding interval of 2-3 hours for improved ADL tolerance.   ACHIEVED  - Pt will report ability to delay urinary urge for at least 15 minutes to maintain continence with ADL/IADLs.   ACHIEVED - can delay if he's sitting   - Pt will report little (drops) to no incidence of urinary incontinence 6/7 days for improved hygiene and ADL/IADL tolerance.   PROGRESSING - still has stress urinary incontinence that is associated with physical activity; occasionally has a few drops that come out before he gets to the bathroom.   - Pt will report needing </= 1 pad/day indicating improved PFM function needed to  maintain continence  PROGRESSING - on an active day patient requires 7-8 pads/day (pads are full when he changes them); on a less active day he needs 3-4 pads (still full when changed)   - Pt will demonstrate PFM strength of at least 3/5 MMT for improved strength needed to maintain continence.   PROGRESSING - patient states he can cut off urine stream sometimes.   - Pt will report bearing down appropriately 100% of the time for improved bowel function and decreased stress on adjacent pelvic structures  ACHIEVED    Plan     Plan of care Certification: 2/20/2024 to 5/20/2024.     Outpatient Physical Therapy 2 times weekly for 12 weeks    Lindy Johnson, PTA

## 2024-05-09 ENCOUNTER — CLINICAL SUPPORT (OUTPATIENT)
Dept: REHABILITATION | Facility: HOSPITAL | Age: 69
End: 2024-05-09
Payer: MEDICARE

## 2024-05-09 DIAGNOSIS — M62.81 MUSCLE WEAKNESS: ICD-10-CM

## 2024-05-09 DIAGNOSIS — M62.89 PELVIC FLOOR DYSFUNCTION: Primary | ICD-10-CM

## 2024-05-09 DIAGNOSIS — N39.3 MALE URINARY STRESS INCONTINENCE: ICD-10-CM

## 2024-05-09 PROCEDURE — 97530 THERAPEUTIC ACTIVITIES: CPT | Mod: PO,CQ

## 2024-05-09 PROCEDURE — 97112 NEUROMUSCULAR REEDUCATION: CPT | Mod: PO,CQ

## 2024-05-09 NOTE — PROGRESS NOTES
Pelvic Health Physical Therapy   PROGRESS NOTE and Treatment Note     Name: Ashish Dave  Clinic Number: [id number]    Therapy Diagnosis:   Encounter Diagnoses   Name Primary?    Pelvic floor dysfunction Yes    Male urinary stress incontinence     Muscle weakness        Physician: Phil Werner MD    Visit Date: 5/9/2024  Physician Orders: PT Eval and Treat   Medical Diagnosis from Referral: Prostate cancer [C61]   Evaluation Date: 2/20/2024  Authorization Period Expiration: 2/5/2025  Plan of Care Expiration: 5/20/2024  Visit # / Visits authorized: 15/ 20     FOTO 3 /3        Time In: 7:05  Time Out: 8:50  Total Appointment Time (timed & untimed codes): 45 minutes     Precautions:  prostatectomy 1/3/2024; catheter out on 1/10/2024      Subjective     Pt reports: no new issues today.     He was compliant with home exercise program.  Response to previous treatment: good  Functional change: unremarkable     Pain: 0/10  Location: right knee can problematic     Objective       Limitation/Restriction for FOTO Pelvic Survey    Therapist reviewed FOTO scores for Ashish Dave on 5/9/2024.   FOTO documents entered into [employer] - see Media section.             Ashish received therapeutic exercises to develop  strength, endurance, ROM, flexibility, posture, and core stabilization for 05 minutes including:  the following exercises     5 minutes bike     Ashish received the following manual therapy techniques: to develop flexibility, extensibility, and desensitization for 0 minutes including:         Ashish participated in neuromuscular re-education activities to develop Coordination, Control, Down training, Posture, Proprioception, Kinesthetic, and Sense for 30 minutes including:       Diagonal chops 40# high to low 3 minutes each direction standing on airex pad   Paloff walk outs  with double green band 2 minutes each side   Bridges with kegel and exhale 2 minutes with feet on airex pad  Bent knee fall out with PTB 3 x 10    Rev crunches 1 minute with ball  Diagonal rev crunches with ball 1 minute     Not performed today   Standing lumbar extensions to address low back pain 20x   Standing shoulder extension 40# 3 x 10   Diagonal chops 30# low to high       Not performed today   Hooklying kegel with 5 sec hold and  10 sec relax  3 minutes Not performed today   Clams with Purple theraband 3 x 10 repetitions   Transverse abdominus activation 5 seconds holds x 3 minutes    With shushing x 3 minutes      Ashish participated in dynamic functional therapeutic activities to improve functional performance for 15 minutes, including:    Shuttle 100# with proprioception disc 4 minutes   Shuttle single leg 50#  2 minutes each     Not performed today   Box lifts from stool with 20 # with kegel and exhale  25 X   Deal lifts with 20 pounds 3 x 5 repetitions from chair height Not performed today       Sit to stand 3 x 5 with kegel and exhale  15# kettle bell       Home Exercises Provided and Patient Education Provided     Education provided:   - anatomy/physiology of pelvic floor, posture/body mechanices, and Coordination of kegels with functional activities such as cough, laugh, sneeze, lift, etc.   Discussed progression of plan of care with patient; educated pt in activity modification; reviewed HEP with pt. Pt demonstrated and verbalized understanding of all instruction and was not provided with a handout of HEP (see Patient Instructions).  -     Written Home Exercises Provided: Patient instructed to cont prior HEP.  Exercises were reviewed and Ashish was able to demonstrate them prior to the end of the session.  Ashish demonstrated good  understanding of the education provided.     See EMR under Patient Instructions for exercises provided prior visit.    Assessment      Today's treatment shifted focus into endurance training to better serve patient's needs outside of clinic. Therapeutic exercises continue to be performed on a timed basis rather  than number of repetitions. He will continue to benefit from high intensity training in therapy to address continued leakage during his highly active daily life.     Ashish Is progressing well towards his goals.   Pt prognosis is Good.     Pt will continue to benefit from skilled outpatient physical therapy to address the deficits listed in the problem list box on initial evaluation, provide pt/family education and to maximize pt's level of independence in the home and community environment.     Pt's spiritual, cultural and educational needs considered and pt agreeable to plan of care and goals.     Anticipated barriers to physical therapy: post-op     Goals: Goals:  Short Term Goals: 6 weeks   - Pt will demonstrate excellent knowledge and adherence to HEP to facilitate optimal recovery.  - Pt will demonstrate proper PFM contraction, relaxation, and lengthening coordinated with TA and breath for improved muscle coordination needed for functional activity.     Long Term Goals: 12 weeks   - Pt will demonstrate excellent knowledge and adherence to HEP for continued self-maintenance of symptoms.  - Pt will report FOTO score of 10% improvement or more indicating clinically relevant increase in function.  - Pt will report voiding interval of 2-3 hours for improved ADL tolerance.   ACHIEVED  - Pt will report ability to delay urinary urge for at least 15 minutes to maintain continence with ADL/IADLs.   ACHIEVED - can delay if he's sitting   - Pt will report little (drops) to no incidence of urinary incontinence 6/7 days for improved hygiene and ADL/IADL tolerance.   PROGRESSING - still has stress urinary incontinence that is associated with physical activity; occasionally has a few drops that come out before he gets to the bathroom.   - Pt will report needing </= 1 pad/day indicating improved PFM function needed to maintain continence  PROGRESSING - on an active day patient requires 7-8 pads/day (pads are full when he  changes them); on a less active day he needs 3-4 pads (still full when changed)   - Pt will demonstrate PFM strength of at least 3/5 MMT for improved strength needed to maintain continence.   PROGRESSING - patient states he can cut off urine stream sometimes.   - Pt will report bearing down appropriately 100% of the time for improved bowel function and decreased stress on adjacent pelvic structures  ACHIEVED    Plan     Plan of care Certification: 2/20/2024 to 5/20/2024.     Outpatient Physical Therapy 2 times weekly for 12 weeks    Lindy Johnson, PTA

## 2024-05-14 ENCOUNTER — CLINICAL SUPPORT (OUTPATIENT)
Dept: REHABILITATION | Facility: HOSPITAL | Age: 69
End: 2024-05-14
Payer: MEDICARE

## 2024-05-14 DIAGNOSIS — N39.3 MALE URINARY STRESS INCONTINENCE: ICD-10-CM

## 2024-05-14 DIAGNOSIS — M62.81 MUSCLE WEAKNESS: ICD-10-CM

## 2024-05-14 DIAGNOSIS — M62.89 PELVIC FLOOR DYSFUNCTION: Primary | ICD-10-CM

## 2024-05-14 PROCEDURE — 97112 NEUROMUSCULAR REEDUCATION: CPT | Mod: PO,CQ

## 2024-05-14 PROCEDURE — 97530 THERAPEUTIC ACTIVITIES: CPT | Mod: PO,CQ

## 2024-05-14 NOTE — PROGRESS NOTES
"  Pelvic Health Physical Therapy   PROGRESS NOTE and Treatment Note     Name: Ashish Dave  Clinic Number: [id number]    Therapy Diagnosis:   Encounter Diagnoses   Name Primary?    Pelvic floor dysfunction Yes    Male urinary stress incontinence     Muscle weakness        Physician: Phil Werner MD    Visit Date: 5/14/2024  Physician Orders: PT Eval and Treat   Medical Diagnosis from Referral: Prostate cancer [C61]   Evaluation Date: 2/20/2024  Authorization Period Expiration: 2/5/2025  Plan of Care Expiration: 5/20/2024  Visit # / Visits authorized: 16/ 20     FOTO 3 /3        Time In: 7:05  Time Out: 8:50  Total Appointment Time (timed & untimed codes): 45 minutes     Precautions:  prostatectomy 1/3/2024; catheter out on 1/10/2024      Subjective     Pt reports: " seems to be getting better."     He was compliant with home exercise program.  Response to previous treatment: good  Functional change: unremarkable     Pain: 0/10  Location: right knee can problematic     Objective       Limitation/Restriction for FOTO Pelvic Survey    Therapist reviewed FOTO scores for Ashish Dave on 5/14/2024.   FOTO documents entered into EPIC - see Media section.             Ashish received therapeutic exercises to develop  strength, endurance, ROM, flexibility, posture, and core stabilization for 05 minutes including:  the following exercises     5 minutes bike     Ashish received the following manual therapy techniques: to develop flexibility, extensibility, and desensitization for 0 minutes including:         Ashish participated in neuromuscular re-education activities to develop Coordination, Control, Down training, Posture, Proprioception, Kinesthetic, and Sense for 30 minutes including:       Diagonal chops 40# high to low 3 minutes each direction standing on airex pad   Standing shoulder extension 40# 3 x 10   Paloff walk outs  with double green band 2 minutes each side   Resisted lateral walks with RTB 10 ft x 4 "   Monster walks with RTB FWD/BCK 10 ft x 4     Not performed today   Standing lumbar extensions to address low back pain 20x   Standing shoulder extension 40# 3 x 10   Diagonal chops 30# low to high   Bridges with kegel and exhale 2 minutes with feet on airex pad  Bent knee fall out with PTB 3 x 10   Rev crunches 1 minute with ball  Diagonal rev crunches with ball 1 minute   Hooklying kegel with 5 sec hold and  10 sec relax  3 minutes Not performed today   Clams with Purple theraband 3 x 10 repetitions   Transverse abdominus activation 5 seconds holds x 3 minutes    With shushing x 3 minutes      Ashish participated in dynamic functional therapeutic activities to improve functional performance for 15 minutes, including:    Shuttle 100# with proprioception disc 4 minutes   Shuttle single leg 50#  2 minutes each       Box lifts from stool with 20 # with kegel and exhale  25 X Not performed today   Deal lifts with 20 pounds 3 x 5 repetitions from chair height       Sit to stand 3 x 5 with kegel and exhale  15# kettle bell       Home Exercises Provided and Patient Education Provided     Education provided:   - anatomy/physiology of pelvic floor, posture/body mechanices, and Coordination of kegels with functional activities such as cough, laugh, sneeze, lift, etc.   Discussed progression of plan of care with patient; educated pt in activity modification; reviewed HEP with pt. Pt demonstrated and verbalized understanding of all instruction and was not provided with a handout of HEP (see Patient Instructions).  -     Written Home Exercises Provided: Patient instructed to cont prior HEP.  Exercises were reviewed and Ashish was able to demonstrate them prior to the end of the session.  Ashish demonstrated good  understanding of the education provided.     See EMR under Patient Instructions for exercises provided prior visit.    Assessment     Patient is tolerating progressive endurance exercises very well. Several  therapeutic exercises continue to be performed on a timed basis rather than number of repetitions. He will continue to benefit from high intensity training in therapy to address continued leakage during his highly active daily life.     Ashish Is progressing well towards his goals.   Pt prognosis is Good.     Pt will continue to benefit from skilled outpatient physical therapy to address the deficits listed in the problem list box on initial evaluation, provide pt/family education and to maximize pt's level of independence in the home and community environment.     Pt's spiritual, cultural and educational needs considered and pt agreeable to plan of care and goals.     Anticipated barriers to physical therapy: post-op     Goals: Goals:  Short Term Goals: 6 weeks   - Pt will demonstrate excellent knowledge and adherence to HEP to facilitate optimal recovery.  - Pt will demonstrate proper PFM contraction, relaxation, and lengthening coordinated with TA and breath for improved muscle coordination needed for functional activity.     Long Term Goals: 12 weeks   - Pt will demonstrate excellent knowledge and adherence to HEP for continued self-maintenance of symptoms.  - Pt will report FOTO score of 10% improvement or more indicating clinically relevant increase in function.  - Pt will report voiding interval of 2-3 hours for improved ADL tolerance.   ACHIEVED  - Pt will report ability to delay urinary urge for at least 15 minutes to maintain continence with ADL/IADLs.   ACHIEVED - can delay if he's sitting   - Pt will report little (drops) to no incidence of urinary incontinence 6/7 days for improved hygiene and ADL/IADL tolerance.   PROGRESSING - still has stress urinary incontinence that is associated with physical activity; occasionally has a few drops that come out before he gets to the bathroom.   - Pt will report needing </= 1 pad/day indicating improved PFM function needed to maintain continence  PROGRESSING - on  an active day patient requires 7-8 pads/day (pads are full when he changes them); on a less active day he needs 3-4 pads (still full when changed)   - Pt will demonstrate PFM strength of at least 3/5 MMT for improved strength needed to maintain continence.   PROGRESSING - patient states he can cut off urine stream sometimes.   - Pt will report bearing down appropriately 100% of the time for improved bowel function and decreased stress on adjacent pelvic structures  ACHIEVED    Plan     Plan of care Certification: 2/20/2024 to 5/20/2024.     Outpatient Physical Therapy 2 times weekly for 12 weeks    Lindy Johnson, PTA

## 2024-05-16 ENCOUNTER — CLINICAL SUPPORT (OUTPATIENT)
Dept: REHABILITATION | Facility: HOSPITAL | Age: 69
End: 2024-05-16
Payer: MEDICARE

## 2024-05-16 DIAGNOSIS — M62.89 PELVIC FLOOR DYSFUNCTION: Primary | ICD-10-CM

## 2024-05-16 DIAGNOSIS — M62.81 MUSCLE WEAKNESS: ICD-10-CM

## 2024-05-16 DIAGNOSIS — N39.3 MALE URINARY STRESS INCONTINENCE: ICD-10-CM

## 2024-05-16 PROCEDURE — 97110 THERAPEUTIC EXERCISES: CPT | Mod: KX,PO,CQ

## 2024-05-16 PROCEDURE — 97112 NEUROMUSCULAR REEDUCATION: CPT | Mod: KX,PO,CQ

## 2024-05-16 NOTE — PROGRESS NOTES
Pelvic Health Physical Therapy   PROGRESS NOTE and Treatment Note     Name: Ashish Dave  Clinic Number: [id number]    Therapy Diagnosis:   Encounter Diagnoses   Name Primary?    Pelvic floor dysfunction Yes    Male urinary stress incontinence     Muscle weakness        Physician: Phil Werner MD    Visit Date: 5/16/2024  Physician Orders: PT Eval and Treat   Medical Diagnosis from Referral: Prostate cancer [C61]   Evaluation Date: 2/20/2024  Authorization Period Expiration: 2/5/2025  Plan of Care Expiration: 5/20/2024  Visit # / Visits authorized: 17/ 20     FOTO 3 /3        Time In: 7:05  Time Out: 8:50  Total Appointment Time (timed & untimed codes): 45 minutes     Precautions:  prostatectomy 1/3/2024; catheter out on 1/10/2024      Subjective     Pt reports:no new issues this morning.     He was compliant with home exercise program.  Response to previous treatment: good  Functional change: ongoing     Pain: 0/10  Location: right knee can problematic     Objective       Limitation/Restriction for FOTO Pelvic Survey    Therapist reviewed FOTO scores for Ashish Dave on 5/16/2024.   FOTO documents entered into EPIC - see Media section.             Ashish received therapeutic exercises to develop  strength, endurance, ROM, flexibility, posture, and core stabilization for 05 minutes including:  the following exercises     5 minutes bike     Ashish received the following manual therapy techniques: to develop flexibility, extensibility, and desensitization for 0 minutes including:         Ashish participated in neuromuscular re-education activities to develop Coordination, Control, Down training, Posture, Proprioception, Kinesthetic, and Sense for 30 minutes including:       Diagonal chops 40# high to low 3 minutes each direction standing on airex pad   Standing shoulder extension 40# 3 x 10   Paloff walk outs  with double green band 2 minutes each side   Resisted lateral walks with RTB 10 ft x 4   [facility]  07/05/20 1504   Vent Information   Vent Type 840   Vent Mode AC/VC   Vt Ordered 430 mL   Rate Set 16 bmp   Peak Flow 80 L/min   Pressure Support 0 cmH20   FiO2  60 %   SpO2 92 %   SpO2/FiO2 ratio 153.33   Sensitivity 3   PEEP/CPAP 12   I Time/ I Time % 0 s   Humidification Source Heated wire   Humidification Temp 36   Circuit Condensation Drained   Vent Patient Data   High Peep/I Pressure 0   Peak Inspiratory Pressure 23 cmH2O   Mean Airway Pressure 13 cmH20   Rate Measured 27 br/min   Vt Exhaled 388 mL   Minute Volume 12.2 Liters   I:E Ratio 1:1.40   Cough/Sputum   Cough None   Spontaneous Breathing Trial (SBT) RT Doc   Pulse 84   Breath Sounds   Right Upper Lobe Diminished   Right Middle Lobe Diminished   Right Lower Lobe Diminished   Left Upper Lobe Diminished   Left Lower Lobe Diminished   Additional Respiratory  Assessments   Resp 24   Position Semi-Harris's   Oral Care Completed? Yes   Oral Care Mouth suctioned   Subglottic Suction Done? Yes   Alarm Settings   High Pressure Alarm 50 cmH2O   Low Minute Volume Alarm 5 L/min   Apnea (secs) 20 secs   High Respiratory Rate 40 br/min   Low Exhaled Vt  350 mL   Patient Observation   Observations 8ett 23 at lip   ETT (adult)   Placement Date/Time: 06/29/20 0230   Preoxygenation: Yes  Mask Ventilation: Ventilated by mask (1)  Technique: Video laryngoscopy  Type: Cuffed  Tube Size: 8 mm  Laryngoscope: GlideScope  Blade Size: 4  Location: Oral  Insertion attempts: 1  Placement. ..    Secured at 23 cm   Measured From [address] [place] [address],[address] By [employer] tube [email or web address]   Site Condition Dry walks with RTB FWD/BCK 10 ft x 4     Not performed today   Standing lumbar extensions to address low back pain 20x   Standing shoulder extension 40# 3 x 10   Diagonal chops 30# low to high   Bridges with kegel and exhale 2 minutes with feet on airex pad  Bent knee fall out with PTB 3 x 10   Rev crunches 1 minute with ball  Diagonal rev crunches with ball 1 minute   Hooklying kegel with 5 sec hold and  10 sec relax  3 minutes Not performed today   Clams with Purple theraband 3 x 10 repetitions   Transverse abdominus activation 5 seconds holds x 3 minutes    With shushing x 3 minutes      Ashish participated in dynamic functional therapeutic activities to improve functional performance for 15 minutes, including:    Shuttle 100# with proprioception disc 4 minutes   Shuttle single leg 50#  2 minutes each       Box lifts from stool with 20 # with kegel and exhale  25 X Not performed today   Deal lifts with 20 pounds 3 x 5 repetitions from chair height       Sit to stand 3 x 5 with kegel and exhale  15# kettle bell       Home Exercises Provided and Patient Education Provided     Education provided:   - anatomy/physiology of pelvic floor, posture/body mechanices, and Coordination of kegels with functional activities such as cough, laugh, sneeze, lift, etc.   Discussed progression of plan of care with patient; educated pt in activity modification; reviewed HEP with pt. Pt demonstrated and verbalized understanding of all instruction and was not provided with a handout of HEP (see Patient Instructions).  -     Written Home Exercises Provided: Patient instructed to cont prior HEP.  Exercises were reviewed and Ashish was able to demonstrate them prior to the end of the session.  Ashish demonstrated good  understanding of the education provided.     See EMR under Patient Instructions for exercises provided prior visit.    Assessment     Patient is tolerating progressive endurance exercises very well. Several therapeutic  exercises continue to be performed on a timed basis rather than number of repetitions. He is reporting more control when performing these exercises. He will continue to benefit from high intensity training in therapy to address continued leakage during his highly active daily life.     Ashish Is progressing well towards his goals.   Pt prognosis is Good.     Pt will continue to benefit from skilled outpatient physical therapy to address the deficits listed in the problem list box on initial evaluation, provide pt/family education and to maximize pt's level of independence in the home and community environment.     Pt's spiritual, cultural and educational needs considered and pt agreeable to plan of care and goals.     Anticipated barriers to physical therapy: post-op     Goals: Goals:  Short Term Goals: 6 weeks   - Pt will demonstrate excellent knowledge and adherence to HEP to facilitate optimal recovery.  - Pt will demonstrate proper PFM contraction, relaxation, and lengthening coordinated with TA and breath for improved muscle coordination needed for functional activity.     Long Term Goals: 12 weeks   - Pt will demonstrate excellent knowledge and adherence to HEP for continued self-maintenance of symptoms.  - Pt will report FOTO score of 10% improvement or more indicating clinically relevant increase in function.  - Pt will report voiding interval of 2-3 hours for improved ADL tolerance.   ACHIEVED  - Pt will report ability to delay urinary urge for at least 15 minutes to maintain continence with ADL/IADLs.   ACHIEVED - can delay if he's sitting   - Pt will report little (drops) to no incidence of urinary incontinence 6/7 days for improved hygiene and ADL/IADL tolerance.   PROGRESSING - still has stress urinary incontinence that is associated with physical activity; occasionally has a few drops that come out before he gets to the bathroom.   - Pt will report needing </= 1 pad/day indicating improved PFM function  needed to maintain continence  PROGRESSING - on an active day patient requires 7-8 pads/day (pads are full when he changes them); on a less active day he needs 3-4 pads (still full when changed)   - Pt will demonstrate PFM strength of at least 3/5 MMT for improved strength needed to maintain continence.   PROGRESSING - patient states he can cut off urine stream sometimes.   - Pt will report bearing down appropriately 100% of the time for improved bowel function and decreased stress on adjacent pelvic structures  ACHIEVED    Plan     Plan of care Certification: 2/20/2024 to 5/20/2024.     Outpatient Physical Therapy 2 times weekly for 12 weeks    Lindy Johnson, PTA

## 2024-05-20 ENCOUNTER — CLINICAL SUPPORT (OUTPATIENT)
Dept: REHABILITATION | Facility: HOSPITAL | Age: 69
End: 2024-05-20
Payer: MEDICARE

## 2024-05-20 DIAGNOSIS — N39.3 MALE URINARY STRESS INCONTINENCE: ICD-10-CM

## 2024-05-20 DIAGNOSIS — M62.81 MUSCLE WEAKNESS: ICD-10-CM

## 2024-05-20 DIAGNOSIS — M62.89 PELVIC FLOOR DYSFUNCTION: Primary | ICD-10-CM

## 2024-05-20 PROCEDURE — 97112 NEUROMUSCULAR REEDUCATION: CPT | Mod: PO

## 2024-05-20 NOTE — PROGRESS NOTES
Pelvic Health Physical Therapy   Treatment Note     Name: Ashish Dave  Clinic Number: [id number]    Therapy Diagnosis:   Encounter Diagnoses   Name Primary?    Pelvic floor dysfunction Yes    Male urinary stress incontinence     Muscle weakness        Physician: Phil Werner MD    Visit Date: 5/20/2024  Physician Orders: PT Eval and Treat   Medical Diagnosis from Referral: Prostate cancer [C61]   Evaluation Date: 2/20/2024  Authorization Period Expiration: 2/5/2025  Plan of Care Expiration: 5/20/2024  Visit # / Visits authorized: 17/ 20     FOTO 3 /3        Time In: 7:05  Time Out: 8:50  Total Appointment Time (timed & untimed codes): 45 minutes     Precautions:  prostatectomy 1/3/2024; catheter out on 1/10/2024      Subjective     Pt reports: still better; still has more leakage when he drinks more water and is more active.   Feels ready to continue with home exercise program independent and follow up in a few months.     He was compliant with home exercise program.  Response to previous treatment: good  Functional change: ongoing     Pain: 0/10  Location: right knee can problematic     Objective       Limitation/Restriction for FOTO Pelvic Survey    Therapist reviewed FOTO scores for Ashish Dave on 5/20/2024.   FOTO documents entered into EPIC - see Media section.             Ashish received therapeutic exercises to develop  strength, endurance, ROM, flexibility, posture, and core stabilization for 05 minutes including:  the following exercises     5 minutes bike     Ashish received the following manual therapy techniques: to develop flexibility, extensibility, and desensitization for 0 minutes including:         Ashish participated in neuromuscular re-education activities to develop Coordination, Control, Down training, Posture, Proprioception, Kinesthetic, and Sense for 30 minutes including:       Diagonal chops 40# high to low 3 minutes each direction standing on airex pad   Standing shoulder  extension 40# 3 x 10   Paloff walk outs  with double green band 2 minutes each side   Resisted lateral walks with RTB 10 ft x 4   Monster walks with RTB FWD/BCK 10 ft x 4     Not performed today   Standing lumbar extensions to address low back pain 20x   Standing shoulder extension 40# 3 x 10   Diagonal chops 30# low to high   Bridges with kegel and exhale 2 minutes with feet on airex pad  Bent knee fall out with PTB 3 x 10   Rev crunches 1 minute with ball  Diagonal rev crunches with ball 1 minute   Hooklying kegel with 5 sec hold and  10 sec relax  3 minutes Not performed today   Clams with Purple theraband 3 x 10 repetitions   Transverse abdominus activation 5 seconds holds x 3 minutes    With shushing x 3 minutes      Ashish participated in dynamic functional therapeutic activities to improve functional performance for 15 minutes, including:    Shuttle 100# with proprioception disc 4 minutes   Shuttle single leg 50#  2 minutes each       Box lifts from stool with 20 # with kegel and exhale  25 X Not performed today   Deal lifts with 20 pounds 3 x 5 repetitions from chair height       Sit to stand 3 x 5 with kegel and exhale  15# kettle bell       Home Exercises Provided and Patient Education Provided     Education provided:   - anatomy/physiology of pelvic floor, posture/body mechanices, and Coordination of kegels with functional activities such as cough, laugh, sneeze, lift, etc.   Discussed progression of plan of care with patient; educated pt in activity modification; reviewed HEP with pt. Pt demonstrated and verbalized understanding of all instruction and was not provided with a handout of HEP (see Patient Instructions).  -     Written Home Exercises Provided: Patient instructed to cont prior HEP.  Exercises were reviewed and Ashish was able to demonstrate them prior to the end of the session.  Ashish demonstrated good  understanding of the education provided.     See EMR under Patient Instructions for  exercises provided prior visit.    Assessment     Patient is tolerating progressive endurance exercises very well. Due to length of recovery, patient to continue home exercise program independently at this time and follow up with physical therapist in future if he reaches plateau or needs further exercise instruction.     Ashish Is progressing well towards his goals.   Pt prognosis is Good.     Pt will continue to benefit from skilled outpatient physical therapy to address the deficits listed in the problem list box on initial evaluation, provide pt/family education and to maximize pt's level of independence in the home and community environment.     Pt's spiritual, cultural and educational needs considered and pt agreeable to plan of care and goals.     Anticipated barriers to physical therapy: post-op     Goals: Goals:  Short Term Goals: 6 weeks   - Pt will demonstrate excellent knowledge and adherence to HEP to facilitate optimal recovery.  - Pt will demonstrate proper PFM contraction, relaxation, and lengthening coordinated with TA and breath for improved muscle coordination needed for functional activity.     Long Term Goals: 12 weeks   - Pt will demonstrate excellent knowledge and adherence to HEP for continued self-maintenance of symptoms.  - Pt will report FOTO score of 10% improvement or more indicating clinically relevant increase in function.  - Pt will report voiding interval of 2-3 hours for improved ADL tolerance.   ACHIEVED  - Pt will report ability to delay urinary urge for at least 15 minutes to maintain continence with ADL/IADLs.   ACHIEVED - can delay if he's sitting   - Pt will report little (drops) to no incidence of urinary incontinence 6/7 days for improved hygiene and ADL/IADL tolerance.   PROGRESSING - still has stress urinary incontinence that is associated with physical activity; occasionally has a few drops that come out before he gets to the bathroom.   - Pt will report needing </= 1  pad/day indicating improved PFM function needed to maintain continence  PROGRESSING - on an active day patient requires 7-8 pads/day (pads are full when he changes them); on a less active day he needs 3-4 pads (still full when changed)   - Pt will demonstrate PFM strength of at least 3/5 MMT for improved strength needed to maintain continence.   PROGRESSING - patient states he can cut off urine stream sometimes.   - Pt will report bearing down appropriately 100% of the time for improved bowel function and decreased stress on adjacent pelvic structures  ACHIEVED    Plan     Patient is appropriate to be discharged and continue with independent HEP at this time. Patient instructed to contact PT with any questions or concerns following discharge.      Kayli Vee, PT

## 2024-07-05 ENCOUNTER — LAB VISIT (OUTPATIENT)
Dept: LAB | Facility: HOSPITAL | Age: 69
End: 2024-07-05
Attending: UROLOGY
Payer: MEDICARE

## 2024-07-05 DIAGNOSIS — Z85.46 HISTORY OF PROSTATE CANCER: ICD-10-CM

## 2024-07-05 LAB — COMPLEXED PSA SERPL-MCNC: <0.01 NG/ML (ref 0–4)

## 2024-07-05 PROCEDURE — 36415 COLL VENOUS BLD VENIPUNCTURE: CPT | Performed by: UROLOGY

## 2024-07-05 PROCEDURE — 84153 ASSAY OF PSA TOTAL: CPT | Performed by: UROLOGY

## 2024-07-12 ENCOUNTER — OFFICE VISIT (OUTPATIENT)
Dept: UROLOGY | Facility: CLINIC | Age: 69
End: 2024-07-12
Payer: MEDICARE

## 2024-07-12 VITALS
SYSTOLIC BLOOD PRESSURE: 134 MMHG | WEIGHT: 164.88 LBS | HEART RATE: 69 BPM | HEIGHT: 70 IN | DIASTOLIC BLOOD PRESSURE: 81 MMHG | BODY MASS INDEX: 23.6 KG/M2

## 2024-07-12 DIAGNOSIS — Z85.46 HISTORY OF PROSTATE CANCER: Primary | ICD-10-CM

## 2024-07-12 LAB
BILIRUBIN, UA POC OHS: NEGATIVE
BLOOD, UA POC OHS: NEGATIVE
CLARITY, UA POC OHS: CLEAR
COLOR, UA POC OHS: YELLOW
GLUCOSE, UA POC OHS: NEGATIVE
KETONES, UA POC OHS: NEGATIVE
LEUKOCYTES, UA POC OHS: NEGATIVE
NITRITE, UA POC OHS: NEGATIVE
PH, UA POC OHS: 5
PROTEIN, UA POC OHS: NEGATIVE
SPECIFIC GRAVITY, UA POC OHS: 1.02
UROBILINOGEN, UA POC OHS: 0.2

## 2024-07-12 PROCEDURE — 99999 PR PBB SHADOW E&M-EST. PATIENT-LVL III: CPT | Mod: PBBFAC,,, | Performed by: UROLOGY

## 2024-07-12 PROCEDURE — 99213 OFFICE O/P EST LOW 20 MIN: CPT | Mod: PBBFAC,PO | Performed by: UROLOGY

## 2024-07-12 NOTE — Clinical Note
Kayli/heather -- Last seen in May.  Plan to do HEP and follow up in a few months.  No follow-up set.  Please see evaluation and set follow-up with patient thinks

## 2024-07-12 NOTE — PROGRESS NOTES
[facility] Urology Progress Note     Ashish Dave is a 68 y.o. male who presents for prostate cancer follow up     Long history of BPH/LUTS on observation/supplements who on eval for rising psa to 5.4 and mild LUTS progression had MRI with pirad3 lesion of ant TZ but was negative though did have diffuse maryam 7 including 4+3=7 prostate cancer for which he elected to have robotic prostatectomy, which he underwent on 1/3/24  Preop: AUA SS: 10/2 (2: intermittency, urgency, weak stream; 1: emptying, fgrequency, straining, sleeping) . ++ anxiety. Reports that erections are not great, has ED.  Tried a few supplements over the counter previously which did not work. Had heart rhyrthm problem years ago. Used to see cardiology.  Preop saw cardiology for PSVT. He did well with surgery and was DCed on POD 1.   PATH:  3+4 [id number] (+pni, though neg margin resection)     Since then has had concerns about incisional swelling, scrotal swelling, blood in urine, foul smelling urine  ER 1/7 for scrotal swelling no pain c/w hematoma as discussed with pt in messaging, confirmed at time of negative cystogram on exam 1/10 and schafer removed and no incision concerns  Reassured small blood in urine while healing normal and days later concerned urine foul smelling and was yellow not clear  Ucx at time of cystogram negative. As precaution for pt concerns, 1/22/24 UA neg x trc blood with mirco only 2 rbc 1 wbc, Ucx neg. He was concerns results were false     2/5/24: Followed up with cardiology 1/30/24, bp controlled, of one med, rate controlled, no further palpitations or concerns  Water goes through him, and if siting a while as soon as he stands up will be going to the bathroom.  Sometimes urgent, but also just goes just to go when he gets up  If on his feet as while will drip out him sometimes without knowing it and feels is constant drip from amount diaper.  6-8 per day, down from a lot more. Has been doing  kegels in row in am and pm.  "Still on colace. When BM harder feels more pelvic discomfort  Some swelling persists lower abdomen, some pain on hard surfaces. Udip negative  - significant anxiety about continence recovery so ref to PFPT, started ditropan 5mg XL     In interim, per chart review, has been seeing PFPT  3/5: doing more around house, yardwork, no incident  3/8: Still having leakage if he drinks a lot of water at once. Continues to experience squirting if he coughs or sneezes   3/22: still has leakage - most trouble seems to be when he's drinking water. Tries to drink 1/2 gallon throughout the day, so whenever he's drinking water and he's active/up on his feet he has more leakage. If he drinks water and sits in the recliner he's fine.   3/27: overall stable though "unusually bad day yesterday" due to started the morning having to go to the [employer] to mop up some water from a pipe leak and used more pads  On review  Had routine labs 2/28 for upcoming pcp visit 3/13 including UA noted cloudy only with 1+ leuks and micro 10-20 wbc and few bacteria)  Advised concerning for infection and if having any symptoms could start bactrim though pt noted only incontinence form prostatectomy no symptoms so held off on abx  2/28/24 Urine culture final as below for esbl coli, and when seen 3/13 in pcp given 1 week bactrim DS    Result:            Greater than 100,000 CFU/mL of Escherichia coli (ESBL)                               E.coli (ESBL)                             ----------------                             INT   MALVIN      AMOX/CLAVULANATE       I     16      AMPICILLIN             R     >=32 **1      AMP/SULBACTAM          R     >=32     CEFAZOLIN              R     >=64 **2      CEFEPIME               S     <=1     CEFTAZIDIME            S     4      CEFTRIAXONE            R     >=64     CIPROFLOXACIN          R     >=4     GENTAMICIN             S     <=1     IMIPENEM               S     <=0.25     LEVOFLOXACIN           R     >=8     " "NITROFURANTOIN         S     <=16     PIP/TAZOBACTAM         S     <=4     TOBRAMYCIN             S     <=1     TRIMETHOPRIM/SULFA     S     <=20     ESBL RESULT:           *   **3      4/4/24: PSA <0.01 on 3/28/24, In addition to urine above, labs 2/28/24 include H/H 15.6/49.9, WBC 4.7, Cr 0.72, eGFR 99  Urinalysis dipstick today is negative.  Most of his leakage is when he is on his feet being very active and drinking water.  Especially yd work and moving around a lot or bending.  Nighttime is no problem.  Nocturia 1-2 times, and seldom leak at night. Taking oxybutynin 5 XL.  Mild dry mouth otherwise tolerating well.  No constipation.  Working with pelvic floor physical therapy as summarized above.  He is compliant with home exercise regimen doing for sets of 10 Kegel exercises per day  He has wearing a brief/depend with a pad inside.  Rarely does it spell over from the pad to the brief but he is going through about 7 pads per day, not just when moist for comfort but usually they are quite saturated. Inquires about going fishing. Reports being asymptomatic from the above urine culture and was just found from routine screening labs     He returns today noting  PSA 7/5/24 <0.01  Continued to work with PFPT last seen on 5/23/24 noting still better; still has more leakage when he drinks more water and is more active. Feels ready to continue with home exercise program independent and follow up in a few months.   Underwear with pad in it. Uses pad in it. 4 pads per day. Usually saturated but on a "good day" if just damp and sitting a while will change for comfort - good day 2-3 pads  Leakage improving, but still leaking. If a lot of work in yard etc more. Stays active. Does get distracted and goes hours without voiding  Pretty dry at night.  Early AM feels urge  Still on oxybutynin.  Did not set further recheck as above  Udip negative.  Ok at home  Exercises mwf kegels and pelvic floor exercises. Tues/thurs nothing no " "kegels  Improvement from depends, to briefs, Tinel underwear with pads.    Focused Physical Exam:    Vitals:    07/12/24 1012   BP: 134/81   Pulse: 69     Body mass index is 23.66 kg/m². Weight: 74.8 kg (164 lb 14.5 oz) Height: 5' 10" (177.8 cm)     Abdomen: Soft, non-tender, nondistended, no CVA tenderness, lap incisons cdi no hernia      Recent Results (from the past 336 hour(s))   Prostate Specific Antigen, Diagnostic    Collection Time: 07/05/24  7:42 AM   Result Value Ref Range    PSA Diagnostic <0.01 0.00 - 4.00 ng/mL   POCT Urinalysis(Instrument)    Collection Time: 07/12/24 10:20 AM   Result Value Ref Range    Color, POC UA Yellow Yellow, Straw, Colorless    Clarity, POC UA Clear Clear    Glucose, POC UA Negative Negative    Bilirubin, POC UA Negative Negative    Ketones, POC UA Negative Negative    Spec Grav POC UA 1.020 1.005 - 1.030    Blood, POC UA Negative Negative    pH, POC UA 5.0 5.0 - 8.0    Protein, POC UA Negative Negative    Urobilinogen, POC UA 0.2 <=1.0    Nitrite, POC UA Negative Negative    WBC, POC UA Negative Negative       ASSESSMENT   1. History of prostate cancer  POCT Urinalysis(Instrument)    Prostate Specific Antigen, Diagnostic          Plan    Overall doing well six-month status post robotic prostatectomy with undetectable PSA and no evidence of disease.  Continue to follow PSA every 3 months for year 1 then every 6 months out to year 5 then annually.  Did review all interim medical record with pelvic floor physical therapy noting his progress.  It has been about 2 months since he has seen them, and does feel improved but is still leaking.  His 2-4 pad per day incontinence is stress urinary incontinence with activity.  He is quite active.  Did again review the importance of timed voiding, especially when busy with other activities, as well as the importance of daily regimented Kegel exercises a few sets throughout the day.  Okay to alternate days of the full pelvic floor home " exercise regimen program.  Did recommend revisiting with them as recommended previously in May when he was set to do home exercise program and follow up in a few months.  No follow-up set so will message PF PT to re-evaluate and see if there are any further recommendations at this point in his recovery.  Certainly did discuss as long as he is continuing to improve this is good, and if is not dramatically improved by our next three-month visit can talk about further options.      Total time spent in/on encounter today, including face to face time with patient, counseling, medical record review, interpretation of tests/results, , and treatment plan coordination: 35 minutes    *Visit today included increased complexity associated with the current or anticipated ongoing medical longitudinal care and management related to this patient's serious and/or complex managed problem(s) as described above.

## 2024-07-21 ENCOUNTER — HOSPITAL ENCOUNTER (EMERGENCY)
Facility: HOSPITAL | Age: 69
Discharge: HOME | End: 2024-07-21
Attending: EMERGENCY MEDICINE
Payer: MEDICARE

## 2024-07-21 ENCOUNTER — NURSE TRIAGE (OUTPATIENT)
Dept: ADMINISTRATIVE | Facility: CLINIC | Age: 69
End: 2024-07-21
Payer: MEDICARE

## 2024-07-21 VITALS
RESPIRATION RATE: 14 BRPM | DIASTOLIC BLOOD PRESSURE: 84 MMHG | BODY MASS INDEX: 24.11 KG/M2 | WEIGHT: 168 LBS | TEMPERATURE: 98 F | HEART RATE: 64 BPM | OXYGEN SATURATION: 98 % | SYSTOLIC BLOOD PRESSURE: 142 MMHG

## 2024-07-21 DIAGNOSIS — R42 VERTIGO: Primary | ICD-10-CM

## 2024-07-21 LAB
ALBUMIN SERPL BCP-MCNC: 4.5 G/DL (ref 3.5–5.2)
ALP SERPL-CCNC: 48 U/L (ref 55–135)
ALT SERPL W/O P-5'-P-CCNC: 14 U/L (ref 10–44)
ANION GAP SERPL CALC-SCNC: 6 MMOL/L (ref 8–16)
AST SERPL-CCNC: 22 U/L (ref 10–40)
BASOPHILS # BLD AUTO: 0.07 K/UL (ref 0–0.2)
BASOPHILS NFR BLD: 0.9 % (ref 0–1.9)
BILIRUB SERPL-MCNC: 0.8 MG/DL (ref 0.1–1)
BNP SERPL-MCNC: 49 PG/ML (ref 0–99)
BUN SERPL-MCNC: 17 MG/DL (ref 8–23)
CALCIUM SERPL-MCNC: 9.2 MG/DL (ref 8.7–10.5)
CHLORIDE SERPL-SCNC: 104 MMOL/L (ref 95–110)
CO2 SERPL-SCNC: 28 MMOL/L (ref 23–29)
CREAT SERPL-MCNC: 0.8 MG/DL (ref 0.5–1.4)
DIFFERENTIAL METHOD BLD: ABNORMAL
EOSINOPHIL # BLD AUTO: 0 K/UL (ref 0–0.5)
EOSINOPHIL NFR BLD: 0.4 % (ref 0–8)
ERYTHROCYTE [DISTWIDTH] IN BLOOD BY AUTOMATED COUNT: 14.3 % (ref 11.5–14.5)
EST. GFR  (NO RACE VARIABLE): >60 ML/MIN/1.73 M^2
GLUCOSE SERPL-MCNC: 139 MG/DL (ref 70–110)
HCT VFR BLD AUTO: 43.3 % (ref 40–54)
HGB BLD-MCNC: 14.4 G/DL (ref 14–18)
IMM GRANULOCYTES # BLD AUTO: 0.03 K/UL (ref 0–0.04)
IMM GRANULOCYTES NFR BLD AUTO: 0.4 % (ref 0–0.5)
LYMPHOCYTES # BLD AUTO: 1.1 K/UL (ref 1–4.8)
LYMPHOCYTES NFR BLD: 14 % (ref 18–48)
MAGNESIUM SERPL-MCNC: 2.1 MG/DL (ref 1.6–2.6)
MCH RBC QN AUTO: 30.3 PG (ref 27–31)
MCHC RBC AUTO-ENTMCNC: 33.3 G/DL (ref 32–36)
MCV RBC AUTO: 91 FL (ref 82–98)
MONOCYTES # BLD AUTO: 0.4 K/UL (ref 0.3–1)
MONOCYTES NFR BLD: 4.3 % (ref 4–15)
NEUTROPHILS # BLD AUTO: 6.4 K/UL (ref 1.8–7.7)
NEUTROPHILS NFR BLD: 80 % (ref 38–73)
NRBC BLD-RTO: 0 /100 WBC
OHS QRS DURATION: 94 MS
OHS QTC CALCULATION: 426 MS
PLATELET # BLD AUTO: 198 K/UL (ref 150–450)
PMV BLD AUTO: 10.4 FL (ref 9.2–12.9)
POTASSIUM SERPL-SCNC: 3.7 MMOL/L (ref 3.5–5.1)
PROT SERPL-MCNC: 7.1 G/DL (ref 6–8.4)
RBC # BLD AUTO: 4.75 M/UL (ref 4.6–6.2)
SODIUM SERPL-SCNC: 138 MMOL/L (ref 136–145)
TROPONIN I SERPL HS-MCNC: 4.8 PG/ML (ref 0–14.9)
WBC # BLD AUTO: 8.05 K/UL (ref 3.9–12.7)

## 2024-07-21 PROCEDURE — 84484 ASSAY OF TROPONIN QUANT: CPT | Performed by: EMERGENCY MEDICINE

## 2024-07-21 PROCEDURE — 63600175 PHARM REV CODE 636 W HCPCS: Performed by: EMERGENCY MEDICINE

## 2024-07-21 PROCEDURE — 83880 ASSAY OF NATRIURETIC PEPTIDE: CPT | Performed by: EMERGENCY MEDICINE

## 2024-07-21 PROCEDURE — 96374 THER/PROPH/DIAG INJ IV PUSH: CPT

## 2024-07-21 PROCEDURE — 25000003 PHARM REV CODE 250: Performed by: EMERGENCY MEDICINE

## 2024-07-21 PROCEDURE — 85025 COMPLETE CBC W/AUTO DIFF WBC: CPT | Performed by: EMERGENCY MEDICINE

## 2024-07-21 PROCEDURE — 83735 ASSAY OF MAGNESIUM: CPT | Performed by: EMERGENCY MEDICINE

## 2024-07-21 PROCEDURE — 80053 COMPREHEN METABOLIC PANEL: CPT | Performed by: EMERGENCY MEDICINE

## 2024-07-21 PROCEDURE — 99285 EMERGENCY DEPT VISIT HI MDM: CPT | Mod: 25

## 2024-07-21 RX ORDER — MECLIZINE HYDROCHLORIDE 25 MG/1
25 TABLET ORAL 3 TIMES DAILY PRN
Qty: 20 TABLET | Refills: 0 | Status: SHIPPED | OUTPATIENT
Start: 2024-07-21

## 2024-07-21 RX ORDER — MECLIZINE HCL 12.5 MG 12.5 MG/1
25 TABLET ORAL
Status: COMPLETED | OUTPATIENT
Start: 2024-07-21 | End: 2024-07-21

## 2024-07-21 RX ORDER — ONDANSETRON HYDROCHLORIDE 2 MG/ML
4 INJECTION, SOLUTION INTRAVENOUS
Status: COMPLETED | OUTPATIENT
Start: 2024-07-21 | End: 2024-07-21

## 2024-07-21 RX ORDER — ONDANSETRON 4 MG/1
4 TABLET, FILM COATED ORAL EVERY 6 HOURS PRN
Qty: 20 TABLET | Refills: 1 | Status: SHIPPED | OUTPATIENT
Start: 2024-07-21 | End: 2025-07-21

## 2024-07-21 RX ADMIN — MECLIZINE 25 MG: 12.5 TABLET ORAL at 12:07

## 2024-07-21 RX ADMIN — ONDANSETRON 4 MG: 2 INJECTION INTRAMUSCULAR; INTRAVENOUS at 12:07

## 2024-07-21 NOTE — DISCHARGE INSTRUCTIONS
MRI of brain offered to you however at this time you do not want to get that so follow-up with your primary care provider for further testing and discussed with Dr. Kemp about recommendations about getting MRI of brain.  Return for worsening symptoms or any problems

## 2024-07-21 NOTE — ED PROVIDER NOTES
Encounter Date: 7/21/2024       History     Chief Complaint   Patient presents with    Hypertension    Nausea     68-year-old male presented emergency department with dizziness and nausea which started yesterday.  Patient checked his blood pressure and took his blood pressure medication this morning and blood pressure was high so came here.  Patient states the nausea and dizziness did improve.  Patient said in the past he had vertigo and this felt similar.  Patient denies any other complaints.  Denies chest pain or shortness of breath or dysuria or hematuria weakness or numbness or fever or chills.      Review of patient's allergies indicates:   Allergen Reactions    Adhesive Other (See Comments)     SKIN WAS RED     Past Medical History:   Diagnosis Date    Arthritis     Cancer 12/2023    PROSTATE    Chronic calculous cholecystitis 08/12/2019    COVID-19     Essential (primary) hypertension 03/08/2016    GERD (gastroesophageal reflux disease)      Past Surgical History:   Procedure Laterality Date    CHOLECYSTECTOMY      COLONOSCOPY  2016    Dr Saldaña- rtc 5 yr    ESOPHAGOGASTRODUODENOSCOPY N/A 6/6/2022    Procedure: EGD (ESOPHAGOGASTRODUODENOSCOPY);  Surgeon: Pj Syed MD;  Location: [facility];  Service: Endoscopy;  Laterality: N/A;    GALLBLADDER SURGERY  08/2019    KNEE SURGERY Right     x's3    LAPAROSCOPIC CHOLECYSTECTOMY N/A 8/12/2019    Procedure: CHOLECYSTECTOMY, LAPAROSCOPIC;  Surgeon: Alphonso Freeman III, MD;  Location: [facility] OR;  Service: General;  Laterality: N/A;    ROBOT-ASSISTED LAPAROSCOPIC PELVIC LYMPHADENECTOMY N/A 1/3/2024    Procedure: ROBOTIC LYMPHADENECTOMY, PELVIS;  Surgeon: Phil Werner MD;  Location: [facility] OR;  Service: Urology;  Laterality: N/A;    ROBOT-ASSISTED LAPAROSCOPIC PROSTATECTOMY N/A 1/3/2024    Procedure: ROBOTIC PROSTATECTOMY;  Surgeon: Phil Werner MD;  Location: [facility] OR;  Service: Urology;  Laterality: N/A;    UPPER GASTROINTESTINAL ENDOSCOPY        Family History   Problem Relation Name Age of Onset    Stroke Mother      No Known Problems Father      Ulcerative colitis Son      Colon cancer Neg Hx      Colon polyps Neg Hx      Crohn's disease Neg Hx      Esophageal cancer Neg Hx      Stomach cancer Neg Hx       Social History     Tobacco Use    Smoking status: Never    Smokeless tobacco: Never   Substance Use Topics    Alcohol use: Not Currently    Drug use: Never     Review of Systems   Constitutional: Negative.    HENT: Negative.     Eyes: Negative.    Respiratory: Negative.     Cardiovascular: Negative.    Gastrointestinal:  Positive for nausea.   Endocrine: Negative.    Genitourinary: Negative.    Skin: Negative.    Allergic/Immunologic: Negative.    Neurological:  Positive for dizziness.   Hematological: Negative.    Psychiatric/Behavioral: Negative.     All other systems reviewed and are negative.      Physical Exam     Initial Vitals   BP Pulse Resp Temp SpO2   07/21/24 1055 07/21/24 1055 07/21/24 1100 07/21/24 1101 07/21/24 1055   (!) 157/80 (!) 58 16 97.8 °F (36.6 °C) 97 %      MAP       --                Physical Exam    Nursing note and vitals reviewed.  Constitutional: He appears well-developed and well-nourished.   HENT:   Head: Normocephalic and atraumatic.   Nose: Nose normal.   Eyes: Conjunctivae and EOM are normal.   Neck: No tracheal deviation present.   Normal range of motion.  Cardiovascular:  Normal rate, regular rhythm, normal heart sounds and intact distal pulses.     Exam reveals no friction rub.       No murmur heard.  Pulmonary/Chest: Breath sounds normal. No respiratory distress. He has no wheezes. He has no rales.   Abdominal: Abdomen is soft. He exhibits no distension. There is no abdominal tenderness.   Musculoskeletal:         General: Normal range of motion.      Cervical back: Normal range of motion.     Neurological: He is alert and oriented to person, place, and time. He has normal strength. GCS score is 15. GCS eye  subscore is 4. GCS verbal subscore is 5. GCS motor subscore is 6.   Skin: Skin is warm and dry. Capillary refill takes less than 2 seconds.   Psychiatric: He has a normal mood and affect. Thought content normal.         ED Course   Procedures  Labs Reviewed   CBC W/ AUTO DIFFERENTIAL - Abnormal       Result Value    WBC 8.05      RBC 4.75      Hemoglobin 14.4      Hematocrit 43.3      MCV 91      MCH 30.3      MCHC 33.3      RDW 14.3      Platelets 198      MPV 10.4      Immature Granulocytes 0.4      Gran # (ANC) 6.4      Immature Grans (Abs) 0.03      Lymph # 1.1      Mono # 0.4      Eos # 0.0      Baso # 0.07      nRBC 0      Gran % 80.0 (*)     Lymph % 14.0 (*)     Mono % 4.3      Eosinophil % 0.4      Basophil % 0.9      Differential Method Automated     COMPREHENSIVE METABOLIC PANEL - Abnormal    Sodium 138      Potassium 3.7      Chloride 104      CO2 28      Glucose 139 (*)     BUN 17      Creatinine 0.8      Calcium 9.2      Total Protein 7.1      Albumin 4.5      Total Bilirubin 0.8      Alkaline Phosphatase 48 (*)     AST 22      ALT 14      eGFR >60.0      Anion Gap 6 (*)    MAGNESIUM    Magnesium 2.1     TROPONIN I HIGH SENSITIVITY    Troponin I High Sensitivity 4.8     B-TYPE NATRIURETIC PEPTIDE    BNP 49     TROPONIN I HIGH SENSITIVITY     EKG Readings: (Independently Interpreted)   Rhythm: Normal Sinus Rhythm. Ectopy: No Ectopy. Conduction: Normal. ST Segments: Normal ST Segments. T Waves: Normal. Clinical Impression: Normal Sinus Rhythm     ECG Results              EKG 12-lead (In process)        Collection Time Result Time QRS Duration OHS QTC Calculation    07/21/24 10:55:13 07/21/24 11:42:41 94 426                     In process by Interface, Lab In [facility] (07/21/24 11:42:45)                   Narrative:    Test Reason : R07.9,    Vent. Rate : 057 BPM     Atrial Rate : 057 BPM     P-R Int : 200 ms          QRS Dur : 094 ms      QT Int : 438 ms       P-R-T Axes : 056 012 053 degrees     QTc Int :  426 ms    Sinus bradycardia  Otherwise normal ECG  When compared with ECG of 07-DEC-2023 15:09,  Borderline criteria for Inferior infarct are no longer Present    Referred By: [email or web address]   SELF           Confirmed By:                                   Imaging Results              X-Ray Chest AP Portable (Final result)  Result time 07/21/24 12:00:32      Final result by Cely Singh MD (07/21/24 12:00:32)                   Impression:      No acute cardiopulmonary abnormality.      Electronically signed by: Cely Singh  Date:    07/21/2024  Time:    12:00               Narrative:    EXAMINATION:  XR CHEST AP PORTABLE    CLINICAL HISTORY:  Chest Pain;    FINDINGS:  Portable chest at 11:33 is compared to 12/22/2023 shows normal cardiomediastinal silhouette.    Lungs are clear. Pulmonary vasculature is normal. No acute osseous abnormality.                                       Medications   meclizine tablet 25 mg (25 mg Oral Given 7/21/24 1218)   ondansetron injection 4 mg (4 mg Intravenous Given 7/21/24 1218)     Medical Decision Making  68-year-old male with elevated blood pressure.  Patient had symptoms of vertigo.  Given patient's age plan was to do MRI of the brain however patient did not want to do an MRI and patient said he this is similar to previous episode of vertigo and will follow-up with his primary care provider Dr. Kemp and will get testing as needed and patient did have improvement of symptoms and currently is neurologically intact.  Patient does not have any cerebellar signs at this time and patient's symptoms also almost resolved.  Patient does not have any difficulty with his coordination and has normal gait and normal neurologic exam.  Patient refusing MRI at this time.  Will treat for patient's symptoms and blood pressure is stable.  Screening labs and workup done and plan is to discharged with instructions and follow-up with primary care provider for further testing    Amount and/or  Complexity of Data Reviewed  Labs: ordered. Decision-making details documented in ED Course.  Radiology: ordered. Decision-making details documented in ED Course.  ECG/medicine tests: ordered and independent interpretation performed. Decision-making details documented in ED Course.  Discussion of management or test interpretation with external provider(s): Patient had complete resolution of symptoms and blood pressure normalized and patient is hemodynamically stable.  Refusing MRI brain.  Patient to follow-up with primary care provider for further testing as needed.  Discharged with return precautions and follow-up    Risk  Prescription drug management.                                      Clinical Impression:  Final diagnoses:  [R42] Vertigo (Primary)          ED Disposition Condition    Discharge Stable          ED Prescriptions       Medication Sig Dispense Start Date End Date Auth. Provider    meclizine (ANTIVERT) 25 mg tablet Take 1 tablet (25 mg total) by mouth 3 (three) times daily as needed. 20 tablet 7/21/2024 -- Cortez Weldon MD    ondansetron (ZOFRAN) 4 MG tablet Take 1 tablet (4 mg total) by mouth every 6 (six) hours as needed. 20 tablet 7/21/2024 7/21/2025 Cortez Weldon MD          Follow-up Information       Follow up With Specialties Details Why Contact Info    Phil Kemp MD Family Medicine, Home Health Services, Hospice Services In 2 days  [address]  [address]  [facility] [address]  [phone]               Cortez Weldon MD  07/21/24 3954

## 2024-07-22 ENCOUNTER — PATIENT MESSAGE (OUTPATIENT)
Dept: FAMILY MEDICINE | Facility: CLINIC | Age: 69
End: 2024-07-22
Payer: MEDICARE

## 2024-07-22 ENCOUNTER — TELEPHONE (OUTPATIENT)
Dept: FAMILY MEDICINE | Facility: CLINIC | Age: 69
End: 2024-07-22
Payer: MEDICARE

## 2024-07-22 NOTE — TELEPHONE ENCOUNTER
Pt calling regarding medication compatibility question. He wants to make sure he can take tylenol/ibuprofen with the medications from the hospital today. He will call pharmacy in this regard. I have also advised he call for further triage if needed.    Reason for Disposition   [1] Caller has medicine question about med NOT prescribed by PCP AND [2] triager unable to answer question (e.g., compatibility with other med, storage)    Protocols used: Medication Question Call-A-AH

## 2024-07-22 NOTE — TELEPHONE ENCOUNTER
----- Message from Tania Tena sent at 7/22/2024  8:30 AM CDT -----  Wife Saba calling about pt being in [facility] was discharged yesterday. Pt needing a [facility] f/u   [phone]

## 2024-07-22 NOTE — TELEPHONE ENCOUNTER
BPPV, has meclizine and zofran as needed from the ER. I usually recommend meclizine q4-6 hours for a few days and then back off to prn. Maybe can get him in to f/u with Janell or someone with availability if he still feels bad

## 2024-08-11 ENCOUNTER — ON-DEMAND VIRTUAL (OUTPATIENT)
Dept: URGENT CARE | Facility: CLINIC | Age: 69
End: 2024-08-11
Payer: MEDICARE

## 2024-08-11 ENCOUNTER — NURSE TRIAGE (OUTPATIENT)
Dept: ADMINISTRATIVE | Facility: CLINIC | Age: 69
End: 2024-08-11
Payer: MEDICARE

## 2024-08-11 DIAGNOSIS — U07.1 COVID-19: Primary | ICD-10-CM

## 2024-08-11 PROCEDURE — 99213 OFFICE O/P EST LOW 20 MIN: CPT | Mod: 95,,, | Performed by: NURSE PRACTITIONER

## 2024-08-11 RX ORDER — BENZONATATE 100 MG/1
100 CAPSULE ORAL 3 TIMES DAILY PRN
Qty: 30 CAPSULE | Refills: 0 | Status: SHIPPED | OUTPATIENT
Start: 2024-08-11 | End: 2024-08-21

## 2024-08-11 RX ORDER — AZELASTINE 1 MG/ML
1 SPRAY, METERED NASAL 2 TIMES DAILY
Qty: 30 ML | Refills: 0 | Status: SHIPPED | OUTPATIENT
Start: 2024-08-11

## 2024-08-11 NOTE — TELEPHONE ENCOUNTER
Patient states c/o sinus symptoms,  nasal congestion, sneezing and fever of 101.1 Patient states onset of symptoms on yesterday, 8/10/24 and his home Covid-19 test result was positive on today, 8/11/24. Patient denies chest pain, SOB and states his O2 saturations are 99%.    Care Advice given per Coronavirus (Covid-19) Diagnosed or Suspected. Patient advised to Visit an Urgent Care Center or schedule an [facility] on Demand Virtual Visit within Four (4) Hours. Patient also advised to contact the [facility] on Call Service for any worsening symptoms. Patient states understanding of care advice and accepted [facility] on Demand Virtual Visit.     Reason for Disposition   [1] Fever > 101 F (38.3 C) AND [2] age > 60 years    Additional Information   Negative: SEVERE difficulty breathing (e.g., struggling for each breath, speaks in single words)   Negative: Difficult to awaken or acting confused (e.g., disoriented, slurred speech)   Negative: Bluish (or gray) lips or face now   Negative: Shock suspected (e.g., cold/pale/clammy skin, too weak to stand, low BP, rapid pulse)   Negative: Sounds like a life-threatening emergency to the triager   Negative: [1] Diagnosed or suspected COVID-19 AND [2] symptoms lasting 3 or more weeks   Negative: [1] COVID-19 exposure AND [2] no symptoms   Negative: COVID-19 vaccine reaction suspected (e.g., fever, headache, muscle aches) occurring 1 to 3 days after getting vaccine   Negative: COVID-19 vaccine, questions about   Negative: [1] Lives with someone known to have influenza (flu test positive) AND [2] flu-like symptoms (e.g., cough, runny nose, sore throat, SOB; with or without fever)   Negative: [1] Possible COVID-19 symptoms AND [2] triager concerned about severity of symptoms or other causes   Negative: COVID-19 and breastfeeding, questions about   Negative: SEVERE or constant chest pain or pressure  (Exception: Mild central chest pain, present only when coughing.)   Negative: MODERATE  difficulty breathing (e.g., speaks in phrases, SOB even at rest, pulse 100-120)   Negative: [1] Headache AND [2] stiff neck (can't touch chin to chest)   Negative: Oxygen level (e.g., pulse oximetry) 90 percent or lower   Negative: Chest pain or pressure  (Exception: MILD central chest pain, present only when coughing.)   Negative: [1] Drinking very little AND [2] dehydration suspected (e.g., no urine > 12 hours, very dry mouth, very lightheaded)   Negative: Patient sounds very sick or weak to the triager   Negative: MILD difficulty breathing (e.g., minimal/no SOB at rest, SOB with walking, pulse <100)   Negative: Fever > 103 F (39.4 C)    Protocols used: Coronavirus (COVID-19) Diagnosed or [id number]

## 2024-08-11 NOTE — PROGRESS NOTES
Subjective:      Patient ID: Ashish Dave is a 68 y.o. male.    Vitals:  vitals were not taken for this visit.     Chief Complaint: URI (Nasal congestion)      Visit Type: TELE AUDIOVISUAL    Present with the patient at the time of consultation: TELEMED PRESENT WITH PATIENT: None        Past Medical History:   Diagnosis Date    Arthritis     Cancer 12/2023    PROSTATE    Chronic calculous cholecystitis 08/12/2019    COVID-19     Essential (primary) hypertension 03/08/2016    GERD (gastroesophageal reflux disease)      Past Surgical History:   Procedure Laterality Date    CHOLECYSTECTOMY      COLONOSCOPY  2016    Dr Schaffer rtvandana 4 yr    ESOPHAGOGASTRODUODENOSCOPY N/A 6/6/2022    Procedure: EGD (ESOPHAGOGASTRODUODENOSCOPY);  Surgeon: Pj Syed MD;  Location: [facility];  Service: Endoscopy;  Laterality: N/A;    GALLBLADDER SURGERY  08/2019    KNEE SURGERY Right     x's3    LAPAROSCOPIC CHOLECYSTECTOMY N/A 8/12/2019    Procedure: CHOLECYSTECTOMY, LAPAROSCOPIC;  Surgeon: Alphonso Freeman III, MD;  Location: [facility];  Service: General;  Laterality: N/A;    ROBOT-ASSISTED LAPAROSCOPIC PELVIC LYMPHADENECTOMY N/A 1/3/2024    Procedure: ROBOTIC LYMPHADENECTOMY, PELVIS;  Surgeon: Phil Werner MD;  Location: [facility] OR;  Service: Urology;  Laterality: N/A;    ROBOT-ASSISTED LAPAROSCOPIC PROSTATECTOMY N/A 1/3/2024    Procedure: ROBOTIC PROSTATECTOMY;  Surgeon: Phil Werner MD;  Location: [facility] OR;  Service: Urology;  Laterality: N/A;    UPPER GASTROINTESTINAL ENDOSCOPY       Review of patient's allergies indicates:   Allergen Reactions    Adhesive Other (See Comments)     SKIN WAS RED     Current Outpatient Medications on File Prior to Visit   Medication Sig Dispense Refill    acetaminophen (TYLENOL) 325 MG tablet Take 2 tablets (650 mg total) by mouth every 6 (six) hours as needed (alternating with motrin/ibuprofen 400-600 every 6 hrs so something avail aevery 3 if needed).  0    ASCORBIC ACID, VITAMIN  C, ORAL Take 4,000 mg by mouth 2 (two) times a day.      docusate sodium (COLACE) 100 MG capsule Take 1 capsule (100 mg total) by mouth 2 (two) times daily.  0    meclizine (ANTIVERT) 25 mg tablet Take 1 tablet (25 mg total) by mouth 3 (three) times daily as needed. 20 tablet 0    ondansetron (ZOFRAN) 4 MG tablet Take 1 tablet (4 mg total) by mouth every 6 (six) hours as needed. 20 tablet 1    oxybutynin (DITROPAN-XL) 10 MG 24 hr tablet Take 1 tablet (10 mg total) by mouth once daily. 30 tablet 11    sulfamethoxazole-trimethoprim 800-160mg (BACTRIM DS) 800-160 mg Tab Take 1 tablet by mouth 2 (two) times daily. 14 tablet 0    vitamin D (VITAMIN D3) 1000 units Tab Take 4,000 Units by mouth once daily.       No current facility-administered medications on file prior to visit.     Family History   Problem Relation Name Age of Onset    Stroke Mother      No Known Problems Father      Ulcerative colitis Son      Colon cancer Neg Hx      Colon polyps Neg Hx      Crohn's disease Neg Hx      Esophageal cancer Neg Hx      Stomach cancer Neg Hx         Medications Ordered                [facility] [address] [place] [address] - [address]   [address] [facility] [address]    Telephone: [phone]   Fax: [phone]   Hours: Not open 24 hours                         E-Prescribed (2 of 2)              azelastine (ASTELIN) 137 mcg (0.1 %) nasal spray    Si spray (137 mcg total) by Nasal route 2 (two) times daily.       Start: 24     Quantity: 30 mL Refills: 0                         benzonatate (TESSALON) 100 MG capsule    Sig: Take 1 capsule (100 mg total) by mouth 3 (three) times daily as needed for Cough.       Start: 24     Quantity: 30 capsule Refills: 0                           Ohs Peq Odvv Intake    2024  5:01 PM CDT - Filed by Patient   What is your current physical address in the event of a medical emergency? [address] [place] [address] [place] [address]   Are you able to take your vital signs?  Yes   Systolic Blood Pressure: 161   Diastolic Blood Pressure: 92   Weight: 175   Height: 69   Pulse: 98   Temperature: 101.2   Respiration rate: 99   Pulse Oxygen: 95   Please attach any relevant images or files          69 yo male with c/o fever, congestion, and cough. He states positive post nasal drip. He states nasal congestion, sneezing. He denies sob and wheezing. He states he took a covid test and it was positive. He states he did take tylenol and allegra . He states positive chills. Denies hx of kidney disease. He states blood pressure is running high         Constitution: Positive for generalized weakness. Negative for fever.   HENT:  Positive for congestion and postnasal drip. Negative for sore throat.    Cardiovascular:  Negative for sob on exertion.   Respiratory:  Positive for cough and sputum production. Negative for shortness of breath and wheezing.    Allergic/Immunologic: Positive for sneezing.   Neurological:  Negative for headaches.        Objective:   The physical exam was conducted virtually.  LOCATION OF PATIENT home  Physical Exam   Constitutional: He is oriented to person, place, and time. He appears well-developed.   HENT:   Head: Normocephalic and atraumatic.   Ears:   Right Ear: Hearing, tympanic membrane and external ear normal.   Left Ear: Hearing, tympanic membrane and external ear normal.   Nose: Congestion present.   Mouth/Throat: Uvula is midline, oropharynx is clear and moist and mucous membranes are normal.   Eyes: Conjunctivae and EOM are normal. Pupils are equal, round, and reactive to light.   Neck: Neck supple.   Cardiovascular: Normal rate.   Pulmonary/Chest: Effort normal and breath sounds normal.   Musculoskeletal: Normal range of motion.         General: Normal range of motion.   Neurological: He is alert and oriented to person, place, and time.   Skin: Skin is warm.   Psychiatric: His behavior is normal. Thought content normal.   Nursing note and vitals  reviewed.      Assessment:     1. COVID-19        Plan:     Last creatinine 0.8    Patient declined paxlovid.         The CDC has recently changed guideline to include patient that are Covid-19 positive should stay home until theyve been fever-free without medication for at least 24 hours and their symptoms have been improving for 24 hours.      Important home care recommendations include: monitor your symptoms, if you have trouble breathing please go to the ER. Stay in a separate room from other household members, if possible.  Use a separate bathroom, if possible.  Avoid contact with other members of the household and pets.  Don't share personal household items, like cups, towels, and utensils.  Wear a mask when around other people if able.  Please refer to CDC's websit for more information about what to do if you you're sick and how to notify your contacts.    Stay home except to get medical care. Most people with COVID-19 have mild illness and can recover at home without medical care. Do not leave your home, except to get medical care. Do not visit public areas. Get rest and stay hydrated. Call before you get medical care. Be sure to get care if you have trouble breathing, or have any other emergency warning signs, or if you think it is an emergency.  Avoid public transportation, ride-sharing, or taxis.  Separate yourself from other people.  As much as possible, stay in a specific room and away from other people and pets in your home. If possible, you should use a separate bathroom. If you need to be around other people or animals in or outside of the home, wear a mask.     If someone is showing any of these signs, seek emergency medical care immediately:  Trouble breathing  Persistent pain or pressure in the chest  New confusion  Inability to wake or stay awake  Pale, gray, or blue-colored skin, lips, or nail beds, depending on skin tone  *This list is not all possible symptoms. Please call your medical  provider for any other symptoms that are severe or concerning to you.    Call ahead before visiting your doctor  Call ahead. Many medical visits for routine care are being postponed or done by phone or telemedicine.  If you have a medical appointment that cannot be postponed, call your doctors office, and tell them you have or may have COVID-19. This will help the office protect themselves and other patients.    You dont need to wear the mask if you are alone. If you cant put on a mask (because of trouble breathing, for example), cover your coughs and sneezes in some other way. Try to stay at least 6 feet away from other people. This will help protect the people around you.  Masks should not be placed on young children under age 2 years, anyone who has trouble breathing, or anyone who is not able to remove the mask without help.    Cover your coughs and sneezes  Cover your mouth and nose with a tissue when you cough or sneeze.  Throw away used tissues in a lined trash can.  Immediately wash your hands with soap and water for at least 20 seconds. If soap and water are not available, clean your hands with an alcohol-based hand  that contains at least 60% alcohol.  hands wash light icon  Clean your hands often  Wash your hands often with soap and water for at least 20 seconds. This is especially important after blowing your nose, coughing, or sneezing; going to the bathroom; and before eating or preparing food.  Use hand  if soap and water are not available. Use an alcohol-based hand  with at least 60% alcohol, covering all surfaces of your hands and rubbing them together until they feel dry.  Soap and water are the best option, especially if hands are visibly dirty.  Avoid touching your eyes, nose, and mouth with unwashed hands.  Handwashing Tips  Avoid sharing personal household items  Do not share dishes, drinking glasses, cups, eating utensils, towels, or bedding with other people in  your home.  Wash these items thoroughly after using them with soap and water or put in the .  spraybottle icon  Clean all high-touch surfaces everyday  Clean and disinfect high-touch surfaces in your sick room and bathroom; wear disposable gloves. Let someone else clean and disinfect surfaces in common areas, but you should clean your bedroom and bathroom, if possible.  If a caregiver or other person needs to clean and disinfect a sick persons bedroom or bathroom, they should do so on an as-needed basis. The caregiver/other person should wear a mask and disposable gloves prior to cleaning. They should wait as long as possible after the person who is sick has used the bathroom before coming in to clean and use the bathroom.  High-touch surfaces include phones, remote controls, counters, tabletops, doorknobs, bathroom fixtures, toilets, keyboards, tablets, and bedside tables.    Clean and disinfect areas that may have blood, stool, or body fluids on them.  Use household  and disinfectants. Clean the area or item with soap and water or another detergent if it is dirty. Then, use a household disinfectant.  Be sure to follow the instructions on the label to ensure safe and effective use of the product. Many products recommend keeping the surface wet for several minutes to ensure germs are killed. Many also recommend precautions such as wearing gloves and making sure you have good ventilation during use of the product.  Use a product from EPAs List N: Disinfectants for Coronavirus (COVID-19)external icon.  Complete Disinfection Guidance      PLEASE READ YOUR DISCHARGE INSTRUCTIONS ENTIRELY AS IT CONTAINS IMPORTANT INFORMATION.    Please drink plenty of fluids.    Please get plenty of rest.    If not allergic, please take over the counter Tylenol (Acetaminophen) and/or Motrin (Ibuprofen) as directed for control of muscle aches, pain, and/or fever.    Please go to the Emergency Department for any  shortness of breath or difficulty breathing.    For congestion, runny nose, or post nasal drip please take an over the counter antihistamine medication (Claritin/Zyrtec/Allegra) of your choice as directed or try an over the counter decongestant like Sudafed. You buy this behind the pharmacy counter.  You can also buy combination OTC antihistamine plus decongestant medications such at Zyrtec-D or Claritin-D.  Do not take decongestant if you suffer from high blood pressure.    For cough, you may be prescribed medication.  Take as prescribed.  If you were not prescribed any medication, you can use over the counter cough medications such as Robitussin.  If you have chest congestion you can consider using over the counter Mucinex but make sure you drink plenty of water to encourage mobilization of the secretions.    If you do have high blood pressure or palpitations, it is safe to take Coricidin HBP for relief of sinus symptoms.    Sore throat recommendations: Warm fluids (tea with honey, soup, broth), warm salt water gargles, throat lozenges, rest, hydration.     If you  smoke, please stop smoking.    You must understand that you've received an Urgent Care treatment only and that you may be released before all of your medical problems are known or treated. You, the patient, will arrange for follow up as instructed. If your symptoms worsen or fail to improve you should go to the Emergency Room.    COVID-19  -     benzonatate (TESSALON) 100 MG capsule; Take 1 capsule (100 mg total) by mouth 3 (three) times daily as needed for Cough.  Dispense: 30 capsule; Refill: 0  -     azelastine (ASTELIN) 137 mcg (0.1 %) nasal spray; 1 spray (137 mcg total) by Nasal route 2 (two) times daily.  Dispense: 30 mL; Refill: 0

## 2024-08-13 ENCOUNTER — TELEPHONE (OUTPATIENT)
Dept: FAMILY MEDICINE | Facility: CLINIC | Age: 69
End: 2024-08-13
Payer: MEDICARE

## 2024-08-13 NOTE — TELEPHONE ENCOUNTER
----- Message from Tania Tena sent at 8/13/2024  3:25 PM CDT -----  Pt tested positive for COVID with an at home test on Sunday evening.   [phone]

## 2024-09-04 ENCOUNTER — TELEPHONE (OUTPATIENT)
Dept: FAMILY MEDICINE | Facility: CLINIC | Age: 69
End: 2024-09-04
Payer: MEDICARE

## 2024-09-04 NOTE — TELEPHONE ENCOUNTER
----- Message from Tania Tena sent at 9/4/2024  3:42 PM CDT -----  Pt would like to know I he needs blood work done before his appointment.   [phone]

## 2024-09-17 ENCOUNTER — OFFICE VISIT (OUTPATIENT)
Dept: FAMILY MEDICINE | Facility: CLINIC | Age: 69
End: 2024-09-17
Payer: MEDICARE

## 2024-09-17 VITALS
DIASTOLIC BLOOD PRESSURE: 70 MMHG | SYSTOLIC BLOOD PRESSURE: 110 MMHG | WEIGHT: 175 LBS | BODY MASS INDEX: 25.05 KG/M2 | HEART RATE: 78 BPM | HEIGHT: 70 IN

## 2024-09-17 DIAGNOSIS — D09.8 CARCINOMA IN SITU OF OTHER SPECIFIED SITES: ICD-10-CM

## 2024-09-17 DIAGNOSIS — R79.9 ABNORMAL FINDING OF BLOOD CHEMISTRY, UNSPECIFIED: ICD-10-CM

## 2024-09-17 DIAGNOSIS — C61 PROSTATE CANCER: ICD-10-CM

## 2024-09-17 DIAGNOSIS — Z90.79 HISTORY OF ROBOT-ASSISTED LAPAROSCOPIC RADICAL PROSTATECTOMY: Primary | ICD-10-CM

## 2024-09-17 DIAGNOSIS — N39.3 MALE URINARY STRESS INCONTINENCE: ICD-10-CM

## 2024-09-17 DIAGNOSIS — M17.0 PRIMARY OSTEOARTHRITIS OF BOTH KNEES: ICD-10-CM

## 2024-09-17 DIAGNOSIS — Z77.090 ASBESTOS EXPOSURE: ICD-10-CM

## 2024-09-17 DIAGNOSIS — M62.89 PELVIC FLOOR DYSFUNCTION: ICD-10-CM

## 2024-09-17 DIAGNOSIS — M12.812 ROTATOR CUFF ARTHROPATHY OF BOTH SHOULDERS: ICD-10-CM

## 2024-09-17 DIAGNOSIS — M12.811 ROTATOR CUFF ARTHROPATHY OF BOTH SHOULDERS: ICD-10-CM

## 2024-09-17 DIAGNOSIS — K31.7 MULTIPLE GASTRIC POLYPS: ICD-10-CM

## 2024-09-17 PROCEDURE — 99214 OFFICE O/P EST MOD 30 MIN: CPT | Mod: S$GLB,,, | Performed by: FAMILY MEDICINE

## 2024-09-17 NOTE — PROGRESS NOTES
SUBJECTIVE:    Patient ID: Ashish Dave is a 68 y.o. male.    Chief Complaint: Joint Pain (Multiple joints pain, no bottles, abc )    68-year-old male here for six-month checkup.  He was positive for COVID virus in June of 2024.  His symptoms include sinus congestion slight cough and slight fatigue.  It resolved spontaneously.    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    Osteoarthritis involves his shoulders and his knees.  He has most significant pain in the right knee.  He is able to walk 20 minutes in the neighborhood or 1 mi.  He rides a bike also    2021-colonoscopy with Dr. [facility] 5 years    January 20, 2024, radical prostatectomy and lymph node dissection with Dr. Werner, discovered to have Reva 7 prostate cancer.  Now PSA is undetectable, postoperatively he has a erectile dysfunction and some urinary incontinence.  He is leaking a little and wears a pad daily.  Oxybutynin XL 10 mg daily help the symptoms.  He was told not to anticipate getting any Viagra until 1 year post surgery.    He declines all vaccines offered    Joint Pain  Associated symptoms include arthralgias (Knees and shoulders are achy). Pertinent negatives include no abdominal pain, chest pain, chills, coughing, fatigue, fever, headaches, joint swelling, myalgias, nausea, numbness or vomiting.       Admission on 07/21/2024, Discharged on 07/21/2024   Component Date Value Ref Range Status    Magnesium 07/21/2024 2.1  1.6 - 2.6 mg/dL Final    QRS Duration 07/21/2024 94  ms Final    [facility] QTC Calculation 07/21/2024 426  ms Final    WBC 07/21/2024 8.05  3.90 - 12.70 K/uL Final    RBC 07/21/2024 4.75  4.60 - 6.20 M/uL Final    Hemoglobin 07/21/2024 14.4  14.0 - 18.0 g/dL Final    Hematocrit 07/21/2024 43.3  40.0 - 54.0 % Final    MCV 07/21/2024 91  82 - 98 fL Final    MCH 07/21/2024 30.3  27.0 - 31.0 pg Final    MCHC 07/21/2024 33.3  32.0 - 36.0 g/dL Final    RDW 07/21/2024 14.3  11.5 - 14.5 % Final    Platelets 07/21/2024  198  150 - 450 K/uL Final    MPV 07/21/2024 10.4  9.2 - 12.9 fL Final    Immature Granulocytes 07/21/2024 0.4  0.0 - 0.5 % Final    Gran # (ANC) 07/21/2024 6.4  1.8 - 7.7 K/uL Final    Immature Grans (Abs) 07/21/2024 0.03  0.00 - 0.04 K/uL Final    Lymph # 07/21/2024 1.1  1.0 - 4.8 K/uL Final    Mono # 07/21/2024 0.4  0.3 - 1.0 K/uL Final    Eos # 07/21/2024 0.0  0.0 - 0.5 K/uL Final    Baso # 07/21/2024 0.07  0.00 - 0.20 K/uL Final    nRBC 07/21/2024 0  0 /100 WBC Final    Gran % 07/21/2024 80.0 (H)  38.0 - 73.0 % Final    Lymph % 07/21/2024 14.0 (L)  18.0 - 48.0 % Final    Mono % 07/21/2024 4.3  4.0 - 15.0 % Final    Eosinophil % 07/21/2024 0.4  0.0 - 8.0 % Final    Basophil % 07/21/2024 0.9  0.0 - 1.9 % Final    Differential Method 07/21/2024 Automated   Final    Sodium 07/21/2024 138  136 - 145 mmol/L Final    Potassium 07/21/2024 3.7  3.5 - 5.1 mmol/L Final    Chloride 07/21/2024 104  95 - 110 mmol/L Final    CO2 07/21/2024 28  23 - 29 mmol/L Final    Glucose 07/21/2024 139 (H)  70 - 110 mg/dL Final    BUN 07/21/2024 17  8 - 23 mg/dL Final    Creatinine 07/21/2024 0.8  0.5 - 1.4 mg/dL Final    Calcium 07/21/2024 9.2  8.7 - 10.5 mg/dL Final    Total Protein 07/21/2024 7.1  6.0 - 8.4 g/dL Final    Albumin 07/21/2024 4.5  3.5 - 5.2 g/dL Final    Total Bilirubin 07/21/2024 0.8  0.1 - 1.0 mg/dL Final    Alkaline Phosphatase 07/21/2024 48 (L)  55 - 135 U/L Final    AST 07/21/2024 22  10 - 40 U/L Final    ALT 07/21/2024 14  10 - 44 U/L Final    eGFR 07/21/2024 >60.0  >60 mL/min/1.73 m^2 Final    Anion Gap 07/21/2024 6 (L)  8 - 16 mmol/L Final    Troponin I High Sensitivity 07/21/2024 4.8  0.0 - 14.9 pg/mL Final    BNP 07/21/2024 49  0 - 99 pg/mL Final   Office Visit on 07/12/2024   Component Date Value Ref Range Status    Color, POC UA 07/12/2024 Yellow  Yellow, Straw, Colorless Final    Clarity, POC UA 07/12/2024 Clear  Clear Final    Glucose, POC UA 07/12/2024 Negative  Negative Final    Bilirubin, POC UA 07/12/2024  Negative  Negative Final    Ketones, POC UA 07/12/2024 Negative  Negative Final    Spec Grav POC UA 07/12/2024 1.020  1.005 - 1.030 Final    Blood, POC UA 07/12/2024 Negative  Negative Final    pH, POC UA 07/12/2024 5.0  5.0 - 8.0 Final    Protein, POC UA 07/12/2024 Negative  Negative Final    Urobilinogen, POC UA 07/12/2024 0.2  <=1.0 Final    Nitrite, POC UA 07/12/2024 Negative  Negative Final    WBC, POC UA 07/12/2024 Negative  Negative Final   Lab Visit on 07/05/2024   Component Date Value Ref Range Status    PSA Diagnostic 07/05/2024 <0.01  0.00 - 4.00 ng/mL Final   Office Visit on 04/05/2024   Component Date Value Ref Range Status    Color, POC UA 04/05/2024 Yellow  Yellow, Straw, Colorless Final    Clarity, POC UA 04/05/2024 Clear  Clear Final    Glucose, POC UA 04/05/2024 Negative  Negative Final    Bilirubin, POC UA 04/05/2024 Negative  Negative Final    Ketones, POC UA 04/05/2024 Negative  Negative Final    Spec Grav POC UA 04/05/2024 1.025  1.005 - 1.030 Final    Blood, POC UA 04/05/2024 Negative  Negative Final    pH, POC UA 04/05/2024 5.5  5.0 - 8.0 Final    Protein, POC UA 04/05/2024 Negative  Negative Final    Urobilinogen, POC UA 04/05/2024 0.2  <=1.0 Final    Nitrite, POC UA 04/05/2024 Negative  Negative Final    WBC, POC UA 04/05/2024 Negative  Negative Final    POC Residual Urine Volume 04/05/2024 0  0 - 100 mL Final   Lab Visit on 03/28/2024   Component Date Value Ref Range Status    PSA Diagnostic 03/28/2024 <0.01  0.00 - 4.00 ng/mL Final       Past Medical History:   Diagnosis Date    Arthritis     Cancer 12/2023    PROSTATE    Chronic calculous cholecystitis 08/12/2019    COVID-19     Essential (primary) hypertension 03/08/2016    GERD (gastroesophageal reflux disease)      Social History     Socioeconomic History    Marital status:    Tobacco Use    Smoking status: Never    Smokeless tobacco: Never   Substance and Sexual Activity    Alcohol use: Not Currently    Drug use: Never      Social Determinants of Health     Financial Resource Strain: Low Risk  (11/11/2023)    Overall Financial Resource Strain (CARDIA)     Difficulty of Paying Living Expenses: Not hard at all   Food Insecurity: No Food Insecurity (11/11/2023)    Hunger Vital Sign     Worried About Running Out of Food in the Last Year: Never true     Ran Out of Food in the Last Year: Never true   Transportation Needs: No Transportation Needs (11/11/2023)    PRAPARE - Transportation     Lack of Transportation (Medical): No     Lack of Transportation (Non-Medical): No   Physical Activity: Insufficiently Active (11/11/2023)    Exercise Vital Sign     Days of Exercise per Week: 3 days     Minutes of Exercise per Session: 30 min   Stress: No Stress Concern Present (11/11/2023)    [employer] - Occupational Stress Questionnaire     Feeling of Stress : Only a little   Housing Stability: Low Risk  (11/11/2023)    Housing Stability Vital Sign     Unable to Pay for Housing in the Last Year: No     Number of Places Lived in the Last Year: 1     Unstable Housing in the Last Year: No     Past Surgical History:   Procedure Laterality Date    CHOLECYSTECTOMY      COLONOSCOPY  2016    Dr Saldaña- rtc 5 yr    ESOPHAGOGASTRODUODENOSCOPY N/A 6/6/2022    Procedure: EGD (ESOPHAGOGASTRODUODENOSCOPY);  Surgeon: Pj Syed MD;  Location: [facility];  Service: Endoscopy;  Laterality: N/A;    GALLBLADDER SURGERY  08/2019    KNEE SURGERY Right     x's3    LAPAROSCOPIC CHOLECYSTECTOMY N/A 8/12/2019    Procedure: CHOLECYSTECTOMY, LAPAROSCOPIC;  Surgeon: Alphonso Freeman III, MD;  Location: [facility] OR;  Service: General;  Laterality: N/A;    ROBOT-ASSISTED LAPAROSCOPIC PELVIC LYMPHADENECTOMY N/A 1/3/2024    Procedure: ROBOTIC LYMPHADENECTOMY, PELVIS;  Surgeon: Phil Werner MD;  Location: [facility];  Service: Urology;  Laterality: N/A;    ROBOT-ASSISTED LAPAROSCOPIC PROSTATECTOMY N/A 1/3/2024    Procedure: ROBOTIC  PROSTATECTOMY;  Surgeon: Phil Werner MD;  Location: [facility];  Service: Urology;  Laterality: N/A;    UPPER GASTROINTESTINAL ENDOSCOPY       Family History   Problem Relation Name Age of Onset    Stroke Mother      No Known Problems Father      Ulcerative colitis Son      Colon cancer Neg Hx      Colon polyps Neg Hx      Crohn's disease Neg Hx      Esophageal cancer Neg Hx      Stomach cancer Neg Hx         The 10-year CVD risk score (GWENDOLYN'Patricia, et al., 2008) is: 8.4%    Values used to calculate the score:      Age: 68 years      Sex: Male      Diabetic: No      Tobacco smoker: No      Systolic Blood Pressure: 110 mmHg      Is BP treated: No      HDL Cholesterol: 63 mg/dL      Total Cholesterol: 134 mg/dL    All of your core healthy metrics are met.      Review of patient's allergies indicates:   Allergen Reactions    Adhesive Other (See Comments)     SKIN WAS RED       Current Outpatient Medications:     ASCORBIC ACID, VITAMIN C, ORAL, Take 4,000 mg by mouth 2 (two) times a day., Disp: , Rfl:     azelastine (ASTELIN) 137 mcg (0.1 %) nasal spray, 1 spray (137 mcg total) by Nasal route 2 (two) times daily., Disp: 30 mL, Rfl: 0    meclizine (ANTIVERT) 25 mg tablet, Take 1 tablet (25 mg total) by mouth 3 (three) times daily as needed., Disp: 20 tablet, Rfl: 0    ondansetron (ZOFRAN) 4 MG tablet, Take 1 tablet (4 mg total) by mouth every 6 (six) hours as needed., Disp: 20 tablet, Rfl: 1    oxybutynin (DITROPAN-XL) 10 MG 24 hr tablet, Take 1 tablet (10 mg total) by mouth once daily., Disp: 30 tablet, Rfl: 11    vitamin D (VITAMIN D3) 1000 units Tab, Take 4,000 Units by mouth once daily., Disp: , Rfl:     acetaminophen (TYLENOL) 325 MG tablet, Take 2 tablets (650 mg total) by mouth every 6 (six) hours as needed (alternating with motrin/ibuprofen 400-600 every 6 hrs so something avail aevery 3 if needed)., Disp: , Rfl: 0    docusate sodium (COLACE) 100 MG capsule, Take 1 capsule (100 mg total) by mouth 2 (two)  "times daily., Disp: , Rfl: 0    sulfamethoxazole-trimethoprim 800-160mg (BACTRIM DS) 800-160 mg Tab, Take 1 tablet by mouth 2 (two) times daily., Disp: 14 tablet, Rfl: 0    Review of Systems   Constitutional:  Positive for activity change. Negative for appetite change, chills, fatigue, fever and unexpected weight change.   HENT:  Positive for hearing loss and trouble swallowing. Negative for ear pain.    Eyes:  Negative for pain, discharge and visual disturbance.   Respiratory:  Negative for apnea, cough, chest tightness, shortness of breath and wheezing.    Cardiovascular:  Negative for chest pain, palpitations and leg swelling.   Gastrointestinal:  Negative for abdominal pain, blood in stool, constipation, diarrhea, nausea, vomiting and reflux.   Endocrine: Negative for cold intolerance, heat intolerance and polydipsia.   Genitourinary:  Negative for bladder incontinence, difficulty urinating, dysuria, erectile dysfunction, frequency, hematuria, testicular pain and urgency.   Musculoskeletal:  Positive for arthralgias (Knees and shoulders are achy). Negative for gait problem, joint swelling and myalgias.   Neurological:  Negative for dizziness, seizures, numbness and headaches.   Psychiatric/Behavioral:  Negative for agitation, behavioral problems, dysphoric mood and hallucinations. The patient is not nervous/anxious.            Objective:      Vitals:    09/17/24 0822   BP: 110/70   Pulse: 78   Weight: 79.4 kg (175 lb)   Height: 5' 10" (1.778 m)     Physical Exam  Vitals and nursing note reviewed.   Constitutional:       General: He is not in acute distress.     Appearance: Normal appearance. He is well-developed. He is not toxic-appearing.   HENT:      Head: Normocephalic and atraumatic.      Right Ear: Tympanic membrane and external ear normal.      Left Ear: Tympanic membrane and external ear normal.      Nose: Nose normal.      Mouth/Throat:      Pharynx: Oropharynx is clear. No posterior oropharyngeal " erythema.   Eyes:      Pupils: Pupils are equal, round, and reactive to light.   Neck:      Thyroid: No thyromegaly.      Vascular: No carotid bruit.   Cardiovascular:      Rate and Rhythm: Normal rate and regular rhythm.      Heart sounds: Normal heart sounds. No murmur heard.  Pulmonary:      Effort: Pulmonary effort is normal.      Breath sounds: Rales (minimal dry crackles in the left base) present. No wheezing.   Abdominal:      General: Bowel sounds are normal. There is no distension.      Palpations: Abdomen is soft.      Tenderness: There is no abdominal tenderness.   Musculoskeletal:         General: No tenderness or deformity. Normal range of motion.      Cervical back: Normal range of motion and neck supple.      Lumbar back: Normal. No spasms.      Comments: Bends 90 degrees at  waist, decreased flexion of the shoulders to 150°, internal rotation is okay.  Knees are crepitant right greater than left.  He ambulates well without a limp., no pitting edema to lower extremities   Lymphadenopathy:      Cervical: No cervical adenopathy.   Skin:     General: Skin is warm and dry.      Findings: No rash.   Neurological:      General: No focal deficit present.      Mental Status: He is alert and oriented to person, place, and time. Mental status is at baseline.      Cranial Nerves: No cranial nerve deficit.      Coordination: Coordination normal.   Psychiatric:         Mood and Affect: Mood normal.         Behavior: Behavior normal.         Thought Content: Thought content normal.         Judgment: Judgment normal.           Assessment:       1. History of robot-assisted laparoscopic radical prostatectomy    2. Pelvic floor dysfunction    3. Male urinary stress incontinence    4. Prostate cancer    5. Multiple gastric polyps    6. Rotator cuff arthropathy of both shoulders    7. Primary osteoarthritis of both knees    8. Asbestos exposure         Plan:       History of robot-assisted laparoscopic radical  prostatectomy  Status post prostatectomy for prostate cancer, Dr. Wernre following PSAs  Pelvic floor dysfunction  Status post pelvic floor physical therapy  Male urinary stress incontinence  Continue oxybutynin XL 10 mg  Prostate cancer  Now in remission  Multiple gastric polyps  Not on a PPI  Rotator cuff arthropathy of both shoulders  150 degree flexion, not desiring orthopedic input yet  Primary osteoarthritis of both knees  Offered referral to orthopedics for knee cortisone injections when pain gets bad.  Asbestos exposure  Chest x-ray was normal in July of 2024    No follow-ups on file.        9/17/2024 Phil Kemp

## 2024-10-18 ENCOUNTER — LAB VISIT (OUTPATIENT)
Dept: LAB | Facility: HOSPITAL | Age: 69
End: 2024-10-18
Attending: UROLOGY
Payer: MEDICARE

## 2024-10-18 DIAGNOSIS — Z85.46 HISTORY OF PROSTATE CANCER: ICD-10-CM

## 2024-10-18 LAB — COMPLEXED PSA SERPL-MCNC: <0.01 NG/ML (ref 0–4)

## 2024-10-18 PROCEDURE — 84153 ASSAY OF PSA TOTAL: CPT | Performed by: UROLOGY

## 2024-10-18 PROCEDURE — 36415 COLL VENOUS BLD VENIPUNCTURE: CPT | Performed by: UROLOGY

## 2024-10-22 ENCOUNTER — OFFICE VISIT (OUTPATIENT)
Dept: UROLOGY | Facility: CLINIC | Age: 69
End: 2024-10-22
Payer: MEDICARE

## 2024-10-22 DIAGNOSIS — Z85.46 HISTORY OF PROSTATE CANCER: Primary | ICD-10-CM

## 2024-10-22 DIAGNOSIS — N39.3 SUI (STRESS URINARY INCONTINENCE), MALE: ICD-10-CM

## 2024-10-22 LAB
BILIRUBIN, UA POC OHS: NEGATIVE
BLOOD, UA POC OHS: NEGATIVE
CLARITY, UA POC OHS: CLEAR
COLOR, UA POC OHS: YELLOW
GLUCOSE, UA POC OHS: NEGATIVE
KETONES, UA POC OHS: NEGATIVE
LEUKOCYTES, UA POC OHS: NEGATIVE
NITRITE, UA POC OHS: NEGATIVE
PH, UA POC OHS: 7
PROTEIN, UA POC OHS: NEGATIVE
SPECIFIC GRAVITY, UA POC OHS: 1.02
UROBILINOGEN, UA POC OHS: 0.2

## 2024-10-22 PROCEDURE — 99213 OFFICE O/P EST LOW 20 MIN: CPT | Mod: PBBFAC,PO | Performed by: UROLOGY

## 2024-10-22 PROCEDURE — 99999 PR PBB SHADOW E&M-EST. PATIENT-LVL III: CPT | Mod: PBBFAC,,, | Performed by: UROLOGY

## 2024-10-22 PROCEDURE — G2211 COMPLEX E/M VISIT ADD ON: HCPCS | Mod: S$PBB,,, | Performed by: UROLOGY

## 2024-10-22 PROCEDURE — 99999PBSHW POCT URINALYSIS(INSTRUMENT): Mod: PBBFAC,,,

## 2024-10-22 PROCEDURE — 99215 OFFICE O/P EST HI 40 MIN: CPT | Mod: S$PBB,,, | Performed by: UROLOGY

## 2024-10-22 PROCEDURE — 81003 URINALYSIS AUTO W/O SCOPE: CPT | Mod: PBBFAC,PO | Performed by: UROLOGY

## 2024-10-22 NOTE — Clinical Note
Referred back.  Now 9 months postprostatectomy.  Mild stress incontinence.  [facility], as utilized you guys early in his recovery.  I think there is benefit of re-evaluation at this point in his recovery based on discussion today.  Please contact to schedule

## 2024-10-22 NOTE — PROGRESS NOTES
[facility] Urology Progress Note     Ashish Dave is a 68 y.o. male who presents for prostate cancer follow up     Long history of BPH/LUTS on observation/supplements who on eval for rising psa to 5.4 and mild LUTS progression had MRI with pirad3 lesion of ant TZ but was negative though did have diffuse maryam 7 including 4+3=7 prostate cancer for which he elected to have robotic prostatectomy, which he underwent on 1/3/24  Preop: AUA SS: 10/2 (2: intermittency, urgency, weak stream; 1: emptying, fgrequency, straining, sleeping) . ++ anxiety. Reports that erections are not great, has ED.  Tried a few supplements over the counter previously which did not work. Had heart rhyrthm problem years ago. Used to see cardiology.  Preop saw cardiology for PSVT. He did well with surgery and was DCed on POD 1.       Robot-assisted laparoscopic radical prostatectomy  Robot-assisted laparoscopic bilateral pelvic lymphadenectomy  PATH:  3+4 [id number] (+pni, though neg margin resection)     Since then has had concerns about incisional swelling, scrotal swelling, blood in urine, foul smelling urine  ER 1/7 for scrotal swelling no pain c/w hematoma as discussed with pt in messaging, confirmed at time of negative cystogram on exam 1/10 and schafer removed and no incision concerns  Reassured small blood in urine while healing normal and days later concerned urine foul smelling and was yellow not clear  Ucx at time of cystogram negative. As precaution for pt concerns, 1/22/24 UA neg x trc blood with mirco only 2 rbc 1 wbc, Ucx neg. He was concerns results were false     2/5/24: Followed up with cardiology 1/30/24, bp controlled, of one med, rate controlled, no further palpitations or concerns  Water goes through him, and if siting a while as soon as he stands up will be going to the bathroom.  Sometimes urgent, but also just goes just to go when he gets up  If on his feet as while will drip out him sometimes without knowing it and feels is  "constant drip from amount diaper.  6-8 per day, down from a lot more. Has been doing  kegels in row in am and pm. Still on colace. When BM harder feels more pelvic discomfort  Some swelling persists lower abdomen, some pain on hard surfaces. Udip negative  - significant anxiety about continence recovery so ref to PFPT, started ditropan 5mg XL     In interim, per chart review, has been seeing PFPT  3/5: doing more around house, yardwork, no incident  3/8: Still having leakage if he drinks a lot of water at once. Continues to experience squirting if he coughs or sneezes   3/22: still has leakage - most trouble seems to be when he's drinking water. Tries to drink 1/2 gallon throughout the day, so whenever he's drinking water and he's active/up on his feet he has more leakage. If he drinks water and sits in the recliner he's fine.   3/27: overall stable though "unusually bad day yesterday" due to started the morning having to go to the [employer] to mop up some water from a pipe leak and used more pads  On review  Had routine labs 2/28 for upcoming pcp visit 3/13 including UA noted cloudy only with 1+ leuks and micro 10-20 wbc and few bacteria)  Advised concerning for infection and if having any symptoms could start bactrim though pt noted only incontinence form prostatectomy no symptoms so held off on abx  2/28/24 Urine culture final as below for esbl coli, and when seen 3/13 in pcp given 1 week bactrim DS    Result:            Greater than 100,000 CFU/mL of Escherichia coli (ESBL)  R to amp, ancef, rocephin, cipro/levo - S to cefepime, gent, penems, NF, zosyn, bactrim    4/4/24: PSA <0.01 on 3/28/24, In addition to urine above, labs 2/28/24 include H/H 15.6/49.9, WBC 4.7, Cr 0.72, eGFR 99  Urinalysis dipstick today is negative.  Most of his leakage is when he is on his feet being very active and drinking water.  Especially yd work and moving around a lot or bending.  Nighttime is no problem.  Nocturia 1-2 times, and " "seldom leak at night. Taking oxybutynin 5 XL.  Mild dry mouth otherwise tolerating well.  No constipation.  Working with pelvic floor physical therapy as summarized above.  He is compliant with home exercise regimen doing for sets of 10 Kegel exercises per day  He has wearing a brief/depend with a pad inside.  Rarely does it spell over from the pad to the brief but he is going through about 7 pads per day, not just when moist for comfort but usually they are quite saturated. Inquires about going fishing. Reports being asymptomatic from the above urine culture and was just found from routine screening labs     7/12/24: PSA 7/5/24 <0.01  Continued to work with PFPT last seen on 5/23/24 noting still better; still has more leakage when he drinks more water and is more active. Feels ready to continue with home exercise program independent and follow up in a few months. Underwear with pad in it. Uses pad in it. 4 pads per day. Usually saturated but on a "good day" if just damp and sitting a while will change for comfort - good day 2-3 pads  Leakage improving, but still leaking. If a lot of work in yard etc more. Stays active. Does get distracted and goes hours without voiding  Pretty dry at night.  Early AM feels urge. Still on oxybutynin. Did not set further recheck as above. Udip negative. Ok at home  Exercises mwf kegels and pelvic floor exercises. Tues/thurs nothing no kegels. Improvement from depends, to briefs, to now underwear with pads.    He returns today noting  10/18/24 psa <0.01  Continence still improving, but leakage still there. On a good day, can be down to 2 pads. When supported well, jock strap, less leakage and feels better  Has been minimizing bladder irritants.   When busy doing something and lots of movement and picking things up, and not thinking about it, will be worst time.   Has been working on timed voiding Q2 hours but only a little but  At night when laying down will feel urge to go. Dry at " night.   Pretty dry right now - since 6 am (3 hours).  3 sets of 10 kegels per day, holding contractions longer at least 10 seconds  If does full pelvic floor HEP knees up bends etc for 30 mins will weaken it.   Leakage is getting better, but progress is slower and not getting fully there  DC from pfpt in may.  Taking oxybutynin 5xl.       Focused Physical Exam:    Abdomen: Soft, non-tender, nondistended, no CVA tenderness, lap incisions cdi    Recent Results (from the past 2 weeks)   Prostate Specific Antigen, Diagnostic    Collection Time: 10/18/24  8:59 AM   Result Value Ref Range    PSA Diagnostic <0.01 0.00 - 4.00 ng/mL   POCT Urinalysis(Instrument)    Collection Time: 10/22/24  8:39 AM   Result Value Ref Range    Color, POC UA Yellow Yellow, Straw, Colorless    Clarity, POC UA Clear Clear    Glucose, POC UA Negative Negative    Bilirubin, POC UA Negative Negative    Ketones, POC UA Negative Negative    Spec Grav POC UA 1.020 1.005 - 1.030    Blood, POC UA Negative Negative    pH, POC UA 7.0 5.0 - 8.0    Protein, POC UA Negative Negative    Urobilinogen, POC UA 0.2 <=1.0    Nitrite, POC UA Negative Negative    WBC, POC UA Negative Negative       ASSESSMENT   1. History of prostate cancer  POCT Urinalysis(Instrument)    Prostate Specific Antigen, Diagnostic    Prostate Specific Antigen, Diagnostic      2. BIRGIT (stress urinary incontinence), male  Ambulatory referral/consult to Physical/Occupational Therapy          Plan    Overall doing well a little over 9 months status post robotic prostatectomy with favorable pathology and undetectable PSA with no evidence of disease.  He has been working on his continence and while it is improving, it is improving slowly.  We did have extensive discussion about his postprostatectomy persistent stress incontinence.    He is working on home exercise regimen program and doing Kegel exercises.  Certainly at this point the Kegel's and sphincteric strengthening is more important,  and especially as he notes when he gets into a 30 minute home exercise regimen program, feels a bit weaker, maybe over doing a little bit with this.  Advise to focus on Stephen's now.  Especially given the timeline, and that he was not been to pelvic floor physical therapy since May and was discharge, as he utilize them early in his recovery, now at 9 months postop, with persistent mild stress incontinence I certainly recommended referral and re-evaluation to help any pointers and where he is in his pelvic floor recovery at this time to help facilitate resolution of some of this incontinence.  We did briefly review that should this not completely recover their surgical options such as sling and sphincter, the latter which I believe would be a bit aggressive for his mild amount of leakage.  Hopefully as he continues to work on this and work with pelvic floor physical therapy again he can get down to dry or at least a safety pad only.  He was dry at night.  We did briefly review sling and surgical procedures in the differences, and certainly the less invasive option may be best for him should this not recover with continued pelvic floor physical therapy especially as he notes with the additional support and jockstrap etcetera as having less leakage.  Referral placed and message sent, and will see him back in 3 months with PSA prior to follow-up, follow up the results of continued pelvic floor physical therapy and have further discussion on management.    Total time spent in/on encounter today, including face to face time with patient, counseling, medical record review, interpretation of tests/results, , and treatment plan coordination: 40 minutes    *Visit today included increased complexity associated with the current or anticipated ongoing medical longitudinal care and management related to this patient's serious and/or complex managed problem(s) as described above.

## 2024-10-28 ENCOUNTER — CLINICAL SUPPORT (OUTPATIENT)
Dept: REHABILITATION | Facility: HOSPITAL | Age: 69
End: 2024-10-28
Attending: UROLOGY
Payer: MEDICARE

## 2024-10-28 DIAGNOSIS — N39.3 MALE URINARY STRESS INCONTINENCE: ICD-10-CM

## 2024-10-28 DIAGNOSIS — M62.89 PELVIC FLOOR DYSFUNCTION: Primary | ICD-10-CM

## 2024-10-28 DIAGNOSIS — M62.81 MUSCLE WEAKNESS: ICD-10-CM

## 2024-10-28 DIAGNOSIS — N39.3 SUI (STRESS URINARY INCONTINENCE), MALE: ICD-10-CM

## 2024-10-28 PROCEDURE — 97530 THERAPEUTIC ACTIVITIES: CPT | Mod: PO

## 2024-10-28 PROCEDURE — 97161 PT EVAL LOW COMPLEX 20 MIN: CPT | Mod: PO

## 2024-10-28 PROCEDURE — 97112 NEUROMUSCULAR REEDUCATION: CPT | Mod: PO

## 2024-11-05 ENCOUNTER — CLINICAL SUPPORT (OUTPATIENT)
Dept: REHABILITATION | Facility: HOSPITAL | Age: 69
End: 2024-11-05
Payer: MEDICARE

## 2024-11-05 DIAGNOSIS — M62.81 MUSCLE WEAKNESS: ICD-10-CM

## 2024-11-05 DIAGNOSIS — M62.89 PELVIC FLOOR DYSFUNCTION: Primary | ICD-10-CM

## 2024-11-05 DIAGNOSIS — N39.3 MALE URINARY STRESS INCONTINENCE: ICD-10-CM

## 2024-11-05 PROCEDURE — 97112 NEUROMUSCULAR REEDUCATION: CPT | Mod: PO,CQ

## 2024-11-05 NOTE — PROGRESS NOTES
Pelvic Health Physical Therapy   Treatment Note     Name: Ashish Dave  Clinic Number: [id number]    Therapy Diagnosis:   Encounter Diagnoses   Name Primary?    Pelvic floor dysfunction Yes    Male urinary stress incontinence     Muscle weakness      Physician: Phil Werner MD    Visit Date: 11/5/2024    Physician Orders: PT Eval and Treat   Medical Diagnosis from Referral: BIRGIT (stress urinary incontinence), male [N39.3]   Evaluation Date: 10/28/2024  Authorization Period Expiration: 12-31-24  Plan of Care Expiration: 1/20/2025  Progress Note Due: 11/28/2024  Visit # / Visits authorized: 1/ 12  FOTO: 1/3     Precautions: Standard      Time In: 7:00 pm   Time Out: 7:45 pm   Total Appointment Time (timed & untimed codes): 45 minutes    Subjective     Pt reports: Still having leakage with high activity levels.  He was compliant with home exercise program.  Response to previous treatment: good   Functional change:  ongoing     Pain: 0/10  Location:       Objective     Prior visit:   Muscle Power: 2/5 posteriorly. Poor activation and endurance at base of penis required for urethral closure. Specifically, bulbospongiosus and superficial transverse perineum exhibit greatest weakness  Muscle Endurance: 3-4 sec at anterior pelvic floor  # Reps To Fatigue: 6    Fast Contractions in 10 seconds: slow and inefficient relaxation                 Quality of contraction: decreased hold and slow relaxation   Specificity: patient contracts: adductors, glutes, obliques  Coordination: tends to hold breath during PFM contration   Does Pelvic Floor drop and relax with a diaphragmatic breath? Yes, following cues    Ashish received therapeutic exercises to develop  strength, endurance, ROM, flexibility, posture, and core stabilization for 0 minutes including:       Ashish received the following manual therapy techniques: to develop flexibility, extensibility, and desensitization for 0 minutes including:       Ashish participated in  neuromuscular re-education activities to develop Coordination, Control, Down training, Proprioception, Kinesthetic, and Sense for 40 minutes including:     Transverse abdominis activation   Transverse abdominis with knee extension   Transverse abdominis with bilateral knee fall out +  Wall planks 20 sec x 3   Wall sits 20 sec x 3       Ashish participated in dynamic functional therapeutic activities to improve functional performance for 0  minutes, including:         Home Exercises Provided and Patient Education Provided     Education provided:   - anatomy/physiology of pelvic floor and Coordination of kegels with functional activities such as cough, laugh, sneeze, lift, etc.   Discussed progression of plan of care with patient; educated pt in activity modification; reviewed HEP with pt. Pt demonstrated and verbalized understanding of all instruction and was provided with a handout of HEP (see Patient Instructions).  - home exercise program update     Written Home Exercises Provided: Patient instructed to cont prior HEP.  Exercises were reviewed and Ashish was able to demonstrate them prior to the end of the session.  Ashihs demonstrated good  understanding of the education provided.     See EMR under Patient Instructions for exercises provided 11/5/2024.    Assessment     Today's treatment focused on transverse abdominis engagement minimizing compensatory muscle activation. He was able to demonstrate significant improvement with coordination as he progressed through treatment today.     Ashish Is progressing well towards his goals.   Pt prognosis is Good.     Pt will continue to benefit from skilled outpatient physical therapy to address the deficits listed in the problem list box on initial evaluation, provide pt/family education and to maximize pt's level of independence in the home and community environment.     Pt's spiritual, cultural and educational needs considered and pt agreeable to plan of care and  goals.     Anticipated barriers to physical therapy: none    Goals: Goals:  Short Term Goals: 6 weeks   - Pt will demonstrate excellent knowledge and adherence to HEP to facilitate optimal recovery.  - Pt will demonstrate proper PFM contraction, relaxation, and lengthening coordinated with TA and breath for improved muscle coordination needed for functional activity.     Long Term Goals: 12 weeks   - Pt will demonstrate excellent knowledge and adherence to HEP for continued self-maintenance of symptoms.  - Pt will report FOTO Urinary Problem score of 56% or better indicating clinically relevant increase in function.  - Pt will report voiding interval of 2-4 hours for improved ADL tolerance.  - Pt will report ability to delay urinary urge for at least 15 minutes to maintain continence with ADL/IADLs.   - Pt will report 60-75% improvement in  urinary incontinence symptoms for improved hygiene and ADL/IADL tolerance.   - Pt will report needing </= 1 pad/day indicating improved PFM function needed to maintain continence  - Pt will demonstrate PFM strength of at least 3/5 MMT for improved strength needed to maintain continence.   - Pt will report bearing down appropriately 100% of the time for improved bowel function and decreased stress on adjacent pelvic structures.   - Pt will demonstrate independence with pressure-management strategies to decreased stress on adjacent pelvic structures.     Plan     Plan of care Certification: 10/28/2024 to 1/20/2024.     Outpatient Physical Therapy 1-2 times weekly for 12 weeks    Next visit: use RUSI to assess TrA and pelvic floor synergy. Transperineal ultrasound to assess urethral closure.     Lindy Johnson, DEVANTE

## 2024-11-06 ENCOUNTER — DOCUMENTATION ONLY (OUTPATIENT)
Dept: REHABILITATION | Facility: HOSPITAL | Age: 69
End: 2024-11-06
Payer: MEDICARE

## 2024-11-14 ENCOUNTER — PATIENT MESSAGE (OUTPATIENT)
Dept: UROLOGY | Facility: CLINIC | Age: 69
End: 2024-11-14
Payer: MEDICARE

## 2024-11-14 ENCOUNTER — CLINICAL SUPPORT (OUTPATIENT)
Dept: REHABILITATION | Facility: HOSPITAL | Age: 69
End: 2024-11-14
Payer: MEDICARE

## 2024-11-14 DIAGNOSIS — N39.3 MALE URINARY STRESS INCONTINENCE: ICD-10-CM

## 2024-11-14 DIAGNOSIS — M62.89 PELVIC FLOOR DYSFUNCTION: Primary | ICD-10-CM

## 2024-11-14 DIAGNOSIS — M62.81 MUSCLE WEAKNESS: ICD-10-CM

## 2024-11-14 PROCEDURE — 97112 NEUROMUSCULAR REEDUCATION: CPT | Mod: KX,PO,CQ

## 2024-11-18 ENCOUNTER — CLINICAL SUPPORT (OUTPATIENT)
Dept: REHABILITATION | Facility: HOSPITAL | Age: 69
End: 2024-11-18
Payer: MEDICARE

## 2024-11-18 DIAGNOSIS — N39.3 MALE URINARY STRESS INCONTINENCE: ICD-10-CM

## 2024-11-18 DIAGNOSIS — M62.89 PELVIC FLOOR DYSFUNCTION: Primary | ICD-10-CM

## 2024-11-18 DIAGNOSIS — M62.81 MUSCLE WEAKNESS: ICD-10-CM

## 2024-11-18 PROCEDURE — 97112 NEUROMUSCULAR REEDUCATION: CPT | Mod: PO

## 2024-11-18 NOTE — PROGRESS NOTES
"  Pelvic Health Physical Therapy   Treatment Note     Name: Ashish Dave  Clinic Number: [id number]    Therapy Diagnosis:   Encounter Diagnoses   Name Primary?    Pelvic floor dysfunction Yes    Male urinary stress incontinence     Muscle weakness        Physician: Phil Werner MD    Visit Date: 11/18/2024    Physician Orders: PT Eval and Treat   Medical Diagnosis from Referral: BIRGIT (stress urinary incontinence), male [N39.3]   Evaluation Date: 10/28/2024  Authorization Period Expiration: 12-31-24  Plan of Care Expiration: 1/20/2025  Progress Note Due: 11/28/2024  Visit # / Visits authorized: 2/ 12  FOTO: 1/3     Precautions: Standard      Time In: 830  Time Out: 915  Total Appointment Time (timed & untimed codes): 45 minutes    Subjective     Pt reports: still having leakage, varies with activity level.   Right now has a penile clamp on - "it definitely helps, but when I don't have it you deal with the same issues."   Hard time determine reason behind why some days are heavier than others in terms of leakage, seems that he sometimes has flares in symptoms and he's not sure of the cause; he can't feel the leakage happening but then he'll notice his pad wet.       He was compliant with home exercise program.  Response to previous treatment: good   Functional change:  ongoing     Pain: 0/10  Location:       Objective     Prior visit:   Muscle Power: 2/5 posteriorly. Poor activation and endurance at base of penis required for urethral closure. Specifically, bulbospongiosus and superficial transverse perineum exhibit greatest weakness  Muscle Endurance: 3-4 sec at anterior pelvic floor  # Reps To Fatigue: 6    Fast Contractions in 10 seconds: slow and inefficient relaxation                 Quality of contraction: decreased hold and slow relaxation   Specificity: patient contracts: adductors, glutes, obliques  Coordination: tends to hold breath during PFM contration   Does Pelvic Floor drop and relax with a " diaphragmatic breath? Yes, following cues    Ashish received therapeutic exercises to develop  strength, endurance, ROM, flexibility, posture, and core stabilization for 0 minutes including:       Ashish received the following manual therapy techniques: to develop flexibility, extensibility, and desensitization for 0 minutes including:       Ashish participated in neuromuscular re-education activities to develop Coordination, Control, Down training, Proprioception, Kinesthetic, and Sense for 40 minutes including:     Transverse abdominis activation   Transverse abdominis with knee extension   Transverse abdominis with bilateral knee fall out +  Wall planks 20 sec x 3   Wall sits 20 sec x 3   Resisted lateral walks 10 ft x 4   Resisted monster walks fwd and back 10 ft x 4       Ashish participated in dynamic functional therapeutic activities to improve functional performance for 0  minutes, including:         Home Exercises Provided and Patient Education Provided     Education provided:   - anatomy/physiology of pelvic floor and Coordination of kegels with functional activities such as cough, laugh, sneeze, lift, etc.   Discussed progression of plan of care with patient; educated pt in activity modification; reviewed HEP with pt. Pt demonstrated and verbalized understanding of all instruction and was provided with a handout of HEP (see Patient Instructions).  - home exercise program update     Written Home Exercises Provided: Patient instructed to cont prior HEP.  Exercises were reviewed and Ashish was able to demonstrate them prior to the end of the session.  Ashihs demonstrated good  understanding of the education provided.     See EMR under Patient Instructions for exercises provided 11/5/2024.    Assessment     Today's treatment focused on strength and endurance of core and pelvic musculature. Patient demo good carry-over from previous visit with ability to contract deep abdominal muscles with activity without  oblique dominance. Discussed patient's swelling in lower abdomen/upper thighs - would be worth patient having evaluation from lymphedema specialist due to history of surgery and lymph node removal, will ask about referral.     Ashish Is progressing well towards his goals.   Pt prognosis is Good.     Pt will continue to benefit from skilled outpatient physical therapy to address the deficits listed in the problem list box on initial evaluation, provide pt/family education and to maximize pt's level of independence in the home and community environment.     Pt's spiritual, cultural and educational needs considered and pt agreeable to plan of care and goals.     Anticipated barriers to physical therapy: none    Goals: Goals:  Short Term Goals: 6 weeks   - Pt will demonstrate excellent knowledge and adherence to HEP to facilitate optimal recovery.  - Pt will demonstrate proper PFM contraction, relaxation, and lengthening coordinated with TA and breath for improved muscle coordination needed for functional activity.     Long Term Goals: 12 weeks   - Pt will demonstrate excellent knowledge and adherence to HEP for continued self-maintenance of symptoms.  - Pt will report FOTO Urinary Problem score of 56% or better indicating clinically relevant increase in function.  - Pt will report voiding interval of 2-4 hours for improved ADL tolerance.  - Pt will report ability to delay urinary urge for at least 15 minutes to maintain continence with ADL/IADLs.   - Pt will report 60-75% improvement in  urinary incontinence symptoms for improved hygiene and ADL/IADL tolerance.   - Pt will report needing </= 1 pad/day indicating improved PFM function needed to maintain continence  - Pt will demonstrate PFM strength of at least 3/5 MMT for improved strength needed to maintain continence.   - Pt will report bearing down appropriately 100% of the time for improved bowel function and decreased stress on adjacent pelvic structures.   - Pt  will demonstrate independence with pressure-management strategies to decreased stress on adjacent pelvic structures.     Plan     Plan of care Certification: 10/28/2024 to 1/20/2024.     Outpatient Physical Therapy 1-2 times weekly for 12 weeks    Next visit: use RUSI to assess TrA and pelvic floor synergy. Transperineal ultrasound to assess urethral closure.     Kayli Vee, PT

## 2024-12-05 ENCOUNTER — CLINICAL SUPPORT (OUTPATIENT)
Dept: REHABILITATION | Facility: HOSPITAL | Age: 69
End: 2024-12-05
Payer: MEDICARE

## 2024-12-05 DIAGNOSIS — N39.3 MALE URINARY STRESS INCONTINENCE: ICD-10-CM

## 2024-12-05 DIAGNOSIS — M62.81 MUSCLE WEAKNESS: ICD-10-CM

## 2024-12-05 DIAGNOSIS — M62.89 PELVIC FLOOR DYSFUNCTION: Primary | ICD-10-CM

## 2024-12-05 PROCEDURE — 97112 NEUROMUSCULAR REEDUCATION: CPT | Mod: PO,CQ

## 2024-12-05 NOTE — PROGRESS NOTES
Pelvic Health Physical Therapy   Treatment Note     Name: Ashish Dave  Clinic Number: [id number]    Therapy Diagnosis:   Encounter Diagnoses   Name Primary?    Pelvic floor dysfunction Yes    Male urinary stress incontinence     Muscle weakness        Physician: Phil Werner MD    Visit Date: 12/5/2024    Physician Orders: PT Eval and Treat   Medical Diagnosis from Referral: BIRGIT (stress urinary incontinence), male [N39.3]   Evaluation Date: 10/28/2024  Authorization Period Expiration: 12-31-24  Plan of Care Expiration: 1/20/2025  Progress Note Due: 11/28/2024  Visit # / Visits authorized: 4/ 12  FOTO: 1/3     Precautions: Standard      Time In: 710  Time Out: 805  Total Appointment Time (timed & untimed codes): 55 minutes    Subjective     Pt reports: still having leakage, varies with activity level. He is using a clamp which helps but he will realize that he hasn't unclamped for several hours if he gets involved with something. He reports some swelling in inner thigh region he is unsure if it is related to weight fluctuations.     He was compliant with home exercise program.  Response to previous treatment: good   Functional change:  ongoing     Pain: 0/10  Location:       Objective     Prior visit:   Muscle Power: 2/5 posteriorly. Poor activation and endurance at base of penis required for urethral closure. Specifically, bulbospongiosus and superficial transverse perineum exhibit greatest weakness  Muscle Endurance: 3-4 sec at anterior pelvic floor  # Reps To Fatigue: 6    Fast Contractions in 10 seconds: slow and inefficient relaxation                 Quality of contraction: decreased hold and slow relaxation   Specificity: patient contracts: adductors, glutes, obliques  Coordination: tends to hold breath during PFM contration   Does Pelvic Floor drop and relax with a diaphragmatic breath? Yes, following cues    Ashish received therapeutic exercises to develop  strength, endurance, ROM, flexibility,  posture, and core stabilization for 0 minutes including:       Ashish received the following manual therapy techniques: to develop flexibility, extensibility, and desensitization for 0 minutes including:       Ashish participated in neuromuscular re-education activities to develop Coordination, Control, Down training, Proprioception, Kinesthetic, and Sense for 55 minutes including:     SEMG EVALUATION:  12/5/24  PF Electrode placement: external perianal  Pt. Position: supine hooklying  Findings: excellent engagement and relaxation noted with cues. Good holding pattern for both.   SEMG Problems:  excellent coordination  No skin irritation, erythema, or other adverse effects observed from electrode placement.       Transverse abdominis activation   Transverse abdominis with knee extension   Transverse abdominis with bilateral knee fall out +  Wall planks 30 sec x 3   Wall sits 30 sec x 3   Resisted lateral walks 10 ft x 4   Resisted monster walks fwd and back 10 ft x 4       Ashish participated in dynamic functional therapeutic activities to improve functional performance for 0  minutes, including:         Home Exercises Provided and Patient Education Provided     Education provided:   - anatomy/physiology of pelvic floor and Coordination of kegels with functional activities such as cough, laugh, sneeze, lift, etc.   Discussed progression of plan of care with patient; educated pt in activity modification; reviewed HEP with pt. Pt demonstrated and verbalized understanding of all instruction and was provided with a handout of HEP (see Patient Instructions).  - home exercise program update     Written Home Exercises Provided: Patient instructed to cont prior HEP.  Exercises were reviewed and Ashish was able to demonstrate them prior to the end of the session.  Ashish demonstrated good  understanding of the education provided.     See EMR under Patient Instructions for exercises provided 11/5/2024.    Assessment     Inner  thigh region was assessed with no lymphedema or swelling noted. He was educated on how to evaluate for pitting edema which did not occur with testing in this area. eSMG was utilized today to assess with poor coordination was contributing to patient's ongoing urinary incontinence however he demonstrated excellent response to cues for engagement and relaxation. Excellent holding pattern for both contraction and relaxation indicating good endurance and true relaxation of Pelvic Floor musculature. Patient to be reassessed by Primary therapist at next session.      Ashish Is progressing well towards his goals.   Pt prognosis is Good.     Pt will continue to benefit from skilled outpatient physical therapy to address the deficits listed in the problem list box on initial evaluation, provide pt/family education and to maximize pt's level of independence in the home and community environment.     Pt's spiritual, cultural and educational needs considered and pt agreeable to plan of care and goals.     Anticipated barriers to physical therapy: none    Goals: Goals:  Short Term Goals: 6 weeks   - Pt will demonstrate excellent knowledge and adherence to HEP to facilitate optimal recovery.  - Pt will demonstrate proper PFM contraction, relaxation, and lengthening coordinated with TA and breath for improved muscle coordination needed for functional activity.     Long Term Goals: 12 weeks   - Pt will demonstrate excellent knowledge and adherence to HEP for continued self-maintenance of symptoms.  - Pt will report FOTO Urinary Problem score of 56% or better indicating clinically relevant increase in function.  - Pt will report voiding interval of 2-4 hours for improved ADL tolerance.  - Pt will report ability to delay urinary urge for at least 15 minutes to maintain continence with ADL/IADLs.   - Pt will report 60-75% improvement in  urinary incontinence symptoms for improved hygiene and ADL/IADL tolerance.   - Pt will report  needing </= 1 pad/day indicating improved PFM function needed to maintain continence  - Pt will demonstrate PFM strength of at least 3/5 MMT for improved strength needed to maintain continence.   - Pt will report bearing down appropriately 100% of the time for improved bowel function and decreased stress on adjacent pelvic structures.   - Pt will demonstrate independence with pressure-management strategies to decreased stress on adjacent pelvic structures.     Plan     Plan of care Certification: 10/28/2024 to 1/20/2024.     Outpatient Physical Therapy 1-2 times weekly for 12 weeks    Next visit: use RUSI to assess TrA and pelvic floor synergy. Transperineal ultrasound to assess urethral closure.     Lindy Johnson, PTA

## 2024-12-10 ENCOUNTER — CLINICAL SUPPORT (OUTPATIENT)
Dept: REHABILITATION | Facility: HOSPITAL | Age: 69
End: 2024-12-10
Payer: MEDICARE

## 2024-12-10 DIAGNOSIS — M62.89 PELVIC FLOOR DYSFUNCTION: Primary | ICD-10-CM

## 2024-12-10 DIAGNOSIS — N39.3 MALE URINARY STRESS INCONTINENCE: ICD-10-CM

## 2024-12-10 DIAGNOSIS — M62.81 MUSCLE WEAKNESS: ICD-10-CM

## 2024-12-10 PROCEDURE — 97112 NEUROMUSCULAR REEDUCATION: CPT | Mod: PO

## 2024-12-10 PROCEDURE — 97530 THERAPEUTIC ACTIVITIES: CPT | Mod: PO

## 2024-12-10 NOTE — PROGRESS NOTES
Pelvic Health Physical Therapy   Progress/Treatment Note     Name: Ashish Dave  Clinic Number: [id number]    Therapy Diagnosis:   Encounter Diagnoses   Name Primary?    Pelvic floor dysfunction Yes    Male urinary stress incontinence     Muscle weakness          Physician: Phil Werner MD    Visit Date: 12/10/2024    Physician Orders: PT Eval and Treat   Medical Diagnosis from Referral: BIRGIT (stress urinary incontinence), male [N39.3]   Evaluation Date: 10/28/2024  Authorization Period Expiration: 12-31-24  Plan of Care Expiration: 1/20/2025  Progress Note Due: 1/10/2025  Visit # / Visits authorized: 5/ 12  FOTO: 2/3     Precautions: Standard      Time In: 7:45 am  Time Out: 8:30 am  Total Appointment Time (timed & untimed codes): 45 minutes    Subjective     Pt reports: things are pretty much the same. Feels like he reached a certain point. Does find the clamp gives him more freedom. Stays pretty dry at night. If he is active, he still has leakage. If it is a slow day, he won't have the leakage. If he is out and about with yard work, he will just forget to urinate which will then lead to more problems of leakage. If he maintains some sort of Kegel with his activity, he is fine.     He was compliant with home exercise program.  Response to previous treatment: good   Functional change:  ongoing     Pain: 0/10  Location:       Objective     Pelvic exam 12/10/2024:  Muscle Power: 3/5   Muscle Endurance: 16 sec hold (even palpable at anterior pelvic floor)  # Reps To Fatigue: 3  Fast Contractions in 10 seconds: slow and inefficient relaxation - improving                 Quality of contraction: decreased hold   Specificity: patient contracts: adductors, glutes, obliques- continued but improving   Coordination: tends to hold breath during PFM contration - continued   Does Pelvic Floor drop and relax with a diaphragmatic breath? Yes, following cues    Ashish received therapeutic exercises to develop  strength,  "endurance, ROM, flexibility, posture, and core stabilization for 0 minutes including:       Ashish received the following manual therapy techniques: to develop flexibility, extensibility, and desensitization for 0 minutes including:       Ashish participated in neuromuscular re-education activities to develop Coordination, Control, Down training, Proprioception, Kinesthetic, and Sense for 30 minutes including:     Pelvic assessment as noted above to address muscle coordination and control  Side lying adduction + Kegel #2.5 ankle weights 3 x 10, 5"   Transverse abdominis activation   Transverse abdominis with knee extension   Transverse abdominis with bilateral knee fall out +  Wall planks 15 x 10"    Not today:  Wall sits 30 sec x 3   Resisted lateral walks 10 ft x 4   Resisted monster walks fwd and back 10 ft x 4       Ashish participated in dynamic functional therapeutic activities to improve functional performance for 15  minutes, including:     Review of progress, prognosis, HEP, FOTO survey    Home Exercises Provided and Patient Education Provided     Education provided:   - anatomy/physiology of pelvic floor and Coordination of kegels with functional activities such as cough, laugh, sneeze, lift, etc.   Discussed progression of plan of care with patient; educated pt in activity modification; reviewed HEP with pt. Pt demonstrated and verbalized understanding of all instruction and was provided with a handout of HEP (see Patient Instructions).  - home exercise program update     Written Home Exercises Provided: Patient instructed to cont prior HEP.  Exercises were reviewed and Ashish was able to demonstrate them prior to the end of the session.  Ashish demonstrated good  understanding of the education provided.     See EMR under Patient Instructions for exercises provided 11/5/2024.    Assessment     Ashish presents today with continued difficulty managing intra-abdominal pressure with continued breath holding " and delayed recruitment of transverse abdominis and bulbospongiosus/superficial transverse perineum. Much improved with minimal cueing and greatly improving endurance hold. Discussed penile clamp protocol as patient is not allowing his bladder to fill during the day secondary to consistent leakage. He is only feeling the urge to urinate with a full bladder if he is sitting or laying down for prolonged time. Discussed the importance of being intentional during activities to ensure quality pressure management and protection from urinary leakage with increased pelvic activation and coordination, utilizing more of the anterior structural support for urethral closure. Pt will continue to benefit from skilled outpatient physical therapy to address the deficits listed in the problem list box on initial evaluation, provide pt/family education and to maximize pt's level of independence in the home and community environment.     Ashish Is progressing well towards his goals.   Pt prognosis is Good.     Pt will continue to benefit from skilled outpatient physical therapy to address the deficits listed in the problem list box on initial evaluation, provide pt/family education and to maximize pt's level of independence in the home and community environment.     Pt's spiritual, cultural and educational needs considered and pt agreeable to plan of care and goals.     Anticipated barriers to physical therapy: none    Goals:   Short Term Goals: 6 weeks   - Pt will demonstrate excellent knowledge and adherence to HEP to facilitate optimal recovery. ( MET 12/10/2024)  - Pt will demonstrate proper PFM contraction, relaxation, and lengthening coordinated with TA and breath for improved muscle coordination needed for functional activity.( MET 12/10/2024)     Long Term Goals: 12 weeks   - Pt will demonstrate excellent knowledge and adherence to HEP for continued self-maintenance of symptoms.(NOT MET 12/10/2024)  - Pt will report FOTO  Urinary Problem score of 56% or better indicating clinically relevant increase in function.(PART MET 12/10/2024)  - Pt will report voiding interval of 2-4 hours for improved ADL tolerance.( MET 12/10/2024) trying to, usually successful   - Pt will report ability to delay urinary urge for at least 15 minutes to maintain continence with ADL/IADLs. (NOT MET 12/10/2024)  - Pt will report 60-75% improvement in  urinary incontinence symptoms for improved hygiene and ADL/IADL tolerance. (PART MET 12/10/2024)  - Pt will report needing </= 1 pad/day indicating improved PFM function needed to maintain continence(NOT MET 12/10/2024)  - Pt will demonstrate PFM strength of at least 3/5 MMT for improved strength needed to maintain continence. ( MET 12/10/2024)  - Pt will report bearing down appropriately 100% of the time for improved bowel function and decreased stress on adjacent pelvic structures. ( MET 12/10/2024)  - Pt will demonstrate independence with pressure-management strategies to decreased stress on adjacent pelvic structures. (PART MET 12/10/2024) not always consistent     Plan     Plan of care Certification: 10/28/2024 to 1/20/2024.     Outpatient Physical Therapy 1-2 times weekly for 12 weeks    Next visit: use RUSI to assess TrA and pelvic floor synergy. Transperineal ultrasound to assess urethral closure.     Summer Edge, PT

## 2024-12-10 NOTE — PATIENT INSTRUCTIONS
Increase Kegel holds to 15 seconds. Increase reps of squatting and deadlifting to 15 rather than 10. We are working on repetitions and endurance.    4-5 exercises daily with intentional avoidance of breath holding (exhale on exertion). Make sure to engage the front muscles as discussed in clinic (lower abdomen and around the base of the penis)     Penile Clamp 4-6 weeks, 6 days/week     -Aim to increase bladder capacity     -Aims to increase pelvic floor muscle training     -Aims to improve quality of life during significant incontinence phase          Make sure you are emptying every 2-4 hours. Make sure you continue to drink your water as discussed.     Wear all hours that you are active and keep on even with exercise, but never wear at night.     (4 hour max, and never hold if painful.)     Have one day off each week to rest skin and test bladder function. Chart - do bladder chart one day each week for 4 weeks.     After 4 weeks we will review    Use clamp as an aid if very vigorous exercise or special outings, i.e. weddings or when alcohol may be consumed     STOP using clamp long term, temporary aid only.

## 2024-12-12 ENCOUNTER — CLINICAL SUPPORT (OUTPATIENT)
Dept: REHABILITATION | Facility: HOSPITAL | Age: 69
End: 2024-12-12
Payer: MEDICARE

## 2024-12-12 DIAGNOSIS — M62.81 MUSCLE WEAKNESS: ICD-10-CM

## 2024-12-12 DIAGNOSIS — N39.3 MALE URINARY STRESS INCONTINENCE: ICD-10-CM

## 2024-12-12 DIAGNOSIS — M62.89 PELVIC FLOOR DYSFUNCTION: Primary | ICD-10-CM

## 2024-12-12 PROCEDURE — 97112 NEUROMUSCULAR REEDUCATION: CPT | Mod: PO

## 2024-12-12 PROCEDURE — 97530 THERAPEUTIC ACTIVITIES: CPT | Mod: PO

## 2024-12-12 NOTE — PROGRESS NOTES
Pelvic Health Physical Therapy   Treatment Note     Name: Ashish Dave  Clinic Number: [id number]    Therapy Diagnosis:   No diagnosis found.        Physician: Phil Werner MD    Visit Date: 12/12/2024    Physician Orders: PT Eval and Treat   Medical Diagnosis from Referral: BIRGIT (stress urinary incontinence), male [N39.3]   Evaluation Date: 10/28/2024  Authorization Period Expiration: 12-31-24  Plan of Care Expiration: 1/20/2025  Progress Note Due: 1/10/2025  Visit # / Visits authorized: 6/ 12  FOTO: 2/3     Precautions: Standard      Time In: *** am  Time Out: *** am  Total Appointment Time (timed & untimed codes): *** minutes    Subjective     Pt reports: things are pretty much the same. Feels like he reached a certain point. Does find the clamp gives him more freedom. Stays pretty dry at night. If he is active, he still has leakage. If it is a slow day, he won't have the leakage. If he is out and about with yard work, he will just forget to urinate which will then lead to more problems of leakage. If he maintains some sort of Kegel with his activity, he is fine.     He was compliant with home exercise program.  Response to previous treatment: good   Functional change:  ongoing     Pain: 0/10  Location:       Objective     Pelvic exam 12/10/2024:  Muscle Power: 3/5   Muscle Endurance: 16 sec hold (even palpable at anterior pelvic floor)  # Reps To Fatigue: 3  Fast Contractions in 10 seconds: slow and inefficient relaxation - improving                 Quality of contraction: decreased hold   Specificity: patient contracts: adductors, glutes, obliques- continued but improving   Coordination: tends to hold breath during PFM contration - continued   Does Pelvic Floor drop and relax with a diaphragmatic breath? Yes, following cues    Ashish received therapeutic exercises to develop  strength, endurance, ROM, flexibility, posture, and core stabilization for 0 minutes including:       Ashish received the  "following manual therapy techniques: to develop flexibility, extensibility, and desensitization for 0 minutes including:       Ashish participated in neuromuscular re-education activities to develop Coordination, Control, Down training, Proprioception, Kinesthetic, and Sense for 30 minutes including:     Pelvic assessment as noted above to address muscle coordination and control  Side lying adduction + Kegel #2.5 ankle weights 3 x 10, 5"   Transverse abdominis activation   Transverse abdominis with knee extension   Transverse abdominis with bilateral knee fall out +  Wall planks 15 x 10"    Not today:  Wall sits 30 sec x 3   Resisted lateral walks 10 ft x 4   Resisted monster walks fwd and back 10 ft x 4       Ashish participated in dynamic functional therapeutic activities to improve functional performance for 15  minutes, including:     Review of progress, prognosis, HEP, FOTO survey    Home Exercises Provided and Patient Education Provided     Education provided:   - anatomy/physiology of pelvic floor and Coordination of kegels with functional activities such as cough, laugh, sneeze, lift, etc.   Discussed progression of plan of care with patient; educated pt in activity modification; reviewed HEP with pt. Pt demonstrated and verbalized understanding of all instruction and was provided with a handout of HEP (see Patient Instructions).  - home exercise program update     Written Home Exercises Provided: Patient instructed to cont prior HEP.  Exercises were reviewed and Ashish was able to demonstrate them prior to the end of the session.  Ashish demonstrated good  understanding of the education provided.     See EMR under Patient Instructions for exercises provided 11/5/2024.    Assessment     Ashish presents today with continued difficulty managing intra-abdominal pressure with continued breath holding and delayed recruitment of transverse abdominis and bulbospongiosus/superficial transverse perineum. Much " improved with minimal cueing and greatly improving endurance hold. Discussed penile clamp protocol as patient is not allowing his bladder to fill during the day secondary to consistent leakage. He is only feeling the urge to urinate with a full bladder if he is sitting or laying down for prolonged time. Discussed the importance of being intentional during activities to ensure quality pressure management and protection from urinary leakage with increased pelvic activation and coordination, utilizing more of the anterior structural support for urethral closure. Pt will continue to benefit from skilled outpatient physical therapy to address the deficits listed in the problem list box on initial evaluation, provide pt/family education and to maximize pt's level of independence in the home and community environment.     Ashish Is progressing well towards his goals.   Pt prognosis is Good.     Pt will continue to benefit from skilled outpatient physical therapy to address the deficits listed in the problem list box on initial evaluation, provide pt/family education and to maximize pt's level of independence in the home and community environment.     Pt's spiritual, cultural and educational needs considered and pt agreeable to plan of care and goals.     Anticipated barriers to physical therapy: none    Goals:   Short Term Goals: 6 weeks   - Pt will demonstrate excellent knowledge and adherence to HEP to facilitate optimal recovery. ( MET 12/10/2024)  - Pt will demonstrate proper PFM contraction, relaxation, and lengthening coordinated with TA and breath for improved muscle coordination needed for functional activity.( MET 12/10/2024)     Long Term Goals: 12 weeks   - Pt will demonstrate excellent knowledge and adherence to HEP for continued self-maintenance of symptoms.(NOT MET 12/10/2024)  - Pt will report FOTO Urinary Problem score of 56% or better indicating clinically relevant increase in function.(PART MET  12/10/2024)  - Pt will report voiding interval of 2-4 hours for improved ADL tolerance.( MET 12/10/2024) trying to, usually successful   - Pt will report ability to delay urinary urge for at least 15 minutes to maintain continence with ADL/IADLs. (NOT MET 12/10/2024)  - Pt will report 60-75% improvement in  urinary incontinence symptoms for improved hygiene and ADL/IADL tolerance. (PART MET 12/10/2024)  - Pt will report needing </= 1 pad/day indicating improved PFM function needed to maintain continence(NOT MET 12/10/2024)  - Pt will demonstrate PFM strength of at least 3/5 MMT for improved strength needed to maintain continence. ( MET 12/10/2024)  - Pt will report bearing down appropriately 100% of the time for improved bowel function and decreased stress on adjacent pelvic structures. ( MET 12/10/2024)  - Pt will demonstrate independence with pressure-management strategies to decreased stress on adjacent pelvic structures. (PART MET 12/10/2024) not always consistent     Plan     Plan of care Certification: 10/28/2024 to 1/20/2024.     Outpatient Physical Therapy 1-2 times weekly for 12 weeks    Next visit: use RUSI to assess TrA and pelvic floor synergy. Transperineal ultrasound to assess urethral closure.     Summer Edge, PT

## 2024-12-12 NOTE — PROGRESS NOTES
Pelvic Health Physical Therapy   Treatment Note     Name: Ashish Dave  Clinic Number: [id number]    Therapy Diagnosis:   Encounter Diagnoses   Name Primary?    Pelvic floor dysfunction Yes    Male urinary stress incontinence     Muscle weakness        Physician: Phil Werner MD    Visit Date: 12/12/2024    Physician Orders: PT Eval and Treat   Medical Diagnosis from Referral: BIRGIT (stress urinary incontinence), male [N39.3]   Evaluation Date: 10/28/2024  Authorization Period Expiration: 12-31-24  Plan of Care Expiration: 1/20/2025  Progress Note Due: 1/10/2025  Visit # / Visits authorized: 6/ 12  FOTO: 2/3     Precautions: Standard      Time In: 7:36 am  Time Out: 8: 30 am  Total Appointment Time (timed & untimed codes): 54 minutes    Subjective     Pt reports: feels like he may not be emptying his bladder all the way. Started using the clamp to start with the new protocol. Going to urinate every 2-4 hours and not wearing it at night. Still fine at night time. Able to also make it in the morning to the bathroom.     He was compliant with home exercise program.  Response to previous treatment: good   Functional change:  ongoing     Pain: 0/10  Location:       Objective     Pelvic exam 12/10/2024:  Muscle Power: 3/5   Muscle Endurance: 16 sec hold (even palpable at anterior pelvic floor)  # Reps To Fatigue: 3  Fast Contractions in 10 seconds: slow and inefficient relaxation - improving                 Quality of contraction: decreased hold   Specificity: patient contracts: adductors, glutes, obliques- continued but improving   Coordination: tends to hold breath during PFM contration - continued   Does Pelvic Floor drop and relax with a diaphragmatic breath? Yes, following cues    Ashish received therapeutic exercises to develop  strength, endurance, ROM, flexibility, posture, and core stabilization for 0 minutes including:       Ashish received the following manual therapy techniques: to develop  "flexibility, extensibility, and desensitization for 0 minutes including:       Ashish participated in neuromuscular re-education activities to develop Coordination, Control, Down training, Proprioception, Kinesthetic, and Sense for 44 minutes including:     Quadruped Cat/Cows, allowing for complete abdominal expansion  Quadruped transverse abdominis activation 10 x 10"  Quadruped bear hovers + ball squeeze 10 x 10"  Wall sits 20 sec x 4  Standing rows + Kegel, blue theraband 2 x 10, 5"  Resisted lateral walks with hip hinge 10 ft x 4 , green ankle band  Resisted monster walks fwd and back with hip hinge 10 ft x 4,  green ankle band      Not today:  Side lying adduction + Kegel #2.5 ankle weights 3 x 10, 5"   Transverse abdominis with knee extension   Transverse abdominis with bilateral knee fall out +  Wall planks 15 x 10"        Ashish participated in dynamic functional therapeutic activities to improve functional performance for 10  minutes, including:     Dead lifting #30 DB , 3 x 10    Home Exercises Provided and Patient Education Provided     Education provided:   - anatomy/physiology of pelvic floor and Coordination of kegels with functional activities such as cough, laugh, sneeze, lift, etc.   Discussed progression of plan of care with patient; educated pt in activity modification; reviewed HEP with pt. Pt demonstrated and verbalized understanding of all instruction and was provided with a handout of HEP (see Patient Instructions).  - home exercise program update     Written Home Exercises Provided: Patient instructed to cont prior HEP.  Exercises were reviewed and Ashish was able to demonstrate them prior to the end of the session.  Ashish demonstrated good  understanding of the education provided.     See EMR under Patient Instructions for exercises provided 11/5/2024.    Assessment     Ashish tolerated all task progressions very well despite decreased R knee mobility into flexion. Most urinary leakage " noted with stretch to pelvic floor such as in the squatting positions. Frequent verbal cues to avoid breath holding. Pt will continue to benefit from skilled outpatient physical therapy to address the deficits listed in the problem list box on initial evaluation, provide pt/family education and to maximize pt's level of independence in the home and community environment.     Ashish Is progressing well towards his goals.   Pt prognosis is Good.     Pt will continue to benefit from skilled outpatient physical therapy to address the deficits listed in the problem list box on initial evaluation, provide pt/family education and to maximize pt's level of independence in the home and community environment.     Pt's spiritual, cultural and educational needs considered and pt agreeable to plan of care and goals.     Anticipated barriers to physical therapy: none    Goals:   Short Term Goals: 6 weeks   - Pt will demonstrate excellent knowledge and adherence to HEP to facilitate optimal recovery. ( MET 12/10/2024)  - Pt will demonstrate proper PFM contraction, relaxation, and lengthening coordinated with TA and breath for improved muscle coordination needed for functional activity.( MET 12/10/2024)     Long Term Goals: 12 weeks   - Pt will demonstrate excellent knowledge and adherence to HEP for continued self-maintenance of symptoms.(NOT MET 12/10/2024)  - Pt will report FOTO Urinary Problem score of 56% or better indicating clinically relevant increase in function.(PART MET 12/10/2024)  - Pt will report voiding interval of 2-4 hours for improved ADL tolerance.( MET 12/10/2024) trying to, usually successful   - Pt will report ability to delay urinary urge for at least 15 minutes to maintain continence with ADL/IADLs. (NOT MET 12/10/2024)  - Pt will report 60-75% improvement in  urinary incontinence symptoms for improved hygiene and ADL/IADL tolerance. (PART MET 12/10/2024)  - Pt will report needing </= 1 pad/day indicating  improved PFM function needed to maintain continence(NOT MET 12/10/2024)  - Pt will demonstrate PFM strength of at least 3/5 MMT for improved strength needed to maintain continence. ( MET 12/10/2024)  - Pt will report bearing down appropriately 100% of the time for improved bowel function and decreased stress on adjacent pelvic structures. ( MET 12/10/2024)  - Pt will demonstrate independence with pressure-management strategies to decreased stress on adjacent pelvic structures. (PART MET 12/10/2024) not always consistent     Plan     Plan of care Certification: 10/28/2024 to 1/20/2024.     Outpatient Physical Therapy 1-2 times weekly for 12 weeks    Next visit: use RUSI to assess TrA and pelvic floor synergy. Transperineal ultrasound to assess urethral closure. Assess complete emptying    Summer Edge, PT

## 2024-12-12 NOTE — PATIENT INSTRUCTIONS
Patient: Marly Neal Date: 2024   : 1970 Attending: Evangelist Pinto MD   54 year old female        Chief complaint: CKD, CHF decompensation, biventricular heart failure    Subjective:    Sitting in bedside chair  Complaining about back pain  \" I am urinating a lot\"  Urine output more than 6 litres    Vital Last Value 24 Hour Range   Temperature 98.2 °F (36.8 °C) (24 0900) Temp  Min: 98.1 °F (36.7 °C)  Max: 99.1 °F (37.3 °C)   Pulse 86 (24 09) Pulse  Min: 86  Max: 102   Respiratory 19 (24) Resp  Min: 2  Max: 32   Non-Invasive  Blood Pressure 118/80 (24 0400) No data recorded   Arterial   Blood Pressure 92/69 (24 09) Arterial Line BP  Min: 74/63  Max: 104/79   PulseOximetry 91 % (24) SpO2  Min: 91 %  Max: 99 %     Vital Today Admitted   Weight 99.6 kg (219 lb 9.3 oz) (24 0600) Weight: 109.3 kg (240 lb 15.4 oz) (24 151)   Height N/A Height: 5' 3.6\" (161.5 cm) (24 151)   BMI N/A BMI (Calculated): 41.88 (24 1518)     Weight over the past 48 Hours:  Patient Vitals for the past 48 hrs:   Weight   24 0558 104.1 kg (229 lb 8 oz)   24 0600 99.6 kg (219 lb 9.3 oz)       Intake/Output:    Last Stool Occurrence: 1 (240)    I/O this shift:  In: 660 [P.O.:660]  Out: 405 [Urine:405]    I/O last 3 completed shifts:  In: 2214 [P.O.:1250; I.V.:964]  Out: 6495 [Urine:6495]      Intake/Output Summary (Last 24 hours) at 2024 1004  Last data filed at 2024 0900  Gross per 24 hour   Intake 2754 ml   Output 5925 ml   Net -3171 ml       Medications/Infusions:  Scheduled:   Current Facility-Administered Medications   Medication Dose Route Frequency Provider Last Rate Last Admin    lidocaine (LIDOCARE) 4 % patch 1 patch  1 patch Transdermal Daily Chris Richard MD        insulin glargine (LANTUS) injection 39 Units  39 Units Subcutaneous Nightly Shazia Escobar MD   39 Units at 24    insulin lispro  Home Exercise Program: 12/12/2024    Try sitting down this week to see if this allows you to more completely empty your bladder    DOUBLE VOIDING - Double voiding is a technique that may assist the bladder to empty more effectively when  urine is left in the bladder. It involves passing  urine more than once each time that you go to  the toilet. This makes sure that the bladder is  completely empty.    Here are 3 strategies you can try to fully empty your bladder.  You do not have to do all of these things every single time. Find which ones work best for you.     Check to make sure your pelvic floor is relaxed   Do a body scan - make sure your legs, buttocks, and abdominals are relaxed.  Take a couple deep, slow breaths to encourage your pelvic floor muscles to DROP (ie. Try Diaphragmatic Breathing).  Gently apply pressure over your bladder.  Change your pelvic position: lean forward, rock your pelvis forward and backward, stand up then sit back down.     Wait at least 30 seconds to 1 minute to see if a second urine stream begins.     Do not stop your urine stream or push/strain!         (ADMELOG,HumaLOG) - Correction Dose   Subcutaneous Nightly Shazia Escobar MD   2 Units at 09/28/24 2000    ferrous sulfate (65 mg Fe per 325 mg) tablet 325 mg  325 mg Oral BID  Chris Richard MD   325 mg at 09/29/24 0829    insulin lispro (ADMELOG,HumaLOG) - Scheduled Mealtime Dose   Subcutaneous TID Shazia Bauer MD   18 Units at 09/29/24 0809    insulin lispro (ADMELOG,HumaLOG) - Correction Dose   Subcutaneous TID Shazia Bauer MD   2 Units at 09/29/24 0809    melatonin tablet 6 mg  6 mg Oral Nightly Chris Richard MD   6 mg at 09/28/24 2000    B complex-vitamin C-folic acid (NEPHRO-JAYME) tablet 0.8 mg  1 tablet Oral Daily Chris Richard MD   0.8 mg at 09/29/24 0836    AMIODarone (PACERONE) tablet 200 mg  200 mg Oral 2 times per day Ramila Harrell APNP   200 mg at 09/29/24 0829    hydrALAZINE (APRESOLINE) tablet 25 mg  25 mg Oral TID Lenora Crawford APNP   25 mg at 09/29/24 0829    acetaminophen (TYLENOL) tablet 1,000 mg  1,000 mg Oral 3 times per day Tammy Parker PA-C   1,000 mg at 09/28/24 2100    docusate sodium-sennosides (SENOKOT S) 50-8.6 MG 2 tablet  2 tablet Oral Nightly Chris Richard MD   2 tablet at 09/25/24 2100    Potassium Standard Replacement Protocol (Levels 3.5 and lower)   Does not apply See Admin Instructions Cade Rinaldi MD        Potassium Replacement (Levels 3.6 - 4)   Does not apply See Admin Instructions Cade Rinaldi MD        Magnesium Standard Replacement Protocol   Does not apply See Admin Instructions Cade Rinaldi MD        Phosphorus Standard Replacement Protocol   Does not apply See Admin Instructions Cade Rinaldi MD        sodium chloride 0.9 % injection 2 mL  2 mL Intracatheter 2 times per day Ramos Mason MD   2 mL at 09/28/24 2000    pantoprazole (PROTONIX) EC tablet 40 mg  40 mg Oral Nightly Ramos Mason MD   40 mg at 09/28/24 2000    [Held by provider] aspirin (ECOTRIN) enteric coated tablet  81 mg  81 mg Oral Daily Ramos Mason MD   81 mg at 09/27/24 0902    potassium CHLORIDE ER tablet 40 mEq  40 mEq Oral BID WC Ramos Mason MD   40 mEq at 09/29/24 0829    pravastatin (PRAVACHOL) tablet 80 mg  80 mg Oral Nightly Ramos Mason MD   80 mg at 09/28/24 2000    spironolactone (ALDACTONE) tablet 25 mg  25 mg Oral Daily Dimple Pedro PA-C   25 mg at 09/29/24 0829       Continuous Infusions:  Current Facility-Administered Medications   Medication Dose Route Frequency Provider Last Rate Last Admin    milrinone (PRIMACOR) 40 mg/100 mL in dextrose 5 % infusion  0.5 mcg/kg/min (Dosing Weight) Intravenous Continuous Chris Richard MD 8.2 mL/hr at 09/29/24 0800 0.5 mcg/kg/min at 09/29/24 0800    heparin (porcine) 25,000 units/250 mL in dextrose 5 % infusion  1-30 Units/kg/hr (Dosing Weight) Intravenous Continuous Ramila Harrell APNP 15.3 mL/hr at 09/29/24 0846 14 Units/kg/hr at 09/29/24 0846    sodium bicarbonate 8.4 % 25 mEq in dextrose 5 % 1,000 mL Impella PURGE SOLUTION   Intracatheter Continuous Tammy Parker PA-C 10.8 mL/hr at 09/29/24 0800 Rate Change at 09/29/24 0800    sodium chloride 0.9% infusion   Intravenous Continuous PRN Evangelist Pinto MD   Stopped at 09/28/24 0025    sodium chloride 0.9% infusion   Intravenous Continuous PRN Joe Espino MD   Completed at 09/24/24 1220    bumetanide (BUMEX) 12.5 mg/50 mL infusion adult or ped > 15 kg (0.25 mg/mL)  2.5 mg/hr Intravenous Continuous Isi Cheney MD 10 mL/hr at 09/29/24 0827 2.5 mg/hr at 09/29/24 0827       Physical Exam:  GEN: NAD, morbid obese  HEENT: NC/AT. anicteric  NECK: full, lines in place  CV: nl s1 s2 no rub noted. +pitting edema BLE   PULM: No IWOB. CTA.   ABD: Soft, NT, ND. +BS  MSK: No clubbing/cyanosis. All four ext present  : Lei yellow urine  DERM: No new rashes or lesions noted exposed areas. Warm/dry  NEURO / PSYCH: CN II-XII grossly intact. Appropriate affect. Oriented x 3.    Laboratory Results:    Recent  Labs   Lab 09/29/24  0511 09/28/24  1504 09/28/24  1143 09/28/24  0537 09/27/24  1709 09/27/24  0613 09/26/24  1613 09/26/24  0759 09/26/24  0356 09/25/24  2213 09/25/24  1619 09/25/24  0408 09/24/24 2026 09/24/24  1349 09/24/24  1245 09/24/24  1235 09/24/24  1145   SODIUM 130* 130*  --  130*   < > 133*   < >  --    < > 131*  --    < >  --   --   --  133*  --    POTASSIUM 3.8 4.0 4.3 3.8   < > 3.4   < >  --    < > 4.2  --    < >  --   --   --  3.5  --    CHLORIDE 94* 94*  --  94*   < > 98   < >  --    < > 101  --    < >  --   --   --  102  --    CO2 29 29  --  27   < > 27   < >  --    < > 22  --    < >  --   --   --  24  --    ANIONGAP 11 11  --  13   < > 11   < >  --    < > 12  --    < >  --   --   --  11  --    BUN 34*  --   --  32*  --  28*  --   --    < >  --   --    < >  --   --   --  14  --    CREATININE 1.35*  --   --  1.30*  --  1.34*  --   --    < >  --   --    < >  --   --   --  1.45*  --    CALCIUM 9.8  --   --  9.4  --  9.7  --   --    < >  --   --    < >  --   --   --  8.1*  --    CHIDI  --   --   --   --   --   --   --   --   --   --   --   --   --   --   --  1.04*  --    GLUCOSE 158*  --   --  200*  --  103*  --   --    < >  --   --    < >  --   --   --  190*  --    APH  --   --   --   --   --   --   --   --   --   --   --   --  7.43  --  7.41  --  7.41   APCO2  --   --   --   --   --   --   --   --   --   --   --   --  31*  --  37  --  36   APO2  --   --   --   --   --   --   --   --   --   --   --   --  103  --  140*  --  103   AHCO3  --   --   --   --   --   --   --   --   --   --   --   --  21*  --  24  --  23   ASAT  --   --   --   --   --   --   --   --   --   --   --   --  98  --  99  --  99   *  --   --  429*  --  380*  --   --    < >  --   --    < >  --   --   --   --   --    CPK  --   --   --   --   --   --   --  167  --  207* 215*   < >  --    < >  --   --   --    WBC 9.8  --   --  7.5  --  8.7  --   --    < >  --   --    < >  --   --   --  7.8  --    HCT 35.1*  --   --  34.2*  --   37.0  --   --    < >  --   --    < >  --   --   --  36.1  --     < > = values in this interval not displayed.     Recent Labs   Lab 09/29/24  0511 09/28/24  0636 09/28/24  0537 09/27/24  1222 09/27/24  0613 09/26/24  1613 09/26/24  0356 09/25/24  1619 09/25/24  0919 09/25/24  0408 09/24/24  1349 09/24/24  1235 09/24/24  0446 09/24/24 0446   HGB 11.1*  --  10.7*  --  11.0*  --  10.8*  --   --  10.3*  --  11.0*  --  10.7*     --  172  --  184  --  196  --   --  181  --  196  --  206   INR  --   --   --   --   --   --   --   --  1.2  --   --  1.3  --  1.2   PTT 54* 51*  --  51* 56*   < > 25   < > 25  --   --  46*   < >  --    MG 1.9  --  1.9  --  2.0  --   --   --   --   --   --  1.8   < >  --    PHOS 4.4  --  4.4  --   --   --   --   --   --   --   --  2.6  --   --    ALKPT 177*  --  186*  --  169*  --  195*  --   --  191*   < > 220*  --   --    VB12  --   --   --   --   --   --  795  --   --   --   --   --   --   --    BILIRUBIN 1.1*  --  1.1*  --  1.4*  --  0.9  --   --  1.2*   < > 1.7*  --   --    AST 28  --  19  --  20  --  19  --   --  18   < > 19  --   --    GPT 19  --  16  --  17  --  18  --   --  23   < > 30  --   --    ALBUMIN 3.4  --  3.3*  --  3.4  --  3.3*  --   --  3.0*   < > 3.1*  --   --    LACTA  --   --   --   --   --   --  1.1  --   --  1.4  --   --   --   --    PST  --   --   --   --   --   --  4*  --   --   --   --   --   --   --    FERR  --   --   --   --   --   --  31  --   --   --   --   --   --   --     < > = values in this interval not displayed.       Urine Panel  Lab Results   Component Value Date    FIFI 50 09/21/2024    UKET Negative 09/19/2024    USPG 1.009 09/19/2024    UPROT Negative 09/19/2024    UWBC Negative 09/19/2024    URBC Trace (A) 09/19/2024    UBILI Negative 09/19/2024    UPH 5.0 09/19/2024    UROB 0.2 09/19/2024    UBACTR FEW (A) 06/15/2012       Diagnosis/Plan:  -CKD stage III. Baseline SCr 1.2-1.3.  -acute kidney injury  -cardiorenal syndrome  -decompensated bi-V  failure with severe elevated LV/RV pressures 2/2 NICM. Diuresing well. Bumex 2mg / hr  -cardiogenic shock  -hyponatremia  -hypokalemia, supplemented     PLAN:    Decrease bumex gtt to 2.0 mg/hr  Discussed with justin, and Dr Richard  Check labs in am      Isi Cheney MD   [phone]

## 2024-12-15 ENCOUNTER — NURSE TRIAGE (OUTPATIENT)
Dept: ADMINISTRATIVE | Facility: CLINIC | Age: 69
End: 2024-12-15
Payer: MEDICARE

## 2024-12-15 NOTE — TELEPHONE ENCOUNTER
"Pt c/o pain from left buttocks to calf since Friday. Pt states that he believes he is having sciatic pain. States that he is having problems sleeping. Rates pain 6/10 and states that he took 3 Aleve. Advised to be seen within 3 days per protocol. VU. Pt states that he is going to take a Norco 5-325 mg and asking if med has interaction with other meds. Advised to f/u with Pharmacist for medication interactions per protocol. VU. Appt scheduled for 12/16/24 at 1040. Encounter routed to provider.     Reason for Disposition   [1] MODERATE pain (e.g., interferes with normal activities, limping) AND [2] present > 3 days    Additional Information   Negative: Looks like a broken bone or dislocated joint (e.g., crooked or deformed)   Negative: Sounds like a life-threatening emergency to the triager   Negative: [1] SEVERE pain (e.g., excruciating, unable to do any normal activities) AND [2] fever   Negative: Can't stand (bear weight) or walk   Negative: [1] Red area or streak AND [2] fever   Negative: Patient sounds very sick or weak to the triager   Negative: [1] SEVERE pain (e.g., excruciating, unable to do any normal activities) AND [2] not improved after 2 hours of pain medicine   Negative: [1] Looks infected (e.g., spreading redness, pus) AND [2] large red area (> 2 in. or 5 cm)   Negative: [1] Painful rash AND [2] multiple small blisters grouped together (i.e., dermatomal distribution or "band" or "stripe")   Negative: Looks like a boil, infected sore, or deep ulcer   Negative: [1] Localized rash is very painful AND [2] no fever   Negative: [1] Redness of the skin AND [2] no fever   Negative: Numbness in a leg or foot (i.e., loss of sensation)    Protocols used: Hip Pain-A-AH    "

## 2024-12-16 ENCOUNTER — OFFICE VISIT (OUTPATIENT)
Dept: FAMILY MEDICINE | Facility: CLINIC | Age: 69
End: 2024-12-16
Payer: MEDICARE

## 2024-12-16 VITALS
BODY MASS INDEX: 25.72 KG/M2 | SYSTOLIC BLOOD PRESSURE: 122 MMHG | WEIGHT: 179.63 LBS | OXYGEN SATURATION: 97 % | HEIGHT: 70 IN | DIASTOLIC BLOOD PRESSURE: 68 MMHG | HEART RATE: 72 BPM

## 2024-12-16 DIAGNOSIS — M54.30 SCIATICA, UNSPECIFIED LATERALITY: ICD-10-CM

## 2024-12-16 DIAGNOSIS — C61 PROSTATE CANCER: Primary | ICD-10-CM

## 2024-12-16 DIAGNOSIS — M62.89 PELVIC FLOOR DYSFUNCTION: ICD-10-CM

## 2024-12-16 DIAGNOSIS — M54.9 BACK PAIN, UNSPECIFIED BACK LOCATION, UNSPECIFIED BACK PAIN LATERALITY, UNSPECIFIED CHRONICITY: ICD-10-CM

## 2024-12-16 PROCEDURE — 99214 OFFICE O/P EST MOD 30 MIN: CPT | Mod: S$GLB,,, | Performed by: NURSE PRACTITIONER

## 2024-12-16 RX ORDER — TIZANIDINE 4 MG/1
4 TABLET ORAL EVERY 8 HOURS
Qty: 30 TABLET | Refills: 0 | Status: SHIPPED | OUTPATIENT
Start: 2024-12-16 | End: 2024-12-26

## 2024-12-16 RX ORDER — METHYLPREDNISOLONE 4 MG/1
TABLET ORAL
Qty: 21 EACH | Refills: 0 | Status: SHIPPED | OUTPATIENT
Start: 2024-12-16 | End: 2025-01-06

## 2024-12-16 RX ORDER — HYDROCODONE BITARTRATE AND ACETAMINOPHEN 5; 325 MG/1; MG/1
1 TABLET ORAL EVERY 8 HOURS PRN
Qty: 20 TABLET | Refills: 0 | Status: SHIPPED | OUTPATIENT
Start: 2024-12-16

## 2024-12-16 RX ORDER — KETOROLAC TROMETHAMINE 30 MG/ML
30 INJECTION, SOLUTION INTRAMUSCULAR; INTRAVENOUS
Status: SHIPPED | OUTPATIENT
Start: 2024-12-16 | End: 2024-12-19

## 2024-12-17 ENCOUNTER — TELEPHONE (OUTPATIENT)
Dept: FAMILY MEDICINE | Facility: CLINIC | Age: 69
End: 2024-12-17
Payer: MEDICARE

## 2024-12-17 NOTE — TELEPHONE ENCOUNTER
Spoke to pt and he was calling to make an appointment for daughter. Message sent in daughters chart

## 2024-12-17 NOTE — TELEPHONE ENCOUNTER
----- Message from Janell sent at 12/17/2024  9:38 AM CST -----  - 8:37- pt is asking to talk to nurse     [phone]

## 2024-12-23 NOTE — PROGRESS NOTES
SUBJECTIVE:    Patient ID: Ashish Dave is a 69 y.o. male.    Chief Complaint: Leg Pain (No bottles//Pt is here for left buttocks and leg pain times 3 days-pt has been going to rehab for pelvic exercises and had an intense workout and he thinks that triggered the pain but not sure//KE)    History of Present Illness    CHIEF COMPLAINT:  69 year old male presents today for urgent visit. He is complaining of sciatic nerve pain.    ACUTE SCIATIC PAIN:  He developed sciatic nerve pain Friday evening following an intense pelvic floor rehabilitation workout. Pain is constant, radiating from the middle of the left buttock down the left leg to the calf. The affected area is tender to palpation and feels similar to a bruise. Pain severity disrupted sleep on Friday and Saturday nights. He reports some symptom improvement with Celecoxib, massage therapy, and TENS unit application.    CHRONIC BACK PAIN:  He reports chronic lower back pain for the past couple years, present throughout the day with increased severity upon waking, particularly when sleeping in prone position. Pain radiates down left leg. He notes improvement with Celecoxib, massage device, and TENS unit application.    SURGICAL HISTORY:  He underwent prostatectomy with lymph node dissection on January 3rd.    CURRENT MEDICATIONS:  He takes Oxybutynin.      ROS:  General: -fever, -chills, -fatigue, -weight gain, -weight loss  Eyes: -vision changes, -redness, -discharge  ENT: -ear pain, -nasal congestion, -sore throat  Cardiovascular: -chest pain, -palpitations, -lower extremity edema  Respiratory: -cough, -shortness of breath  Gastrointestinal: -abdominal pain, -nausea, -vomiting, -diarrhea, -constipation, -blood in stool  Genitourinary: -dysuria, -hematuria, -frequency  Musculoskeletal: -joint pain, -muscle pain, +back pain  Skin: -rash, -lesion  Neurological: -headache, -dizziness, -numbness, -tingling  Psychiatric: -anxiety, -depression, +sleep difficulty          Office Visit on 10/22/2024   Component Date Value Ref Range Status    Color, POC UA 10/22/2024 Yellow  Yellow, Straw, Colorless Final    Clarity, POC UA 10/22/2024 Clear  Clear Final    Glucose, POC UA 10/22/2024 Negative  Negative Final    Bilirubin, POC UA 10/22/2024 Negative  Negative Final    Ketones, POC UA 10/22/2024 Negative  Negative Final    Spec Grav POC UA 10/22/2024 1.020  1.005 - 1.030 Final    Blood, POC UA 10/22/2024 Negative  Negative Final    pH, POC UA 10/22/2024 7.0  5.0 - 8.0 Final    Protein, POC UA 10/22/2024 Negative  Negative Final    Urobilinogen, POC UA 10/22/2024 0.2  <=1.0 Final    Nitrite, POC UA 10/22/2024 Negative  Negative Final    WBC, POC UA 10/22/2024 Negative  Negative Final   Lab Visit on 10/18/2024   Component Date Value Ref Range Status    PSA Diagnostic 10/18/2024 <0.01  0.00 - 4.00 ng/mL Final   Admission on 07/21/2024, Discharged on 07/21/2024   Component Date Value Ref Range Status    Magnesium 07/21/2024 2.1  1.6 - 2.6 mg/dL Final    QRS Duration 07/21/2024 94  ms Final    [facility] QTC Calculation 07/21/2024 426  ms Final    WBC 07/21/2024 8.05  3.90 - 12.70 K/uL Final    RBC 07/21/2024 4.75  4.60 - 6.20 M/uL Final    Hemoglobin 07/21/2024 14.4  14.0 - 18.0 g/dL Final    Hematocrit 07/21/2024 43.3  40.0 - 54.0 % Final    MCV 07/21/2024 91  82 - 98 fL Final    MCH 07/21/2024 30.3  27.0 - 31.0 pg Final    MCHC 07/21/2024 33.3  32.0 - 36.0 g/dL Final    RDW 07/21/2024 14.3  11.5 - 14.5 % Final    Platelets 07/21/2024 198  150 - 450 K/uL Final    MPV 07/21/2024 10.4  9.2 - 12.9 fL Final    Immature Granulocytes 07/21/2024 0.4  0.0 - 0.5 % Final    Gran # (ANC) 07/21/2024 6.4  1.8 - 7.7 K/uL Final    Immature Grans (Abs) 07/21/2024 0.03  0.00 - 0.04 K/uL Final    Lymph # 07/21/2024 1.1  1.0 - 4.8 K/uL Final    Mono # 07/21/2024 0.4  0.3 - 1.0 K/uL Final    Eos # 07/21/2024 0.0  0.0 - 0.5 K/uL Final    Baso # 07/21/2024 0.07  0.00 - 0.20 K/uL Final    nRBC 07/21/2024 0  0 /100  WBC Final    Gran % 07/21/2024 80.0 (H)  38.0 - 73.0 % Final    Lymph % 07/21/2024 14.0 (L)  18.0 - 48.0 % Final    Mono % 07/21/2024 4.3  4.0 - 15.0 % Final    Eosinophil % 07/21/2024 0.4  0.0 - 8.0 % Final    Basophil % 07/21/2024 0.9  0.0 - 1.9 % Final    Differential Method 07/21/2024 Automated   Final    Sodium 07/21/2024 138  136 - 145 mmol/L Final    Potassium 07/21/2024 3.7  3.5 - 5.1 mmol/L Final    Chloride 07/21/2024 104  95 - 110 mmol/L Final    CO2 07/21/2024 28  23 - 29 mmol/L Final    Glucose 07/21/2024 139 (H)  70 - 110 mg/dL Final    BUN 07/21/2024 17  8 - 23 mg/dL Final    Creatinine 07/21/2024 0.8  0.5 - 1.4 mg/dL Final    Calcium 07/21/2024 9.2  8.7 - 10.5 mg/dL Final    Total Protein 07/21/2024 7.1  6.0 - 8.4 g/dL Final    Albumin 07/21/2024 4.5  3.5 - 5.2 g/dL Final    Total Bilirubin 07/21/2024 0.8  0.1 - 1.0 mg/dL Final    Alkaline Phosphatase 07/21/2024 48 (L)  55 - 135 U/L Final    AST 07/21/2024 22  10 - 40 U/L Final    ALT 07/21/2024 14  10 - 44 U/L Final    eGFR 07/21/2024 >60.0  >60 mL/min/1.73 m^2 Final    Anion Gap 07/21/2024 6 (L)  8 - 16 mmol/L Final    Troponin I High Sensitivity 07/21/2024 4.8  0.0 - 14.9 pg/mL Final    BNP 07/21/2024 49  0 - 99 pg/mL Final   Office Visit on 07/12/2024   Component Date Value Ref Range Status    Color, POC UA 07/12/2024 Yellow  Yellow, Straw, Colorless Final    Clarity, POC UA 07/12/2024 Clear  Clear Final    Glucose, POC UA 07/12/2024 Negative  Negative Final    Bilirubin, POC UA 07/12/2024 Negative  Negative Final    Ketones, POC UA 07/12/2024 Negative  Negative Final    Spec Grav POC UA 07/12/2024 1.020  1.005 - 1.030 Final    Blood, POC UA 07/12/2024 Negative  Negative Final    pH, POC UA 07/12/2024 5.0  5.0 - 8.0 Final    Protein, POC UA 07/12/2024 Negative  Negative Final    Urobilinogen, POC UA 07/12/2024 0.2  <=1.0 Final    Nitrite, POC UA 07/12/2024 Negative  Negative Final    WBC, POC UA 07/12/2024 Negative  Negative Final   Lab Visit on  07/05/2024   Component Date Value Ref Range Status    PSA Diagnostic 07/05/2024 <0.01  0.00 - 4.00 ng/mL Final       Past Medical History:   Diagnosis Date    Arthritis     Cancer 12/2023    PROSTATE    Chronic calculous cholecystitis 08/12/2019    COVID-19     Essential (primary) hypertension 03/08/2016    GERD (gastroesophageal reflux disease)      Social History     Socioeconomic History    Marital status:    Tobacco Use    Smoking status: Never    Smokeless tobacco: Never   Substance and Sexual Activity    Alcohol use: Not Currently    Drug use: Never     Social Drivers of Health     Financial Resource Strain: Low Risk  (12/15/2024)    Overall Financial Resource Strain (CARDIA)     Difficulty of Paying Living Expenses: Not hard at all   Food Insecurity: No Food Insecurity (12/15/2024)    Hunger Vital Sign     Worried About Running Out of Food in the Last Year: Never true     Ran Out of Food in the Last Year: Never true   Transportation Needs: No Transportation Needs (11/11/2023)    PRAPARE - Transportation     Lack of Transportation (Medical): No     Lack of Transportation (Non-Medical): No   Physical Activity: Insufficiently Active (12/15/2024)    Exercise Vital Sign     Days of Exercise per Week: 2 days     Minutes of Exercise per Session: 60 min   Stress: No Stress Concern Present (12/15/2024)    [employer] - Occupational Stress Questionnaire     Feeling of Stress : Only a little   Housing Stability: Low Risk  (11/11/2023)    Housing Stability Vital Sign     Unable to Pay for Housing in the Last Year: No     Number of Places Lived in the Last Year: 1     Unstable Housing in the Last Year: No     Past Surgical History:   Procedure Laterality Date    CHOLECYSTECTOMY      COLONOSCOPY  2016    Dr Saldaña- rtc 5 yr    ESOPHAGOGASTRODUODENOSCOPY N/A 6/6/2022    Procedure: EGD (ESOPHAGOGASTRODUODENOSCOPY);  Surgeon: Pj Syed MD;  Location: [facility];  Service: Endoscopy;   Laterality: N/A;    GALLBLADDER SURGERY  08/2019    KNEE SURGERY Right     x's3    LAPAROSCOPIC CHOLECYSTECTOMY N/A 8/12/2019    Procedure: CHOLECYSTECTOMY, LAPAROSCOPIC;  Surgeon: Alphonso Freeman III, MD;  Location: [facility] OR;  Service: General;  Laterality: N/A;    ROBOT-ASSISTED LAPAROSCOPIC PELVIC LYMPHADENECTOMY N/A 1/3/2024    Procedure: ROBOTIC LYMPHADENECTOMY, PELVIS;  Surgeon: Phil Werner MD;  Location: [facility] OR;  Service: Urology;  Laterality: N/A;    ROBOT-ASSISTED LAPAROSCOPIC PROSTATECTOMY N/A 1/3/2024    Procedure: ROBOTIC PROSTATECTOMY;  Surgeon: Phil Werner MD;  Location: [facility] OR;  Service: Urology;  Laterality: N/A;    UPPER GASTROINTESTINAL ENDOSCOPY       Family History   Problem Relation Name Age of Onset    Stroke Mother      No Known Problems Father      Ulcerative colitis Son      Colon cancer Neg Hx      Colon polyps Neg Hx      Crohn's disease Neg Hx      Esophageal cancer Neg Hx      Stomach cancer Neg Hx         All of your core healthy metrics are met.      Review of patient's allergies indicates:   Allergen Reactions    Adhesive Other (See Comments)     SKIN WAS RED       Current Outpatient Medications:     ASCORBIC ACID, VITAMIN C, ORAL, Take 4,000 mg by mouth 2 (two) times a day., Disp: , Rfl:     azelastine (ASTELIN) 137 mcg (0.1 %) nasal spray, 1 spray (137 mcg total) by Nasal route 2 (two) times daily., Disp: 30 mL, Rfl: 0    HYDROcodone-acetaminophen (NORCO) 5-325 mg per tablet, Take 1 tablet by mouth every 8 (eight) hours as needed for Pain., Disp: 20 tablet, Rfl: 0    meclizine (ANTIVERT) 25 mg tablet, Take 1 tablet (25 mg total) by mouth 3 (three) times daily as needed., Disp: 20 tablet, Rfl: 0    methylPREDNISolone (MEDROL DOSEPACK) 4 mg tablet, use as directed, Disp: 21 each, Rfl: 0    ondansetron (ZOFRAN) 4 MG tablet, Take 1 tablet (4 mg total) by mouth every 6 (six) hours as needed., Disp: 20 tablet, Rfl: 1    oxybutynin (DITROPAN-XL) 10 MG 24 hr tablet,  "Take 1 tablet (10 mg total) by mouth once daily., Disp: 30 tablet, Rfl: 11    tiZANidine (ZANAFLEX) 4 MG tablet, Take 1 tablet (4 mg total) by mouth every 8 (eight) hours. for 10 days, Disp: 30 tablet, Rfl: 0    vitamin D (VITAMIN D3) 1000 units Tab, Take 4,000 Units by mouth once daily., Disp: , Rfl:     Objective:      Vitals:    12/16/24 1032   BP: 122/68   Pulse: 72   SpO2: 97%   Weight: 81.5 kg (179 lb 9.6 oz)   Height: 5' 10" (1.778 m)     Physical Exam    General: No acute distress. Well-developed. Well-nourished.  Cardiovascular: Regular rate. Regular rhythm. No murmurs. No rubs.   Respiratory: Normal respiratory effort. Clear to auscultation bilaterally. No rales. No rhonchi. No wheezing.  Musculoskeletal: No  obvious deformity. Pain on straight back movement of left leg.  Extremities: No lower extremity edema.  Neurological: Alert & oriented x3. No slurred speech. Normal gait.  Psychiatric: Normal mood. Normal affect. Good insight. Good judgment.  Skin: Warm. Dry. No rash.       Assessment:       Assessment & Plan    - Assessed sciatic nerve issue, likely muscular in origin based on physical exam findings and ability to replicate pain with pressure  - Considered recent pelvic floor rehabilitation as potential trigger for current symptoms  - Ruled out connection to previous prostate removal and lymph node dissection  - Determined treatment plan including in-office injection, oral medications, and temporary cessation of physical therapy    LUMBAGO WITH SCIATICA (LEFT SIDE):  - Explained rationale for using heat therapy at this stage of injury.  - Discussed difference between ice and heat application in relation to inflammation and healing process.  - Ashish to apply heating pad to painful area for 20 minutes.  - Administered in-office injection for pain relief.  - Started oral steroids to be taken during the day, beginning 2 days after office visit.  - Started mild muscle relaxer, to be taken up to 3 times " daily.  - Started pain medication for nighttime use as a sleep aid.  - Discontinued Celecoxib.  - Performed in-office injection for pain relief.    LOW BACK PAIN:  - Ashish to avoid physical therapy sessions this week, resuming next week.  - Continued oxybutynin.    OTHER MUSCLE SPASM:  - Started mild muscle relaxer, to be taken up to 3 times daily.    GENERAL FOLLOW-UP AND MONITORING:  - Ordered kidney function test.  - Contact office by Wednesday if symptoms are not improving or worsening.  - Follow up as needed if symptoms persist or worsen.       Plan:       Prostate cancer  Comments:  f/u with dr. chamorro as planned    Pelvic floor dysfunction  Comments:  continue to pt ok to hold off this week    Sciatica, unspecified laterality  Comments:  xray. medrol.  Orders:  -     ketorolac injection 30 mg  -     methylPREDNISolone (MEDROL DOSEPACK) 4 mg tablet; use as directed  Dispense: 21 each; Refill: 0  -     tiZANidine (ZANAFLEX) 4 MG tablet; Take 1 tablet (4 mg total) by mouth every 8 (eight) hours. for 10 days  Dispense: 30 tablet; Refill: 0  -     HYDROcodone-acetaminophen (NORCO) 5-325 mg per tablet; Take 1 tablet by mouth every 8 (eight) hours as needed for Pain.  Dispense: 20 tablet; Refill: 0    Back pain, unspecified back location, unspecified back pain laterality, unspecified chronicity  Comments:  call if no iprovement  Orders:  -     ketorolac injection 30 mg  -     methylPREDNISolone (MEDROL DOSEPACK) 4 mg tablet; use as directed  Dispense: 21 each; Refill: 0  -     tiZANidine (ZANAFLEX) 4 MG tablet; Take 1 tablet (4 mg total) by mouth every 8 (eight) hours. for 10 days  Dispense: 30 tablet; Refill: 0  -     HYDROcodone-acetaminophen (NORCO) 5-325 mg per tablet; Take 1 tablet by mouth every 8 (eight) hours as needed for Pain.  Dispense: 20 tablet; Refill: 0      Follow up if symptoms worsen or fail to improve, for medication management.        This note was generated with the assistance of ambient  listening technology. Verbal consent was obtained by the patient and accompanying visitor(s) for the recording of patient appointment to facilitate this note. I attest to having reviewed and edited the generated note for accuracy, though some syntax or spelling errors may persist. Please contact the author of this note for any clarification.      12/22/2024 Kira Newby

## 2024-12-30 ENCOUNTER — CLINICAL SUPPORT (OUTPATIENT)
Dept: REHABILITATION | Facility: HOSPITAL | Age: 69
End: 2024-12-30
Payer: MEDICARE

## 2024-12-30 DIAGNOSIS — M62.81 MUSCLE WEAKNESS: ICD-10-CM

## 2024-12-30 DIAGNOSIS — M62.89 PELVIC FLOOR DYSFUNCTION: Primary | ICD-10-CM

## 2024-12-30 DIAGNOSIS — N39.3 MALE URINARY STRESS INCONTINENCE: ICD-10-CM

## 2024-12-30 PROCEDURE — 97110 THERAPEUTIC EXERCISES: CPT | Mod: PO

## 2024-12-30 PROCEDURE — 97112 NEUROMUSCULAR REEDUCATION: CPT | Mod: PO

## 2024-12-30 PROCEDURE — 97530 THERAPEUTIC ACTIVITIES: CPT | Mod: PO

## 2024-12-30 NOTE — PROGRESS NOTES
Pelvic Health Physical Therapy   Treatment Note     Name: Ashish Dave  Clinic Number: [id number]    Therapy Diagnosis:   Encounter Diagnoses   Name Primary?    Pelvic floor dysfunction Yes    Male urinary stress incontinence     Muscle weakness          Physician: Phil Werner MD    Visit Date: 12/30/2024    Physician Orders: PT Eval and Treat   Medical Diagnosis from Referral: BIRGIT (stress urinary incontinence), male [N39.3]   Evaluation Date: 10/28/2024  Authorization Period Expiration: 12-31-24  Plan of Care Expiration: 1/20/2025  Progress Note Due: 1/10/2025  Visit # / Visits authorized: 7/ 12  FOTO: 2/3     Precautions: Standard      Time In: 9:20 am  Time Out: 10:15 am  Total Appointment Time (timed & untimed codes): 55  minutes    Subjective     Pt reports: his sciatic nerve pain flared up but now that is better. He has been sitting more often to urinate which seems to help him empty. Has been using the clamp other than one day per week. Still having urinary leakage on the day he does not wear the clamp. He has been consistent with strengthening other than when the sciatic nerve pain was bad. He did just empty.     He was compliant with home exercise program.  Response to previous treatment: good   Functional change:  ongoing     Pain: 0/10  Location:       Objective     Pelvic exam 12/10/2024:  Muscle Power: 3/5   Muscle Endurance: 16 sec hold (even palpable at anterior pelvic floor)  # Reps To Fatigue: 3  Fast Contractions in 10 seconds: slow and inefficient relaxation - improving                 Quality of contraction: decreased hold   Specificity: patient contracts: adductors, glutes, obliques- continued but improving   Coordination: tends to hold breath during PFM contration - continued   Does Pelvic Floor drop and relax with a diaphragmatic breath? Yes, following cues    Ashish received therapeutic exercises to develop  strength, endurance, ROM, flexibility, posture, and core stabilization for 8  "minutes including:     Standing thoracic extensions x 20  Piriformis stretch in supine 3 x 20"      Ashish received the following manual therapy techniques: to develop flexibility, extensibility, and desensitization for 0 minutes including:       Ashish participated in neuromuscular re-education activities to develop Coordination, Control, Down training, Proprioception, Kinesthetic, and Sense for 32 minutes including:     Wall planks + Kegel 10 x 10"  Wall planks + Kegel  with knee  3 x 20  Resisted lateral walks with hip hinge & Kegel 10 ft x 4 , green ankle band  Wall sits + Kegel 30 sec x 2, 60" x 2    Not today:  Quadruped Cat/Cows, allowing for complete abdominal expansion- not today  Quadruped transverse abdominis activation 10 x 10"  Quadruped bear hovers + ball squeeze 10 x 10"  Standing rows + Kegel, blue theraband 2 x 10, 5"  Resisted monster walks fwd and back with hip hinge 10 ft x 4,  green ankle band  Side lying adduction + Kegel #2.5 ankle weights 3 x 10, 5"   Transverse abdominis with knee extension   Transverse abdominis with bilateral knee fall out +        Ashish participated in dynamic functional therapeutic activities to improve functional performance for 15  minutes, including:     Dead lifting #30 DB , 2 x 10  Squatting with Kegel and exhale x 25    Home Exercises Provided and Patient Education Provided     Education provided:   - anatomy/physiology of pelvic floor and Coordination of kegels with functional activities such as cough, laugh, sneeze, lift, etc.   Discussed progression of plan of care with patient; educated pt in activity modification; reviewed HEP with pt. Pt demonstrated and verbalized understanding of all instruction and was provided with a handout of HEP (see Patient Instructions).  - home exercise program update     Written Home Exercises Provided: Patient instructed to cont prior HEP.  Exercises were reviewed and Ashish was able to demonstrate them prior to the end of " the session.  Ashish demonstrated good  understanding of the education provided.     See EMR under Patient Instructions for exercises provided 11/5/2024.    Assessment     Ashish tolerated all task progressions very well with decreasing frequency of urinary leakage with trunk flexion. Again, frequent verbal cues to avoid breath holding. Pt will continue to benefit from skilled outpatient physical therapy to address the deficits listed in the problem list box on initial evaluation, provide pt/family education and to maximize pt's level of independence in the home and community environment.     Ashish Is progressing well towards his goals.   Pt prognosis is Good.     Pt will continue to benefit from skilled outpatient physical therapy to address the deficits listed in the problem list box on initial evaluation, provide pt/family education and to maximize pt's level of independence in the home and community environment.     Pt's spiritual, cultural and educational needs considered and pt agreeable to plan of care and goals.     Anticipated barriers to physical therapy: none    Goals:   Short Term Goals: 6 weeks   - Pt will demonstrate excellent knowledge and adherence to HEP to facilitate optimal recovery. ( MET 12/10/2024)  - Pt will demonstrate proper PFM contraction, relaxation, and lengthening coordinated with TA and breath for improved muscle coordination needed for functional activity.( MET 12/10/2024)     Long Term Goals: 12 weeks   - Pt will demonstrate excellent knowledge and adherence to HEP for continued self-maintenance of symptoms.(NOT MET 12/10/2024)  - Pt will report FOTO Urinary Problem score of 56% or better indicating clinically relevant increase in function.(PART MET 12/10/2024)  - Pt will report voiding interval of 2-4 hours for improved ADL tolerance.( MET 12/10/2024) trying to, usually successful   - Pt will report ability to delay urinary urge for at least 15 minutes to maintain continence with  ADL/IADLs. (NOT MET 12/10/2024)  - Pt will report 60-75% improvement in  urinary incontinence symptoms for improved hygiene and ADL/IADL tolerance. (PART MET 12/10/2024)  - Pt will report needing </= 1 pad/day indicating improved PFM function needed to maintain continence(NOT MET 12/10/2024)  - Pt will demonstrate PFM strength of at least 3/5 MMT for improved strength needed to maintain continence. ( MET 12/10/2024)  - Pt will report bearing down appropriately 100% of the time for improved bowel function and decreased stress on adjacent pelvic structures. ( MET 12/10/2024)  - Pt will demonstrate independence with pressure-management strategies to decreased stress on adjacent pelvic structures. (PART MET 12/10/2024) not always consistent     Plan     Plan of care Certification: 10/28/2024 to 1/20/2024.     Outpatient Physical Therapy 1-2 times weekly for 12 weeks    Next visit: use RUSI to assess TrA and pelvic floor synergy. Transperineal ultrasound to assess urethral closure. Assess complete emptying on 1/2/2025.     Summer Edge, PT

## 2025-01-02 ENCOUNTER — CLINICAL SUPPORT (OUTPATIENT)
Dept: REHABILITATION | Facility: HOSPITAL | Age: 70
End: 2025-01-02
Payer: MEDICARE

## 2025-01-02 DIAGNOSIS — N39.3 MALE URINARY STRESS INCONTINENCE: ICD-10-CM

## 2025-01-02 DIAGNOSIS — M62.81 MUSCLE WEAKNESS: ICD-10-CM

## 2025-01-02 DIAGNOSIS — M62.89 PELVIC FLOOR DYSFUNCTION: Primary | ICD-10-CM

## 2025-01-02 PROCEDURE — 97530 THERAPEUTIC ACTIVITIES: CPT | Mod: PO

## 2025-01-02 PROCEDURE — 97112 NEUROMUSCULAR REEDUCATION: CPT | Mod: PO

## 2025-01-02 NOTE — PROGRESS NOTES
"  Pelvic Health Physical Therapy   POC/Treatment Note     Name: Ashish Dave  Clinic Number: [id number]    Therapy Diagnosis:   Encounter Diagnoses   Name Primary?    Pelvic floor dysfunction Yes    Male urinary stress incontinence     Muscle weakness            Physician: Phil Werner MD    Visit Date: 1/2/2025    Physician Orders: PT Eval and Treat   Medical Diagnosis from Referral: BIRGIT (stress urinary incontinence), male [N39.3]   Evaluation Date: 10/28/2024  Authorization Period Expiration: 12-31-25  Plan of Care Expiration: 2/31/2025  Progress Note Due: 1/10/2025  Visit # / Visits authorized: 8 total/ 20 auth  FOTO: 2/3     Precautions: Standard      Time In: 7:45 am  Time Out: 8:30 am  Total Appointment Time (timed & untimed codes): 45  minutes    Subjective     Pt reports: slow improvements.     He was compliant with home exercise program.  Response to previous treatment: good   Functional change:  ongoing     Pain: 0/10  Location:       Objective     RUSI ASSESSMENT 1/2/2025   Transabdominal pelvic floor muscles: initially, patient bears down with attempted Kegel. However, this improved with cues to shorten the penis at the anterior portion of pelvis. Patient demonstrates full bladder with great emptying in standing position. Notable pelvic muscle fatigue with repeated endurance hold in supine.    Transperineal pelvic floor muscles: follow up visit      Ashish received therapeutic exercises to develop  strength, endurance, ROM, flexibility, posture, and core stabilization for 8 minutes including:     Standing thoracic extensions x 20  Piriformis stretch in supine 3 x 20"      Ashish received the following manual therapy techniques: to develop flexibility, extensibility, and desensitization for 0 minutes including:       Ashish participated in neuromuscular re-education activities to develop Coordination, Control, Down training, Proprioception, Kinesthetic, and Sense for 35 minutes including:     RUSI " "assessment and treatment as noted above   Side lying clams + Kegel with TrA press down into [employer] [place]   Wall planks + Kegel 10 x 10"  Wall planks + Kegel  with knee  3 x 20      Not today:  Resisted lateral walks with hip hinge & Kegel 10 ft x 4 , green ankle band  Wall sits + Kegel 30 sec x 2, 60" x 2  Quadruped Cat/Cows, allowing for complete abdominal expansion- not today  Quadruped transverse abdominis activation 10 x 10"  Quadruped bear hovers + ball squeeze 10 x 10"  Standing rows + Kegel, blue theraband 2 x 10, 5"  Resisted monster walks fwd and back with hip hinge 10 ft x 4,  green ankle band  Side lying adduction + Kegel #2.5 ankle weights 3 x 10, 5"   Transverse abdominis with knee extension   Transverse abdominis with bilateral knee fall out +        sAhish participated in dynamic functional therapeutic activities to improve functional performance for 10 minutes, including:     Review of POC and current progress/prognosis  Review of anatomy and expectations    Not today:  Dead lifting #30 DB , 2 x 10  Squatting with Kegel and exhale x 25    Home Exercises Provided and Patient Education Provided     Education provided:   - anatomy/physiology of pelvic floor and Coordination of kegels with functional activities such as cough, laugh, sneeze, lift, etc.   Discussed progression of plan of care with patient; educated pt in activity modification; reviewed HEP with pt. Pt demonstrated and verbalized understanding of all instruction and was provided with a handout of HEP (see Patient Instructions).  - home exercise program update     Written Home Exercises Provided: Patient instructed to cont prior HEP.  Exercises were reviewed and Ashish was able to demonstrate them prior to the end of the session.  Ashish demonstrated good  understanding of the education provided.     See EMR under Patient Instructions for exercises provided 11/5/2024.    Assessment     Ashish tolerated all task progressions very " well with decreasing frequency of urinary leakage with trunk flexion. Assessed urinary emptying and function this date with use of RUSI. Initially, patient bears down with attempted Kegel. However, this improved with cues to shorten the penis at the anterior portion of pelvis. Patient demonstrates full bladder with great emptying in standing position. Notable pelvic muscle fatigue with repeated endurance hold in supine. Patient able to hold urine in for 2-4 hours at this time and feels empty now when he thinks about it, but does have increased leakage if he is really busy and not thinking about urinary control.  Pt will continue to benefit from skilled outpatient physical therapy to address the deficits listed in the problem list box on initial evaluation, provide pt/family education and to maximize pt's level of independence in the home and community environment. Plans to extend POC 6 additional weeks from current POC end date to ensure quality progression in pelvic function/urinary control.    Ashish Is progressing well towards his goals.   Pt prognosis is Good.     Pt will continue to benefit from skilled outpatient physical therapy to address the deficits listed in the problem list box on initial evaluation, provide pt/family education and to maximize pt's level of independence in the home and community environment.     Pt's spiritual, cultural and educational needs considered and pt agreeable to plan of care and goals.     Anticipated barriers to physical therapy: none    Goals:   Short Term Goals: 6 weeks   - Pt will demonstrate excellent knowledge and adherence to HEP to facilitate optimal recovery. ( MET 12/10/2024)  - Pt will demonstrate proper PFM contraction, relaxation, and lengthening coordinated with TA and breath for improved muscle coordination needed for functional activity.( MET 12/10/2024)     Long Term Goals: 12 weeks   - Pt will demonstrate excellent knowledge and adherence to HEP for continued  self-maintenance of symptoms.(NOT MET 1/2/2025)  - Pt will report FOTO Urinary Problem score of 56% or better indicating clinically relevant increase in function.(NOT MET 1/2/2025)  - Pt will report voiding interval of 2-4 hours for improved ADL tolerance.( MET 12/10/2024) trying to, usually successful   - Pt will report ability to delay urinary urge for at least 15 minutes to maintain continence with ADL/IADLs.(NOT MET 1/2/2025)  - Pt will report 60-75% improvement in  urinary incontinence symptoms for improved hygiene and ADL/IADL tolerance.(PART MET 1/2/2025)  - Pt will report needing </= 1 pad/day indicating improved PFM function needed to maintain continence(NOT MET 1/2/2025)  - Pt will demonstrate PFM strength of at least 3/5 MMT for improved strength needed to maintain continence. ( MET 12/10/2024)  - Pt will report bearing down appropriately 100% of the time for improved bowel function and decreased stress on adjacent pelvic structures. ( MET 12/10/2024)  - Pt will demonstrate independence with pressure-management strategies to decreased stress on adjacent pelvic structures. (PART MET 1/2/2025) not always consistent     Plan     Plan of care Certification: 1/20/2024 extended to 2/31/2025     Outpatient Physical Therapy 1-2 times weekly for 12 weeks    Next visit: Transperineal ultrasound to assess urethral closure and continue coordination with task progressions    Summer Edge, PT

## 2025-01-02 NOTE — PROGRESS NOTES
Pelvic Health Physical Therapy   Treatment Note     Name: Ashish Dave  Clinic Number: [id number]    Therapy Diagnosis:   No diagnosis found.        Physician: Phil Werner MD    Visit Date: 1/2/2025    Physician Orders: PT Eval and Treat   Medical Diagnosis from Referral: BIRGIT (stress urinary incontinence), male [N39.3]   Evaluation Date: 10/28/2024  Authorization Period Expiration: 12-31-25  Plan of Care Expiration: 1/20/2025  Progress Note Due: 1/10/2025  Visit # / Visits authorized: 8 total/ 20 auth  FOTO: 2/3     Precautions: Standard      Time In: *** am  Time Out: *** am  Total Appointment Time (timed & untimed codes): ***  minutes    Subjective     Pt reports: his sciatic nerve pain flared up but now that is better. He has been sitting more often to urinate which seems to help him empty. Has been using the clamp other than one day per week. Still having urinary leakage on the day he does not wear the clamp. He has been consistent with strengthening other than when the sciatic nerve pain was bad. He did just empty.     He was compliant with home exercise program.  Response to previous treatment: good   Functional change:  ongoing     Pain: 0/10  Location:       Objective     Pelvic exam 12/10/2024:  Muscle Power: 3/5   Muscle Endurance: 16 sec hold (even palpable at anterior pelvic floor)  # Reps To Fatigue: 3  Fast Contractions in 10 seconds: slow and inefficient relaxation - improving                 Quality of contraction: decreased hold   Specificity: patient contracts: adductors, glutes, obliques- continued but improving   Coordination: tends to hold breath during PFM contration - continued   Does Pelvic Floor drop and relax with a diaphragmatic breath? Yes, following cues    Ashish received therapeutic exercises to develop  strength, endurance, ROM, flexibility, posture, and core stabilization for 8 minutes including:     Standing thoracic extensions x 20  Piriformis stretch in supine 3 x  "20"      Ashish received the following manual therapy techniques: to develop flexibility, extensibility, and desensitization for 0 minutes including:       Ashish participated in neuromuscular re-education activities to develop Coordination, Control, Down training, Proprioception, Kinesthetic, and Sense for 32 minutes including:     Wall planks + Kegel 10 x 10"  Wall planks + Kegel  with knee  3 x 20  Resisted lateral walks with hip hinge & Kegel 10 ft x 4 , green ankle band  Wall sits + Kegel 30 sec x 2, 60" x 2    Not today:  Quadruped Cat/Cows, allowing for complete abdominal expansion- not today  Quadruped transverse abdominis activation 10 x 10"  Quadruped bear hovers + ball squeeze 10 x 10"  Standing rows + Kegel, blue theraband 2 x 10, 5"  Resisted monster walks fwd and back with hip hinge 10 ft x 4,  green ankle band  Side lying adduction + Kegel #2.5 ankle weights 3 x 10, 5"   Transverse abdominis with knee extension   Transverse abdominis with bilateral knee fall out +        Ashish participated in dynamic functional therapeutic activities to improve functional performance for 15  minutes, including:     Dead lifting #30 DB , 2 x 10  Squatting with Kegel and exhale x 25    Home Exercises Provided and Patient Education Provided     Education provided:   - anatomy/physiology of pelvic floor and Coordination of kegels with functional activities such as cough, laugh, sneeze, lift, etc.   Discussed progression of plan of care with patient; educated pt in activity modification; reviewed HEP with pt. Pt demonstrated and verbalized understanding of all instruction and was provided with a handout of HEP (see Patient Instructions).  - home exercise program update     Written Home Exercises Provided: Patient instructed to cont prior HEP.  Exercises were reviewed and Ashish was able to demonstrate them prior to the end of the session.  Ashish demonstrated good  understanding of the education provided.     See " EMR under Patient Instructions for exercises provided 11/5/2024.    Assessment     Ashish tolerated all task progressions very well with decreasing frequency of urinary leakage with trunk flexion. Again, frequent verbal cues to avoid breath holding. Pt will continue to benefit from skilled outpatient physical therapy to address the deficits listed in the problem list box on initial evaluation, provide pt/family education and to maximize pt's level of independence in the home and community environment.     Ashish Is progressing well towards his goals.   Pt prognosis is Good.     Pt will continue to benefit from skilled outpatient physical therapy to address the deficits listed in the problem list box on initial evaluation, provide pt/family education and to maximize pt's level of independence in the home and community environment.     Pt's spiritual, cultural and educational needs considered and pt agreeable to plan of care and goals.     Anticipated barriers to physical therapy: none    Goals:   Short Term Goals: 6 weeks   - Pt will demonstrate excellent knowledge and adherence to HEP to facilitate optimal recovery. ( MET 12/10/2024)  - Pt will demonstrate proper PFM contraction, relaxation, and lengthening coordinated with TA and breath for improved muscle coordination needed for functional activity.( MET 12/10/2024)     Long Term Goals: 12 weeks   - Pt will demonstrate excellent knowledge and adherence to HEP for continued self-maintenance of symptoms.(NOT MET 12/10/2024)  - Pt will report FOTO Urinary Problem score of 56% or better indicating clinically relevant increase in function.(PART MET 12/10/2024)  - Pt will report voiding interval of 2-4 hours for improved ADL tolerance.( MET 12/10/2024) trying to, usually successful   - Pt will report ability to delay urinary urge for at least 15 minutes to maintain continence with ADL/IADLs. (NOT MET 12/10/2024)  - Pt will report 60-75% improvement in  urinary  incontinence symptoms for improved hygiene and ADL/IADL tolerance. (PART MET 12/10/2024)  - Pt will report needing </= 1 pad/day indicating improved PFM function needed to maintain continence(NOT MET 12/10/2024)  - Pt will demonstrate PFM strength of at least 3/5 MMT for improved strength needed to maintain continence. ( MET 12/10/2024)  - Pt will report bearing down appropriately 100% of the time for improved bowel function and decreased stress on adjacent pelvic structures. ( MET 12/10/2024)  - Pt will demonstrate independence with pressure-management strategies to decreased stress on adjacent pelvic structures. (PART MET 12/10/2024) not always consistent     Plan     Plan of care Certification: 10/28/2024 to 1/20/2024.     Outpatient Physical Therapy 1-2 times weekly for 12 weeks    Next visit: use RUSI to assess TrA and pelvic floor synergy. Transperineal ultrasound to assess urethral closure. Assess complete emptying on 1/2/2025.     Summer Edge, PT

## 2025-01-02 NOTE — PLAN OF CARE
"  Pelvic Health Physical Therapy   POC/Treatment Note     Name: Ashish Dave  Clinic Number: [id number]    Therapy Diagnosis:   Encounter Diagnoses   Name Primary?    Pelvic floor dysfunction Yes    Male urinary stress incontinence     Muscle weakness            Physician: Phil Werner MD    Visit Date: 1/2/2025    Physician Orders: PT Eval and Treat   Medical Diagnosis from Referral: BIRGIT (stress urinary incontinence), male [N39.3]   Evaluation Date: 10/28/2024  Authorization Period Expiration: 12-31-25  Plan of Care Expiration: 2/31/2025  Progress Note Due: 1/10/2025  Visit # / Visits authorized: 8 total/ 20 auth  FOTO: 2/3     Precautions: Standard      Time In: 7:45 am  Time Out: 8:30 am  Total Appointment Time (timed & untimed codes): 45  minutes    Subjective     Pt reports: slow improvements.     He was compliant with home exercise program.  Response to previous treatment: good   Functional change:  ongoing     Pain: 0/10  Location:       Objective     RUSI ASSESSMENT 1/2/2025   Transabdominal pelvic floor muscles: initially, patient bears down with attempted Kegel. However, this improved with cues to shorten the penis at the anterior portion of pelvis. Patient demonstrates full bladder with great emptying in standing position. Notable pelvic muscle fatigue with repeated endurance hold in supine.    Transperineal pelvic floor muscles: follow up visit      Ashish received therapeutic exercises to develop  strength, endurance, ROM, flexibility, posture, and core stabilization for 8 minutes including:     Standing thoracic extensions x 20  Piriformis stretch in supine 3 x 20"      Ashish received the following manual therapy techniques: to develop flexibility, extensibility, and desensitization for 0 minutes including:       Ashish participated in neuromuscular re-education activities to develop Coordination, Control, Down training, Proprioception, Kinesthetic, and Sense for 35 minutes including:     RUSI " "assessment and treatment as noted above   Side lying clams + Kegel with TrA press down into [employer] [place]   Wall planks + Kegel 10 x 10"  Wall planks + Kegel  with knee  3 x 20      Not today:  Resisted lateral walks with hip hinge & Kegel 10 ft x 4 , green ankle band  Wall sits + Kegel 30 sec x 2, 60" x 2  Quadruped Cat/Cows, allowing for complete abdominal expansion- not today  Quadruped transverse abdominis activation 10 x 10"  Quadruped bear hovers + ball squeeze 10 x 10"  Standing rows + Kegel, blue theraband 2 x 10, 5"  Resisted monster walks fwd and back with hip hinge 10 ft x 4,  green ankle band  Side lying adduction + Kegel #2.5 ankle weights 3 x 10, 5"   Transverse abdominis with knee extension   Transverse abdominis with bilateral knee fall out +        Ashish participated in dynamic functional therapeutic activities to improve functional performance for 10 minutes, including:     Review of POC and current progress/prognosis  Review of anatomy and expectations    Not today:  Dead lifting #30 DB , 2 x 10  Squatting with Kegel and exhale x 25    Home Exercises Provided and Patient Education Provided     Education provided:   - anatomy/physiology of pelvic floor and Coordination of kegels with functional activities such as cough, laugh, sneeze, lift, etc.   Discussed progression of plan of care with patient; educated pt in activity modification; reviewed HEP with pt. Pt demonstrated and verbalized understanding of all instruction and was provided with a handout of HEP (see Patient Instructions).  - home exercise program update     Written Home Exercises Provided: Patient instructed to cont prior HEP.  Exercises were reviewed and Ashish was able to demonstrate them prior to the end of the session.  Ashish demonstrated good  understanding of the education provided.     See EMR under Patient Instructions for exercises provided 11/5/2024.    Assessment     Ashish tolerated all task progressions very " well with decreasing frequency of urinary leakage with trunk flexion. Assessed urinary emptying and function this date with use of RUSI. Initially, patient bears down with attempted Kegel. However, this improved with cues to shorten the penis at the anterior portion of pelvis. Patient demonstrates full bladder with great emptying in standing position. Notable pelvic muscle fatigue with repeated endurance hold in supine. Patient able to hold urine in for 2-4 hours at this time and feels empty now when he thinks about it, but does have increased leakage if he is really busy and not thinking about urinary control.  Pt will continue to benefit from skilled outpatient physical therapy to address the deficits listed in the problem list box on initial evaluation, provide pt/family education and to maximize pt's level of independence in the home and community environment. Plans to extend POC 6 additional weeks from current POC end date to ensure quality progression in pelvic function/urinary control.    Ashish Is progressing well towards his goals.   Pt prognosis is Good.     Pt will continue to benefit from skilled outpatient physical therapy to address the deficits listed in the problem list box on initial evaluation, provide pt/family education and to maximize pt's level of independence in the home and community environment.     Pt's spiritual, cultural and educational needs considered and pt agreeable to plan of care and goals.     Anticipated barriers to physical therapy: none    Goals:   Short Term Goals: 6 weeks   - Pt will demonstrate excellent knowledge and adherence to HEP to facilitate optimal recovery. ( MET 12/10/2024)  - Pt will demonstrate proper PFM contraction, relaxation, and lengthening coordinated with TA and breath for improved muscle coordination needed for functional activity.( MET 12/10/2024)     Long Term Goals: 12 weeks   - Pt will demonstrate excellent knowledge and adherence to HEP for continued  self-maintenance of symptoms.(NOT MET 1/2/2025)  - Pt will report FOTO Urinary Problem score of 56% or better indicating clinically relevant increase in function.(NOT MET 1/2/2025)  - Pt will report voiding interval of 2-4 hours for improved ADL tolerance.( MET 12/10/2024) trying to, usually successful   - Pt will report ability to delay urinary urge for at least 15 minutes to maintain continence with ADL/IADLs.(NOT MET 1/2/2025)  - Pt will report 60-75% improvement in  urinary incontinence symptoms for improved hygiene and ADL/IADL tolerance.(PART MET 1/2/2025)  - Pt will report needing </= 1 pad/day indicating improved PFM function needed to maintain continence(NOT MET 1/2/2025)  - Pt will demonstrate PFM strength of at least 3/5 MMT for improved strength needed to maintain continence. ( MET 12/10/2024)  - Pt will report bearing down appropriately 100% of the time for improved bowel function and decreased stress on adjacent pelvic structures. ( MET 12/10/2024)  - Pt will demonstrate independence with pressure-management strategies to decreased stress on adjacent pelvic structures. (PART MET 1/2/2025) not always consistent     Plan     Plan of care Certification: 1/20/2024 extended to 2/31/2025     Outpatient Physical Therapy 1-2 times weekly for 12 weeks    Next visit: Transperineal ultrasound to assess urethral closure and continue coordination with task progressions    Summer Edge, PT

## 2025-01-07 ENCOUNTER — CLINICAL SUPPORT (OUTPATIENT)
Dept: REHABILITATION | Facility: HOSPITAL | Age: 70
End: 2025-01-07
Payer: MEDICARE

## 2025-01-07 DIAGNOSIS — M62.81 MUSCLE WEAKNESS: ICD-10-CM

## 2025-01-07 DIAGNOSIS — M62.89 PELVIC FLOOR DYSFUNCTION: Primary | ICD-10-CM

## 2025-01-07 DIAGNOSIS — N39.3 MALE URINARY STRESS INCONTINENCE: ICD-10-CM

## 2025-01-07 PROCEDURE — 97530 THERAPEUTIC ACTIVITIES: CPT | Mod: PO

## 2025-01-07 PROCEDURE — 97112 NEUROMUSCULAR REEDUCATION: CPT | Mod: PO

## 2025-01-07 NOTE — PROGRESS NOTES
Pelvic Health Physical Therapy   Progress/Treatment Note     Name: Ashish Dave  Clinic Number: [id number]    Therapy Diagnosis:   Encounter Diagnoses   Name Primary?    Pelvic floor dysfunction Yes    Male urinary stress incontinence     Muscle weakness          Physician: Phil Werner MD    Visit Date: 1/7/2025    Physician Orders: PT Eval and Treat   Medical Diagnosis from Referral: BIRGIT (stress urinary incontinence), male [N39.3]   Evaluation Date: 10/28/2024  Authorization Period Expiration: 12-31-25  Plan of Care Expiration: 1/20/2025  Progress Note Due: 1/10/2025  Visit # / Visits authorized: 8/ 12  FOTO: 2/3 Last provided 12/10/24     Precautions: Standard      Time In: 7:45 am  Time Out: 8: 45 am  Total Appointment Time (timed & untimed codes): 60 minutes    Subjective     Pt reports:will stop using the clamp Thursday. Has been practicing the quality of the Kegel rather than quantity. Going back to Dr. Werner on the 31st of this month. Made a year since his surgery on 1/3.     He was compliant with home exercise program.  Response to previous treatment: good   Functional change:  ongoing     Pain: 0/10  Location:       Objective       RUSI ASSESSMENT   Midline abdominal wall: patient demonstrates compensations initially with attempted bearing down. Improved following verbal cues to shorten the penis. Complete emptying noted with standing voiding.       Pelvic exam 12/10/2024:  Muscle Power: 3/5   Muscle Endurance: 16 sec hold (even palpable at anterior pelvic floor)  # Reps To Fatigue: 3  Fast Contractions in 10 seconds: slow and inefficient relaxation - improving                 Quality of contraction: decreased hold   Specificity: patient contracts: adductors, glutes, obliques- continued but improving   Coordination: tends to hold breath during PFM contration - continued   Does Pelvic Floor drop and relax with a diaphragmatic breath? Yes, following cues    Ashish received therapeutic exercises to  "develop  strength, endurance, ROM, flexibility, posture, and core stabilization for 0 minutes including:     Standing thoracic extensions x 20  Piriformis stretch in supine 3 x 20"      Ashish received the following manual therapy techniques: to develop flexibility, extensibility, and desensitization for 0 minutes including:       Ashish participated in neuromuscular re-education activities to develop Coordination, Control, Down training, Proprioception, Kinesthetic, and Sense for 35 minutes including:     Modified child's pose + Kegel 10 x 10"  Quadruped Cat/Cows, allowing for complete abdominal expansion  Side lying adduction + Kegel #2.5 ankle weights x 25 each side (UE press into Pilate's [place])  Side lying abduction + Kegel #2.5 ankle weights x 25 each side (UE press into Pilate[employer] [place])  Wall planks + Kegel  with knee , green ankle band 3 x 20   Cues to avoid PPT  Resisted monster walks fwd and back with hip hinge 10 ft x 4,  green ankle band  Standing hip abduction,  + Kegel, green ankle band 3 x 8     Not today:  Wall planks + Kegel 10 x 10"  Resisted lateral walks with hip hinge & Kegel 10 ft x 4 , green ankle band  Wall sits + Kegel 30 sec x 2, 60" x 2  Quadruped transverse abdominis activation 10 x 10"  Quadruped bear hovers + ball squeeze 10 x 10"  Standing rows + Kegel, blue theraband 2 x 10, 5"  Transverse abdominis with knee extension   Transverse abdominis with bilateral knee fall out       Ashish participated in dynamic functional therapeutic activities to improve functional performance for 25 minutes, including:     Dead lifting #35 DB x 30  Squatting with Kegel and exhale,  #30 DB 2 x 8 (decreased depth secondary to knee)  Squatting with Kegel and exhale,  Blue theraband  3 x 8 (decreased depth secondary to knee)    Home Exercises Provided and Patient Education Provided     Education provided:   - anatomy/physiology of pelvic floor and Coordination of kegels with functional activities such " as cough, laugh, sneeze, lift, etc.   Discussed progression of plan of care with patient; educated pt in activity modification; reviewed HEP with pt. Pt demonstrated and verbalized understanding of all instruction and was provided with a handout of HEP (see Patient Instructions).  - home exercise program update     Written Home Exercises Provided: Patient instructed to cont prior HEP.  Exercises were reviewed and Ashish was able to demonstrate them prior to the end of the session.  Ashish demonstrated good  understanding of the education provided.     See EMR under Patient Instructions for exercises provided 11/5/2024.    Assessment     Ashish demonstrates compensations initially with attempted bearing down. Improved following verbal cues to shorten the penis. Complete emptying noted with standing voiding. Progressions tolerated well with noted fatigue into hip abduction. Therapist suspects that patient occasionally bears down with functional tasks and exercise, causing bladder leakage at those times. Pt will continue to benefit from skilled outpatient physical therapy to address the deficits listed in the problem list box on initial evaluation, provide pt/family education and to maximize pt's level of independence in the home and community environment.     Ashish Is progressing well towards his goals.   Pt prognosis is Good.     Pt will continue to benefit from skilled outpatient physical therapy to address the deficits listed in the problem list box on initial evaluation, provide pt/family education and to maximize pt's level of independence in the home and community environment.     Pt's spiritual, cultural and educational needs considered and pt agreeable to plan of care and goals.     Anticipated barriers to physical therapy: none    Goals:   Short Term Goals: 6 weeks   - Pt will demonstrate excellent knowledge and adherence to HEP to facilitate optimal recovery. ( MET 12/10/2024)  - Pt will demonstrate proper  PFM contraction, relaxation, and lengthening coordinated with TA and breath for improved muscle coordination needed for functional activity.( MET 12/10/2024)     Long Term Goals: 12 weeks   - Pt will demonstrate excellent knowledge and adherence to HEP for continued self-maintenance of symptoms.(NOT MET 12/10/2024)  - Pt will report FOTO Urinary Problem score of 56% or better indicating clinically relevant increase in function.(PART MET 12/10/2024)  - Pt will report voiding interval of 2-4 hours for improved ADL tolerance.( MET 12/10/2024) trying to, usually successful   - Pt will report ability to delay urinary urge for at least 15 minutes to maintain continence with ADL/IADLs. (NOT MET 12/10/2024)  - Pt will report 60-75% improvement in  urinary incontinence symptoms for improved hygiene and ADL/IADL tolerance. (PART MET 12/10/2024)  - Pt will report needing </= 1 pad/day indicating improved PFM function needed to maintain continence(NOT MET 12/10/2024)  - Pt will demonstrate PFM strength of at least 3/5 MMT for improved strength needed to maintain continence. ( MET 12/10/2024)  - Pt will report bearing down appropriately 100% of the time for improved bowel function and decreased stress on adjacent pelvic structures. ( MET 12/10/2024)  - Pt will demonstrate independence with pressure-management strategies to decreased stress on adjacent pelvic structures. (PART MET 12/10/2024) not always consistent     Plan     Plan of care Certification: 10/28/2024 to 1/20/2024.     Outpatient Physical Therapy 1-2 times weekly for 12 weeks    Next visit: continue progressing, ensure more consistent Kegel lift with RUSI on 1/9/25 and progress note    Summer Edge, PT

## 2025-01-09 ENCOUNTER — CLINICAL SUPPORT (OUTPATIENT)
Dept: REHABILITATION | Facility: HOSPITAL | Age: 70
End: 2025-01-09
Payer: MEDICARE

## 2025-01-09 DIAGNOSIS — N39.3 MALE URINARY STRESS INCONTINENCE: ICD-10-CM

## 2025-01-09 DIAGNOSIS — M62.81 MUSCLE WEAKNESS: ICD-10-CM

## 2025-01-09 DIAGNOSIS — M62.89 PELVIC FLOOR DYSFUNCTION: Primary | ICD-10-CM

## 2025-01-09 PROCEDURE — 97112 NEUROMUSCULAR REEDUCATION: CPT | Mod: PO

## 2025-01-09 NOTE — PLAN OF CARE
Pelvic Health Physical Therapy   POC Update     Name: Ashish Dave  Clinic Number: [id number]    Therapy Diagnosis:   Encounter Diagnoses   Name Primary?    Pelvic floor dysfunction Yes    Male urinary stress incontinence     Muscle weakness          Physician: Phil Werner MD    Visit Date: 1/9/2025    Physician Orders: PT Eval and Treat   Medical Diagnosis from Referral: BIRGIT (stress urinary incontinence), male [N39.3]   Evaluation Date: 10/28/2024  Authorization Period Expiration: 12-31-25  Plan of Care Expiration: 2/28/2025  Progress Note Due: 2/7/2025  Visit # / Visits authorized: 8 total. 3/30 on current auth  FOTO: 2/3 Last provided 12/10/24     Precautions: Standard      Time In: 7:45 am  Time Out: 8:30 am  Total Appointment Time (timed & untimed codes): 45 minutes    Subjective     Pt reports:will stop using the clamp Thursday. Has been practicing the quality of the Kegel rather than quantity. Going back to Dr. Werner on the 31st of this month. Made a year since his surgery on 1/3.     He was compliant with home exercise program.  Response to previous treatment: good   Functional change:  ongoing     Pain: 0/10  Location:       Objective       RUSI ASSESSMENT 1/9/2025  Midline abdominal wall: improved Kegel with lift of bladder base with hip flexion. Able to perform consistently in hook lying x 20 repetitions following in depth review of compensations being made. Oblique/rectus compensations initially that improved with decreased effort.       Pelvic exam 12/10/2024:  Muscle Power: 3/5   Muscle Endurance: 16 sec hold (even palpable at anterior pelvic floor)  # Reps To Fatigue: 3  Fast Contractions in 10 seconds: slow and inefficient relaxation - improving                 Quality of contraction: decreased hold   Specificity: patient contracts: adductors, glutes, obliques- continued but improving   Coordination: tends to hold breath during PFM contration - continued   Does Pelvic Floor drop and relax  "with a diaphragmatic breath? Yes, following cues    Ashish received therapeutic exercises to develop  strength, endurance, ROM, flexibility, posture, and core stabilization for 0 minutes including:     Standing thoracic extensions x 20  Piriformis stretch in supine 3 x 20"      Ashish received the following manual therapy techniques: to develop flexibility, extensibility, and desensitization for 0 minutes including:       Ashish participated in neuromuscular re-education activities to develop Coordination, Control, Down training, Proprioception, Kinesthetic, and Sense for 45minutes including:     RUSI for breathing, drops, contractions of the PFM to help pt visualize PFM motion and function.     Performed in supine and hook lying    TrA activation B with focus on decreased compensations  Modified child's pose + Kegel 10 x 10"  Hook lying Kegel holds 20 x 5"  Transverse abdominis with bilateral knee fall out   Quadruped Cat/Cows, allowing for complete abdominal expansion      Not today:    Side lying adduction + Kegel #2.5 ankle weights x 25 each side (UE press into Pilate's [place])  Side lying abduction + Kegel #2.5 ankle weights x 25 each side (UE press into Pil[employer] [place])  Wall planks + Kegel  with knee , green ankle band 3 x 20   Cues to avoid PPT  Resisted monster walks fwd and back with hip hinge 10 ft x 4,  green ankle band  Standing hip abduction,  + Kegel, green ankle band 3 x 8   Wall planks + Kegel 10 x 10"  Resisted lateral walks with hip hinge & Kegel 10 ft x 4 , green ankle band  Wall sits + Kegel 30 sec x 2, 60" x 2  Quadruped transverse abdominis activation 10 x 10"  Quadruped bear hovers + ball squeeze 10 x 10"  Transverse abdominis with knee extension       Ashish participated in dynamic functional therapeutic activities to improve functional performance for 0 minutes, including:     Dead lifting #35 DB x 30  Squatting with Kegel and exhale,  #30 DB 2 x 8 (decreased depth secondary to " knee)  Squatting with Kegel and exhale,  Blue theraband  3 x 8 (decreased depth secondary to knee)    Home Exercises Provided and Patient Education Provided     Education provided:   - anatomy/physiology of pelvic floor and Coordination of kegels with functional activities such as cough, laugh, sneeze, lift, etc.   Discussed progression of plan of care with patient; educated pt in activity modification; reviewed HEP with pt. Pt demonstrated and verbalized understanding of all instruction and was provided with a handout of HEP (see Patient Instructions).  - home exercise program update     Written Home Exercises Provided: Patient instructed to cont prior HEP.  Exercises were reviewed and Ashish was able to demonstrate them prior to the end of the session.  Ashish demonstrated good  understanding of the education provided.     See EMR under Patient Instructions for exercises provided 11/5/2024.    Assessment     RUSI assessment performed again this date secondary to previous inconsistencies with Kegel quality. Improved Kegel with lift of bladder base with hip flexion. Oblique/rectus compensations initially that improved with decreased effort.  Therapist suspects that patient occasionally bears down with functional tasks and exercise, causing bladder leakage at those times. Pt will require extended POC secondary to coordination deficits impacting progress. However, improving quality and control overall. Much improving awareness and understanding of appropriate pelvic floor function and control at this time. Pt will continue to benefit from skilled outpatient physical therapy to address the deficits listed in the problem list box on initial evaluation, provide pt/family education and to maximize pt's level of independence in the home and community environment.     Ashish Is progressing well towards his goals.   Pt prognosis is Good.     Pt will continue to benefit from skilled outpatient physical therapy to address the  deficits listed in the problem list box on initial evaluation, provide pt/family education and to maximize pt's level of independence in the home and community environment.     Pt's spiritual, cultural and educational needs considered and pt agreeable to plan of care and goals.     Anticipated barriers to physical therapy: none    Goals:   Short Term Goals: 6 weeks   - Pt will demonstrate excellent knowledge and adherence to HEP to facilitate optimal recovery. ( MET 12/10/2024)  - Pt will demonstrate proper PFM contraction, relaxation, and lengthening coordinated with TA and breath for improved muscle coordination needed for functional activity.( MET 12/10/2024)     Long Term Goals: 12 weeks   - Pt will demonstrate excellent knowledge and adherence to HEP for continued self-maintenance of symptoms.(NOT MET 1/9/2025)  - Pt will report FOTO Urinary Problem score of 56% or better indicating clinically relevant increase in function.(PART  MET 1/9/2025)  - Pt will report voiding interval of 2-4 hours for improved ADL tolerance.( MET 12/10/2024) trying to, usually successful   - Pt will report ability to delay urinary urge for at least 15 minutes to maintain continence with ADL/IADLs. .(NOT MET 1/9/2025) has just been going right away around 2 hours  - Pt will report 60-75% improvement in  urinary incontinence symptoms for improved hygiene and ADL/IADL tolerance. (PART MET 1/9/2025)  - Pt will report needing </= 1 pad/day indicating improved PFM function needed to maintain continence.(NOT MET 1/9/2025) 3pads w clamp. 4-5 without.   - Pt will demonstrate PFM strength of at least 3/5 MMT for improved strength needed to maintain continence. ( MET 12/10/2024)  - Pt will report bearing down appropriately 100% of the time for improved bowel function and decreased stress on adjacent pelvic structures. ( MET 12/10/2024)  - Pt will demonstrate independence with pressure-management strategies to decreased stress on adjacent pelvic  structures. (PART MET 1/9/2025) not always consistent     Plan     Plan of care Certification: 1/20/2024 extended to 2/28.2025     Outpatient Physical Therapy 1-2 times weekly for 8 weeks    Next visit: continue progressing, ensure more consistent Kegel lift with RUSI on 1/16/25 - do transperinea RUSIl if no consistency     Summer Edge, PT

## 2025-01-09 NOTE — PATIENT INSTRUCTIONS
"Perform 2 sets of 10 for 10 second holds    Peform 3 sets of 20 heel slides    Perform with band around the knees  Perform 3 sets of 10-15 each leg (alternate legs)          "Child's Pose" - first start by getting onto your hands and knees, then move your feet so they are touching and your knees are wider than your hips. Sit back so that you are sitting on your heels and reach your arms forward. Think about resting in this position. Complete 10 sets of 10 second holds.      CHILD'S POSE VARIATIONS:                 "

## 2025-01-14 ENCOUNTER — CLINICAL SUPPORT (OUTPATIENT)
Dept: REHABILITATION | Facility: HOSPITAL | Age: 70
End: 2025-01-14
Payer: MEDICARE

## 2025-01-14 DIAGNOSIS — N39.3 MALE URINARY STRESS INCONTINENCE: ICD-10-CM

## 2025-01-14 DIAGNOSIS — M62.81 MUSCLE WEAKNESS: ICD-10-CM

## 2025-01-14 DIAGNOSIS — M62.89 PELVIC FLOOR DYSFUNCTION: Primary | ICD-10-CM

## 2025-01-14 PROCEDURE — 97112 NEUROMUSCULAR REEDUCATION: CPT | Mod: PO

## 2025-01-14 NOTE — PROGRESS NOTES
Pelvic Health Physical Therapy   Treatment Summary      Name: Ashish Dave  Clinic Number: [id number]     Therapy Diagnosis:        Encounter Diagnoses   Name Primary?    Pelvic floor dysfunction Yes    Male urinary stress incontinence      Muscle weakness           Physician: Phil Werner MD     Visit Date: 1/9/2025     Physician Orders: PT Eval and Treat   Medical Diagnosis from Referral: BIRGIT (stress urinary incontinence), male [N39.3]   Evaluation Date: 10/28/2024  Authorization Period Expiration: 12-31-25  Plan of Care Expiration: 2/28/2025  Progress Note Due: 2/9/2025  Visit # / Visits authorized: 9 total. 4/30 on current auth  FOTO: 2/3 Last provided 12/10/24     Precautions: Standard      Time In: 7:45 am  Time Out: 8:30 am  Total Appointment Time (timed & untimed codes): 45 minutes     Subjective      Pt reports: he did pretty good with the more quality movements and feels like he is now making progress. Did have some problems with moving books from a pile onto the book shelf and was not thinking about the pelvic floor. Feels like his problems are always when he does not think about it. Before then, he really feels like progress is being made.        He was compliant with home exercise program.  Response to previous treatment: good   Functional change:  ongoing      Pain: 0/10  Location:        Objective         RUSI ASSESSMENT 1/9/2025  Midline abdominal wall: improved Kegel with lift of bladder base with hip flexion. Able to perform consistently in hook lying x 20 repetitions following in depth review of compensations being made. Oblique/rectus compensations initially that improved with decreased effort.         Pelvic exam 12/10/2024:  Muscle Power: 3/5   Muscle Endurance: 16 sec hold (even palpable at anterior pelvic floor)  # Reps To Fatigue: 3  Fast Contractions in 10 seconds: slow and inefficient relaxation - improving                 Quality of contraction: decreased hold   Specificity:  "patient contracts: adductors, glutes, obliques- continued but improving   Coordination: tends to hold breath during PFM contration - continued   Does Pelvic Floor drop and relax with a diaphragmatic breath? Yes, following cues     Ashish received therapeutic exercises to develop  strength, endurance, ROM, flexibility, posture, and core stabilization for 0 minutes including:      Standing thoracic extensions x 20  Piriformis stretch in supine 3 x 20"        Ashish received the following manual therapy techniques: to develop flexibility, extensibility, and desensitization for 0 minutes including:         Ashish participated in neuromuscular re-education activities to develop Coordination, Control, Down training, Proprioception, Kinesthetic, and Sense for 45 minutes including:      Modified child's pose + Kegel 10 x 10"  Hook lying Kegel holds 10 x 10"  Transverse abdominis with bilateral knee fall out  3 x 8  Transverse abdominis with bilateral alternating marching 2 x 10  Quadruped Cat/Cows, allowing for complete abdominal expansion 2 x 10  Quadruped fire hydrants 5 x 20" blue theraband   Bent over hi-low hip extensions 2 x 10, 3-5" hold (add semi round balance circles under feet NV)     Not today:     RUSI for breathing, drops, contractions of the PFM to help pt visualize PFM motion and function.                Performed in supine and hook lying               TrA activation B with focus on decreased compensations  Wall planks + Kegel  with knee , green ankle band 3 x 20  Resisted monster walks fwd and back with hip hinge 10 ft x 4,  green ankle band  Wall planks + Kegel 10 x 10"  Resisted lateral walks with hip hinge & Kegel 10 ft x 4 , green ankle band  Wall sits + Kegel 30 sec x 2, 60" x 2  Quadruped transverse abdominis activation 10 x 10"  Quadruped bear hovers + ball squeeze 10 x 10"      Ashish participated in dynamic functional therapeutic activities to improve functional performance for 0 minutes, " including:     Dead lifting #35 DB x 30  Squatting with Kegel and exhale,  #30 DB 2 x 8 (decreased depth secondary to knee)  Squatting with Kegel and exhale,  Blue theraband  3 x 8 (decreased depth secondary to knee)     Home Exercises Provided and Patient Education Provided      Education provided:   - anatomy/physiology of pelvic floor and Coordination of kegels with functional activities such as cough, laugh, sneeze, lift, etc.   Discussed progression of plan of care with patient; educated pt in activity modification; reviewed HEP with pt. Pt demonstrated and verbalized understanding of all instruction and was provided with a handout of HEP (see Patient Instructions).  - home exercise program update      Written Home Exercises Provided: Patient instructed to cont prior HEP.  Exercises were reviewed and Ashish was able to demonstrate them prior to the end of the session.  Ashish demonstrated good  understanding of the education provided.      See EMR under Patient Instructions for exercises provided 11/5/2024.     Assessment      Today's visit focused on slowing down the movements again to ensure he is avoiding compensations and improving muscle recruitment quality, especially as this seems to be already helping him.  Pt will continue to benefit from skilled outpatient physical therapy to address the deficits listed in the problem list box on initial evaluation, provide pt/family education and to maximize pt's level of independence in the home and community environment.      Ashish Is progressing well towards his goals.   Pt prognosis is Good.      Pt will continue to benefit from skilled outpatient physical therapy to address the deficits listed in the problem list box on initial evaluation, provide pt/family education and to maximize pt's level of independence in the home and community environment.      Pt's spiritual, cultural and educational needs considered and pt agreeable to plan of care and goals.      Anticipated barriers to physical therapy: none     Goals:   Short Term Goals: 6 weeks   - Pt will demonstrate excellent knowledge and adherence to HEP to facilitate optimal recovery. ( MET 12/10/2024)  - Pt will demonstrate proper PFM contraction, relaxation, and lengthening coordinated with TA and breath for improved muscle coordination needed for functional activity.( MET 12/10/2024)     Long Term Goals: 12 weeks   - Pt will demonstrate excellent knowledge and adherence to HEP for continued self-maintenance of symptoms.(NOT MET 1/9/2025)  - Pt will report FOTO Urinary Problem score of 56% or better indicating clinically relevant increase in function.(PART  MET 1/9/2025)  - Pt will report voiding interval of 2-4 hours for improved ADL tolerance.( MET 12/10/2024) trying to, usually successful   - Pt will report ability to delay urinary urge for at least 15 minutes to maintain continence with ADL/IADLs. .(NOT MET 1/9/2025) has just been going right away around 2 hours  - Pt will report 60-75% improvement in  urinary incontinence symptoms for improved hygiene and ADL/IADL tolerance. (PART MET 1/9/2025)  - Pt will report needing </= 1 pad/day indicating improved PFM function needed to maintain continence.(NOT MET 1/9/2025) 3pads w clamp. 4-5 without.   - Pt will demonstrate PFM strength of at least 3/5 MMT for improved strength needed to maintain continence. ( MET 12/10/2024)  - Pt will report bearing down appropriately 100% of the time for improved bowel function and decreased stress on adjacent pelvic structures. ( MET 12/10/2024)  - Pt will demonstrate independence with pressure-management strategies to decreased stress on adjacent pelvic structures. (PART MET 1/9/2025) not always consistent      Plan      Plan of care Certification: 1/20/2024 extended to 2/28/2025     Outpatient Physical Therapy 1-2 times weekly for 8 weeks     Next visit: continue progressing, ensure more consistent Kegel lift with RUSI on  1/16/25 - do transperinea RUSIl if no consistency      Summer Edge, PT

## 2025-01-16 ENCOUNTER — CLINICAL SUPPORT (OUTPATIENT)
Dept: REHABILITATION | Facility: HOSPITAL | Age: 70
End: 2025-01-16
Payer: MEDICARE

## 2025-01-16 DIAGNOSIS — N39.3 MALE URINARY STRESS INCONTINENCE: ICD-10-CM

## 2025-01-16 DIAGNOSIS — M62.81 MUSCLE WEAKNESS: ICD-10-CM

## 2025-01-16 DIAGNOSIS — M62.89 PELVIC FLOOR DYSFUNCTION: Primary | ICD-10-CM

## 2025-01-16 PROCEDURE — 97112 NEUROMUSCULAR REEDUCATION: CPT | Mod: PO

## 2025-01-16 NOTE — PROGRESS NOTES
Pelvic Health Physical Therapy   Treatment Summary      Name: Ashish Dave  Clinic Number: [id number]     Therapy Diagnosis:        Encounter Diagnoses   Name Primary?    Pelvic floor dysfunction Yes    Male urinary stress incontinence      Muscle weakness           Physician: Phil Werner MD     Visit Date: 1/9/2025     Physician Orders: PT Eval and Treat   Medical Diagnosis from Referral: BIRGIT (stress urinary incontinence), male [N39.3]   Evaluation Date: 10/28/2024  Authorization Period Expiration: 12-31-25  Plan of Care Expiration: 2/28/2025  Progress Note Due: 2/9/2025  Visit # / Visits authorized:10 total. 5/30 on current auth  FOTO: 2/3 Last provided 12/10/24     Precautions: Standard      Time In: 7:45  am  Time Out: 8:30 am  Total Appointment Time (timed & untimed codes): 45 minutes     Subjective      Pt reports: continuing to feel progress       He was compliant with home exercise program.  Response to previous treatment: good   Functional change:  ongoing      Pain: 0/10  Location:        Objective         RUSI ASSESSMENT 1/16/2025  Midline abdominal wall: improved endurance holds to an average of 9 seconds. Able to reduce consistency of 10 rep test from 25.6 seconds to 18 or less seconds today. Fatigue observable by end of rehabilitation ultrasound today.         Pelvic exam 12/10/2024:  Muscle Power: 3/5   Muscle Endurance: 16 sec hold (even palpable at anterior pelvic floor)  # Reps To Fatigue: 3  Fast Contractions in 10 seconds: slow and inefficient relaxation - improving                 Quality of contraction: decreased hold   Specificity: patient contracts: adductors, glutes, obliques- continued but improving   Coordination: tends to hold breath during PFM contration - continued   Does Pelvic Floor drop and relax with a diaphragmatic breath? Yes, following cues     Ashish received therapeutic exercises to develop  strength, endurance, ROM, flexibility, posture, and core stabilization for 0  "minutes including:      Standing thoracic extensions x 20  Piriformis stretch in supine 3 x 20"        Ashish received the following manual therapy techniques: to develop flexibility, extensibility, and desensitization for 0 minutes including:         Ashish participated in neuromuscular re-education activities to develop Coordination, Control, Down training, Proprioception, Kinesthetic, and Sense for 45 minutes including:      RUSI for breathing, drops, contractions of the PFM to help pt visualize PFM motion and function as noted above   Focused on maintaining activation with normal breathing pattern   Endurance vs quick flicks with complete relaxation   NO cues for quality of muscle activation (did not bear down today!!!)       Return in follow up visit:  Modified child's pose + Kegel 10 x 10"  Hook lying Kegel holds 10 x 10"  Transverse abdominis with bilateral knee fall out  3 x 8  Transverse abdominis with bilateral alternating marching 2 x 10  Quadruped Cat/Cows, allowing for complete abdominal expansion 2 x 10  Quadruped fire hydrants 5 x 20" blue theraband   Bent over hi-low hip extensions 2 x 10, 3-5" hold (add semi round balance circles under feet NV)   Wall planks + Kegel  with knee , green ankle band 3 x 20  Resisted monster walks fwd and back with hip hinge 10 ft x 4,  green ankle band  Wall planks + Kegel 10 x 10"  Resisted lateral walks with hip hinge & Kegel 10 ft x 4 , green ankle band  Wall sits + Kegel 30 sec x 2, 60" x 2  Quadruped transverse abdominis activation 10 x 10"  Quadruped bear hovers + ball squeeze 10 x 10"      Ashish participated in dynamic functional therapeutic activities to improve functional performance for 0 minutes, including:     Dead lifting #35 DB x 30  Squatting with Kegel and exhale,  #30 DB 2 x 8 (decreased depth secondary to knee)  Squatting with Kegel and exhale,  Blue theraband  3 x 8 (decreased depth secondary to knee)     Home Exercises Provided and Patient " Education Provided      Education provided:   - anatomy/physiology of pelvic floor and Coordination of kegels with functional activities such as cough, laugh, sneeze, lift, etc.   Discussed progression of plan of care with patient; educated pt in activity modification; reviewed HEP with pt. Pt demonstrated and verbalized understanding of all instruction and was provided with a handout of HEP (see Patient Instructions).  - home exercise program update      Written Home Exercises Provided: Patient instructed to cont prior HEP.  Exercises were reviewed and Ashish was able to demonstrate them prior to the end of the session.  Ashish demonstrated good  understanding of the education provided.      See EMR under Patient Instructions for exercises provided 11/5/2024.     Assessment      Ashish is demonstrating great progress noted on RUSI assessment today with significant improvements in quality of pelvic muscle activation without cues to avoid bearing down. Notable fatigue, slowness to activate pelvic floor, and slowness to completely relax by the end of therapy. However, this is still a great improvement as he has shown increased awareness of appropriate mechanics. Able to hold full bladder throughout 45 minute session and make it to the restroom to empty without leakage. Did have dime sized amount of leakage from home to clinic, however. Pt will continue to benefit from skilled outpatient physical therapy to address the deficits listed in the problem list box on initial evaluation, provide pt/family education and to maximize pt's level of independence in the home and community environment.      Ashish Is progressing well towards his goals.   Pt prognosis is Good.      Pt will continue to benefit from skilled outpatient physical therapy to address the deficits listed in the problem list box on initial evaluation, provide pt/family education and to maximize pt's level of independence in the home and community environment.       Pt's spiritual, cultural and educational needs considered and pt agreeable to plan of care and goals.     Anticipated barriers to physical therapy: none     Goals:   Short Term Goals: 6 weeks   - Pt will demonstrate excellent knowledge and adherence to HEP to facilitate optimal recovery. ( MET 12/10/2024)  - Pt will demonstrate proper PFM contraction, relaxation, and lengthening coordinated with TA and breath for improved muscle coordination needed for functional activity.( MET 12/10/2024)     Long Term Goals: 12 weeks   - Pt will demonstrate excellent knowledge and adherence to HEP for continued self-maintenance of symptoms.(NOT MET 1/9/2025)  - Pt will report FOTO Urinary Problem score of 56% or better indicating clinically relevant increase in function.(PART  MET 1/9/2025)  - Pt will report voiding interval of 2-4 hours for improved ADL tolerance.( MET 12/10/2024) trying to, usually successful   - Pt will report ability to delay urinary urge for at least 15 minutes to maintain continence with ADL/IADLs. .(NOT MET 1/9/2025) has just been going right away around 2 hours  - Pt will report 60-75% improvement in  urinary incontinence symptoms for improved hygiene and ADL/IADL tolerance. (PART MET 1/9/2025)  - Pt will report needing </= 1 pad/day indicating improved PFM function needed to maintain continence.(NOT MET 1/9/2025) 3pads w clamp. 4-5 without.   - Pt will demonstrate PFM strength of at least 3/5 MMT for improved strength needed to maintain continence. ( MET 12/10/2024)  - Pt will report bearing down appropriately 100% of the time for improved bowel function and decreased stress on adjacent pelvic structures. ( MET 12/10/2024)  - Pt will demonstrate independence with pressure-management strategies to decreased stress on adjacent pelvic structures. (PART MET 1/9/2025) not always consistent      Plan      Plan of care Certification: 1/20/2024 extended to 2/28/2025     Outpatient Physical Therapy 1-2 times  weekly for 8 weeks     Next visit: continue progressing, ensure more consistent Kegel lift with RUSI on 1/16/25 - do transperinea RUSIl if no consistency      Summer Edge, PT

## 2025-01-24 ENCOUNTER — CLINICAL SUPPORT (OUTPATIENT)
Dept: REHABILITATION | Facility: HOSPITAL | Age: 70
End: 2025-01-24
Payer: MEDICARE

## 2025-01-24 DIAGNOSIS — N39.3 MALE URINARY STRESS INCONTINENCE: ICD-10-CM

## 2025-01-24 DIAGNOSIS — M62.89 PELVIC FLOOR DYSFUNCTION: Primary | ICD-10-CM

## 2025-01-24 DIAGNOSIS — M62.81 MUSCLE WEAKNESS: ICD-10-CM

## 2025-01-24 PROCEDURE — 97112 NEUROMUSCULAR REEDUCATION: CPT | Mod: PO,CQ

## 2025-01-24 PROCEDURE — 97530 THERAPEUTIC ACTIVITIES: CPT | Mod: PO,CQ

## 2025-01-24 NOTE — PROGRESS NOTES
Pelvic Health Physical Therapy   Treatment Summary      Name: Ashish Dave  Clinic Number: [id number]     Therapy Diagnosis:        Encounter Diagnoses   Name Primary?    Pelvic floor dysfunction Yes    Male urinary stress incontinence      Muscle weakness           Physician: Phil Werner MD     Visit Date: 1/9/2025     Physician Orders: PT Eval and Treat   Medical Diagnosis from Referral: BIRGIT (stress urinary incontinence), male [N39.3]   Evaluation Date: 10/28/2024  Authorization Period Expiration: 12-31-25  Plan of Care Expiration: 2/28/2025  Progress Note Due: 2/9/2025  Visit # / Visits authorized:10 total. 5/30 on current auth  FOTO: 2/3 Last provided 12/10/24     Precautions: Standard      Time In: 7:00  am  Time Out: 8:00 am  Total Appointment Time (timed & untimed codes): 55 minutes     Subjective      Pt reports: continuing to feel progress but still has bad days where he will need up to 6 pads to get through the day. The good days he still require 3 pads.        He was compliant with home exercise program.  Response to previous treatment: good   Functional change:  ongoing      Pain: 0/10  Location:        Objective         RUSI ASSESSMENT 1/16/2025  Midline abdominal wall: improved endurance holds to an average of 9 seconds. Able to reduce consistency of 10 rep test from 25.6 seconds to 18 or less seconds today. Fatigue observable by end of rehabilitation ultrasound today.         Pelvic exam 12/10/2024:  Muscle Power: 3/5   Muscle Endurance: 16 sec hold (even palpable at anterior pelvic floor)  # Reps To Fatigue: 3  Fast Contractions in 10 seconds: slow and inefficient relaxation - improving                 Quality of contraction: decreased hold   Specificity: patient contracts: adductors, glutes, obliques- continued but improving   Coordination: tends to hold breath during PFM contration - continued   Does Pelvic Floor drop and relax with a diaphragmatic breath? Yes, following cues     Ashish  "received therapeutic exercises to develop  strength, endurance, ROM, flexibility, posture, and core stabilization for 0 minutes including:      Standing thoracic extensions x 20  Piriformis stretch in supine 3 x 20"        Ashish received the following manual therapy techniques: to develop flexibility, extensibility, and desensitization for 0 minutes including:         Ashish participated in neuromuscular re-education activities to develop Coordination, Control, Down training, Proprioception, Kinesthetic, and Sense for 45 minutes including:        Return in follow up visit:  Modified child's pose + Kegel 10 x 10"  Hook lying Kegel holds 10 x 10"  Quadruped Cat/Cows, allowing for complete abdominal expansion 2 x 10  Cobra stretch for additional lengthening of abdominals 10 sec x 10   Quadruped fire hydrants 5 x 20" blue theraband   Bent over hi-low hip extensions 2 x 10, 3-5" hold (add semi round balance circles under feet NV)   Wall planks + Kegel  with knee , green ankle band 3 x 20 Not performed today   Resisted monster walks fwd and back with hip hinge 10 ft x 4,  green ankle band  Wall planks + Kegel 10 x 10"  Resisted lateral walks with hip hinge & Kegel 10 ft x 4 , green ankle band  Wall sits + Kegel 30 sec x 2, 60" x 2    Not performed today   Transverse abdominis with bilateral knee fall out  3 x 8  Transverse abdominis with bilateral alternating marching 2 x 10  Quadruped transverse abdominis activation 10 x 10"  Quadruped bear hovers + ball squeeze 10 x 10"     RUSI for breathing, drops, contractions of the PFM to help pt visualize PFM motion and function as noted above   Focused on maintaining activation with normal breathing pattern   Endurance vs quick flicks with complete relaxation   NO cues for quality of muscle activation (did not bear down today!!!)     Ashish participated in dynamic functional therapeutic activities to improve functional performance for 10 minutes, including:    Education on " acidic foods and how they can effect urinary output and bladder irritation.      Not performed today   Dead lifting #35 DB x 30  Squatting with Kegel and exhale,  #30 DB 2 x 8 (decreased depth secondary to knee)  Squatting with Kegel and exhale,  Blue theraband  3 x 8 (decreased depth secondary to knee)     Home Exercises Provided and Patient Education Provided      Education provided:   - anatomy/physiology of pelvic floor and Coordination of kegels with functional activities such as cough, laugh, sneeze, lift, etc.   Discussed progression of plan of care with patient; educated pt in activity modification; reviewed HEP with pt. Pt demonstrated and verbalized understanding of all instruction and was provided with a handout of HEP (see Patient Instructions).  - home exercise program update      Written Home Exercises Provided: Patient instructed to cont prior HEP.  Exercises were reviewed and Ashish was able to demonstrate them prior to the end of the session.  Ashish demonstrated good  understanding of the education provided.      See EMR under Patient Instructions for exercises provided 11/5/2024.     Assessment      Today's treatment focused on coordination and endurance training for Pelvic Floor muscles. Pt will continue to benefit from skilled outpatient physical therapy to address the deficits listed in the problem list box on initial evaluation, provide pt/family education and to maximize pt's level of independence in the home and community environment.      Ashish Is progressing well towards his goals.   Pt prognosis is Good.      Pt will continue to benefit from skilled outpatient physical therapy to address the deficits listed in the problem list box on initial evaluation, provide pt/family education and to maximize pt's level of independence in the home and community environment.      Pt's spiritual, cultural and educational needs considered and pt agreeable to plan of care and goals.     Anticipated  barriers to physical therapy: none     Goals:   Short Term Goals: 6 weeks   - Pt will demonstrate excellent knowledge and adherence to HEP to facilitate optimal recovery. ( MET 12/10/2024)  - Pt will demonstrate proper PFM contraction, relaxation, and lengthening coordinated with TA and breath for improved muscle coordination needed for functional activity.( MET 12/10/2024)     Long Term Goals: 12 weeks   - Pt will demonstrate excellent knowledge and adherence to HEP for continued self-maintenance of symptoms.(NOT MET 1/9/2025)  - Pt will report FOTO Urinary Problem score of 56% or better indicating clinically relevant increase in function.(PART  MET 1/9/2025)  - Pt will report voiding interval of 2-4 hours for improved ADL tolerance.( MET 12/10/2024) trying to, usually successful   - Pt will report ability to delay urinary urge for at least 15 minutes to maintain continence with ADL/IADLs. .(NOT MET 1/9/2025) has just been going right away around 2 hours  - Pt will report 60-75% improvement in  urinary incontinence symptoms for improved hygiene and ADL/IADL tolerance. (PART MET 1/9/2025)  - Pt will report needing </= 1 pad/day indicating improved PFM function needed to maintain continence.(NOT MET 1/9/2025) 3pads w clamp. 4-5 without.   - Pt will demonstrate PFM strength of at least 3/5 MMT for improved strength needed to maintain continence. ( MET 12/10/2024)  - Pt will report bearing down appropriately 100% of the time for improved bowel function and decreased stress on adjacent pelvic structures. ( MET 12/10/2024)  - Pt will demonstrate independence with pressure-management strategies to decreased stress on adjacent pelvic structures. (PART MET 1/9/2025) not always consistent      Plan      Plan of care Certification: 1/20/2024 extended to 2/28/2025     Outpatient Physical Therapy 1-2 times weekly for 8 weeks     Next visit: continue progressing, ensure more consistent Kegel lift with RUSI on 1/16/25 - do  transperinea RUSIl if no consistency      Summer Edge, PT

## 2025-01-28 ENCOUNTER — CLINICAL SUPPORT (OUTPATIENT)
Dept: REHABILITATION | Facility: HOSPITAL | Age: 70
End: 2025-01-28
Payer: MEDICARE

## 2025-01-28 DIAGNOSIS — N39.3 MALE URINARY STRESS INCONTINENCE: ICD-10-CM

## 2025-01-28 DIAGNOSIS — M62.81 MUSCLE WEAKNESS: ICD-10-CM

## 2025-01-28 DIAGNOSIS — M62.89 PELVIC FLOOR DYSFUNCTION: Primary | ICD-10-CM

## 2025-01-28 PROCEDURE — 97112 NEUROMUSCULAR REEDUCATION: CPT | Mod: PO,CQ

## 2025-01-28 NOTE — PROGRESS NOTES
Pelvic Health Physical Therapy   Treatment Summary      Name: Ashish Dave  Clinic Number: [id number]     Therapy Diagnosis:        Encounter Diagnoses   Name Primary?    Pelvic floor dysfunction Yes    Male urinary stress incontinence      Muscle weakness           Physician: Phil Werner MD     Visit Date: 1/9/2025     Physician Orders: PT Eval and Treat   Medical Diagnosis from Referral: BIRGIT (stress urinary incontinence), male [N39.3]   Evaluation Date: 10/28/2024  Authorization Period Expiration: 12-31-25  Plan of Care Expiration: 2/28/2025  Progress Note Due: 2/9/2025  Visit # / Visits authorized:10 total. 7/30 on current auth  FOTO: 2/3 Last provided 12/10/24     Precautions: Standard      Time In: 7:00  am  Time Out: 8:00 am  Total Appointment Time (timed & untimed codes): 55 minutes     Subjective      Pt reports: having a bad day after leaving therapy last week. He reported going home and realizing he had workers coming to the house and had to rush through some preparations. Incontinence remained high throughout the rest of the day.        He was compliant with home exercise program.  Response to previous treatment: good   Functional change:  ongoing      Pain: 0/10  Location:        Objective         RUSI ASSESSMENT 1/16/2025  Midline abdominal wall: improved endurance holds to an average of 9 seconds. Able to reduce consistency of 10 rep test from 25.6 seconds to 18 or less seconds today. Fatigue observable by end of rehabilitation ultrasound today.         Pelvic exam 12/10/2024:  Muscle Power: 3/5   Muscle Endurance: 16 sec hold (even palpable at anterior pelvic floor)  # Reps To Fatigue: 3  Fast Contractions in 10 seconds: slow and inefficient relaxation - improving                 Quality of contraction: decreased hold   Specificity: patient contracts: adductors, glutes, obliques- continued but improving   Coordination: tends to hold breath during PFM contration - continued   Does Pelvic Floor  "drop and relax with a diaphragmatic breath? Yes, following cues     Ashish received therapeutic exercises to develop  strength, endurance, ROM, flexibility, posture, and core stabilization for 0 minutes including:      Standing thoracic extensions x 20  Piriformis stretch in supine 3 x 20"        Ashish received the following manual therapy techniques: to develop flexibility, extensibility, and desensitization for 0 minutes including:         Ashish participated in neuromuscular re-education activities to develop Coordination, Control, Down training, Proprioception, Kinesthetic, and Sense for 55 minutes including:        Return in follow up visit:  Modified child's pose + Kegel 10 x 10"  Hook lying Kegel holds 10 x 10"  Quadruped Cat/Cows, allowing for complete abdominal expansion 2 x 10  Cobra stretch for additional lengthening of abdominals 10 sec x 10   Quadruped fire hydrants 5 x 20" blue theraband Not performed today   Clams 3 x 10 BTB   Bent over hi-low hip extensions 2 x 10, 3-5" hold (add semi round balance circles under feet NV)   Wall planks + Kegel  with knee , green ankle band 3 x 20 Not performed today   Resisted monster walks fwd and back with hip hinge 10 ft x 4,  green ankle band Not performed today   Wall planks + Kegel 10 x 10"  Resisted lateral walks with hip hinge & Kegel 10 ft x 4 , green ankle band Not performed today   Wall sits + Kegel 30 sec x 2, 60" x 2    Not performed today   Transverse abdominis with bilateral knee fall out  3 x 8  Transverse abdominis with bilateral alternating marching 2 x 10  Quadruped transverse abdominis activation 10 x 10"  Quadruped bear hovers + ball squeeze 10 x 10"     RUSI for breathing, drops, contractions of the PFM to help pt visualize PFM motion and function as noted above   Focused on maintaining activation with normal breathing pattern   Endurance vs quick flicks with complete relaxation   NO cues for quality of muscle activation (did not bear down " today!!!)     Ashish participated in dynamic functional therapeutic activities to improve functional performance for 0 minutes, including:    Education on acidic foods and how they can effect urinary output and bladder irritation.      Not performed today   Dead lifting #35 DB x 30  Squatting with Kegel and exhale,  #30 DB 2 x 8 (decreased depth secondary to knee)  Squatting with Kegel and exhale,  Blue theraband  3 x 8 (decreased depth secondary to knee)     Home Exercises Provided and Patient Education Provided      Education provided:   - anatomy/physiology of pelvic floor and Coordination of kegels with functional activities such as cough, laugh, sneeze, lift, etc.   Discussed progression of plan of care with patient; educated pt in activity modification; reviewed HEP with pt. Pt demonstrated and verbalized understanding of all instruction and was provided with a handout of HEP (see Patient Instructions).  - home exercise program update      Written Home Exercises Provided: Patient instructed to cont prior HEP.  Exercises were reviewed and Ashish was able to demonstrate them prior to the end of the session.  Ashish demonstrated good  understanding of the education provided.      See EMR under Patient Instructions for exercises provided 11/5/2024.     Assessment      Patient was clearly triggered into increased incontinence from the stress of unexpected demands on the day last Friday. Discussed pattern recognition for triggers and utilizing the clamp on days when this type of trigger occurs. Today's treatment focused on coordination and endurance training for Pelvic Floor muscles. Pt will continue to benefit from skilled outpatient physical therapy to address the deficits listed in the problem list box on initial evaluation, provide pt/family education and to maximize pt's level of independence in the home and community environment.      Ashish Is progressing well towards his goals.   Pt prognosis is Good.       Pt will continue to benefit from skilled outpatient physical therapy to address the deficits listed in the problem list box on initial evaluation, provide pt/family education and to maximize pt's level of independence in the home and community environment.      Pt's spiritual, cultural and educational needs considered and pt agreeable to plan of care and goals.     Anticipated barriers to physical therapy: none     Goals:   Short Term Goals: 6 weeks   - Pt will demonstrate excellent knowledge and adherence to HEP to facilitate optimal recovery. ( MET 12/10/2024)  - Pt will demonstrate proper PFM contraction, relaxation, and lengthening coordinated with TA and breath for improved muscle coordination needed for functional activity.( MET 12/10/2024)     Long Term Goals: 12 weeks   - Pt will demonstrate excellent knowledge and adherence to HEP for continued self-maintenance of symptoms.(NOT MET 1/9/2025)  - Pt will report FOTO Urinary Problem score of 56% or better indicating clinically relevant increase in function.(PART  MET 1/9/2025)  - Pt will report voiding interval of 2-4 hours for improved ADL tolerance.( MET 12/10/2024) trying to, usually successful   - Pt will report ability to delay urinary urge for at least 15 minutes to maintain continence with ADL/IADLs. .(NOT MET 1/9/2025) has just been going right away around 2 hours  - Pt will report 60-75% improvement in  urinary incontinence symptoms for improved hygiene and ADL/IADL tolerance. (PART MET 1/9/2025)  - Pt will report needing </= 1 pad/day indicating improved PFM function needed to maintain continence.(NOT MET 1/9/2025) 3pads w clamp. 4-5 without.   - Pt will demonstrate PFM strength of at least 3/5 MMT for improved strength needed to maintain continence. ( MET 12/10/2024)  - Pt will report bearing down appropriately 100% of the time for improved bowel function and decreased stress on adjacent pelvic structures. ( MET 12/10/2024)  - Pt will demonstrate  independence with pressure-management strategies to decreased stress on adjacent pelvic structures. (PART MET 1/9/2025) not always consistent      Plan      Plan of care Certification: 1/20/2024 extended to 2/28/2025     Outpatient Physical Therapy 1-2 times weekly for 8 weeks     Next visit: continue progressing, ensure more consistent Kegel lift with RUSI on 1/16/25 - do transperinea RUSIl if no consistency      Summer Edge, PT

## 2025-01-29 ENCOUNTER — LAB VISIT (OUTPATIENT)
Dept: LAB | Facility: HOSPITAL | Age: 70
End: 2025-01-29
Attending: UROLOGY
Payer: MEDICARE

## 2025-01-29 DIAGNOSIS — Z85.46 HISTORY OF PROSTATE CANCER: ICD-10-CM

## 2025-01-29 LAB — COMPLEXED PSA SERPL-MCNC: <0.01 NG/ML (ref 0–4)

## 2025-01-29 PROCEDURE — 84153 ASSAY OF PSA TOTAL: CPT | Performed by: UROLOGY

## 2025-01-29 PROCEDURE — 36415 COLL VENOUS BLD VENIPUNCTURE: CPT | Performed by: UROLOGY

## 2025-01-31 ENCOUNTER — OFFICE VISIT (OUTPATIENT)
Dept: UROLOGY | Facility: CLINIC | Age: 70
End: 2025-01-31
Payer: MEDICARE

## 2025-01-31 VITALS — WEIGHT: 179.69 LBS | HEIGHT: 70 IN | BODY MASS INDEX: 25.73 KG/M2

## 2025-01-31 DIAGNOSIS — Z85.46 HISTORY OF PROSTATE CANCER: Primary | ICD-10-CM

## 2025-01-31 PROCEDURE — 99213 OFFICE O/P EST LOW 20 MIN: CPT | Mod: PBBFAC,PO | Performed by: UROLOGY

## 2025-01-31 PROCEDURE — 81003 URINALYSIS AUTO W/O SCOPE: CPT | Mod: PBBFAC,PO | Performed by: UROLOGY

## 2025-01-31 PROCEDURE — 99999PBSHW POCT URINALYSIS(INSTRUMENT): Mod: PBBFAC,,,

## 2025-01-31 PROCEDURE — 99215 OFFICE O/P EST HI 40 MIN: CPT | Mod: S$PBB,,, | Performed by: UROLOGY

## 2025-01-31 PROCEDURE — 99999 PR PBB SHADOW E&M-EST. PATIENT-LVL III: CPT | Mod: PBBFAC,,, | Performed by: UROLOGY

## 2025-01-31 PROCEDURE — G2211 COMPLEX E/M VISIT ADD ON: HCPCS | Mod: S$PBB,,, | Performed by: UROLOGY

## 2025-01-31 RX ORDER — SILDENAFIL 50 MG/1
50 TABLET, FILM COATED ORAL DAILY PRN
Qty: 30 TABLET | Refills: 11 | Status: SHIPPED | OUTPATIENT
Start: 2025-01-31 | End: 2026-01-31

## 2025-01-31 RX ORDER — SOLIFENACIN SUCCINATE 5 MG/1
5 TABLET, FILM COATED ORAL DAILY
Qty: 30 TABLET | Refills: 11 | Status: SHIPPED | OUTPATIENT
Start: 2025-01-31 | End: 2026-01-31

## 2025-01-31 NOTE — PROGRESS NOTES
[facility] Urology Progress Note     Ashish Dave is a 69 y.o. male who presents for prostate cancer follow up     Long history of BPH/LUTS on observation/supplements who on eval for rising psa to 5.4 and mild LUTS progression had MRI with pirad3 lesion of ant TZ but was negative though did have diffuse maryam 7 including 4+3=7 prostate cancer for which he elected to have robotic prostatectomy, which he underwent on 1/3/24  Preop: AUA SS: 10/2 (2: intermittency, urgency, weak stream; 1: emptying, fgrequency, straining, sleeping) . ++ anxiety. Reports that erections are not great, has ED.  Tried a few supplements over the counter previously which did not work. Had heart rhyrthm problem years ago. Used to see cardiology.  Preop saw cardiology for PSVT. He did well with surgery and was DCed on POD 1.         Robot-assisted laparoscopic radical prostatectomy 1/3/24  Robot-assisted laparoscopic bilateral pelvic lymphadenectomy  PATH:  3+4 [id number] (+pni, though neg margin resection)     Since then has had concerns about incisional swelling, scrotal swelling, blood in urine, foul smelling urine  ER 1/7 for scrotal swelling no pain c/w hematoma as discussed with pt in messaging, confirmed at time of negative cystogram on exam 1/10 and schafer removed and no incision concerns  Reassured small blood in urine while healing normal and days later concerned urine foul smelling and was yellow not clear  Ucx at time of cystogram negative. As precaution for pt concerns, 1/22/24 UA neg x trc blood with mirco only 2 rbc 1 wbc, Ucx neg. He was concerns results were false     2/5/24: Followed up with cardiology 1/30/24, bp controlled, of one med, rate controlled, no further palpitations or concerns  Water goes through him, and if siting a while as soon as he stands up will be going to the bathroom.  Sometimes urgent, but also just goes just to go when he gets up  If on his feet as while will drip out him sometimes without knowing it and  "feels is constant drip from amount diaper.  6-8 per day, down from a lot more. Has been doing  kegels in row in am and pm. Still on colace. When BM harder feels more pelvic discomfort  Some swelling persists lower abdomen, some pain on hard surfaces. Udip negative  - significant anxiety about continence recovery so ref to PFPT, started ditropan 5mg XL     In interim, per chart review, has been seeing PFPT  3/5: doing more around house, yardwork, no incident  3/8: Still having leakage if he drinks a lot of water at once. Continues to experience squirting if he coughs or sneezes   3/22: still has leakage - most trouble seems to be when he's drinking water. Tries to drink 1/2 gallon throughout the day, so whenever he's drinking water and he's active/up on his feet he has more leakage. If he drinks water and sits in the recliner he's fine.   3/27: overall stable though "unusually bad day yesterday" due to started the morning having to go to the [employer] to mop up some water from a pipe leak and used more pads  On review  Had routine labs 2/28 for upcoming pcp visit 3/13 including UA noted cloudy only with 1+ leuks and micro 10-20 wbc and few bacteria)  Advised concerning for infection and if having any symptoms could start bactrim though pt noted only incontinence form prostatectomy no symptoms so held off on abx  2/28/24 Urine culture final as below for esbl coli, and when seen 3/13 in pcp given 1 week bactrim DS    Result:            Greater than 100,000 CFU/mL of Escherichia coli (ESBL)  R to amp, ancef, rocephin, cipro/levo - S to cefepime, gent, penems, NF, zosyn, bactrim    4/4/24: PSA <0.01 on 3/28/24, In addition to urine above, labs 2/28/24 include H/H 15.6/49.9, WBC 4.7, Cr 0.72, eGFR 99  Urinalysis dipstick today is negative.  Most of his leakage is when he is on his feet being very active and drinking water.  Especially yd work and moving around a lot or bending.  Nighttime is no problem.  Nocturia 1-2 " "times, and seldom leak at night. Taking oxybutynin 5 XL.  Mild dry mouth otherwise tolerating well.  No constipation.  Working with pelvic floor physical therapy as summarized above.  He is compliant with home exercise regimen doing for sets of 10 Kegel exercises per day  He has wearing a brief/depend with a pad inside.  Rarely does it spell over from the pad to the brief but he is going through about 7 pads per day, not just when moist for comfort but usually they are quite saturated. Inquires about going fishing. Reports being asymptomatic from the above urine culture and was just found from routine screening labs     7/12/24: PSA 7/5/24 <0.01  Continued to work with PFPT last seen on 5/23/24 noting still better; still has more leakage when he drinks more water and is more active. Feels ready to continue with home exercise program independent and follow up in a few months. Underwear with pad in it. Uses pad in it. 4 pads per day. Usually saturated but on a "good day" if just damp and sitting a while will change for comfort - good day 2-3 pads  Leakage improving, but still leaking. If a lot of work in yard etc more. Stays active. Does get distracted and goes hours without voiding  Pretty dry at night.  Early AM feels urge. Still on oxybutynin. Did not set further recheck as above. Udip negative. Ok at home  Exercises mwf kegels and pelvic floor exercises. Tues/thurs nothing no kegels. Improvement from depends, to briefs, to now underwear with pads.     10/23/24:   10/18/24 psa <0.01  Continence still improving, but leakage still there. On a good day, can be down to 2 pads. When supported well, jock strap, less leakage and feels better  Has been minimizing bladder irritants.   When busy doing something and lots of movement and picking things up, and not thinking about it, will be worst time.   Has been working on timed voiding Q2 hours but only a little but  At night when laying down will feel urge to go. Dry at " "night.   Pretty dry right now - since 6 am (3 hours).  3 sets of 10 kegels per day, holding contractions longer at least 10 seconds  If does full pelvic floor HEP knees up bends etc for 30 mins will weaken it.   Leakage is getting better, but progress is slower and not getting fully there  DC from pfpt in may.  Taking oxybutynin 5xl.     Today  Psa 1/29/25 is <0.01  On a good day, 3 pads. On a bad day 6 pads. Fairly wet.   Feels like making progress but slow  On day very active and has a lot to do or on a timeline, worse.   Mostly leaking where he doesn't feel it on his bad days. Though example is moving furniture  He did returned to pelvic floor physical therapy after last visit, and has been working with them closely.  On review of notes, last seen 1/28/25 after having a bad day with stress and worsening incontinence, but in the visit prior notes good day bad day 3 versus 6 pad use as well.  They are continuing to work on pelvic floor muscle coordination and has pelvic floor physical therapy plan through February.  Preop - had ED - prn meds.   Dry mouth on oxybutynin afte rinc to 10      Focused Physical Exam:    There were no vitals filed for this visit.  Body mass index is 25.78 kg/m². Weight: 81.5 kg (179 lb 10.8 oz) Height: 5' 10" (177.8 cm)     Abdomen: Soft, non-tender, nondistended, no CVA tenderness    Recent Results (from the past 2 weeks)   Prostate Specific Antigen, Diagnostic    Collection Time: 01/29/25  6:55 AM   Result Value Ref Range    PSA Diagnostic <0.01 0.00 - 4.00 ng/mL   POCT Urinalysis(Instrument)    Collection Time: 01/31/25  9:15 AM   Result Value Ref Range    Color, POC UA Yellow Yellow, Straw, Colorless    Clarity, POC UA Clear Clear    Glucose, POC UA Negative Negative    Bilirubin, POC UA Negative Negative    Ketones, POC UA Negative Negative    Spec Grav POC UA 1.010 1.005 - 1.030    Blood, POC UA Negative Negative    pH, POC UA 6.0 5.0 - 8.0    Protein, POC UA Negative Negative    " Urobilinogen, POC UA 0.2 <=1.0    Nitrite, POC UA Negative Negative    WBC, POC UA Negative Negative       ASSESSMENT   1. History of prostate cancer  POCT Urinalysis(Instrument)    Prostate Specific Antigen, Diagnostic          Plan     Overall doing well about a year status post robotic prostatectomy with favorable pathology and undetectable PSA with no evidence of disease.  PSA monitoring can now go out to every 6 months until year 5 then annually.  He has had some continued struggles with his postprostatectomy continence.  Still sounds majority like stress incontinence but as he does note there is some leakage without awareness, but also notes that his when he is busy and active, think it is still a stress component and has been working with pelvic floor physical therapy again noting some pelvic floor discoordination and benefits from continued therapy.  There seems to also be some urge component, but he has increased dose of oxybutynin without significant change and with significant dry mouth and ask for an alternative medication and therefore will discontinue oxybutynin then start solifenacin.  He will continue to work with pelvic floor physical therapy and as he is noting very small improvements, we did discuss that given the timeline while he may improve and should get maximum benefit from pelvic floor physical therapy, in order to achieve continence may need surgical intervention and briefly reviewed sling versus sphincter.  I think he would do better with a male sling, and even with this may not completely resolve his leakage but should significantly improve it.  He will consider this and would like to finish pelvic floor physical therapy, and as I will see him back in 6 months with PSA prior, around the three-month edu would like to see reconstructive urology versus pelvic floor medicine urology to discuss intervention but would like to hold off at this time.  Will arrange and coordinate when he finishes  [FreeTextEntry1] : EKG:\par 06/16/2022: Sinus rhythm, T wave abnormality V3-V6 (unchanged from prior)\par 04/22/2022: Sinus Rhythm, TW abnormality, lateral ischemia\par -------------------------------------------------\par ECHO:\par 7/11/2022: EF 60%. Nml LVSF. Trace MI and AI.\par 02/18/2016: EF 55%. Nml LV size and function. Trace TR and trace AI.\par -------------------------------------------------\par Stress: \par 03/16/2016:  EF 60%. Exc 3:31 min, 5 METS. No ischemia,no WMA, exercised-induced PH (PAS 45mmHg).\par -------------------------------------------------\par Cath: \par 2012 ([facility]): normal diagnostic C\par ------------------------------------------------\par Carotids:\par 02/18/2016: Minimal diffuse plaque noted in JENNIFER.\par -------------------------------------------------\par MIRIAM:\par 2/18/16: normal\par -------------------------------------------------\par Holter:\par 2015: [facility] pelvic floor physical therapy.  Encouraged him to reach out via [employer], but I have also kept denote.  In regards to postprostatectomy erectile dysfunction we did review rehabilitative protocols versus on demand protocols and discuss medications, vacuum erection device, injections, and ultimately penile prosthesis.  Previously utilized oral medications before surgery on an as-needed basis, and therefore at this time will start low-dose daily sildenafil, and differentiated this from on demand use.  Would be difficult to use vacuum erection device given his current continence, and would like to hold off on more invasive maneuvers at this time.  Prescription sent and differentiation in dosing provided on after visit summary.     Total time spent in/on encounter today, including face to face time with patient, counseling, medical record review, interpretation of tests/results, , and treatment plan coordination: 40 minutes    *Visit today included increased complexity associated with the current or anticipated ongoing medical longitudinal care and management related to this patient's serious and/or complex managed problem(s) as described above.

## 2025-01-31 NOTE — PATIENT INSTRUCTIONS
Viagra/sildenafil     Rehabilitative dosing equals low-dose daily scheduled medication to help foster recovery of pathway.  Not intended to provide erection.  Equals 25 mg.  Equals half tablet daily     On demand dosing.  Intended to provide on demand erection.  100 mg, equals to total tablets, taken 45-60 minutes in advance of anticipated encounter, better absorbed on an empty stomach.  May not provide erection, but welcome to try when the situation presents itself.  If does not work the 1st few times, continued to try per your discretion.  -   Do not use greater than 1 time in 24 hours

## 2025-02-04 ENCOUNTER — CLINICAL SUPPORT (OUTPATIENT)
Dept: REHABILITATION | Facility: HOSPITAL | Age: 70
End: 2025-02-04
Payer: MEDICARE

## 2025-02-04 DIAGNOSIS — M62.89 PELVIC FLOOR DYSFUNCTION: Primary | ICD-10-CM

## 2025-02-04 DIAGNOSIS — M62.81 MUSCLE WEAKNESS: ICD-10-CM

## 2025-02-04 DIAGNOSIS — N39.3 MALE URINARY STRESS INCONTINENCE: ICD-10-CM

## 2025-02-04 PROCEDURE — 97112 NEUROMUSCULAR REEDUCATION: CPT | Mod: PO,CQ

## 2025-02-04 NOTE — PROGRESS NOTES
Pelvic Health Physical Therapy   Treatment Summary      Name: Ashish Dave  Clinic Number: [id number]     Therapy Diagnosis:        Encounter Diagnoses   Name Primary?    Pelvic floor dysfunction Yes    Male urinary stress incontinence      Muscle weakness           Physician: Phil Werner MD     Visit Date: 1/9/2025     Physician Orders: PT Eval and Treat   Medical Diagnosis from Referral: BIRGIT (stress urinary incontinence), male [N39.3]   Evaluation Date: 10/28/2024  Authorization Period Expiration: 12-31-25  Plan of Care Expiration: 2/28/2025  Progress Note Due: 2/9/2025  Visit # / Visits authorized:11 total. 8/30 on current auth  FOTO: 2/3 Last provided 12/10/24     Precautions: Standard      Time In: 7:00  am  Time Out: 8:00 am  Total Appointment Time (timed & untimed codes): 55 minutes     Subjective      Pt reports: he has now realized that stress in an aggravating factor for his incontinence and will try to go ahead and use the clamp on days he knows are going to be stressful. No bad days since the stressful one he had before last visit.      He was compliant with home exercise program.  Response to previous treatment: good   Functional change:  ongoing      Pain: 0/10  Location:        Objective         RUSI ASSESSMENT 1/16/2025  Midline abdominal wall: improved endurance holds to an average of 9 seconds. Able to reduce consistency of 10 rep test from 25.6 seconds to 18 or less seconds today. Fatigue observable by end of rehabilitation ultrasound today.         Pelvic exam 12/10/2024:  Muscle Power: 3/5   Muscle Endurance: 16 sec hold (even palpable at anterior pelvic floor)  # Reps To Fatigue: 3  Fast Contractions in 10 seconds: slow and inefficient relaxation - improving                 Quality of contraction: decreased hold   Specificity: patient contracts: adductors, glutes, obliques- continued but improving   Coordination: tends to hold breath during PFM contration - continued   Does Pelvic  "Floor drop and relax with a diaphragmatic breath? Yes, following cues     Ashish received therapeutic exercises to develop  strength, endurance, ROM, flexibility, posture, and core stabilization for 0 minutes including:      Standing thoracic extensions x 20  Piriformis stretch in supine 3 x 20"        Ashish received the following manual therapy techniques: to develop flexibility, extensibility, and desensitization for 0 minutes including:         Ashish participated in neuromuscular re-education activities to develop Coordination, Control, Down training, Proprioception, Kinesthetic, and Sense for 55 minutes including:      Quadruped Cat/Cows, allowing for complete abdominal expansion 2 x 10  Cobra stretch for additional lengthening of abdominals 10 sec x 10   Quadruped fire hydrants 5 x 5 blue theraband  ( performed without band today  Clams 3 x 10 BTB   Bent over hi-low hip extensions 2 x 10, 3-5" hold (add semi round balance circles under feet NV)   Wall planks + Kegel  with knee , green ankle band 3 x 20 performed without band today  Resisted monster walks fwd and back with hip hinge 10 ft x 4,  green ankle band Not performed today   Wall planks + Kegel 10 x 10"  Resisted lateral walks with hip hinge & Kegel 10 ft x 4 , green ankle band Not performed today   Wall sits + Kegel 30 sec x 2, 60" x 2    Not performed today   Transverse abdominis with bilateral knee fall out  3 x 8  Transverse abdominis with bilateral alternating marching 2 x 10  Quadruped transverse abdominis activation 10 x 10"  Quadruped bear hovers + ball squeeze 10 x 10"   Modified child's pose + Kegel 10 x 10"   Hook lying Kegel holds 10 x 10"    RUSI for breathing, drops, contractions of the PFM to help pt visualize PFM motion and function as noted above   Focused on maintaining activation with normal breathing pattern   Endurance vs quick flicks with complete relaxation   NO cues for quality of muscle activation (did not bear down " today!!!)     Ashish participated in dynamic functional therapeutic activities to improve functional performance for 0 minutes, including:    Education on acidic foods and how they can effect urinary output and bladder irritation.      Not performed today   Dead lifting #35 DB x 30  Squatting with Kegel and exhale,  #30 DB 2 x 8 (decreased depth secondary to knee)  Squatting with Kegel and exhale,  Blue theraband  3 x 8 (decreased depth secondary to knee)     Home Exercises Provided and Patient Education Provided      Education provided:   - anatomy/physiology of pelvic floor and Coordination of kegels with functional activities such as cough, laugh, sneeze, lift, etc.   Discussed progression of plan of care with patient; educated pt in activity modification; reviewed HEP with pt. Pt demonstrated and verbalized understanding of all instruction and was provided with a handout of HEP (see Patient Instructions).  - home exercise program update      Written Home Exercises Provided: Patient instructed to cont prior HEP.  Exercises were reviewed and Ashish was able to demonstrate them prior to the end of the session.  Ashish demonstrated good  understanding of the education provided.      See EMR under Patient Instructions for exercises provided 11/5/2024.     Assessment      Today's treatment continued to focus on strength and endurance building of Pelvic Floor musculature while preventing compensatory activation of abdominals. Patient puts forth excellent effort with all activities but does need some repetition of cues to maintain soft belly when appropriate.  Pt will continue to benefit from skilled outpatient physical therapy to address the deficits listed in the problem list box on initial evaluation, provide pt/family education and to maximize pt's level of independence in the home and community environment.      Ashish Is progressing well towards his goals.   Pt prognosis is Good.      Pt will continue to benefit  from skilled outpatient physical therapy to address the deficits listed in the problem list box on initial evaluation, provide pt/family education and to maximize pt's level of independence in the home and community environment.      Pt's spiritual, cultural and educational needs considered and pt agreeable to plan of care and goals.     Anticipated barriers to physical therapy: none     Goals:   Short Term Goals: 6 weeks   - Pt will demonstrate excellent knowledge and adherence to HEP to facilitate optimal recovery. ( MET 12/10/2024)  - Pt will demonstrate proper PFM contraction, relaxation, and lengthening coordinated with TA and breath for improved muscle coordination needed for functional activity.( MET 12/10/2024)     Long Term Goals: 12 weeks   - Pt will demonstrate excellent knowledge and adherence to HEP for continued self-maintenance of symptoms.(NOT MET 1/9/2025)  - Pt will report FOTO Urinary Problem score of 56% or better indicating clinically relevant increase in function.(PART  MET 1/9/2025)  - Pt will report voiding interval of 2-4 hours for improved ADL tolerance.( MET 12/10/2024) trying to, usually successful   - Pt will report ability to delay urinary urge for at least 15 minutes to maintain continence with ADL/IADLs. .(NOT MET 1/9/2025) has just been going right away around 2 hours  - Pt will report 60-75% improvement in  urinary incontinence symptoms for improved hygiene and ADL/IADL tolerance. (PART MET 1/9/2025)  - Pt will report needing </= 1 pad/day indicating improved PFM function needed to maintain continence.(NOT MET 1/9/2025) 3pads w clamp. 4-5 without.   - Pt will demonstrate PFM strength of at least 3/5 MMT for improved strength needed to maintain continence. ( MET 12/10/2024)  - Pt will report bearing down appropriately 100% of the time for improved bowel function and decreased stress on adjacent pelvic structures. ( MET 12/10/2024)  - Pt will demonstrate independence with  pressure-management strategies to decreased stress on adjacent pelvic structures. (PART MET 1/9/2025) not always consistent      Plan      Plan of care Certification: 1/20/2024 extended to 2/28/2025     Outpatient Physical Therapy 1-2 times weekly for 8 weeks     Next visit: continue progressing, ensure more consistent Kegel lift with RUSI on 1/16/25 - do transperinea RUSIl if no consistency      Summer Edge, PT

## 2025-02-11 ENCOUNTER — PATIENT MESSAGE (OUTPATIENT)
Dept: FAMILY MEDICINE | Facility: CLINIC | Age: 70
End: 2025-02-11
Payer: MEDICARE

## 2025-02-11 ENCOUNTER — CLINICAL SUPPORT (OUTPATIENT)
Dept: REHABILITATION | Facility: HOSPITAL | Age: 70
End: 2025-02-11
Payer: MEDICARE

## 2025-02-11 DIAGNOSIS — N39.3 MALE URINARY STRESS INCONTINENCE: ICD-10-CM

## 2025-02-11 DIAGNOSIS — M62.81 MUSCLE WEAKNESS: ICD-10-CM

## 2025-02-11 DIAGNOSIS — M62.89 PELVIC FLOOR DYSFUNCTION: Primary | ICD-10-CM

## 2025-02-11 PROCEDURE — 97112 NEUROMUSCULAR REEDUCATION: CPT | Mod: KX,PO

## 2025-02-11 PROCEDURE — 97530 THERAPEUTIC ACTIVITIES: CPT | Mod: KX,PO

## 2025-02-11 NOTE — PROGRESS NOTES
Pelvic Health Physical Therapy   Progress/Treatment Summary      Name: Ashish Dave  Clinic Number: [id number]     Therapy Diagnosis:     Encounter Diagnoses   Name Primary?    Pelvic floor dysfunction Yes    Male urinary stress incontinence     Muscle weakness        Physician: Phil Werner MD     Visit Date: 1/9/2025     Physician Orders: PT Eval and Treat   Medical Diagnosis from Referral: BIRGIT (stress urinary incontinence), male [N39.3]   Evaluation Date: 10/28/2024  Authorization Period Expiration: 12-31-25  Plan of Care Expiration: 2/28/2025  Progress Note Due: 3/11/2025  Visit # / Visits authorized:12 total. 9/30 on current auth  FOTO: 2/3 Last provided 12/10/24     Precautions: Standard      Time In: 7:45 am  Time Out: 8:32 am  Total Appointment Time (timed & untimed codes): 47 minutes     Subjective      Pt reports: he has now realized that stress in an aggravating factor for his incontinence and will try to go ahead and use the clamp on days he knows are going to be stressful. No bad days since the stressful one he had before last visit. Able to move a case of water and walking >1 minute with it without leakage if he engages his pelvic floor and exhales properly     He was compliant with home exercise program.  Response to previous treatment: good   Functional change:  ongoing      Pain: 0/10  Location:        Objective         RUSI ASSESSMENT 1/16/2025  Midline abdominal wall: improved endurance holds to an average of 9 seconds. Able to reduce consistency of 10 rep test from 25.6 seconds to 18 or less seconds today. Fatigue observable by end of rehabilitation ultrasound today.     Re-assessment on 2/11/2025:  14 seconds for 10 rep test, much improving coordination in supine, hook lying, sitting and standing without abdominal wall compensations. 40% contraction x 60 seconds easily performed with great execution. Improving vascularity to pelvic muscles and deep core observable on RUSI -increased 40%  "endurance holds for reduction in leakage with walking activities to address slow twitch fiber weakness on Hep       Ashish received therapeutic exercises to develop  strength, endurance, ROM, flexibility, posture, and core stabilization for 0 minutes including:      Standing thoracic extensions x 20  Piriformis stretch in supine 3 x 20"        Ashish received the following manual therapy techniques: to develop flexibility, extensibility, and desensitization for 0 minutes including:         Ashish participated in neuromuscular re-education activities to develop Coordination, Control, Down training, Proprioception, Kinesthetic, and Sense for 38 minutes including:     RUSI re-assessment as noted above-  RUSI for breathing, drops, contractions of the PFM to help pt visualize PFM motion and function as noted above   Endurance vs quick flicks with complete relaxation   Performed sitting, hook lying, and standing to ensure appropriate bladder support without bearing down or compensations       Ashish participated in dynamic functional therapeutic activities to improve functional performance for 9 minutes, including:    Review of home program, progress, and prognosis    Not performed today   Education on acidic foods and how they can effect urinary output and bladder irritation.    Dead lifting #35 DB x 30  Squatting with Kegel and exhale,  #30 DB 2 x 8 (decreased depth secondary to knee)  Squatting with Kegel and exhale,  Blue theraband  3 x 8 (decreased depth secondary to knee)     Home Exercises Provided and Patient Education Provided      Education provided:   - anatomy/physiology of pelvic floor and Coordination of kegels with functional activities such as cough, laugh, sneeze, lift, etc.   Discussed progression of plan of care with patient; educated pt in activity modification; reviewed HEP with pt. Pt demonstrated and verbalized understanding of all instruction and was provided with a handout of HEP (see Patient " Instructions).  - home exercise program update      Written Home Exercises Provided: Patient instructed to cont prior HEP.  Exercises were reviewed and Ashish was able to demonstrate them prior to the end of the session.  Ashish demonstrated good  understanding of the education provided.      See EMR under Patient Instructions for exercises provided 11/5/2024.     Assessment      Ashish demonstrates objective improvements on rehabilitative ultrasound re-assessment today with a reduction in 10 rep test to 14 second time (was >26 seconds one month ago). Significant improvement in coordination in all positions today and for different functional requirements (slow vs fast twitch fiber utilization). Encouraged pt to continue adhering to his program with expectations of very gradual progress. Therapist suspects that muscle strength, quality, and coordination are all progressing well with the biggest impairment being lack of attentiveness with activity at home as he verbalizes being able to carry a pack of water for extended time without urinary leakage when he is intentional with his pelvic floor work. Plan to DC following plan of care expiration.       Ashish Is progressing well towards his goals.   Pt prognosis is Good.      Pt will continue to benefit from skilled outpatient physical therapy to address the deficits listed in the problem list box on initial evaluation, provide pt/family education and to maximize pt's level of independence in the home and community environment.      Pt's spiritual, cultural and educational needs considered and pt agreeable to plan of care and goals.     Anticipated barriers to physical therapy: none     Goals:   Short Term Goals: 6 weeks   - Pt will demonstrate excellent knowledge and adherence to HEP to facilitate optimal recovery. ( MET 12/10/2024)  - Pt will demonstrate proper PFM contraction, relaxation, and lengthening coordinated with TA and breath for improved muscle coordination  needed for functional activity.( MET 12/10/2024)     Long Term Goals: 12 weeks   - Pt will demonstrate excellent knowledge and adherence to HEP for continued self-maintenance of symptoms.(PART MET 2/11/2025) does program early in the morning and again in the evening most days  - Pt will report FOTO Urinary Problem score of 56% or better indicating clinically relevant increase in function.(PART  MET 2/11/2025)   - Pt will report voiding interval of 2-4 hours for improved ADL tolerance.( MET 12/10/2024) trying to, usually successful. Without clamp, waits until he has an urge to go  - Pt will report ability to delay urinary urge for at least 15 minutes to maintain continence with ADL/IADLs. (MET 2/11/2025) unless he is really active   - Pt will report 60-75% improvement in  urinary incontinence symptoms for improved hygiene and ADL/IADL tolerance. (PART MET 2/11/2025) 50%  - Pt will report needing </= 1 pad/day indicating improved PFM function needed to maintain continence (PART MET 2/11/2025) 3 pads on a light day without the clamp. May be 5 if he is very active.   - Pt will demonstrate PFM strength of at least 3/5 MMT for improved strength needed to maintain continence. ( MET 12/10/2024)  - Pt will report bearing down appropriately 100% of the time for improved bowel function and decreased stress on adjacent pelvic structures. ( MET 12/10/2024)  - Pt will demonstrate independence with pressure-management strategies to decreased stress on adjacent pelvic structures.(PART MET 2/11/2025) more engrained in his mind now. Able to move a case of water and walking >1 minute with it without leakage if he engages his pelvic floor and exhales properly     Plan      Plan of care Certification: 1/20/2024 extended to 2/28/2025     Outpatient Physical Therapy 1-2 times weekly for 8 weeks    Next visit: work on endurance (40-60% contraction) for more prolonged holds to reduce leakage with walking, household chores, etc.      Summer  Minesh, PT

## 2025-02-18 ENCOUNTER — CLINICAL SUPPORT (OUTPATIENT)
Dept: REHABILITATION | Facility: HOSPITAL | Age: 70
End: 2025-02-18
Payer: MEDICARE

## 2025-02-18 DIAGNOSIS — M62.81 MUSCLE WEAKNESS: ICD-10-CM

## 2025-02-18 DIAGNOSIS — M62.89 PELVIC FLOOR DYSFUNCTION: Primary | ICD-10-CM

## 2025-02-18 DIAGNOSIS — N39.3 MALE URINARY STRESS INCONTINENCE: ICD-10-CM

## 2025-02-18 PROCEDURE — 97112 NEUROMUSCULAR REEDUCATION: CPT | Mod: KX,PO,CQ

## 2025-02-18 NOTE — PROGRESS NOTES
"  Pelvic Health Physical Therapy   Progress/Treatment Summary      Name: Ashish Dave  Clinic Number: [id number]     Therapy Diagnosis:     Encounter Diagnoses   Name Primary?    Pelvic floor dysfunction Yes    Male urinary stress incontinence     Muscle weakness        Physician: Phil Werner MD     Visit Date: 1/9/2025     Physician Orders: PT Eval and Treat   Medical Diagnosis from Referral: BIRGIT (stress urinary incontinence), male [N39.3]   Evaluation Date: 10/28/2024  Authorization Period Expiration: 12-31-25  Plan of Care Expiration: 2/28/2025  Progress Note Due: 3/11/2025  Visit # / Visits authorized:13 total. 10/30 on current auth  FOTO: 2/3 Last provided 12/10/24     Precautions: Standard      Time In: 7:00 am  Time Out: 7:45 am  Total Appointment Time (timed & untimed codes): 45 minutes     Subjective      Pt reports: "I am trying to get down to 2 pads a day."     He was compliant with home exercise program.  Response to previous treatment: good   Functional change:  ongoing      Pain: 0/10  Location:        Objective         RUSI ASSESSMENT 1/16/2025  Midline abdominal wall: improved endurance holds to an average of 9 seconds. Able to reduce consistency of 10 rep test from 25.6 seconds to 18 or less seconds today. Fatigue observable by end of rehabilitation ultrasound today.     Re-assessment on 2/11/2025:  14 seconds for 10 rep test, much improving coordination in supine, hook lying, sitting and standing without abdominal wall compensations. 40% contraction x 60 seconds easily performed with great execution. Improving vascularity to pelvic muscles and deep core observable on RUSI -increased 40% endurance holds for reduction in leakage with walking activities to address slow twitch fiber weakness on Hep       Ashish received therapeutic exercises to develop  strength, endurance, ROM, flexibility, posture, and core stabilization for 0 minutes including:      Standing thoracic extensions x " "20  Piriformis stretch in supine 3 x 20"        Ashish received the following manual therapy techniques: to develop flexibility, extensibility, and desensitization for 0 minutes including:         Ashish participated in neuromuscular re-education activities to develop Coordination, Control, Down training, Proprioception, Kinesthetic, and Sense for 45 minutes including:     RUSI re-assessment as noted above-  RUSI for breathing, drops, contractions of the PFM to help pt visualize PFM motion and function as noted above   Endurance vs quick flicks with complete relaxation   Performed sitting, hook lying, and standing to ensure appropriate bladder support without bearing down or compensations      Quadruped fire hydrants 5 x 5 blue theraband   Clams 3 x 10 BTB   Bent over hi-low hip extensions 2 x 10, 3-5" hold (add semi round balance circles under feet NV)   Wall planks + Kegel  with knee , green ankle band 3 x 20 performed without band today  Resisted monster walks fwd and back with hip hinge 10 ft x 4,  green ankle band   Wall planks + Kegel 60 sec x 2  Resisted lateral walks with hip hinge & Kegel 10 ft x 4 , green ankle band   Wall sits + Kegel 30 sec x 2, 60" x 2     Not performed today   Transverse abdominis with bilateral knee fall out  3 x 8  Transverse abdominis with bilateral alternating marching 2 x 10  Quadruped transverse abdominis activation 10 x 10"  Quadruped bear hovers + ball squeeze 10 x 10"   Modified child's pose + Kegel 10 x 10"   Hook lying Kegel holds 10 x 10"  Quadruped Cat/Cows, allowing for complete abdominal expansion 2 x 10  Cobra stretch for additional lengthening of abdominals 10 sec x 10          RUSI for breathing, drops, contractions of the PFM to help pt visualize PFM motion and function as noted above              Focused on maintaining activation with normal breathing pattern              Endurance vs quick flicks with complete relaxation              NO cues for quality of " muscle activation (did not bear down today!!!)     Ashish participated in dynamic functional therapeutic activities to improve functional performance for 0 minutes, including:    Review of home program, progress, and prognosis    Not performed today   Education on acidic foods and how they can effect urinary output and bladder irritation.    Dead lifting #35 DB x 30  Squatting with Kegel and exhale,  #30 DB 2 x 8 (decreased depth secondary to knee)  Squatting with Kegel and exhale,  Blue theraband  3 x 8 (decreased depth secondary to knee)     Home Exercises Provided and Patient Education Provided      Education provided:   - anatomy/physiology of pelvic floor and Coordination of kegels with functional activities such as cough, laugh, sneeze, lift, etc.   Discussed progression of plan of care with patient; educated pt in activity modification; reviewed HEP with pt. Pt demonstrated and verbalized understanding of all instruction and was provided with a handout of HEP (see Patient Instructions).  - home exercise program update      Written Home Exercises Provided: Patient instructed to cont prior HEP.  Exercises were reviewed and Ashish was able to demonstrate them prior to the end of the session.  Ashish demonstrated good  understanding of the education provided.      See EMR under Patient Instructions for exercises provided 11/5/2024.     Assessment      Ashish was able to complete all recommended therapeutic exercises without incident. Resumed higher level exercises today.       Ashish Is progressing well towards his goals.   Pt prognosis is Good.      Pt will continue to benefit from skilled outpatient physical therapy to address the deficits listed in the problem list box on initial evaluation, provide pt/family education and to maximize pt's level of independence in the home and community environment.      Pt's spiritual, cultural and educational needs considered and pt agreeable to plan of care and goals.      Anticipated barriers to physical therapy: none     Goals:   Short Term Goals: 6 weeks   - Pt will demonstrate excellent knowledge and adherence to HEP to facilitate optimal recovery. ( MET 12/10/2024)  - Pt will demonstrate proper PFM contraction, relaxation, and lengthening coordinated with TA and breath for improved muscle coordination needed for functional activity.( MET 12/10/2024)     Long Term Goals: 12 weeks   - Pt will demonstrate excellent knowledge and adherence to HEP for continued self-maintenance of symptoms.(PART MET 2/11/2025) does program early in the morning and again in the evening most days  - Pt will report FOTO Urinary Problem score of 56% or better indicating clinically relevant increase in function.(PART  MET 2/11/2025)   - Pt will report voiding interval of 2-4 hours for improved ADL tolerance.( MET 12/10/2024) trying to, usually successful. Without clamp, waits until he has an urge to go  - Pt will report ability to delay urinary urge for at least 15 minutes to maintain continence with ADL/IADLs. (MET 2/11/2025) unless he is really active   - Pt will report 60-75% improvement in  urinary incontinence symptoms for improved hygiene and ADL/IADL tolerance. (PART MET 2/11/2025) 50%  - Pt will report needing </= 1 pad/day indicating improved PFM function needed to maintain continence (PART MET 2/11/2025) 3 pads on a light day without the clamp. May be 5 if he is very active.   - Pt will demonstrate PFM strength of at least 3/5 MMT for improved strength needed to maintain continence. ( MET 12/10/2024)  - Pt will report bearing down appropriately 100% of the time for improved bowel function and decreased stress on adjacent pelvic structures. ( MET 12/10/2024)  - Pt will demonstrate independence with pressure-management strategies to decreased stress on adjacent pelvic structures.(PART MET 2/11/2025) more engrained in his mind now. Able to move a case of water and walking >1 minute with it without  leakage if he engages his pelvic floor and exhales properly     Plan      Plan of care Certification: 1/20/2024 extended to 2/28/2025     Outpatient Physical Therapy 1-2 times weekly for 8 weeks    Next visit: work on endurance (40-60% contraction) for more prolonged holds to reduce leakage with walking, household chores, etc.      Summer Edge, PT

## 2025-02-22 DIAGNOSIS — Z00.00 ENCOUNTER FOR MEDICARE ANNUAL WELLNESS EXAM: ICD-10-CM

## 2025-02-25 ENCOUNTER — CLINICAL SUPPORT (OUTPATIENT)
Dept: REHABILITATION | Facility: HOSPITAL | Age: 70
End: 2025-02-25
Payer: MEDICARE

## 2025-02-25 DIAGNOSIS — M62.89 PELVIC FLOOR DYSFUNCTION: Primary | ICD-10-CM

## 2025-02-25 DIAGNOSIS — N39.3 MALE URINARY STRESS INCONTINENCE: ICD-10-CM

## 2025-02-25 DIAGNOSIS — M62.81 MUSCLE WEAKNESS: ICD-10-CM

## 2025-02-25 PROCEDURE — 97530 THERAPEUTIC ACTIVITIES: CPT | Mod: KX,PO

## 2025-02-25 PROCEDURE — 97112 NEUROMUSCULAR REEDUCATION: CPT | Mod: KX,PO

## 2025-02-25 NOTE — PROGRESS NOTES
Pelvic Health Physical Therapy   Discharge Summary      Name: Ashish Dave  Clinic Number: [id number]     Therapy Diagnosis:     Encounter Diagnoses   Name Primary?    Pelvic floor dysfunction Yes    Male urinary stress incontinence     Muscle weakness        Physician: Phil Werner MD     Visit Date: 1/9/2025     Physician Orders: PT Eval and Treat   Medical Diagnosis from Referral: BIRGIT (stress urinary incontinence), male [N39.3]   Evaluation Date: 10/28/2024  Authorization Period Expiration: 12-31-25  Plan of Care Expiration: 2/28/2025  Progress Note Due: 3/11/2025  Visit # / Visits authorized:14 total. 11/30 on current auth     Precautions: Standard      Time In: 7:45 am  Time Out: 8:20 am  Total Appointment Time (timed & untimed codes): 35 minutes     Subjective      Pt reports: really feels like he is close to 2 pads per day, it is just a slow process. However, things are definitely better. He can wear a pad from first thing in the morning to 2-3 pm now. When he first started, he would wear 2-3 pads before noon and 3-4 in the evening.  His biggest issue remains when he does not actively or intentionally think about engaging his pelvic floor with activity.      He was compliant with home exercise program.  Response to previous treatment: good   Functional change:  ongoing      Pain: 0/10  Location:        Objective         RUSI ASSESSMENT 1/16/2025  Midline abdominal wall: improved endurance holds to an average of 9 seconds. Able to reduce consistency of 10 rep test from 25.6 seconds to 18 or less seconds today. Fatigue observable by end of rehabilitation ultrasound today.     Re-assessment on 2/11/2025:  14 seconds for 10 rep test, much improving coordination in supine, hook lying, sitting and standing without abdominal wall compensations. 40% contraction x 60 seconds easily performed with great execution. Improving vascularity to pelvic muscles and deep core observable on RUSI -increased 40% endurance  holds for reduction in leakage with walking activities to address slow twitch fiber weakness on Hep    Re-assessment on 2/25/2025: 10.2 seconds for 10 rep test with great coordination and consistency. Great endurance holds without issue        Ashish participated in neuromuscular re-education activities to develop Coordination, Control, Down training, Proprioception, Kinesthetic, and Sense for 15 minutes including:     RUSI re-assessment as noted above-  RUSI for breathing, drops, contractions of the PFM to help pt visualize PFM motion and function as noted above   Endurance vs quick flicks with complete relaxation   TrA activation without compensations reviewed     Ashish participated in dynamic functional therapeutic activities to improve functional performance for 10 minutes, including:    Review of home program, progress, and prognosis, and discharge  FOTO survey administered and reviewed, assessing progression of functional performance   Review of Home program and continued independence with exercise progressions/intentional activity     Home Exercises Provided and Patient Education Provided      Education provided:   - anatomy/physiology of pelvic floor and Coordination of kegels with functional activities such as cough, laugh, sneeze, lift, etc.   Discussed progression of plan of care with patient; educated pt in activity modification; reviewed HEP with pt. Pt demonstrated and verbalized understanding of all instruction and was provided with a handout of HEP (see Patient Instructions).  - home exercise program update      Written Home Exercises Provided: Patient instructed to cont prior HEP.  Exercises were reviewed and Ashish was able to demonstrate them prior to the end of the session.  Ashish demonstrated good  understanding of the education provided.      See EMR under Patient Instructions for exercises provided 11/5/2024.     PHYSICAL THERAPY DISCHARGE SUMMARY     Status Towards Goals Met: All short term  goals and 7/9 long term goals met at this time. Patient has demonstrates significant improvements in pelvic coordination, strength, and proprioception. Gradual reduction in pad usage is noted- going from 8+ pads per day to 2-3 at this time (reports very close to only using 2). Improved knowledge and awareness of pressure- management strategies despite moments of pt forgetting to engage his pelvic floor and experiencing leakage.     Goals:   Short Term Goals: 6 weeks   - Pt will demonstrate excellent knowledge and adherence to HEP to facilitate optimal recovery. ( MET 12/10/2024)  - Pt will demonstrate proper PFM contraction, relaxation, and lengthening coordinated with TA and breath for improved muscle coordination needed for functional activity.( MET 12/10/2024)     Long Term Goals: 12 weeks   - Pt will demonstrate excellent knowledge and adherence to HEP for continued self-maintenance of symptoms.(MET 2/25/2025)  - Pt will report FOTO Urinary Problem score of 56% or better indicating clinically relevant increase in function.(PART MET 2/25/2025) 54  - Pt will report voiding interval of 2-4 hours for improved ADL tolerance..(MET 2/25/2025)  a lot of times, he can wait longer than 2 hours. The morning is always the best time  - Pt will report ability to delay urinary urge for at least 15 minutes to maintain continence with ADL/IADLs. .(MET 2/25/2025)  - Pt will report 60-75% improvement in  urinary incontinence symptoms for improved hygiene and ADL/IADL tolerance. .(MET 2/25/2025) 60-65%  - Pt will report needing </= 1 pad/day indicating improved PFM function needed to maintain continence .(PART MET 2/25/2025) getting down to 2 pads per day  - Pt will demonstrate PFM strength of at least 3/5 MMT for improved strength needed to maintain continence. ( MET 12/10/2024)  - Pt will report bearing down appropriately 100% of the time for improved bowel function and decreased stress on adjacent pelvic structures. ( MET  12/10/2024)  - Pt will demonstrate independence with pressure-management strategies to decreased stress on adjacent pelvic structures.(MET 2/25/2025)    Goals Not achieved and why:   Patient was just 2 pts shy from reaching FOTO goal, possibly due to this therapy starting quite some time after initial surgery (had prior PT with a different therapist before this episode). And pad usage goal is described as noted above.         Discharge reason : Patient has maximized benefit from PT at this time    PLAN   This patient is discharged from Outpatient Physical Therapy Services.     Summer Edge, PT  02/25/2025

## 2025-03-13 ENCOUNTER — TELEPHONE (OUTPATIENT)
Dept: FAMILY MEDICINE | Facility: CLINIC | Age: 70
End: 2025-03-13
Payer: MEDICARE

## 2025-03-31 ENCOUNTER — OFFICE VISIT (OUTPATIENT)
Dept: FAMILY MEDICINE | Facility: CLINIC | Age: 70
End: 2025-03-31
Payer: MEDICARE

## 2025-03-31 VITALS
HEIGHT: 70 IN | DIASTOLIC BLOOD PRESSURE: 78 MMHG | BODY MASS INDEX: 26.11 KG/M2 | WEIGHT: 182.38 LBS | HEART RATE: 67 BPM | SYSTOLIC BLOOD PRESSURE: 118 MMHG | OXYGEN SATURATION: 96 %

## 2025-03-31 DIAGNOSIS — M12.812 ROTATOR CUFF ARTHROPATHY OF BOTH SHOULDERS: ICD-10-CM

## 2025-03-31 DIAGNOSIS — K20.90 ESOPHAGITIS: ICD-10-CM

## 2025-03-31 DIAGNOSIS — Z77.090 ASBESTOS EXPOSURE: ICD-10-CM

## 2025-03-31 DIAGNOSIS — M17.0 PRIMARY OSTEOARTHRITIS OF BOTH KNEES: ICD-10-CM

## 2025-03-31 DIAGNOSIS — M50.90 CERVICAL DISC DISEASE: ICD-10-CM

## 2025-03-31 DIAGNOSIS — K31.7 MULTIPLE GASTRIC POLYPS: ICD-10-CM

## 2025-03-31 DIAGNOSIS — R32 URINARY INCONTINENCE DUE TO URETHRAL SPHINCTER INCOMPETENCE: Primary | ICD-10-CM

## 2025-03-31 DIAGNOSIS — M12.811 ROTATOR CUFF ARTHROPATHY OF BOTH SHOULDERS: ICD-10-CM

## 2025-03-31 DIAGNOSIS — C61 PROSTATE CANCER: ICD-10-CM

## 2025-03-31 DIAGNOSIS — R97.20 ELEVATED PSA: ICD-10-CM

## 2025-03-31 DIAGNOSIS — N40.0 ENLARGED PROSTATE WITHOUT LOWER URINARY TRACT SYMPTOMS (LUTS): ICD-10-CM

## 2025-03-31 DIAGNOSIS — N36.42 URINARY INCONTINENCE DUE TO URETHRAL SPHINCTER INCOMPETENCE: Primary | ICD-10-CM

## 2025-03-31 DIAGNOSIS — Z90.79 HISTORY OF ROBOT-ASSISTED LAPAROSCOPIC RADICAL PROSTATECTOMY: ICD-10-CM

## 2025-03-31 PROBLEM — J12.82 PNEUMONIA DUE TO COVID-19 VIRUS: Status: RESOLVED | Noted: 2020-07-20 | Resolved: 2025-03-31

## 2025-03-31 PROBLEM — U07.1 PNEUMONIA DUE TO COVID-19 VIRUS: Status: RESOLVED | Noted: 2020-07-20 | Resolved: 2025-03-31

## 2025-03-31 PROBLEM — T46.4X5A ACE-INHIBITOR COUGH: Status: RESOLVED | Noted: 2023-09-19 | Resolved: 2025-03-31

## 2025-03-31 PROBLEM — R05.8 ACE-INHIBITOR COUGH: Status: RESOLVED | Noted: 2023-09-19 | Resolved: 2025-03-31

## 2025-03-31 PROCEDURE — 99214 OFFICE O/P EST MOD 30 MIN: CPT | Mod: S$GLB,,, | Performed by: FAMILY MEDICINE

## 2025-04-01 NOTE — PROGRESS NOTES
SUBJECTIVE:    Patient ID: Ashish Dave is a 69 y.o. male.    Chief Complaint: Follow-up (6 mo follow up/ lab work in [facility]/ states when awakes and takes BP it will run 130/90 on average, one hour later it will be lower //mp)    Follow-up  Associated symptoms include arthralgias and weakness. Pertinent negatives include no chest pain, headaches, joint swelling, neck pain or vomiting.       History of Present Illness    CHIEF COMPLAINT:  Ashish presents today for follow up.    Osteoarthritis, right knee aches and hurts.  Dr. Thornton has recommended a knee replacement.  Ibuprofen seems to help.  Also has right shoulder pain on motion.  Has trouble sleeping on his shoulders at night.  Patient does not want a knee surgery at this time.    GENITOURINARY:  He underwent prostate surgery in January 2024 and currently experiences urinary incontinence requiring three pads daily.    GASTROINTESTINAL:  Upper endoscopy several years ago revealed 30 gastric polyps and required dilation of esophageal scar tissue. He experiences occasional difficulty swallowing dry foods such as french fries and crackers, but denies issues with regular foods. Colonoscopy was performed in 2021.    MEDICATIONS:  He takes ibuprofen as needed for knee pain with reported improvement.    LABS:  Cholesterol level is 155 without medication. Urinalysis was normal.      ROS:  Constitutional: -appetite change, -chills, -fatigue, -fever, -unexpected weight change  HENT: -ear pain, +trouble swallowing  Eyes: -pain, -discharge, -visual disturbance  Respiratory: -apnea, -cough, -shortness of breath, -wheezing  Cardiovascular: -chest pain, -leg swelling  Gastrointestinal: -abdominal pain, -blood in stool, -constipation, -diarrhea, -nausea, -vomiting, -reflux  Endocrine: -cold intolerance, -heat intolerance, -polydipsia  Genitourinary: +bladder incontinence, -dysuria, -erectile dysfunction, -frequency, -hematuria, -testicular pain, -urgency,  -nocturia  Musculoskeletal: -gait problem, -joint swelling, -myalgia, +joint pain  Neurological: -dizziness, -seizures, -numbness  Psychiatric/Behavioral: -agitation, -hallucinations, -nervous, -anxiety symptoms         Office Visit on 01/31/2025   Component Date Value Ref Range Status    Color, POC UA 01/31/2025 Yellow  Yellow, Straw, Colorless Final    Clarity, POC UA 01/31/2025 Clear  Clear Final    Glucose, POC UA 01/31/2025 Negative  Negative Final    Bilirubin, POC UA 01/31/2025 Negative  Negative Final    Ketones, POC UA 01/31/2025 Negative  Negative Final    Spec Grav POC UA 01/31/2025 1.010  1.005 - 1.030 Final    Blood, POC UA 01/31/2025 Negative  Negative Final    pH, POC UA 01/31/2025 6.0  5.0 - 8.0 Final    Protein, POC UA 01/31/2025 Negative  Negative Final    Urobilinogen, POC UA 01/31/2025 0.2  <=1.0 Final    Nitrite, POC UA 01/31/2025 Negative  Negative Final    WBC, POC UA 01/31/2025 Negative  Negative Final   Lab Visit on 01/29/2025   Component Date Value Ref Range Status    PSA Diagnostic 01/29/2025 <0.01  0.00 - 4.00 ng/mL Final   Office Visit on 10/22/2024   Component Date Value Ref Range Status    Color, POC UA 10/22/2024 Yellow  Yellow, Straw, Colorless Final    Clarity, POC UA 10/22/2024 Clear  Clear Final    Glucose, POC UA 10/22/2024 Negative  Negative Final    Bilirubin, POC UA 10/22/2024 Negative  Negative Final    Ketones, POC UA 10/22/2024 Negative  Negative Final    Spec Grav POC UA 10/22/2024 1.020  1.005 - 1.030 Final    Blood, POC UA 10/22/2024 Negative  Negative Final    pH, POC UA 10/22/2024 7.0  5.0 - 8.0 Final    Protein, POC UA 10/22/2024 Negative  Negative Final    Urobilinogen, POC UA 10/22/2024 0.2  <=1.0 Final    Nitrite, POC UA 10/22/2024 Negative  Negative Final    WBC, POC UA 10/22/2024 Negative  Negative Final   Lab Visit on 10/18/2024   Component Date Value Ref Range Status    PSA Diagnostic 10/18/2024 <0.01  0.00 - 4.00 ng/mL Final       Past Medical History:    Diagnosis Date    ACE-inhibitor cough 09/19/2023    Arthritis     Cancer 12/2023    PROSTATE    Chronic calculous cholecystitis 08/12/2019    COVID-19     Essential (primary) hypertension 03/08/2016    GERD (gastroesophageal reflux disease)     Pneumonia due to COVID-19 virus 07/20/2020     Social History[1]  Past Surgical History:   Procedure Laterality Date    CHOLECYSTECTOMY      COLONOSCOPY  2016    Dr Sarbjit aquino 5 yr    ESOPHAGOGASTRODUODENOSCOPY N/A 6/6/2022    Procedure: EGD (ESOPHAGOGASTRODUODENOSCOPY);  Surgeon: Pj Syed MD;  Location: [facility];  Service: Endoscopy;  Laterality: N/A;    GALLBLADDER SURGERY  08/2019    KNEE SURGERY Right     x's3    LAPAROSCOPIC CHOLECYSTECTOMY N/A 8/12/2019    Procedure: CHOLECYSTECTOMY, LAPAROSCOPIC;  Surgeon: Alphonso Freeman III, MD;  Location: [facility];  Service: General;  Laterality: N/A;    ROBOT-ASSISTED LAPAROSCOPIC PELVIC LYMPHADENECTOMY N/A 1/3/2024    Procedure: ROBOTIC LYMPHADENECTOMY, PELVIS;  Surgeon: Phil Werner MD;  Location: [facility] OR;  Service: Urology;  Laterality: N/A;    ROBOT-ASSISTED LAPAROSCOPIC PROSTATECTOMY N/A 1/3/2024    Procedure: ROBOTIC PROSTATECTOMY;  Surgeon: Phil Werner MD;  Location: [facility];  Service: Urology;  Laterality: N/A;    UPPER GASTROINTESTINAL ENDOSCOPY       Family History   Problem Relation Name Age of Onset    Stroke Mother      No Known Problems Father      Ulcerative colitis Son      Colon cancer Neg Hx      Colon polyps Neg Hx      Crohn's disease Neg Hx      Esophageal cancer Neg Hx      Stomach cancer Neg Hx         The 10-year CVD risk score (GWENDOLYN'Agostino et al., 2008) is: 10.4%    Values used to calculate the score:      Age: 69 years      Sex: Male      Diabetic: No      Tobacco smoker: No      Systolic Blood Pressure: 118 mmHg      Is BP treated: No      HDL Cholesterol: 71 mg/dL      Total Cholesterol: 155 mg/dL    All of your core healthy metrics are met.      Review of patient's  "allergies indicates:   Allergen Reactions    Adhesive Other (See Comments)     SKIN WAS RED     Current Medications[2]    Review of Systems   Constitutional:  Positive for activity change. Negative for unexpected weight change.   HENT:  Positive for hearing loss. Negative for rhinorrhea and trouble swallowing.    Eyes:  Negative for discharge and visual disturbance.   Respiratory:  Negative for chest tightness and wheezing.    Cardiovascular:  Negative for chest pain and palpitations.   Gastrointestinal:  Negative for blood in stool, constipation, diarrhea and vomiting.   Endocrine: Negative for polydipsia and polyuria.   Genitourinary:  Negative for difficulty urinating, hematuria and urgency.   Musculoskeletal:  Positive for arthralgias and gait problem. Negative for joint swelling and neck pain.   Neurological:  Positive for weakness. Negative for headaches.   Psychiatric/Behavioral:  Negative for confusion and dysphoric mood.            Objective:      Vitals:    03/31/25 0835   BP: 118/78   Pulse: 67   SpO2: 96%   Weight: 82.7 kg (182 lb 6.4 oz)   Height: 5' 10" (1.778 m)     Physical Exam  Vitals and nursing note reviewed.   Constitutional:       General: He is not in acute distress.     Appearance: Normal appearance. He is well-developed. He is not toxic-appearing.   HENT:      Head: Normocephalic and atraumatic.      Right Ear: Tympanic membrane and external ear normal.      Left Ear: Tympanic membrane and external ear normal.      Nose: Nose normal.      Mouth/Throat:      Pharynx: Oropharynx is clear. No posterior oropharyngeal erythema.   Eyes:      Pupils: Pupils are equal, round, and reactive to light.   Neck:      Thyroid: No thyromegaly.      Vascular: No carotid bruit.   Cardiovascular:      Rate and Rhythm: Normal rate and regular rhythm.      Heart sounds: Normal heart sounds. No murmur heard.  Pulmonary:      Effort: Pulmonary effort is normal.      Breath sounds: Normal breath sounds. No wheezing " or rales.   Abdominal:      General: Bowel sounds are normal. There is no distension.      Palpations: Abdomen is soft.      Tenderness: There is no abdominal tenderness.   Musculoskeletal:         General: No tenderness or deformity. Normal range of motion.      Cervical back: Normal range of motion and neck supple.      Lumbar back: Normal. No spasms.      Comments: Bends 90 degrees at  waist, knees have full range of motion, no pitting edema to lower extremities, right shoulder has limited flexion to 150° painful arc noted.   Lymphadenopathy:      Cervical: No cervical adenopathy.   Skin:     General: Skin is warm and dry.      Findings: No rash.   Neurological:      General: No focal deficit present.      Mental Status: He is alert and oriented to person, place, and time. Mental status is at baseline.      Cranial Nerves: No cranial nerve deficit.      Coordination: Coordination normal.      Gait: Gait normal.   Psychiatric:         Mood and Affect: Mood normal.         Behavior: Behavior normal.         Thought Content: Thought content normal.         Judgment: Judgment normal.       Physical Exam                  Assessment:       1. Urinary incontinence due to urethral sphincter incompetence    2. Cervical disc disease    3. Enlarged prostate without lower urinary tract symptoms (luts)    4. Elevated PSA    5. History of robot-assisted laparoscopic radical prostatectomy    6. Prostate cancer    7. Esophagitis    8. Multiple gastric polyps    9. Primary osteoarthritis of both knees    10. Rotator cuff arthropathy of both shoulders    11. 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  Currently on VESIcare daily.  Your incontinence is due to his prior prostatectomy, followed by Urology  Cervical disc disease  Managing conservatively  Enlarged prostate without lower urinary tract symptoms (luts)    Elevated  PSA  Status post prostatectomy  History of robot-assisted laparoscopic radical prostatectomy    Prostate cancer  Now in remission  Esophagitis  History of Schatzki's ring and esophageal dilation by Dr. Syed  Multiple gastric polyps  30 gastric polyps were removed, none cancerous  Primary osteoarthritis of both knees  Has seen Dr. Thornton who recommends knee replacement.  Patient not inclined to do surgery yet  Rotator cuff arthropathy of both shoulders  Painful right shoulder persists  Asbestos exposure  No shortness a breath  Cholesterol excellent at 155 HDL 71 TG 60 LDL 70  Assessment & Plan    C61 Prostate cancer  N39.492 Postural (urinary) incontinence  K31.7 Polyp of stomach and duodenum  K22.2 Esophageal obstruction  R13.12 Dysphagia, oropharyngeal phase  M25.50 Pain in unspecified joint    IMPRESSION:  - Reviewed history of prostate surgery from January 2024.  - Ongoing urinary incontinence requiring 3 pads per day.  - Acknowledged adherence to pelvic rehab and Kegel exercises.  - Discussed knee and joint pain management.  - Reviewed recent upper endoscopy findings, including 30 gastric polyps.  - Noted previous dilation of esophageal stricture.  - Cholesterol levels notably low at 155 without medication.    PROSTATE CANCER:  - Monitored patient's condition following prostate surgery in January 2024.  - Ashish experiences urinary incontinence, using 3 pads daily.  - Currently under Dr. Werner's care, with potential provider changes.  - Ongoing management includes pelvic rehabilitation and daily Kegel exercises, which the patient is advised to continue as part of the treatment plan.    URINARY INCONTINENCE:  - Acknowledged the aggravating nature of this condition.    GASTRIC POLYPS:  - Monitored the patient's condition following the discovery of 30 gastric polyps during an upper endoscopy.  - Evaluated and discussed the risk with the patient, explaining that gastric polyps pose a lower cancer risk compared to  colon polyps, and stomach cancer rarely develops from these types of polyps.  - Recommend follow-up only if acid reflux symptoms recur.    ESOPHAGEAL OBSTRUCTION AND DYSPHAGIA:  - Monitored the patient's occasional difficulty swallowing dry foods, noting no issues with regular food.  - Evaluated the patient's history of scar tissue in the esophagus that was previously dilated.  - Assessed the possibility of future swallowing problems.  - Advised the patient to return for evaluation if swallowing problems recur, to consider esophageal dilation.    JOINT PAIN:  - Monitored the patient's knee and joint pain and assessed pain management.  - Continued ibuprofen as needed for knee and joint pain.  - Approved the patient's occasional use of hydrocodone for severe pain.    FOLLOW-UP:  - Follow up for colonoscopy in 2026, with option to choose between Dr. Chauhan or Dr. Iglesias's group.         Follow up in about 6 months (around 9/30/2025), or OA.        This note was generated with the assistance of ambient listening technology. Verbal consent was obtained by the patient and accompanying visitor(s) for the recording of patient appointment to facilitate this note. I attest to having reviewed and edited the generated note for accuracy, though some syntax or spelling errors may persist. Please contact the author of this note for any clarification.      3/31/2025 Phil Kemp           [1]   Social History  Socioeconomic History    Marital status:    Tobacco Use    Smoking status: Never    Smokeless tobacco: Never   Substance and Sexual Activity    Alcohol use: Not Currently    Drug use: Never     Social Drivers of Health     Financial Resource Strain: Low Risk  (12/15/2024)    Overall Financial Resource Strain (CARDIA)     Difficulty of Paying Living Expenses: Not hard at all   Food Insecurity: No Food Insecurity (12/15/2024)    Hunger Vital Sign     Worried About Running Out of Food in the Last Year: Never true      Ran Out of Food in the Last Year: Never true   Transportation Needs: No Transportation Needs (11/11/2023)    PRAPARE - Transportation     Lack of Transportation (Medical): No     Lack of Transportation (Non-Medical): No   Physical Activity: Insufficiently Active (12/15/2024)    Exercise Vital Sign     Days of Exercise per Week: 2 days     Minutes of Exercise per Session: 60 min   Stress: No Stress Concern Present (12/15/2024)    [facility] - Occupational Stress Questionnaire     Feeling of Stress : Only a little   Housing Stability: Low Risk  (11/11/2023)    Housing Stability Vital Sign     Unable to Pay for Housing in the Last Year: No     Number of Places Lived in the Last Year: 1     Unstable Housing in the Last Year: No   [2]   Current Outpatient Medications:     ASCORBIC ACID, VITAMIN C, ORAL, Take 4,000 mg by mouth 2 (two) times a day., Disp: , Rfl:     sildenafiL (VIAGRA) 50 MG tablet, Take 1 tablet (50 mg total) by mouth daily as needed for Erectile Dysfunction., Disp: 30 tablet, Rfl: 11    solifenacin (VESICARE) 5 MG tablet, Take 1 tablet (5 mg total) by mouth once daily., Disp: 30 tablet, Rfl: 11    vitamin D (VITAMIN D3) 1000 units Tab, Take 4,000 Units by mouth once daily., Disp: , Rfl:     azelastine (ASTELIN) 137 mcg (0.1 %) nasal spray, 1 spray (137 mcg total) by Nasal route 2 (two) times daily. (Patient not taking: Reported on 3/31/2025), Disp: 30 mL, Rfl: 0    HYDROcodone-acetaminophen (NORCO) 5-325 mg per tablet, Take 1 tablet by mouth every 8 (eight) hours as needed for Pain. (Patient not taking: Reported on 3/31/2025), Disp: 20 tablet, Rfl: 0    meclizine (ANTIVERT) 25 mg tablet, Take 1 tablet (25 mg total) by mouth 3 (three) times daily as needed. (Patient not taking: Reported on 3/31/2025), Disp: 20 tablet, Rfl: 0    ondansetron (ZOFRAN) 4 MG tablet, Take 1 tablet (4 mg total) by mouth every 6 (six) hours as needed. (Patient not taking: Reported on 3/31/2025),  Disp: 20 tablet, Rfl: 1

## 2025-04-17 ENCOUNTER — TELEPHONE (OUTPATIENT)
Dept: UROLOGY | Facility: CLINIC | Age: 70
End: 2025-04-17
Payer: MEDICARE

## 2025-06-25 ENCOUNTER — PATIENT MESSAGE (OUTPATIENT)
Dept: FAMILY MEDICINE | Facility: CLINIC | Age: 70
End: 2025-06-25
Payer: MEDICARE

## 2025-07-14 ENCOUNTER — LAB VISIT (OUTPATIENT)
Dept: LAB | Facility: HOSPITAL | Age: 70
End: 2025-07-14
Attending: UROLOGY
Payer: MEDICARE

## 2025-07-14 DIAGNOSIS — Z85.46 HISTORY OF PROSTATE CANCER: ICD-10-CM

## 2025-07-14 LAB — PSA SERPL-MCNC: <0.01 NG/ML (ref ?–4)

## 2025-07-14 PROCEDURE — 84153 ASSAY OF PSA TOTAL: CPT

## 2025-07-14 PROCEDURE — 36415 COLL VENOUS BLD VENIPUNCTURE: CPT

## 2025-07-22 ENCOUNTER — OFFICE VISIT (OUTPATIENT)
Dept: UROLOGY | Facility: CLINIC | Age: 70
End: 2025-07-22
Payer: MEDICARE

## 2025-07-22 ENCOUNTER — PATIENT MESSAGE (OUTPATIENT)
Dept: UROLOGY | Facility: CLINIC | Age: 70
End: 2025-07-22

## 2025-07-22 DIAGNOSIS — N52.9 ERECTILE DYSFUNCTION, UNSPECIFIED ERECTILE DYSFUNCTION TYPE: ICD-10-CM

## 2025-07-22 DIAGNOSIS — N39.3 SUI (STRESS URINARY INCONTINENCE), MALE: ICD-10-CM

## 2025-07-22 DIAGNOSIS — Z85.46 HISTORY OF PROSTATE CANCER: Primary | ICD-10-CM

## 2025-07-22 DIAGNOSIS — N32.81 OAB (OVERACTIVE BLADDER): ICD-10-CM

## 2025-07-22 PROCEDURE — 99999PBSHW POCT URINALYSIS(INSTRUMENT): Mod: PBBFAC,,,

## 2025-07-22 PROCEDURE — 99999 PR PBB SHADOW E&M-EST. PATIENT-LVL III: CPT | Mod: PBBFAC,,, | Performed by: UROLOGY

## 2025-07-22 PROCEDURE — 99214 OFFICE O/P EST MOD 30 MIN: CPT | Mod: S$PBB,,, | Performed by: UROLOGY

## 2025-07-22 PROCEDURE — G2211 COMPLEX E/M VISIT ADD ON: HCPCS | Mod: ,,, | Performed by: UROLOGY

## 2025-07-22 PROCEDURE — 81003 URINALYSIS AUTO W/O SCOPE: CPT | Mod: PBBFAC,PO | Performed by: UROLOGY

## 2025-07-22 PROCEDURE — 99213 OFFICE O/P EST LOW 20 MIN: CPT | Mod: PBBFAC,PO | Performed by: UROLOGY

## 2025-07-22 RX ORDER — TADALAFIL 5 MG/1
5 TABLET ORAL DAILY
Qty: 90 TABLET | Refills: 3 | Status: SHIPPED | OUTPATIENT
Start: 2025-07-22 | End: 2026-07-22

## 2025-07-22 NOTE — PATIENT INSTRUCTIONS
's typed/written- Abbreviated/Short Plan:  Prostate cancer s/p RALP 1/2024 - psa remains <0.01. continue to check q6 months until January 2026 and then yearly for rest of life.   BIRGIT-continues to leak 1.5 years out. Likley to be at new baseline. Discussed AUS and/or bulking agent which would require cysto first but they want to wait. Leaning towards bulking agent. Can rediscuss cysto for w/u at fu.   Ed- start cialis/tadalafil low dose 5mg daily. Can use sildneafil 75mg or 100mg in addition to daily 1-2x a week 30 min prior to intercourse. Discussed trimix injections. Hates needles. Not interested in penile prosthesis surgery.   Oab wo UUI- dc vesicare but restart if having increased urinary frequency and urgency.     Otherwise  fu in 6 months with disagnostic psa prior

## 2025-07-22 NOTE — PROGRESS NOTES
[facility], formerly known as [facility]  Group MD's:Court/Nikita/Debbi/Shawanda  Group NP's: Coretta Sy, Mena Griffin    PCP: Phil Kemp MD  Date of Service: 07/22/2025  Today's note written by: MD Court    Subjective:        HPI: Ashish Dave is a 69 y.o. male     [facility] Progress Note  1/31/25     Ashish Dave is a 69 y.o. male who presents for prostate cancer follow up     Long history of BPH/LUTS on observation/supplements who on eval for rising psa to 5.4 and mild LUTS progression had MRI with pirad3 lesion of ant TZ but was negative though did have diffuse maryam 7 including 4+3=7 prostate cancer for which he elected to have robotic prostatectomy, which he underwent on 1/3/24  Preop: AUA SS: 10/2 (2: intermittency, urgency, weak stream; 1: emptying, fgrequency, straining, sleeping) . ++ anxiety. Reports that erections are not great, has ED.  Tried a few supplements over the counter previously which did not work. Had heart rhyrthm problem years ago. Used to see cardiology.  Preop saw cardiology for PSVT. He did well with surgery and was DCed on POD 1.         Robot-assisted laparoscopic radical prostatectomy 1/3/24  Robot-assisted laparoscopic bilateral pelvic lymphadenectomy  PATH:  3+4 [id number] (+pni, though neg margin resection)     Since then has had concerns about incisional swelling, scrotal swelling, blood in urine, foul smelling urine  ER 1/7 for scrotal swelling no pain c/w hematoma as discussed with pt in messaging, confirmed at time of negative cystogram on exam 1/10 and schafer removed and no incision concerns  Reassured small blood in urine while healing normal and days later concerned urine foul smelling and was yellow not clear  Ucx at time of cystogram negative. As precaution for pt concerns, 1/22/24 UA neg x trc blood with mirco only 2 rbc 1 wbc, Ucx neg. He was concerns results were false     2/5/24: Followed up  "with cardiology 1/30/24, bp controlled, of one med, rate controlled, no further palpitations or concerns  Water goes through him, and if siting a while as soon as he stands up will be going to the bathroom.  Sometimes urgent, but also just goes just to go when he gets up  If on his feet as while will drip out him sometimes without knowing it and feels is constant drip from amount diaper.  6-8 per day, down from a lot more. Has been doing  kegels in row in am and pm. Still on colace. When BM harder feels more pelvic discomfort  Some swelling persists lower abdomen, some pain on hard surfaces. Udip negative  - significant anxiety about continence recovery so ref to PFPT, started ditropan 5mg XL     In interim, per chart review, has been seeing PFPT  3/5: doing more around house, yardwork, no incident  3/8: Still having leakage if he drinks a lot of water at once. Continues to experience squirting if he coughs or sneezes   3/22: still has leakage - most trouble seems to be when he's drinking water. Tries to drink 1/2 gallon throughout the day, so whenever he's drinking water and he's active/up on his feet he has more leakage. If he drinks water and sits in the recliner he's fine.   3/27: overall stable though "unusually bad day yesterday" due to started the morning having to go to the [employer] to mop up some water from a pipe leak and used more pads  On review  Had routine labs 2/28 for upcoming pcp visit 3/13 including UA noted cloudy only with 1+ leuks and micro 10-20 wbc and few bacteria)  Advised concerning for infection and if having any symptoms could start bactrim though pt noted only incontinence form prostatectomy no symptoms so held off on abx  2/28/24 Urine culture final as below for esbl coli, and when seen 3/13 in pcp given 1 week bactrim DS    Result:            Greater than 100,000 CFU/mL of Escherichia coli (ESBL)  R to amp, ancef, rocephin, cipro/levo - S to cefepime, gent, penems, NF, zosyn, " "bactrim    4/4/24: PSA <0.01 on 3/28/24, In addition to urine above, labs 2/28/24 include H/H 15.6/49.9, WBC 4.7, Cr 0.72, eGFR 99  Urinalysis dipstick today is negative.  Most of his leakage is when he is on his feet being very active and drinking water.  Especially yd work and moving around a lot or bending.  Nighttime is no problem.  Nocturia 1-2 times, and seldom leak at night. Taking oxybutynin 5 XL.  Mild dry mouth otherwise tolerating well.  No constipation.  Working with pelvic floor physical therapy as summarized above.  He is compliant with home exercise regimen doing for sets of 10 Kegel exercises per day  He has wearing a brief/depend with a pad inside.  Rarely does it spell over from the pad to the brief but he is going through about 7 pads per day, not just when moist for comfort but usually they are quite saturated. Inquires about going fishing. Reports being asymptomatic from the above urine culture and was just found from routine screening labs     7/12/24: PSA 7/5/24 <0.01  Continued to work with PFPT last seen on 5/23/24 noting still better; still has more leakage when he drinks more water and is more active. Feels ready to continue with home exercise program independent and follow up in a few months. Underwear with pad in it. Uses pad in it. 4 pads per day. Usually saturated but on a "good day" if just damp and sitting a while will change for comfort - good day 2-3 pads  Leakage improving, but still leaking. If a lot of work in yard etc more. Stays active. Does get distracted and goes hours without voiding  Pretty dry at night.  Early AM feels urge. Still on oxybutynin. Did not set further recheck as above. Udip negative. Ok at home  Exercises mwf kegels and pelvic floor exercises. Tues/thurs nothing no kegels. Improvement from depends, to briefs, to now underwear with pads.     10/23/24:   10/18/24 psa <0.01  Continence still improving, but leakage still there. On a good day, can be down to 2 " pads. When supported well, jock strap, less leakage and feels better  Has been minimizing bladder irritants.   When busy doing something and lots of movement and picking things up, and not thinking about it, will be worst time.   Has been working on timed voiding Q2 hours but only a little but  At night when laying down will feel urge to go. Dry at night.   Pretty dry right now - since 6 am (3 hours).  3 sets of 10 kegels per day, holding contractions longer at least 10 seconds  If does full pelvic floor HEP knees up bends etc for 30 mins will weaken it.   Leakage is getting better, but progress is slower and not getting fully there  DC from pfpt in may.  Taking oxybutynin 5xl.      Today 1/31/25  Psa 1/29/25 is <0.01  On a good day, 3 pads. On a bad day 6 pads. Fairly wet.   Feels like making progress but slow  On day very active and has a lot to do or on a timeline, worse.   Mostly leaking where he doesn't feel it on his bad days. Though example is moving furniture  He did returned to pelvic floor physical therapy after last visit, and has been working with them closely.  On review of notes, last seen 1/28/25 after having a bad day with stress and worsening incontinence, but in the visit prior notes good day bad day 3 versus 6 pad use as well.  They are continuing to work on pelvic floor muscle coordination and has pelvic floor physical therapy plan through February.  Preop - had ED - prn meds.   Dry mouth on oxybutynin afte rinc to 10    Interval history by ME/ in CLINIC (In-person) on 7/22/25:  History of Present Illness    CHIEF COMPLAINT:  Mr. Dave presents for a follow-up visit to discuss PSA results, stress incontinence, and erectile dysfunction following prostate cancer treatment.    HPI:  Mr. Dave underwent a prostate check by Dr. Marquez in January 2024. Since then, he has stress incontinence, characterized by urine leakage when sneezing, lifting, coughing, or lifting heavy objects. He  attended pelvic physical therapy for this issue. He uses 3-5 pads per day, depending on his activity level. On less active days, he uses about 3 pads, but on busier days when he is bending, lifting, and moving around more, he may use up to 5 pads.    He also has erectile dysfunction. He was prescribed Viagra (sildenafil) 50mg tablets by a previous doctor, which he has taken at doses up to 75mg (1.5 tablets). The medication did not significantly help his condition. He denies having any morning erections.    He has been taking VESIcare for overactive bladder, prescribed by Dr. Werner. He typically urinates every 1-3 hours, though he often loses track when occupied. He can usually reach the bathroom in time, even when in his car in the driveway. He denies dry mouth with the current medication but recalls having this side effect with a previous medication (oxybutynin).    He has a history of prostate cancer, for which he underwent prostatectomy. His recent PSA test showed a level of less than 0.01.    He denies urgency incontinence or frequently leaking urine when trying to make it to the bathroom. He denies any family history of kidney stones, bladder cancer, or renal cancer.    PERTINENT MEDICATIONS:  VESIcare, for overactive bladder  Viagra 50 mg, 1.5 tablets (75 mg) as needed for erectile dysfunction, not effective  Discontinued oxybutynin (previous overactive bladder medication) due to dry mouth side effect    PERTINENT MEDICAL HISTORY:  Prostate cancer: Intermediate risk  Erectile dysfunction  Overactive bladder    PERTINENT FAMILY HISTORY:  Father: Prostate cancer in his 80s    PERTINENT SURGICAL HISTORY:  Prostatectomy: Prior to January 2024, for prostate cancer treatment    PERTINENT TEST RESULTS:  PSA: January 2024, less than 0.01, for prostate cancer monitoring  PSA: Recent, less than 0.01, continues to be low  Urinalysis: Date not specified, negative Prostate check: January 2024, results not  specified    SH:SUBSTANCE USE:  Smoking: No smoking history   My notes:  Prostate cancer - Psa <0.01 7/14/25  BIRGIT - If busy/active then uses 5 pads but if still then 3 pads. Maximum absorbant. He's ok with the pads.   Oab wo UUI - on vesicare 5mg daily. He said denies frequency or urgency now. No dry  mouth. Urinating every 1 to 3 hours.   Ed- rx viagra last time. The most he took was 75mg. Did not help. No morning erections. Ua today neg.     Urine history: family history of kidney, bladder or prostate cancer:father had prostate cancer in his 80s, personal or family history of kidney stones: No,tobacco use: No, anticoagulation: No      PSA History:         REVIEW OF SYSTEMS:  Negative except for as stated above        Objective:     There were no vitals filed for this visit.    Physical Exam                  Assessment:     Ashish Dave is a 69 y.o. male with     1. History of prostate cancer    2. BIRGIT (stress urinary incontinence), male    3. OAB (overactive bladder)    4. Erectile dysfunction, unspecified erectile dysfunction type          Plan:     's typed/written- Abbreviated/Short Plan:  Prostate cancer s/p RALP 1/2024 - psa remains <0.01. continue to check q6 months until January 2026 and then yearly for rest of life.   BIRGIT-continues to leak 1.5 years out. Likley to be at new baseline. Discussed AUS and/or bulking agent which would require cysto first but they want to wait. Leaning towards bulking agent. Can rediscuss cysto for w/u at fu.   Ed- start cialis/tadalafil low dose 5mg daily. Can use sildneafil 75mg or 100mg in addition to daily 1-2x a week 30 min prior to intercourse. Discussed trimix injections. Hates needles. Not interested in penile prosthesis surgery.   Oab wo UUI- dc vesicare but restart if having increased urinary frequency and urgency.     Otherwise  fu in 6 months with disagnostic psa prior       The following assessment plan was created by Balta via ambient  listening:  Assessment & Plan    Z85.46 History of prostate cancer  N39.3 BIRGIT (stress urinary incontinence), male  N32.81 OAB (overactive bladder)  N52.9 Erectile dysfunction, unspecified erectile dysfunction type    IMPRESSION:   Reviewed PSA results, which remain <0.01. Continued surveillance for prostate cancer recurrence with PSA testing every 6 months until January 2026, then yearly thereafter.   Assessed stress incontinence status 1.5 years post-prostatectomy as likely at new baseline.   Discussed treatment options for persistent stress incontinence, including artificial urinary sphincter and bulking agent procedures. Patient prefers conservative management for now.   Discontinued VESIcare and will reassess overactive bladder symptoms. Patient to monitor for changes in urinary frequency and urgency.   Erectile dysfunction: Current Viagra regimen ineffective. Started Cialis (tadalafil) 5 mg daily with continued Viagra (sildenafil)  mg, up to 1-2 times per week, 30 minutes prior to intercourse.   Ruled out need for UTI prophylaxis. Patient to observe for any UTI symptoms and report if they occur, rather than self-treating with OTC medications.    Please review the short plan as above for concise and accurate plan. The dictated/AI generated plan may have some inaccuracies .    PLAN SUMMARY:   Started Cialis (tadalafil) 5 mg daily   Continue Viagra (sildenafil)  mg, up to 1-2 times per week   Discontinued VESIcare   PSA testing every 6 months until January 2026, then yearly thereafter    HISTORY OF PROSTATE CANCER:   Explained PSA monitoring for prostate cancer recurrence and metastasis.   Continued surveillance for prostate cancer recurrence with PSA testing every 6 months until January 2026, then yearly thereafter.    BIRGIT (STRESS URINARY INCONTINENCE), MALE:   Discussed mechanism of stress incontinence post-prostatectomy.    OAB (OVERACTIVE BLADDER):   Mr. Dave to monitor for changes in urinary  frequency and urgency.   Mr. Dave to observe for any UTI symptoms and report if they occur, rather than self-treating with OTC medications.   Discontinued VESIcare and will reassess overactive bladder symptoms.    ERECTILE DYSFUNCTION, UNSPECIFIED ERECTILE DYSFUNCTION TYPE:   Discussed erectile dysfunction treatment options, including penile injections and prosthesis.   Started Cialis (tadalafil) 5 mg daily with continued Viagra (sildenafil)  mg, up to 1-2 times per week, 30 minutes prior to intercourse.             Portions of this note was generated with the assistance of ambient listening technology. Verbal consent was obtained by the patient and accompanying visitor(s) for the recording of patient appointment to facilitate this note. I attest to having reviewed and edited the generated note for accuracy, though some syntax or spelling errors may persist. Please contact the author of this note for any clarification.    Visit today included increased complexity associated with the care of the episodic problem addressed and managing the longitudinal care of the patient due to the serious and/or complex managed problem(s)             Janell Yun MD

## (undated) DEVICE — COVER PROXIMA MAYO STAND

## (undated) DEVICE — SLEEVE SCD EXPRESS KNEE MEDIUM

## (undated) DEVICE — SUTURE ETHIBOND 0 MO-6 18 CX45D

## (undated) DEVICE — CLIP HEMO-LOK ML

## (undated) DEVICE — Device

## (undated) DEVICE — SUT 2-0 VICRYL / CT-1

## (undated) DEVICE — LEGGING CLEAR POLY 2/PACK

## (undated) DEVICE — DRAPE COLUMN DAVINCI XI

## (undated) DEVICE — CONTAINER SPECIMEN OR STER 4OZ

## (undated) DEVICE — SWABSTICK BENZOIN S42450

## (undated) DEVICE — GOWN POLY REINF X-LONG XL

## (undated) DEVICE — DRESSING TRANS 4X4 TEGADERM

## (undated) DEVICE — SPONGE IV DRAIN 4X4 STERILE

## (undated) DEVICE — PAD PINK TRENDELENBURG POS XL

## (undated) DEVICE — TOWEL OR DISP STRL BLUE 4/PK

## (undated) DEVICE — SCISSORS 5MM APPLIED MEDICAL   CB030

## (undated) DEVICE — SEAL UNIVERSAL 5MM-8MM XI

## (undated) DEVICE — PACK CUSTOM UNIV BASIN SLI

## (undated) DEVICE — GOWN POLY REINF BRTH SLV XL

## (undated) DEVICE — DRAIN SIL FLT 7MM FULL PERF ST

## (undated) DEVICE — GLOVE BIOGEL PI ULTRA TOUCH GRAY SZ7.5

## (undated) DEVICE — TROCAR ADVANCED FIXATION  CFF03

## (undated) DEVICE — NDL SAFETY 21G X 1 1/2 ECLPSE

## (undated) DEVICE — HEMOSTAT SURGICEL 4X8IN

## (undated) DEVICE — APPLICATOR CHLORAPREP ORN 26ML

## (undated) DEVICE — DRAPE ABDOMINAL TIBURON 14X11

## (undated) DEVICE — DISSECTOR KITTNER 13300

## (undated) DEVICE — SOL ELECTROLUBE ANTI-STIC

## (undated) DEVICE — SUT ABS CLIP LAPRA-TY CTD

## (undated) DEVICE — SUTURE MONOCRYL 4-0 PS-2 27 MCP426H

## (undated) DEVICE — SOL NACL IRR 3000ML

## (undated) DEVICE — TRAY GENERAL LAPAROSCOPY

## (undated) DEVICE — SYR 10CC LUER LOCK

## (undated) DEVICE — SUT EASE CROSSBOW CLSR SYS

## (undated) DEVICE — IRRIGATOR SUCTION W/TIP

## (undated) DEVICE — SLEEVE TROCAR 5MMX100MM  CTS02

## (undated) DEVICE — CABLE MONOPOLAR 10FT DISPOSABLE

## (undated) DEVICE — SUT ETHILON 2-0 BLK PS-2

## (undated) DEVICE — RETRIEVER ENDOPOUCH SPEC BAG

## (undated) DEVICE — SCISSOR 5MMX35CM DIRECT DRIVE

## (undated) DEVICE — TRAY CATH FOL SIL 10ML 18FR

## (undated) DEVICE — LINER SUCTION 3000CC

## (undated) DEVICE — DEVICE SNAPSECURE FOL CATH

## (undated) DEVICE — UNDERGLOVE BIOGEL PI MICRO BLUE SZ 8

## (undated) DEVICE — SOLUTION IRRI NS BOTTLE 1000ML R5200-01

## (undated) DEVICE — NDL PNEUMOPERITONEUM 150MM

## (undated) DEVICE — COVER LIGHT HANDLE LB53

## (undated) DEVICE — TAPE SILK 3IN

## (undated) DEVICE — GAUZE DRAIN N WVN 6PLY 4X4IN

## (undated) DEVICE — PACK SIRUS BASIC V SURG STRL

## (undated) DEVICE — TUBING INSUFFLATION 10FT

## (undated) DEVICE — SUT STRATAFIX PGAPCL 3 RB-1

## (undated) DEVICE — CLIP HEMO-LOK MLX LARGE LF

## (undated) DEVICE — TROCAR ENDOPATH XCEL 12X100MM

## (undated) DEVICE — ADHESIVE DERMABOND ADVANCED

## (undated) DEVICE — GLOVE SENSICARE PI ALOE 7

## (undated) DEVICE — SYR SLIP TIP 10ML SHIELD

## (undated) DEVICE — BAG TISS RETRV MONARCH 10MM

## (undated) DEVICE — DRAPE ARM DAVINCI XI

## (undated) DEVICE — TROCAR ENDOPATH XCEL 5X100MM

## (undated) DEVICE — SYS LABEL CORRECT MED

## (undated) DEVICE — SOL CLEARIFY VISUALIZATION LAP

## (undated) DEVICE — GLOVE SENSICARE PI GRN 7

## (undated) DEVICE — UNDERGLOVE BIOGEL PI MICRO BLUE SZ 6.5

## (undated) DEVICE — SYR 50ML CATH TIP

## (undated) DEVICE — COVER TIP CURVED SCISSORS XI

## (undated) DEVICE — SEALER VESSEL EXTEND

## (undated) DEVICE — EVACUATOR WOUND BULB 100CC

## (undated) DEVICE — APPLIER CLIP  5MM

## (undated) DEVICE — SUT MONOCRYL 4-0 PS-2

## (undated) DEVICE — BLADE SURG CARBON STEEL SZ11

## (undated) DEVICE — JELLY SURGILUBE LUBE TUBE 2OZ

## (undated) DEVICE — ELECTRODE REM PLYHSV RETURN 9

## (undated) DEVICE — SOLUTION IRRI H2O BOTTLE 1000ML

## (undated) DEVICE — CLIPPER BLADE MOD 4406 (CAREF)

## (undated) DEVICE — TROCAR BALL. BLNT HASSON C0R47

## (undated) DEVICE — SET TUBE PNEUMOCLEAR SE HI FLO

## (undated) DEVICE — DRESSING MEPORE 2.5 X 3   670800

## (undated) DEVICE — GLOVE BIOGEL PI MICRO INDIC 7

## (undated) DEVICE — SUT 2-0 VICRYL / SH (J417)

## (undated) DEVICE — PAD BOVIE ADULT

## (undated) DEVICE — SUT PDS II 0 CT-1 VIL MONO